# Patient Record
Sex: FEMALE | Race: WHITE | NOT HISPANIC OR LATINO | Employment: UNEMPLOYED | URBAN - METROPOLITAN AREA
[De-identification: names, ages, dates, MRNs, and addresses within clinical notes are randomized per-mention and may not be internally consistent; named-entity substitution may affect disease eponyms.]

---

## 2023-09-07 ENCOUNTER — HOSPITAL ENCOUNTER (INPATIENT)
Dept: DATA CONVERSION | Facility: HOSPITAL | Age: 68
Discharge: NURSING, REHAB OR LONG-TERM CARE | End: 2023-09-13
Payer: COMMERCIAL

## 2023-09-07 DIAGNOSIS — I16.0 HYPERTENSIVE URGENCY: ICD-10-CM

## 2023-09-07 DIAGNOSIS — Z86.16 PERSONAL HISTORY OF COVID-19: ICD-10-CM

## 2023-09-07 DIAGNOSIS — R29.810 FACIAL WEAKNESS: ICD-10-CM

## 2023-09-07 DIAGNOSIS — R33.9 RETENTION OF URINE, UNSPECIFIED: ICD-10-CM

## 2023-09-07 DIAGNOSIS — E66.9 OBESITY, UNSPECIFIED: ICD-10-CM

## 2023-09-07 DIAGNOSIS — I11.0 HYPERTENSIVE HEART DISEASE WITH HEART FAILURE (MULTI): ICD-10-CM

## 2023-09-07 DIAGNOSIS — E87.6 HYPOKALEMIA: ICD-10-CM

## 2023-09-07 DIAGNOSIS — G45.9 TRANSIENT CEREBRAL ISCHEMIC ATTACK, UNSPECIFIED: ICD-10-CM

## 2023-09-07 DIAGNOSIS — Z79.899 OTHER LONG TERM (CURRENT) DRUG THERAPY: ICD-10-CM

## 2023-09-07 DIAGNOSIS — S40.812A ABRASION OF LEFT UPPER ARM, INITIAL ENCOUNTER: ICD-10-CM

## 2023-09-07 DIAGNOSIS — I61.9 NONTRAUMATIC INTRACEREBRAL HEMORRHAGE, UNSPECIFIED (MULTI): ICD-10-CM

## 2023-09-07 DIAGNOSIS — I43 CARDIOMYOPATHY IN DISEASES CLASSIFIED ELSEWHERE (MULTI): ICD-10-CM

## 2023-09-07 DIAGNOSIS — R47.81 SLURRED SPEECH: ICD-10-CM

## 2023-09-07 DIAGNOSIS — Z87.891 PERSONAL HISTORY OF NICOTINE DEPENDENCE: ICD-10-CM

## 2023-09-07 DIAGNOSIS — I50.21 ACUTE SYSTOLIC (CONGESTIVE) HEART FAILURE (MULTI): ICD-10-CM

## 2023-09-07 DIAGNOSIS — S40.811A ABRASION OF RIGHT UPPER ARM, INITIAL ENCOUNTER: ICD-10-CM

## 2023-09-07 DIAGNOSIS — I49.3 VENTRICULAR PREMATURE DEPOLARIZATION: ICD-10-CM

## 2023-09-07 DIAGNOSIS — R47.1 DYSARTHRIA AND ANARTHRIA: ICD-10-CM

## 2023-09-07 DIAGNOSIS — N17.9 ACUTE KIDNEY FAILURE, UNSPECIFIED (CMS-HCC): ICD-10-CM

## 2023-09-07 DIAGNOSIS — I70.1 ATHEROSCLEROSIS OF RENAL ARTERY (CMS-HCC): ICD-10-CM

## 2023-09-07 DIAGNOSIS — Z91.148 PATIENT'S OTHER NONCOMPLIANCE WITH MEDICATION REGIMEN FOR OTHER REASON: ICD-10-CM

## 2023-09-07 LAB
ALBUMIN SERPL-MCNC: 4.3 GM/DL (ref 3.5–5)
ALBUMIN/GLOB SERPL: 1.2 RATIO (ref 1.5–3)
ALP BLD-CCNC: 138 U/L (ref 35–125)
ALT SERPL-CCNC: 11 U/L (ref 5–40)
ANION GAP SERPL CALCULATED.3IONS-SCNC: 15 MMOL/L (ref 0–19)
AST SERPL-CCNC: 19 U/L (ref 5–40)
BACTERIA UR QL AUTO: NEGATIVE
BASOPHILS # BLD AUTO: 0.08 K/UL (ref 0–0.22)
BASOPHILS NFR BLD AUTO: 1 % (ref 0–1)
BILIRUB SERPL-MCNC: 1.1 MG/DL (ref 0.1–1.2)
BILIRUB UR QL STRIP.AUTO: NEGATIVE
BUN SERPL-MCNC: 13 MG/DL (ref 8–25)
BUN/CREAT SERPL: 11.8 RATIO (ref 8–21)
CALCIUM SERPL-MCNC: 9.8 MG/DL (ref 8.5–10.4)
CHLORIDE SERPL-SCNC: 105 MMOL/L (ref 97–107)
CLARITY UR: CLEAR
CO2 SERPL-SCNC: 23 MMOL/L (ref 24–31)
COLOR UR: ABNORMAL
CREAT SERPL-MCNC: 1.1 MG/DL (ref 0.4–1.6)
DEPRECATED RDW RBC AUTO: 39.4 FL (ref 37–54)
DIFFERENTIAL METHOD BLD: ABNORMAL
EOSINOPHIL # BLD AUTO: 0.13 K/UL (ref 0–0.45)
EOSINOPHIL NFR BLD: 1.5 % (ref 0–3)
ERYTHROCYTE [DISTWIDTH] IN BLOOD BY AUTOMATED COUNT: 13.1 % (ref 11.7–15)
GFR SERPL CREATININE-BSD FRML MDRD: 55 ML/MIN/1.73 M2
GLOBULIN SER-MCNC: 3.6 G/DL (ref 1.9–3.7)
GLUCOSE SERPL-MCNC: 104 MG/DL (ref 65–99)
GLUCOSE UR STRIP.AUTO-MCNC: NEGATIVE MG/DL
HCT VFR BLD AUTO: 42.7 % (ref 36–44)
HGB BLD-MCNC: 15.4 GM/DL (ref 12–15)
HGB UR QL STRIP.AUTO: 1 /HPF (ref 0–3)
HGB UR QL: ABNORMAL
HS TROPONIN T DELTA: 1 (ref 0–4)
HS TROPONIN T DELTA: ABNORMAL (ref 0–4)
HYALINE CASTS UR QL AUTO: ABNORMAL /LPF
IMM GRANULOCYTES # BLD AUTO: 0.02 K/UL (ref 0–0.1)
KETONES UR QL STRIP.AUTO: NEGATIVE
LEUKOCYTE ESTERASE UR QL STRIP.AUTO: NEGATIVE
LIPASE SERPL-CCNC: 17 U/L (ref 16–63)
LYMPHOCYTES # BLD AUTO: 1.29 K/UL (ref 1.2–3.2)
LYMPHOCYTES NFR BLD MANUAL: 15.3 % (ref 20–40)
MCH RBC QN AUTO: 30.1 PG (ref 26–34)
MCHC RBC AUTO-ENTMCNC: 36.1 % (ref 31–37)
MCV RBC AUTO: 83.6 FL (ref 80–100)
MICROSCOPIC (UA): ABNORMAL
MONOCYTES # BLD AUTO: 0.6 K/UL (ref 0–0.8)
MONOCYTES NFR BLD MANUAL: 7.1 % (ref 0–8)
NEUTROPHILS # BLD AUTO: 6.29 K/UL
NEUTROPHILS # BLD AUTO: 6.29 K/UL (ref 1.8–7.7)
NEUTROPHILS.IMMATURE NFR BLD: 0.2 % (ref 0–1)
NEUTS SEG NFR BLD: 74.9 % (ref 50–70)
NITRITE UR QL STRIP.AUTO: NEGATIVE
NRBC BLD-RTO: 0 /100 WBC
NT-PROBNP SERPL-MCNC: 7278 PG/ML (ref 0–353)
PH UR STRIP.AUTO: 6 [PH] (ref 4.6–8)
PLATELET # BLD AUTO: 266 K/UL (ref 150–450)
PMV BLD AUTO: 11.7 CU (ref 7–12.6)
POTASSIUM SERPL-SCNC: 3.3 MMOL/L (ref 3.4–5.1)
PROT SERPL-MCNC: 7.9 G/DL (ref 5.9–7.9)
PROT UR STRIP.AUTO-MCNC: 50 MG/DL
RBC # BLD AUTO: 5.11 M/UL (ref 4–4.9)
SODIUM SERPL-SCNC: 143 MMOL/L (ref 133–145)
SP GR UR STRIP.AUTO: 1 (ref 1–1.03)
SQUAMOUS UR QL AUTO: ABNORMAL /HPF
TROPONIN T SERPL-MCNC: 25 NG/L
TROPONIN T SERPL-MCNC: 26 NG/L
URINE CULTURE: ABNORMAL
UROBILINOGEN UR QL STRIP.AUTO: NORMAL MG/DL (ref 0–1)
WBC # BLD AUTO: 8.4 K/UL (ref 4.5–11)
WBC #/AREA URNS AUTO: 1 /HPF (ref 0–3)

## 2023-09-08 LAB
ANION GAP SERPL CALCULATED.3IONS-SCNC: 14 MMOL/L (ref 0–19)
BUN SERPL-MCNC: 17 MG/DL (ref 8–25)
BUN/CREAT SERPL: 11.3 RATIO (ref 8–21)
CALCIUM SERPL-MCNC: 9.2 MG/DL (ref 8.5–10.4)
CHLORIDE SERPL-SCNC: 104 MMOL/L (ref 97–107)
CO2 SERPL-SCNC: 27 MMOL/L (ref 24–31)
CREAT SERPL-MCNC: 1.5 MG/DL (ref 0.4–1.6)
DEPRECATED RDW RBC AUTO: 41.9 FL (ref 37–54)
ERYTHROCYTE [DISTWIDTH] IN BLOOD BY AUTOMATED COUNT: 13.5 % (ref 11.7–15)
GFR SERPL CREATININE-BSD FRML MDRD: 38 ML/MIN/1.73 M2
GLUCOSE SERPL-MCNC: 109 MG/DL (ref 65–99)
HCT VFR BLD AUTO: 39 % (ref 36–44)
HGB BLD-MCNC: 13.7 GM/DL (ref 12–15)
MCH RBC QN AUTO: 29.8 PG (ref 26–34)
MCHC RBC AUTO-ENTMCNC: 35.1 % (ref 31–37)
MCV RBC AUTO: 85 FL (ref 80–100)
NRBC BLD-RTO: 0 /100 WBC
PLATELET # BLD AUTO: 235 K/UL (ref 150–450)
PMV BLD AUTO: 11.6 CU (ref 7–12.6)
POTASSIUM SERPL-SCNC: 3.4 MMOL/L (ref 3.4–5.1)
RBC # BLD AUTO: 4.59 M/UL (ref 4–4.9)
SODIUM SERPL-SCNC: 145 MMOL/L (ref 133–145)
WBC # BLD AUTO: 9.3 K/UL (ref 4.5–11)

## 2023-09-09 LAB
ANION GAP SERPL CALCULATED.3IONS-SCNC: 15 MMOL/L (ref 0–19)
BUN SERPL-MCNC: 25 MG/DL (ref 8–25)
BUN/CREAT SERPL: 15.6 RATIO (ref 8–21)
CALCIUM SERPL-MCNC: 9.4 MG/DL (ref 8.5–10.4)
CHLORIDE SERPL-SCNC: 105 MMOL/L (ref 97–107)
CO2 SERPL-SCNC: 23 MMOL/L (ref 24–31)
CREAT SERPL-MCNC: 1.6 MG/DL (ref 0.4–1.6)
DEPRECATED RDW RBC AUTO: 42.3 FL (ref 37–54)
ERYTHROCYTE [DISTWIDTH] IN BLOOD BY AUTOMATED COUNT: 13.5 % (ref 11.7–15)
GFR SERPL CREATININE-BSD FRML MDRD: 35 ML/MIN/1.73 M2
GLUCOSE SERPL-MCNC: 98 MG/DL (ref 65–99)
HCT VFR BLD AUTO: 41.3 % (ref 36–44)
HGB BLD-MCNC: 14.3 GM/DL (ref 12–15)
MCH RBC QN AUTO: 29.7 PG (ref 26–34)
MCHC RBC AUTO-ENTMCNC: 34.6 % (ref 31–37)
MCV RBC AUTO: 85.9 FL (ref 80–100)
NRBC BLD-RTO: 0 /100 WBC
PLATELET # BLD AUTO: 232 K/UL (ref 150–450)
PMV BLD AUTO: 11.7 CU (ref 7–12.6)
POTASSIUM SERPL-SCNC: 3.9 MMOL/L (ref 3.4–5.1)
RBC # BLD AUTO: 4.81 M/UL (ref 4–4.9)
SODIUM SERPL-SCNC: 143 MMOL/L (ref 133–145)
WBC # BLD AUTO: 7.7 K/UL (ref 4.5–11)

## 2023-09-10 LAB
ALBUMIN SERPL-MCNC: 3.8 GM/DL (ref 3.5–5)
ANION GAP SERPL CALCULATED.3IONS-SCNC: 14 MMOL/L (ref 0–19)
ANTICOAGULANT: NORMAL
APTT PPP: 27.3 SEC (ref 22–32.5)
BASOPHILS # BLD AUTO: 0.06 K/UL (ref 0–0.22)
BASOPHILS NFR BLD AUTO: 0.6 % (ref 0–1)
BUN SERPL-MCNC: 20 MG/DL (ref 8–25)
BUN/CREAT SERPL: 18.2 RATIO (ref 8–21)
CALCIUM SERPL-MCNC: 9.5 MG/DL (ref 8.5–10.4)
CHLORIDE SERPL-SCNC: 106 MMOL/L (ref 97–107)
CO2 SERPL-SCNC: 23 MMOL/L (ref 24–31)
CREAT SERPL-MCNC: 1.1 MG/DL (ref 0.4–1.6)
DEPRECATED RDW RBC AUTO: 41.9 FL (ref 37–54)
DEPRECATED RDW RBC AUTO: 42.3 FL (ref 37–54)
DIFFERENTIAL METHOD BLD: ABNORMAL
EOSINOPHIL # BLD AUTO: 0.2 K/UL (ref 0–0.45)
EOSINOPHIL NFR BLD: 1.9 % (ref 0–3)
ERYTHROCYTE [DISTWIDTH] IN BLOOD BY AUTOMATED COUNT: 13.3 % (ref 11.7–15)
ERYTHROCYTE [DISTWIDTH] IN BLOOD BY AUTOMATED COUNT: 13.5 % (ref 11.7–15)
GFR SERPL CREATININE-BSD FRML MDRD: 55 ML/MIN/1.73 M2
GLUCOSE SERPL-MCNC: 108 MG/DL (ref 65–99)
HCT VFR BLD AUTO: 43.6 % (ref 36–44)
HCT VFR BLD AUTO: 44 % (ref 36–44)
HGB BLD-MCNC: 15.1 GM/DL (ref 12–15)
HGB BLD-MCNC: 15.3 GM/DL (ref 12–15)
IMM GRANULOCYTES # BLD AUTO: 0.02 K/UL (ref 0–0.1)
INR PPP: 1 (ref 0.86–1.16)
LYMPHOCYTES # BLD AUTO: 1.58 K/UL (ref 1.2–3.2)
LYMPHOCYTES NFR BLD MANUAL: 15.2 % (ref 20–40)
MAGNESIUM SERPL-MCNC: 1.8 MG/DL (ref 1.6–3.1)
MCH RBC QN AUTO: 30 PG (ref 26–34)
MCH RBC QN AUTO: 30 PG (ref 26–34)
MCHC RBC AUTO-ENTMCNC: 34.6 % (ref 31–37)
MCHC RBC AUTO-ENTMCNC: 34.8 % (ref 31–37)
MCV RBC AUTO: 86.3 FL (ref 80–100)
MCV RBC AUTO: 86.5 FL (ref 80–100)
MONOCYTES # BLD AUTO: 1.14 K/UL (ref 0–0.8)
MONOCYTES NFR BLD MANUAL: 11 % (ref 0–8)
NEUTROPHILS # BLD AUTO: 7.39 K/UL
NEUTROPHILS # BLD AUTO: 7.39 K/UL (ref 1.8–7.7)
NEUTROPHILS.IMMATURE NFR BLD: 0.2 % (ref 0–1)
NEUTS SEG NFR BLD: 71.1 % (ref 50–70)
NRBC BLD-RTO: 0 /100 WBC
NRBC BLD-RTO: 0 /100 WBC
PHOSPHATE SERPL-MCNC: 3.2 MG/DL (ref 2.5–4.5)
PLATELET # BLD AUTO: 263 K/UL (ref 150–450)
PLATELET # BLD AUTO: 273 K/UL (ref 150–450)
PMV BLD AUTO: 11.3 CU (ref 7–12.6)
PMV BLD AUTO: 11.6 CU (ref 7–12.6)
POTASSIUM SERPL-SCNC: 4 MMOL/L (ref 3.4–5.1)
PROTHROMBIN TIME: 10.9 SEC (ref 9.3–12.7)
RBC # BLD AUTO: 5.04 M/UL (ref 4–4.9)
RBC # BLD AUTO: 5.1 M/UL (ref 4–4.9)
SODIUM SERPL-SCNC: 143 MMOL/L (ref 133–145)
WBC # BLD AUTO: 10.4 K/UL (ref 4.5–11)
WBC # BLD AUTO: 9.7 K/UL (ref 4.5–11)

## 2023-09-11 LAB
ALBUMIN SERPL-MCNC: 3.7 GM/DL (ref 3.5–5)
ALBUMIN SERPL-MCNC: 3.7 GM/DL (ref 3.5–5)
ALBUMIN/GLOB SERPL: 1.2 RATIO (ref 1.5–3)
ALP BLD-CCNC: 111 U/L (ref 35–125)
ALT SERPL-CCNC: 10 U/L (ref 5–40)
ANION GAP SERPL CALCULATED.3IONS-SCNC: 13 MMOL/L (ref 0–19)
ANION GAP SERPL CALCULATED.3IONS-SCNC: 14 MMOL/L (ref 0–19)
ANTICOAGULANT: ABNORMAL
APPEARANCE PLAS: CLEAR
APTT PPP: 51.4 SEC (ref 22–32.5)
AST SERPL-CCNC: 19 U/L (ref 5–40)
BILIRUB SERPL-MCNC: 1.4 MG/DL (ref 0.1–1.2)
BUN SERPL-MCNC: 22 MG/DL (ref 8–25)
BUN SERPL-MCNC: 26 MG/DL (ref 8–25)
BUN/CREAT SERPL: 20 RATIO (ref 8–21)
BUN/CREAT SERPL: 20 RATIO (ref 8–21)
CALCIUM SERPL-MCNC: 9 MG/DL (ref 8.5–10.4)
CALCIUM SERPL-MCNC: 9.2 MG/DL (ref 8.5–10.4)
CHLORIDE SERPL-SCNC: 103 MMOL/L (ref 97–107)
CHLORIDE SERPL-SCNC: 106 MMOL/L (ref 97–107)
CHOLEST SERPL-MCNC: 194 MG/DL (ref 133–200)
CHOLEST/HDLC SERPL: 4.4 RATIO
CO2 SERPL-SCNC: 22 MMOL/L (ref 24–31)
CO2 SERPL-SCNC: 22 MMOL/L (ref 24–31)
COLOR SPUN FLD: YELLOW
CREAT SERPL-MCNC: 1.1 MG/DL (ref 0.4–1.6)
CREAT SERPL-MCNC: 1.3 MG/DL (ref 0.4–1.6)
FASTING STATUS PATIENT QL REPORTED: ABNORMAL
GFR SERPL CREATININE-BSD FRML MDRD: 45 ML/MIN/1.73 M2
GFR SERPL CREATININE-BSD FRML MDRD: 55 ML/MIN/1.73 M2
GLOBULIN SER-MCNC: 3.2 G/DL (ref 1.9–3.7)
GLUCOSE SERPL-MCNC: 106 MG/DL (ref 65–99)
GLUCOSE SERPL-MCNC: 107 MG/DL (ref 65–99)
HBA1C MFR BLD: 5.4 % (ref 4–6)
HDLC SERPL-MCNC: 44 MG/DL
LDLC SERPL CALC-MCNC: 127 MG/DL (ref 65–130)
MAGNESIUM SERPL-MCNC: 2.1 MG/DL (ref 1.6–3.1)
MAGNESIUM SERPL-MCNC: 2.2 MG/DL (ref 1.6–3.1)
PHOSPHATE SERPL-MCNC: 3.4 MG/DL (ref 2.5–4.5)
POTASSIUM SERPL-SCNC: 3.4 MMOL/L (ref 3.4–5.1)
POTASSIUM SERPL-SCNC: 3.6 MMOL/L (ref 3.4–5.1)
PROT SERPL-MCNC: 6.9 G/DL (ref 5.9–7.9)
SODIUM SERPL-SCNC: 139 MMOL/L (ref 133–145)
SODIUM SERPL-SCNC: 141 MMOL/L (ref 133–145)
TRIGL SERPL-MCNC: 113 MG/DL (ref 40–150)

## 2023-09-12 LAB
ALBUMIN SERPL-MCNC: 3.4 GM/DL (ref 3.5–5)
ANION GAP SERPL CALCULATED.3IONS-SCNC: 14 MMOL/L (ref 0–19)
BUN SERPL-MCNC: 29 MG/DL (ref 8–25)
BUN/CREAT SERPL: 22.3 RATIO (ref 8–21)
CALCIUM SERPL-MCNC: 9 MG/DL (ref 8.5–10.4)
CHLORIDE SERPL-SCNC: 107 MMOL/L (ref 97–107)
CO2 SERPL-SCNC: 22 MMOL/L (ref 24–31)
CREAT SERPL-MCNC: 1.3 MG/DL (ref 0.4–1.6)
GFR SERPL CREATININE-BSD FRML MDRD: 45 ML/MIN/1.73 M2
GLUCOSE SERPL-MCNC: 95 MG/DL (ref 65–99)
MAGNESIUM SERPL-MCNC: 2 MG/DL (ref 1.6–3.1)
PHOSPHATE SERPL-MCNC: 4 MG/DL (ref 2.5–4.5)
POTASSIUM SERPL-SCNC: 3.5 MMOL/L (ref 3.4–5.1)
SODIUM SERPL-SCNC: 142 MMOL/L (ref 133–145)
VIT B12 SERPL-MCNC: 361 PG/ML (ref 211–946)

## 2023-09-13 LAB
ALDOST SERPL-MCNC: NORMAL NG/DL
ANION GAP SERPL CALCULATED.3IONS-SCNC: 10 MMOL/L (ref 0–19)
BUN SERPL-MCNC: 33 MG/DL (ref 8–25)
BUN/CREAT SERPL: 25.4 RATIO (ref 8–21)
CALCIUM SERPL-MCNC: 9 MG/DL (ref 8.5–10.4)
CHLORIDE SERPL-SCNC: 107 MMOL/L (ref 97–107)
CO2 SERPL-SCNC: 24 MMOL/L (ref 24–31)
CREAT SERPL-MCNC: 1.3 MG/DL (ref 0.4–1.6)
DEPRECATED RDW RBC AUTO: 41.4 FL (ref 37–54)
ERYTHROCYTE [DISTWIDTH] IN BLOOD BY AUTOMATED COUNT: 13.2 % (ref 11.7–15)
GFR SERPL CREATININE-BSD FRML MDRD: 45 ML/MIN/1.73 M2
GLUCOSE SERPL-MCNC: 102 MG/DL (ref 65–99)
HCT VFR BLD AUTO: 38.9 % (ref 36–44)
HGB BLD-MCNC: 13.5 GM/DL (ref 12–15)
MCH RBC QN AUTO: 29.9 PG (ref 26–34)
MCHC RBC AUTO-ENTMCNC: 34.7 % (ref 31–37)
MCV RBC AUTO: 86.1 FL (ref 80–100)
NRBC BLD-RTO: 0 /100 WBC
PLATELET # BLD AUTO: 225 K/UL (ref 150–450)
PMV BLD AUTO: 11.5 CU (ref 7–12.6)
POTASSIUM SERPL-SCNC: 3.4 MMOL/L (ref 3.4–5.1)
RBC # BLD AUTO: 4.52 M/UL (ref 4–4.9)
SODIUM SERPL-SCNC: 141 MMOL/L (ref 133–145)
WBC # BLD AUTO: 8.4 K/UL (ref 4.5–11)

## 2023-09-14 LAB
FOLATE RBC-MCNC: NORMAL NG/ML
HCT VFR BLD AUTO: 42.9 % (ref 36–44)
RENIN PLAS-CCNC: NORMAL NG/ML/H

## 2023-09-15 LAB
BACTERIA SPEC CULT: NORMAL
BILIRUB UR QL STRIP.AUTO: NEGATIVE
CLARITY UR: ABNORMAL
COLOR UR: YELLOW
EPITH CASTS #/AREA UR COMP ASSIST: ABNORMAL /HPF
GLUCOSE UR STRIP.AUTO-MCNC: NEGATIVE MG/DL
HGB UR QL STRIP.AUTO: ABNORMAL /HPF (ref 0–3)
HGB UR QL: ABNORMAL
KETONES UR QL STRIP.AUTO: NEGATIVE
LEUKOCYTE ESTERASE UR QL STRIP.AUTO: ABNORMAL
NITRITE UR QL STRIP.AUTO: NEGATIVE
PH UR STRIP.AUTO: 5.5 [PH] (ref 4.6–8)
PROT UR STRIP.AUTO-MCNC: ABNORMAL MG/DL
REFLEX MICROSCOPIC (UA): ABNORMAL
REPORT STATUS -LH SQ DATA CONVERSION: NORMAL
SERVICE CMNT-IMP: NORMAL
SP GR UR STRIP.AUTO: 1.02 (ref 1–1.03)
SPECIMEN SOURCE: NORMAL
UNSPECIFIED CRY UR QL COMP ASSIST: ABNORMAL
UROBILINOGEN UR QL STRIP.AUTO: NORMAL MG/DL (ref 0–1)
WBC #/AREA URNS AUTO: ABNORMAL /HPF (ref 0–3)

## 2023-09-18 LAB — METHYLMALONATE SERPL-SCNC: NORMAL

## 2023-09-21 LAB
ANION GAP SERPL CALCULATED.3IONS-SCNC: 10 MMOL/L (ref 0–19)
BUN SERPL-MCNC: 18 MG/DL (ref 8–25)
BUN/CREAT SERPL: 15 RATIO (ref 8–21)
CALCIUM SERPL-MCNC: 9.2 MG/DL (ref 8.5–10.4)
CHLORIDE SERPL-SCNC: 108 MMOL/L (ref 97–107)
CO2 SERPL-SCNC: 26 MMOL/L (ref 24–31)
CREAT SERPL-MCNC: 1.2 MG/DL (ref 0.4–1.6)
DEPRECATED RDW RBC AUTO: 42.1 FL (ref 37–54)
ERYTHROCYTE [DISTWIDTH] IN BLOOD BY AUTOMATED COUNT: 12.9 % (ref 11.7–15)
GFR SERPL CREATININE-BSD FRML MDRD: 50 ML/MIN/1.73 M2
GLUCOSE SERPL-MCNC: 99 MG/DL (ref 65–99)
HCT VFR BLD AUTO: 37.7 % (ref 36–44)
HGB BLD-MCNC: 13.1 GM/DL (ref 12–15)
MCH RBC QN AUTO: 30.8 PG (ref 26–34)
MCHC RBC AUTO-ENTMCNC: 34.7 % (ref 31–37)
MCV RBC AUTO: 88.5 FL (ref 80–100)
NRBC BLD-RTO: 0 /100 WBC
PLATELET # BLD AUTO: 259 K/UL (ref 150–450)
PMV BLD AUTO: 12 CU (ref 7–12.6)
POTASSIUM SERPL-SCNC: 3.6 MMOL/L (ref 3.4–5.1)
RBC # BLD AUTO: 4.26 M/UL (ref 4–4.9)
SODIUM SERPL-SCNC: 143 MMOL/L (ref 133–145)
WBC # BLD AUTO: 5.8 K/UL (ref 4.5–11)

## 2023-09-28 ENCOUNTER — HOSPITAL ENCOUNTER (OUTPATIENT)
Dept: DATA CONVERSION | Facility: HOSPITAL | Age: 68
Discharge: HOME | End: 2023-09-28
Payer: COMMERCIAL

## 2023-09-28 DIAGNOSIS — Z00.00 ENCOUNTER FOR GENERAL ADULT MEDICAL EXAMINATION WITHOUT ABNORMAL FINDINGS: ICD-10-CM

## 2023-09-28 LAB
ANION GAP SERPL CALCULATED.3IONS-SCNC: 10 MMOL/L (ref 0–19)
BUN SERPL-MCNC: 18 MG/DL (ref 8–25)
BUN/CREAT SERPL: 15 RATIO (ref 8–21)
CALCIUM SERPL-MCNC: 9 MG/DL (ref 8.5–10.4)
CHLORIDE SERPL-SCNC: 104 MMOL/L (ref 97–107)
CO2 SERPL-SCNC: 27 MMOL/L (ref 24–31)
CREAT SERPL-MCNC: 1.2 MG/DL (ref 0.4–1.6)
DEPRECATED RDW RBC AUTO: 40.9 FL (ref 37–54)
ERYTHROCYTE [DISTWIDTH] IN BLOOD BY AUTOMATED COUNT: 12.8 % (ref 11.7–15)
GFR SERPL CREATININE-BSD FRML MDRD: 50 ML/MIN/1.73 M2
GLUCOSE SERPL-MCNC: 93 MG/DL (ref 65–99)
HCT VFR BLD AUTO: 38.9 % (ref 36–44)
HGB BLD-MCNC: 13.5 GM/DL (ref 12–15)
MCH RBC QN AUTO: 30.4 PG (ref 26–34)
MCHC RBC AUTO-ENTMCNC: 34.7 % (ref 31–37)
MCV RBC AUTO: 87.6 FL (ref 80–100)
NRBC BLD-RTO: 0 /100 WBC
PLATELET # BLD AUTO: 221 K/UL (ref 150–450)
PMV BLD AUTO: 11.8 CU (ref 7–12.6)
POTASSIUM SERPL-SCNC: 3.8 MMOL/L (ref 3.4–5.1)
RBC # BLD AUTO: 4.44 M/UL (ref 4–4.9)
SODIUM SERPL-SCNC: 141 MMOL/L (ref 133–145)
WBC # BLD AUTO: 6.9 K/UL (ref 4.5–11)

## 2023-10-05 ENCOUNTER — PATIENT OUTREACH (OUTPATIENT)
Dept: CASE MANAGEMENT | Facility: HOSPITAL | Age: 68
End: 2023-10-05
Payer: COMMERCIAL

## 2023-10-06 ENCOUNTER — DOCUMENTATION (OUTPATIENT)
Dept: CASE MANAGEMENT | Facility: HOSPITAL | Age: 68
End: 2023-10-06
Payer: COMMERCIAL

## 2023-10-13 ENCOUNTER — PATIENT OUTREACH (OUTPATIENT)
Dept: CASE MANAGEMENT | Facility: HOSPITAL | Age: 68
End: 2023-10-13
Payer: COMMERCIAL

## 2023-10-13 NOTE — PROGRESS NOTES
Called out to Tammy who was DC on 9/13/23. She was in rehab until a few days ago. She is set up with an appointment with Raisa JONES/[facility] on Monday. I provided her with Dr. Alonzo's number to make a follow up visit with him. She was started on Entresto and carvedilol during her hospital stay and states she is taking her meds as prescribed. She denies HF symptoms and states she is doing well.

## 2023-10-14 PROBLEM — G45.9 TRANSIENT ISCHEMIC ATTACK: Status: ACTIVE | Noted: 2023-10-14

## 2023-10-14 PROBLEM — I70.1 RENAL ARTERY STENOSIS (CMS-HCC): Status: ACTIVE | Noted: 2023-10-14

## 2023-10-14 PROBLEM — R47.81 SLURRED SPEECH: Status: ACTIVE | Noted: 2023-10-14

## 2023-10-14 PROBLEM — I10 ESSENTIAL HYPERTENSION: Status: ACTIVE | Noted: 2023-10-14

## 2023-10-14 PROBLEM — I50.9 ACUTE CONGESTIVE HEART FAILURE (MULTI): Status: ACTIVE | Noted: 2023-10-14

## 2023-10-14 PROBLEM — R33.9 URINARY RETENTION: Status: ACTIVE | Noted: 2023-10-14

## 2023-10-14 PROBLEM — I70.1 ATHEROSCLEROSIS OF RENAL ARTERY (CMS-HCC): Status: ACTIVE | Noted: 2023-10-14

## 2023-10-14 PROBLEM — I50.21 ACUTE SYSTOLIC HEART FAILURE (MULTI): Status: ACTIVE | Noted: 2023-10-14

## 2023-10-14 PROBLEM — R47.1 DYSARTHRIA: Status: ACTIVE | Noted: 2023-10-14

## 2023-10-14 PROBLEM — I16.0 HYPERTENSIVE URGENCY: Status: ACTIVE | Noted: 2023-10-14

## 2023-10-14 PROBLEM — N23 URINARY TRACT PAIN: Status: ACTIVE | Noted: 2023-10-14

## 2023-10-14 PROBLEM — R06.09 DYSPNEA ON EXERTION: Status: ACTIVE | Noted: 2023-10-14

## 2023-10-14 RX ORDER — SACUBITRIL AND VALSARTAN 49; 51 MG/1; MG/1
1 TABLET, FILM COATED ORAL 2 TIMES DAILY
COMMUNITY
End: 2023-10-16 | Stop reason: SDUPTHER

## 2023-10-14 RX ORDER — ATENOLOL 25 MG/1
25 TABLET ORAL DAILY
COMMUNITY
Start: 2023-10-03 | End: 2023-10-16 | Stop reason: DRUGHIGH

## 2023-10-14 RX ORDER — CLOPIDOGREL BISULFATE 75 MG/1
75 TABLET ORAL DAILY
COMMUNITY
End: 2023-10-16 | Stop reason: ALTCHOICE

## 2023-10-14 RX ORDER — AMLODIPINE BESYLATE 10 MG/1
10 TABLET ORAL DAILY
COMMUNITY
End: 2023-10-16 | Stop reason: WASHOUT

## 2023-10-14 RX ORDER — MIRTAZAPINE 15 MG/1
15 TABLET, FILM COATED ORAL NIGHTLY
COMMUNITY
Start: 2023-10-03 | End: 2023-10-16 | Stop reason: SDUPTHER

## 2023-10-14 RX ORDER — NYSTATIN 100000 [USP'U]/G
1 POWDER TOPICAL 2 TIMES DAILY
COMMUNITY
End: 2023-10-16 | Stop reason: ALTCHOICE

## 2023-10-14 RX ORDER — LABETALOL 200 MG/1
200 TABLET, FILM COATED ORAL 2 TIMES DAILY
COMMUNITY
End: 2023-10-16 | Stop reason: ALTCHOICE

## 2023-10-14 RX ORDER — NAPROXEN SODIUM 220 MG/1
81 TABLET, FILM COATED ORAL DAILY
COMMUNITY

## 2023-10-14 RX ORDER — ATORVASTATIN CALCIUM 40 MG/1
40 TABLET, FILM COATED ORAL NIGHTLY
COMMUNITY
End: 2023-10-16 | Stop reason: SDUPTHER

## 2023-10-14 RX ORDER — TAMSULOSIN HYDROCHLORIDE 0.4 MG/1
0.4 CAPSULE ORAL DAILY
COMMUNITY
End: 2023-10-16 | Stop reason: ALTCHOICE

## 2023-10-16 ENCOUNTER — OFFICE VISIT (OUTPATIENT)
Dept: PRIMARY CARE | Facility: CLINIC | Age: 68
End: 2023-10-16
Payer: COMMERCIAL

## 2023-10-16 VITALS
RESPIRATION RATE: 16 BRPM | HEART RATE: 88 BPM | HEIGHT: 66 IN | DIASTOLIC BLOOD PRESSURE: 112 MMHG | TEMPERATURE: 97 F | BODY MASS INDEX: 29.09 KG/M2 | OXYGEN SATURATION: 97 % | WEIGHT: 181 LBS | SYSTOLIC BLOOD PRESSURE: 200 MMHG

## 2023-10-16 DIAGNOSIS — E78.2 MODERATE MIXED HYPERLIPIDEMIA NOT REQUIRING STATIN THERAPY: Primary | ICD-10-CM

## 2023-10-16 DIAGNOSIS — I10 HTN, GOAL BELOW 140/80: ICD-10-CM

## 2023-10-16 DIAGNOSIS — I50.41 ACUTE COMBINED SYSTOLIC AND DIASTOLIC CONGESTIVE HEART FAILURE (MULTI): ICD-10-CM

## 2023-10-16 DIAGNOSIS — F51.01 PRIMARY INSOMNIA: ICD-10-CM

## 2023-10-16 PROBLEM — N13.9 OBSTRUCTIVE AND REFLUX UROPATHY, UNSPECIFIED: Status: ACTIVE | Noted: 2023-09-21

## 2023-10-16 PROBLEM — Z91.199 NONCOMPLIANCE: Status: ACTIVE | Noted: 2023-09-13

## 2023-10-16 PROBLEM — Z86.16 PERSONAL HISTORY OF COVID-19: Status: ACTIVE | Noted: 2023-09-13

## 2023-10-16 PROBLEM — E87.6 HYPOKALEMIA: Status: ACTIVE | Noted: 2023-09-13

## 2023-10-16 PROCEDURE — 1159F MED LIST DOCD IN RCRD: CPT | Performed by: NURSE PRACTITIONER

## 2023-10-16 PROCEDURE — 1036F TOBACCO NON-USER: CPT | Performed by: NURSE PRACTITIONER

## 2023-10-16 PROCEDURE — 99350 HOME/RES VST EST HIGH MDM 60: CPT | Performed by: NURSE PRACTITIONER

## 2023-10-16 PROCEDURE — 3080F DIAST BP >= 90 MM HG: CPT | Performed by: NURSE PRACTITIONER

## 2023-10-16 PROCEDURE — 1126F AMNT PAIN NOTED NONE PRSNT: CPT | Performed by: NURSE PRACTITIONER

## 2023-10-16 PROCEDURE — 1160F RVW MEDS BY RX/DR IN RCRD: CPT | Performed by: NURSE PRACTITIONER

## 2023-10-16 PROCEDURE — 3077F SYST BP >= 140 MM HG: CPT | Performed by: NURSE PRACTITIONER

## 2023-10-16 RX ORDER — MIRTAZAPINE 15 MG/1
15 TABLET, FILM COATED ORAL NIGHTLY
Qty: 30 TABLET | Refills: 2 | Status: SHIPPED | OUTPATIENT
Start: 2023-10-16 | End: 2024-01-23

## 2023-10-16 RX ORDER — ATENOLOL 50 MG/1
50 TABLET ORAL DAILY
Qty: 30 TABLET | Refills: 5 | Status: SHIPPED | OUTPATIENT
Start: 2023-10-16 | End: 2023-11-13 | Stop reason: SDUPTHER

## 2023-10-16 RX ORDER — SACUBITRIL AND VALSARTAN 49; 51 MG/1; MG/1
1 TABLET, FILM COATED ORAL 2 TIMES DAILY
Qty: 60 TABLET | Refills: 2 | Status: SHIPPED | OUTPATIENT
Start: 2023-10-16 | End: 2023-11-20 | Stop reason: DRUGHIGH

## 2023-10-16 RX ORDER — ATORVASTATIN CALCIUM 40 MG/1
40 TABLET, FILM COATED ORAL NIGHTLY
Qty: 30 TABLET | Refills: 2 | Status: SHIPPED | OUTPATIENT
Start: 2023-10-16 | End: 2024-01-23

## 2023-10-16 ASSESSMENT — ENCOUNTER SYMPTOMS
DEPRESSION: 0
OCCASIONAL FEELINGS OF UNSTEADINESS: 1
LOSS OF SENSATION IN FEET: 0

## 2023-10-16 ASSESSMENT — PAIN SCALES - GENERAL: PAINLEVEL: 0-NO PAIN

## 2023-10-16 NOTE — PROGRESS NOTES
Subjective   Patient ID: Tammy Walker is a 68 y.o. female who presents for Follow-up (Post Acute).    Patient is seen in her private home sitting up on the sofa in the living room.  She is awake and alert with appropriate awareness and is her own decision maker.  Currently is non-amabulatory, can bear weight only and propels herself in the wheelchair. PMH: HTN, Covid-19 appx 2021, Hyperlipidemia, TIA.  Medical Necessity - Homebound due to functional decline from Covid-19 Long Hauler.    Today is an initial visit to establish with [facility].  Referral  to [facility] placed by Jamee Lowry NP to assist/arrange f/u for heart failure.   Post Acute:  Admitted to [facility] 9/7/23. Presented to the ED for evaluation of increased urinary frequency and burning x 5 days. Patient also reported increased swelling of her legs and SOB with exertion. Significant HTN noted in the ED,  systolic, hydralazine given. 226 on repeat evaluation, labetalol given. Rush catheter placed with over 800 cc urine output. U/A showed no evidence of UTI at this time. Troponin 26, BNP 7278.  I-Team called 9/10/23. Per NP note: Patient suddenly with speech change, not making sense. Last known normal was about 20 minutes earlier. Hypertensive; noted to be having labile BP since admission and medications have been adjusted. Stat CT of head without contrast done - verbal report is no acute change. Definite improvement after return to the unit after CT done. Speech clear and well articulated and she knows that she is speaking correctly/remembers not speaking correctly. Vision now to baseline and upper extremities no longer show neglect. Lower extremities still show right sided neglect. Discussed with Dr. Sheridan/ neurology on call. Due to improving symptoms will not do TNK. Will start heparin infusion. Obtain MRI/MRA in am. Consult neurology NP progress note: MRI negative for acute stroke however showed  "severe small vessel ischemic disease with multiple small areas of microhemorrhages consistent with severe uncontrolled HTN. Patient discharged to [facility],    Since having Covid in 2021 increased weakness, HTN and new onset A-fib.  Active with Dr. Alonzo, nisreen has note seen since her last hospitalization in September.  Since that time she is not able to ambulate.  She did go to SNF after hospital discharge in September, however reports that she did not make much progress.  Reports that she thought that home therapies was arranged, however states that she never heard from anyone.  She does has [employer] and may be due to pre-authorization of additional therapies.  Today she reports a headache with occassional dizziness.  Reports that she has not had this in a while, has not taken anything over the counter.  Denies double vision, nausea or the worse headache she ever had.  Appetite is reported as good, however does report appx 26 lb weight loss over the past two months.          Current Outpatient Medications:     aspirin 81 mg chewable tablet, Chew 1 tablet (81 mg) once daily., Disp: , Rfl:     atenolol (Tenormin) 50 mg tablet, Take 1 tablet (50 mg) by mouth once daily., Disp: 30 tablet, Rfl: 5    atorvastatin (Lipitor) 40 mg tablet, Take 1 tablet (40 mg) by mouth once daily at bedtime., Disp: 30 tablet, Rfl: 2    Entresto 49-51 mg tablet, Take 1 tablet by mouth 2 times a day., Disp: 60 tablet, Rfl: 2    mirtazapine (Remeron) 15 mg tablet, Take 1 tablet (15 mg) by mouth once daily at bedtime., Disp: 30 tablet, Rfl: 2     Review of Systems    Objective   BP (!) 200/112 (BP Location: Right arm, Patient Position: Sitting) Comment: Repeat 191/100  Pulse 88   Temp 36.1 °C (97 °F) (Temporal)   Resp 16   Ht 1.676 m (5' 6\")   Wt 82.1 kg (181 lb)   SpO2 97%   BMI 29.21 kg/m²     Physical Exam    Assessment/Plan   Problem List Items Addressed This Visit             ICD-10-CM    Acute congestive heart " failure (CMS/[facility]) I50.9    Relevant Medications    atenolol (Tenormin) 50 mg tablet    Entresto 49-51 mg tablet    Primary insomnia F51.01    Relevant Medications    mirtazapine (Remeron) 15 mg tablet    Moderate mixed hyperlipidemia not requiring statin therapy - Primary E78.2    Relevant Medications    atorvastatin (Lipitor) 40 mg tablet     Other Visit Diagnoses         Codes    HTN, goal below 140/80     I10          Will continue with [facility] visits with follow up in 4 weeks.  Routine labs at that time.  HTN; schedule appt with Dr. Alonzo-cardiology  BP: goal less than 140/80 - Today additional atenolol 25mg given - Increased dosage to 50mg daily.  Great deal of education to monitor blood pressure daily - call provider if BP is consistently greater than goal.       Raisa Goff, APRN-CNP

## 2023-10-26 ENCOUNTER — TELEPHONE (OUTPATIENT)
Dept: PRIMARY CARE | Facility: CLINIC | Age: 68
End: 2023-10-26
Payer: COMMERCIAL

## 2023-10-26 DIAGNOSIS — R53.1 DECREASED STRENGTH, ENDURANCE, AND MOBILITY: Primary | ICD-10-CM

## 2023-10-26 DIAGNOSIS — R68.89 DECREASED STRENGTH, ENDURANCE, AND MOBILITY: Primary | ICD-10-CM

## 2023-10-26 DIAGNOSIS — Z74.09 DECREASED STRENGTH, ENDURANCE, AND MOBILITY: Primary | ICD-10-CM

## 2023-10-26 NOTE — TELEPHONE ENCOUNTER
Patient seen 10/2023 by [facility] provider CHARLI Goff NP.  If patient is still in need of [facility] services please send referral and notes including therapy needs information directly to [facility] at [phone] (fax).

## 2023-11-13 DIAGNOSIS — I50.41 ACUTE COMBINED SYSTOLIC AND DIASTOLIC CONGESTIVE HEART FAILURE (MULTI): ICD-10-CM

## 2023-11-13 RX ORDER — ATENOLOL 50 MG/1
50 TABLET ORAL DAILY
Qty: 90 TABLET | Refills: 3 | Status: SHIPPED | OUTPATIENT
Start: 2023-11-13 | End: 2023-11-20 | Stop reason: SDUPTHER

## 2023-11-17 ENCOUNTER — TELEPHONE (OUTPATIENT)
Dept: PRIMARY CARE | Facility: CLINIC | Age: 68
End: 2023-11-17
Payer: COMMERCIAL

## 2023-11-20 ENCOUNTER — APPOINTMENT (OUTPATIENT)
Dept: CARDIOLOGY | Facility: CLINIC | Age: 68
End: 2023-11-20
Payer: COMMERCIAL

## 2023-11-20 ENCOUNTER — OFFICE VISIT (OUTPATIENT)
Dept: PRIMARY CARE | Facility: CLINIC | Age: 68
End: 2023-11-20
Payer: COMMERCIAL

## 2023-11-20 VITALS
HEIGHT: 66 IN | OXYGEN SATURATION: 96 % | RESPIRATION RATE: 16 BRPM | BODY MASS INDEX: 30.53 KG/M2 | SYSTOLIC BLOOD PRESSURE: 204 MMHG | HEART RATE: 58 BPM | WEIGHT: 190 LBS | DIASTOLIC BLOOD PRESSURE: 114 MMHG | TEMPERATURE: 96.7 F

## 2023-11-20 DIAGNOSIS — M10.9 GOUT OF MULTIPLE SITES, UNSPECIFIED CAUSE, UNSPECIFIED CHRONICITY: ICD-10-CM

## 2023-11-20 DIAGNOSIS — I10 ESSENTIAL (PRIMARY) HYPERTENSION: Primary | Chronic | ICD-10-CM

## 2023-11-20 DIAGNOSIS — E78.2 MIXED HYPERLIPIDEMIA: ICD-10-CM

## 2023-11-20 DIAGNOSIS — Z86.16 PERSONAL HISTORY OF COVID-19: ICD-10-CM

## 2023-11-20 DIAGNOSIS — I50.41 ACUTE COMBINED SYSTOLIC AND DIASTOLIC CONGESTIVE HEART FAILURE (MULTI): ICD-10-CM

## 2023-11-20 DIAGNOSIS — E55.9 VITAMIN D DEFICIENCY: Chronic | ICD-10-CM

## 2023-11-20 PROCEDURE — 82306 VITAMIN D 25 HYDROXY: CPT

## 2023-11-20 PROCEDURE — 3080F DIAST BP >= 90 MM HG: CPT | Performed by: NURSE PRACTITIONER

## 2023-11-20 PROCEDURE — 1126F AMNT PAIN NOTED NONE PRSNT: CPT | Performed by: NURSE PRACTITIONER

## 2023-11-20 PROCEDURE — 80061 LIPID PANEL: CPT

## 2023-11-20 PROCEDURE — 80053 COMPREHEN METABOLIC PANEL: CPT

## 2023-11-20 PROCEDURE — 1036F TOBACCO NON-USER: CPT | Performed by: NURSE PRACTITIONER

## 2023-11-20 PROCEDURE — 3077F SYST BP >= 140 MM HG: CPT | Performed by: NURSE PRACTITIONER

## 2023-11-20 PROCEDURE — 1160F RVW MEDS BY RX/DR IN RCRD: CPT | Performed by: NURSE PRACTITIONER

## 2023-11-20 PROCEDURE — 84550 ASSAY OF BLOOD/URIC ACID: CPT

## 2023-11-20 PROCEDURE — 85025 COMPLETE CBC W/AUTO DIFF WBC: CPT

## 2023-11-20 PROCEDURE — 1159F MED LIST DOCD IN RCRD: CPT | Performed by: NURSE PRACTITIONER

## 2023-11-20 PROCEDURE — 99350 HOME/RES VST EST HIGH MDM 60: CPT | Performed by: NURSE PRACTITIONER

## 2023-11-20 RX ORDER — SACUBITRIL AND VALSARTAN 97; 103 MG/1; MG/1
1 TABLET, FILM COATED ORAL 2 TIMES DAILY
Qty: 180 TABLET | Refills: 12 | Status: SHIPPED | OUTPATIENT
Start: 2023-11-20 | End: 2024-05-15 | Stop reason: SINTOL

## 2023-11-20 RX ORDER — ACETAMINOPHEN 500 MG
5000 TABLET ORAL DAILY
Qty: 12 TABLET | Refills: 2 | Status: SHIPPED | OUTPATIENT
Start: 2023-11-20 | End: 2023-12-20

## 2023-11-20 RX ORDER — FUROSEMIDE 40 MG/1
40 TABLET ORAL DAILY
Qty: 30 TABLET | Refills: 11 | Status: SHIPPED | OUTPATIENT
Start: 2023-11-20 | End: 2024-11-19

## 2023-11-20 RX ORDER — ATENOLOL 50 MG/1
50 TABLET ORAL 2 TIMES DAILY
Qty: 90 TABLET | Refills: 3 | Status: SHIPPED | OUTPATIENT
Start: 2023-11-20 | End: 2023-12-18 | Stop reason: SDUPTHER

## 2023-11-20 ASSESSMENT — ENCOUNTER SYMPTOMS
DIZZINESS: 1
BLOOD IN STOOL: 0
FATIGUE: 0
SPEECH DIFFICULTY: 0
UNEXPECTED WEIGHT CHANGE: 0
CONSTIPATION: 0
FREQUENCY: 1
WHEEZING: 0
HEADACHES: 0
SHORTNESS OF BREATH: 0
FEVER: 0
VOMITING: 0
PSYCHIATRIC NEGATIVE: 1
TREMORS: 0
DIFFICULTY URINATING: 0
WEAKNESS: 1
ARTHRALGIAS: 1
APPETITE CHANGE: 0
CHEST TIGHTNESS: 0
ACTIVITY CHANGE: 0
ENDOCRINE NEGATIVE: 1
CARDIOVASCULAR NEGATIVE: 1
CHILLS: 0
DIARRHEA: 1

## 2023-11-20 ASSESSMENT — PAIN SCALES - GENERAL: PAINLEVEL: 0-NO PAIN

## 2023-11-20 NOTE — PROGRESS NOTES
"Subjective   Patient ID: Tammy Walker is a 68 y.o. female who presents for Follow-up (HTN, Lab draw, functional decline).    Patient is seen in her private home sitting up on the sofa in the living room.  She is awake and alert with appropriate awareness and is her own decision maker.  Currently is non-amabulatory, can bear weight only and propels herself in the wheelchair. PMH: HTN, Covid-19 appx 2021, Hyperlipidemia, TIA.  Medical Necessity - Homebound due to functional decline from Covid-19 Long Hauler.    Today is a follow up for HTN and functional decline.  Sister reports giving atenolol bid which appears to be helping.  Sister is montoring her blood pressure daily, and has been quite elevate with SBP of 190's and DBP > 100's.  Blood pressures checked multiple times during home visit with BP of 200/113.  This provider recommended ER due to elevated BP, patient and sister are refusing at this time.  2nd dosage of Entresto administered during visit.  Great deal of education regarding body's inability to withstand elevated blood pressures on a regular basis and is high risk for stroke and/or other complications.  Appointment scheduled with cardiology Dr. Alonzo Jan 3rd. Although blood pressure is elevated, she is asymptomatic of HTN.  Denies headache, dizziness, double vision or shortness of breath.  However she does present with 3+ pitting edema bilateral lower extremities.  Reports that her feet are sore, however reports \"doesn't seem to bother me\".  Again a great deal of education regarding risk of fluid overload.  Admits to monitoring her weights \"a couple times weekly\".  Actual weight today is 190 lbs with some weight gain from last home visit.   Appetite is reported as good, mentions \"I don't put salt on my food\", however meals at times are processed and frozen foods.  Reports that bowels are regular.  Reports urinating frequently, but denies pain, burning or discomfort.  Admits that she is inactive and " remains stationary on the sofa most of the day.  Does have the ability to stand and transfer on her own, however is only transferring to wheelchair and propelling herself around.  Has the ability to ambulate, however is choosing not to ambulate due to fear of falling.  Sister reports that they did order a stationary bike for exercising.  Recommendations are for PT/OT in the home setting, however she is refusing.  Denies chest pain, palpitations, tachycardia, and shortness of breath, wheezing, no PND/orthopnea, or respiratory complaints for the patient. There is no fever, or chills. No nausea, vomiting, diarrhea, headaches, or vision changes or recent falls. There is no hematuria, dysuria, flank pain, or increased urgency.  Home Visit: Medically necessary due to: dementia/cognitive impairment, unsteady gait/poor balance, shortness of breath with minimal exertion, Illness or condition that results in activity limitation or restriction that impacts the ability to leave home such as: equipment and /or human assistance needed to safely leave the home, patient has limited support systems to help the patient attend office visits, the office visit would require excessive physical effort or pain for the patient.            Review of Systems   Constitutional:  Negative for activity change, appetite change, chills, fatigue, fever and unexpected weight change.   HENT: Negative.     Respiratory:  Negative for chest tightness, shortness of breath and wheezing.    Cardiovascular: Negative.    Gastrointestinal:  Positive for diarrhea. Negative for blood in stool, constipation and vomiting.   Endocrine: Negative.    Genitourinary:  Positive for frequency and urgency. Negative for difficulty urinating.   Musculoskeletal:  Positive for arthralgias and gait problem.   Skin: Negative.    Neurological:  Positive for dizziness and weakness. Negative for tremors, speech difficulty and headaches.   Psychiatric/Behavioral: Negative.    "      Objective   BP (!) 204/114 Comment: Repeat 196/100  Pulse 58   Temp 35.9 °C (96.7 °F) (Temporal)   Resp 16   Ht 1.676 m (5' 6\")   Wt 86.2 kg (190 lb)   SpO2 96%   BMI 30.67 kg/m²     Physical Exam  Constitutional:       Appearance: Normal appearance.   HENT:      Head: Normocephalic and atraumatic.      Nose: Nose normal.      Mouth/Throat:      Mouth: Mucous membranes are moist.      Pharynx: Oropharynx is clear.   Eyes:      Extraocular Movements: Extraocular movements intact.      Pupils: Pupils are equal, round, and reactive to light.   Cardiovascular:      Rate and Rhythm: Normal rate and regular rhythm.      Pulses: Normal pulses.      Heart sounds: Normal heart sounds.   Pulmonary:      Effort: Pulmonary effort is normal.      Breath sounds: Normal breath sounds. No wheezing or rhonchi.   Chest:      Chest wall: No tenderness.   Abdominal:      General: Bowel sounds are normal.      Palpations: Abdomen is soft.   Musculoskeletal:         General: Swelling present.      Cervical back: Neck supple. No tenderness.      Comments: 3+ left lower ext pitting edema, 2+ rt pitting edema   Skin:     General: Skin is warm and dry.      Findings: No erythema.   Neurological:      Mental Status: She is alert and oriented to person, place, and time. Mental status is at baseline.   Psychiatric:         Mood and Affect: Mood normal.         Judgment: Judgment normal.         Assessment/Plan   Problem List Items Addressed This Visit             ICD-10-CM    Acute congestive heart failure (CMS/HCC) I50.9    Relevant Medications    atenolol (Tenormin) 50 mg tablet    furosemide (Lasix) 40 mg tablet    sacubitriL-valsartan (Entresto)  mg tablet    Other Relevant Orders    Venipunc need phys skill, dx or rx    Essential (primary) hypertension - Primary I10    Relevant Medications    sacubitriL-valsartan (Entresto)  mg tablet    Other Relevant Orders    Comprehensive metabolic panel (Completed)    CBC and " Auto Differential (Completed)    Venipunc need phys skill, dx or rx    Personal history of COVID-19 Z86.16    Mixed hyperlipidemia E78.2    Relevant Orders    Lipid panel (Completed)    Vitamin D deficiency (Chronic) E55.9    Relevant Medications    cholecalciferol (Vitamin D-3) 5,000 Units tablet    Other Relevant Orders    Vitamin D 25-Hydroxy,Total (for eval of Vitamin D levels) (Completed)    Gout of multiple sites M10.9    Relevant Orders    Uric acid (Completed)   Will continue with [facility] visits.  HTN: Increase Entresto 97/103 bid and atenolol 50mg bid - will recheck BP in one hour and notify provider.  CHF:  will add furosemide 40mg daily  Additional Entresto given during today's visit  Labs drawn today without complications  Vitamin D Deficiency - add cholecalciferol 5000 units weekly  F/U with cardiology Jan 3rd.  [phone] - Tammy cell phone  More than 50% of time spent in face-to-face discussion @ a total of 60 minutes with this patient on counseling, coordination of care, collaboration with family and staff and review of medical records and diagnostics.  Follow up call to patient regarding lab results and Blood pressure reading - reports BP at 188/98       Raisa Goff, ANGELI-CNP

## 2023-12-01 ENCOUNTER — TELEPHONE (OUTPATIENT)
Dept: PRIMARY CARE | Facility: CLINIC | Age: 68
End: 2023-12-01
Payer: COMMERCIAL

## 2023-12-01 NOTE — TELEPHONE ENCOUNTER
Phoned patient to confirm 12/4/23 [facility] visit with Raisa Goff NP. No answer on home phone, LMOM. No answer on cell phone and voice mailbox is not set up.   Phoned patient in follow up 12/4/23 at 10:52 am, visit confirmed.

## 2023-12-04 ENCOUNTER — OFFICE VISIT (OUTPATIENT)
Dept: PRIMARY CARE | Facility: CLINIC | Age: 68
End: 2023-12-04
Payer: COMMERCIAL

## 2023-12-04 VITALS
RESPIRATION RATE: 16 BRPM | SYSTOLIC BLOOD PRESSURE: 180 MMHG | HEIGHT: 66 IN | DIASTOLIC BLOOD PRESSURE: 102 MMHG | HEART RATE: 90 BPM | TEMPERATURE: 97 F | BODY MASS INDEX: 30.7 KG/M2 | OXYGEN SATURATION: 98 % | WEIGHT: 191 LBS

## 2023-12-04 DIAGNOSIS — I50.41 ACUTE COMBINED SYSTOLIC AND DIASTOLIC CONGESTIVE HEART FAILURE (MULTI): ICD-10-CM

## 2023-12-04 DIAGNOSIS — E55.9 VITAMIN D DEFICIENCY: Chronic | ICD-10-CM

## 2023-12-04 DIAGNOSIS — I10 ESSENTIAL (PRIMARY) HYPERTENSION: Primary | Chronic | ICD-10-CM

## 2023-12-04 PROCEDURE — 99349 HOME/RES VST EST MOD MDM 40: CPT | Performed by: NURSE PRACTITIONER

## 2023-12-04 PROCEDURE — 1159F MED LIST DOCD IN RCRD: CPT | Performed by: NURSE PRACTITIONER

## 2023-12-04 PROCEDURE — 3080F DIAST BP >= 90 MM HG: CPT | Performed by: NURSE PRACTITIONER

## 2023-12-04 PROCEDURE — 1160F RVW MEDS BY RX/DR IN RCRD: CPT | Performed by: NURSE PRACTITIONER

## 2023-12-04 PROCEDURE — 3077F SYST BP >= 140 MM HG: CPT | Performed by: NURSE PRACTITIONER

## 2023-12-04 PROCEDURE — 1126F AMNT PAIN NOTED NONE PRSNT: CPT | Performed by: NURSE PRACTITIONER

## 2023-12-04 PROCEDURE — 1036F TOBACCO NON-USER: CPT | Performed by: NURSE PRACTITIONER

## 2023-12-04 RX ORDER — HYDROCHLOROTHIAZIDE 25 MG/1
25 TABLET ORAL 2 TIMES DAILY
Qty: 60 TABLET | Refills: 3 | Status: SHIPPED | OUTPATIENT
Start: 2023-12-04 | End: 2024-01-05 | Stop reason: DRUGHIGH

## 2023-12-04 RX ORDER — HYDROCHLOROTHIAZIDE 25 MG/1
25 TABLET ORAL DAILY
Qty: 30 TABLET | Refills: 5 | Status: SHIPPED | OUTPATIENT
Start: 2023-12-04 | End: 2023-12-04 | Stop reason: SDUPTHER

## 2023-12-04 ASSESSMENT — PAIN SCALES - GENERAL: PAINLEVEL: 0-NO PAIN

## 2023-12-04 NOTE — PROGRESS NOTES
Subjective   Patient ID: Tammy Walker is a 68 y.o. female who presents for Hypertension and Follow-up (HTN).    Patient is seen in her private home sitting up on the sofa in the living room.  She is awake and alert with appropriate awareness and is her own decision maker.  Currently is non-ambulatory, can bear weight only and propels herself in the wheelchair. PMH: HTN, Covid-19 appx 2021, Hyperlipidemia, TIA.  Medical Necessity - Homebound due to functional decline from Covid-19 Long Hauler.     Today is a follow up for HTN and functional decline.  Sister reports giving all medications as prescribed.  Recently increased Entresto, and started furosemide 40mg daily.  Appears to be tolerating well, however sister reports blood pressures have improved slightly, however SBP is still >170's. And DBP is > 100 at times.   Although she is not symptomatic, denies headache, dizziness, chest pain,  we have discussed the risks of continued hypertension, and why daily monitoring of blood pressure and weights are vital. They have  recently purchased a scale with recent weight of 190 lbs which is unchanged from most recent reported weight and after taking diuretic.  Admits that appetite choices are not great and does not always follow a low sodium cardiac diet.  Appointment scheduled with cardiology Dr. Alonzo January 3rd.  Remains stationary most of the day.  Does not ambulate, and is only transferring from sofa to wheelchair, propelling self around.  Urinating without complications, admits to frequency and is continent of bowel and bladder.  Denies chest pain, palpitations, tachycardia, and shortness of breath, wheezing, no PND/orthopnea, or respiratory complaints for the patient. There is no fever, or chills. No nausea, vomiting, diarrhea, headaches, or vision changes or recent falls. There is no hematuria, dysuria, flank pain, or increased urgency.    Home Visit: Medically necessary due to: dementia/cognitive impairment,  unsteady gait/poor balance, shortness of breath with minimal exertion, Illness or condition that results in activity limitation or restriction that impacts the ability to leave home such as: equipment and /or human assistance needed to safely leave the home, patient has limited support systems to help the patient attend office visits, the office visit would require excessive physical effort or pain for the patient.              Current Outpatient Medications:     aspirin 81 mg chewable tablet, Chew 1 tablet (81 mg) once daily., Disp: , Rfl:     atenolol (Tenormin) 50 mg tablet, Take 1 tablet (50 mg) by mouth 2 times a day., Disp: 90 tablet, Rfl: 3    atorvastatin (Lipitor) 40 mg tablet, Take 1 tablet (40 mg) by mouth once daily at bedtime., Disp: 30 tablet, Rfl: 2    cholecalciferol (Vitamin D-3) 5,000 Units tablet, Take 1 tablet (5,000 Units) by mouth once daily., Disp: 12 tablet, Rfl: 2    furosemide (Lasix) 40 mg tablet, Take 1 tablet (40 mg) by mouth once daily., Disp: 30 tablet, Rfl: 11    mirtazapine (Remeron) 15 mg tablet, Take 1 tablet (15 mg) by mouth once daily at bedtime., Disp: 30 tablet, Rfl: 2    sacubitriL-valsartan (Entresto)  mg tablet, Take 1 tablet by mouth 2 times a day., Disp: 180 tablet, Rfl: 12     Review of Systems  Constitutional:  Negative for activity change, appetite change, chills, fatigue, fever and unexpected weight change.   HENT: Negative.     Respiratory:  Negative for chest tightness, shortness of breath and wheezing.    Cardiovascular: Negative.    Gastrointestinal:  Negative for blood in stool, constipation and vomiting.   Endocrine: Negative.    Genitourinary:  Positive for frequency and urgency. Negative for difficulty urinating.   Musculoskeletal:  Positive for arthralgias and gait problem.   Skin: Negative.    Neurological:  Positive for  weakness. Negative for tremors, speech difficulty and headaches.   Psychiatric/Behavioral: Negative.       Objective   BP (!) 180/102  "  Pulse 90   Temp 36.1 °C (97 °F) (Temporal)   Resp 16   Ht 1.676 m (5' 6\")   Wt 86.6 kg (191 lb)   SpO2 98%   BMI 30.83 kg/m²     Physical Exam  Constitutional:       Appearance: Normal appearance.   HENT:      Head: Normocephalic and atraumatic.      Nose: Nose normal.      Mouth/Throat:      Mouth: Mucous membranes are moist.      Pharynx: Oropharynx is clear.   Eyes:      Extraocular Movements: Extraocular movements intact.      Pupils: Pupils are equal, round, and reactive to light.   Cardiovascular:      Rate and Rhythm: Normal rate and regular rhythm.      Pulses: Normal pulses.      Heart sounds: Normal heart sounds.   Pulmonary:      Effort: Pulmonary effort is normal.      Breath sounds: Normal breath sounds. No wheezing or rhonchi.   Abdominal:      General: Bowel sounds are normal.      Palpations: Abdomen is soft.   Musculoskeletal:         General: Swelling present.      Cervical back: Neck supple. No tenderness.      Comments: 3+ left lower ext pitting edema, 2+ rt pitting edema   Skin:     General: Skin is warm and dry.      Findings: No erythema.   Neurological:      Mental Status: She is alert and oriented to person, place, and time. Mental status is at baseline.   Psychiatric:         Mood and Affect: Mood normal.         Judgment: Judgment normal.     Assessment/Plan   Problem List Items Addressed This Visit             ICD-10-CM    Acute congestive heart failure (CMS/HCC) - Primary I50.9    Essential (primary) hypertension I10    Vitamin D deficiency (Chronic) E55.9     Will continue with [facility] visits.  Follow up scheduled for 2 weeks due to uncontrolled HTN.    HTN - Start hydrochlorothiazide 25mg bid.  Goal SBP <140 and DBP <80  CHF - Continue with furosemide 40mg daily - education regarding monitoring weights.  If weight gain of 3 lbs in one day or 5 lbs in one week should notify provider FLOR ESPAÑA Kitchen, APRN-CNP   "

## 2023-12-14 ENCOUNTER — APPOINTMENT (OUTPATIENT)
Dept: VASCULAR SURGERY | Facility: CLINIC | Age: 68
End: 2023-12-14
Payer: COMMERCIAL

## 2023-12-15 ENCOUNTER — TELEPHONE (OUTPATIENT)
Dept: PRIMARY CARE | Facility: CLINIC | Age: 68
End: 2023-12-15
Payer: COMMERCIAL

## 2023-12-15 NOTE — TELEPHONE ENCOUNTER
Call placed to patient at home number as listed, [phone]. Appointment confirmed via phone call with patient.

## 2023-12-18 ENCOUNTER — OFFICE VISIT (OUTPATIENT)
Dept: PRIMARY CARE | Facility: CLINIC | Age: 68
End: 2023-12-18
Payer: COMMERCIAL

## 2023-12-18 DIAGNOSIS — I10 ESSENTIAL (PRIMARY) HYPERTENSION: Primary | Chronic | ICD-10-CM

## 2023-12-18 DIAGNOSIS — E55.9 VITAMIN D DEFICIENCY: Chronic | ICD-10-CM

## 2023-12-18 DIAGNOSIS — I50.41 ACUTE COMBINED SYSTOLIC AND DIASTOLIC CONGESTIVE HEART FAILURE (MULTI): ICD-10-CM

## 2023-12-18 DIAGNOSIS — Z86.16 PERSONAL HISTORY OF COVID-19: ICD-10-CM

## 2023-12-18 DIAGNOSIS — M1A.09X0 IDIOPATHIC CHRONIC GOUT OF MULTIPLE SITES WITHOUT TOPHUS: ICD-10-CM

## 2023-12-18 PROCEDURE — 3077F SYST BP >= 140 MM HG: CPT | Performed by: NURSE PRACTITIONER

## 2023-12-18 PROCEDURE — 99349 HOME/RES VST EST MOD MDM 40: CPT | Performed by: NURSE PRACTITIONER

## 2023-12-18 PROCEDURE — 1159F MED LIST DOCD IN RCRD: CPT | Performed by: NURSE PRACTITIONER

## 2023-12-18 PROCEDURE — 3080F DIAST BP >= 90 MM HG: CPT | Performed by: NURSE PRACTITIONER

## 2023-12-18 PROCEDURE — 1126F AMNT PAIN NOTED NONE PRSNT: CPT | Performed by: NURSE PRACTITIONER

## 2023-12-18 PROCEDURE — 1160F RVW MEDS BY RX/DR IN RCRD: CPT | Performed by: NURSE PRACTITIONER

## 2023-12-18 PROCEDURE — 1036F TOBACCO NON-USER: CPT | Performed by: NURSE PRACTITIONER

## 2023-12-18 RX ORDER — ATENOLOL 50 MG/1
50 TABLET ORAL 2 TIMES DAILY
Qty: 90 TABLET | Refills: 3 | Status: SHIPPED | OUTPATIENT
Start: 2023-12-18

## 2023-12-18 ASSESSMENT — ENCOUNTER SYMPTOMS
VOMITING: 0
FATIGUE: 0
SPEECH DIFFICULTY: 0
WOUND: 0
LIGHT-HEADEDNESS: 0
VOICE CHANGE: 0
ARTHRALGIAS: 1
DIFFICULTY URINATING: 0
NUMBNESS: 0
FEVER: 0
ACTIVITY CHANGE: 0
ABDOMINAL PAIN: 0
DIARRHEA: 0
SHORTNESS OF BREATH: 0
UNEXPECTED WEIGHT CHANGE: 0
CONFUSION: 0
CONSTIPATION: 0
CHEST TIGHTNESS: 0
FREQUENCY: 1
COUGH: 0
DIZZINESS: 1
BLOOD IN STOOL: 0
WEAKNESS: 1
TROUBLE SWALLOWING: 0
AGITATION: 0

## 2023-12-18 ASSESSMENT — PAIN SCALES - GENERAL: PAINLEVEL: 0-NO PAIN

## 2023-12-18 NOTE — PROGRESS NOTES
Subjective   Patient ID: Tammy Walker is a 68 y.o. female who presents for Follow-up (Uncontrolled HTN).    Patient is seen in her private home sitting up on the sofa in the living room.  She is awake and alert with appropriate awareness and is her own decision maker.  Currently is non-ambulatory, can bear weight only and propels herself in the wheelchair. PMH: HTN, Covid-19 appx 2021, Hyperlipidemia, TIA.   Medical Necessity - Homebound due to functional decline from Covid-19 Long Hauler.     Today is a follow up for HTN and functional decline and medication changes.  Sister reports giving all medications as prescribed.  Recently increased Entresto, and started furosemide 40mg daily, along with hydrochlorothiazide 25mg bid.  However sister reports giving hydrochlorothiazide only 1x daily.  Appears to be tolerating well, sister reports blood pressures have improved slightly, however SBP is still >180's. And DBP is > 100 at times.   Per blood pressure monitor, does not appear that pressures have improved.  Although she is not symptomatic, denies headache, dizziness, chest pain,  we have discussed the risks of continued hypertension, and why daily monitoring of blood pressure and weights are vital. They have  recently purchased a scale with recent weight of 190 lbs which is unchanged from most recent reported weight and after taking diuretic.  Reassured that medications are taken as prescribed.  Mentions that she is unable to tolerate prescribed vitamin d supplement prescribed, stopped taking.  Admits that appetite choices are not great and does not always follow a low sodium cardiac diet.  Appointment scheduled with cardiology Dr. Alonzo January 3rd.  Remains stationary most of the day.  Does not ambulate, and is only transferring from sofa to wheelchair, propelling self around.  Urinating without complications, admits to frequency and is continent of bowel and bladder.  Denies chest pain, palpitations,  tachycardia, and shortness of breath, wheezing, no PND/orthopnea, or respiratory complaints for the patient. There is no fever, or chills. No nausea, vomiting, diarrhea, headaches, or vision changes or recent falls. There is no hematuria, dysuria, flank pain, or increased urgency.     Home Visit: Medically necessary due to: dementia/cognitive impairment, unsteady gait/poor balance, shortness of breath with minimal exertion, Illness or condition that results in activity limitation or restriction that impacts the ability to leave home such as: equipment and /or human assistance needed to safely leave the home, patient has limited support systems to help the patient attend office visits, the office visit would require excessive physical effort or pain for the patient.              Current Outpatient Medications:     aspirin 81 mg chewable tablet, Chew 1 tablet (81 mg) once daily., Disp: , Rfl:     atenolol (Tenormin) 50 mg tablet, Take 1 tablet (50 mg) by mouth 2 times a day., Disp: 90 tablet, Rfl: 3    atorvastatin (Lipitor) 40 mg tablet, Take 1 tablet (40 mg) by mouth once daily at bedtime., Disp: 30 tablet, Rfl: 2    cholecalciferol (Vitamin D-3) 5,000 Units tablet, Take 1 tablet (5,000 Units) by mouth once daily., Disp: 12 tablet, Rfl: 2    furosemide (Lasix) 40 mg tablet, Take 1 tablet (40 mg) by mouth once daily., Disp: 30 tablet, Rfl: 11    hydroCHLOROthiazide (HYDRODiuril) 25 mg tablet, Take 1 tablet (25 mg) by mouth 2 times a day., Disp: 60 tablet, Rfl: 3    mirtazapine (Remeron) 15 mg tablet, Take 1 tablet (15 mg) by mouth once daily at bedtime., Disp: 30 tablet, Rfl: 2    sacubitriL-valsartan (Entresto)  mg tablet, Take 1 tablet by mouth 2 times a day., Disp: 180 tablet, Rfl: 12     Review of Systems   Constitutional:  Negative for activity change, fatigue, fever and unexpected weight change.   HENT:  Negative for congestion, trouble swallowing and voice change.    Respiratory:  Negative for cough,  "chest tightness and shortness of breath.    Cardiovascular:  Positive for leg swelling. Negative for chest pain.   Gastrointestinal:  Negative for abdominal pain, blood in stool, constipation, diarrhea and vomiting.   Genitourinary:  Positive for frequency. Negative for decreased urine volume, difficulty urinating and pelvic pain.   Musculoskeletal:  Positive for arthralgias and gait problem.   Skin:  Positive for pallor and rash. Negative for wound.   Neurological:  Positive for dizziness and weakness. Negative for speech difficulty, light-headedness and numbness.   Psychiatric/Behavioral:  Negative for agitation, behavioral problems and confusion.        Objective   BP (!) 180/101 Comment: Repeat 178/98  Pulse 88   Temp 36.4 °C (97.6 °F) (Temporal)   Resp 16   Ht 1.676 m (5' 6\")   Wt 86.2 kg (190 lb)   SpO2 98%   BMI 30.67 kg/m²     Physical Exam  Constitutional:       Appearance: Normal appearance.   HENT:      Head: Normocephalic and atraumatic.      Nose: Nose normal.      Mouth/Throat:      Mouth: Mucous membranes are moist.      Pharynx: Oropharynx is clear.   Eyes:      Extraocular Movements: Extraocular movements intact.      Pupils: Pupils are equal, round, and reactive to light.   Cardiovascular:      Rate and Rhythm: Normal rate and regular rhythm.      Pulses: Normal pulses.      Heart sounds: Normal heart sounds.   Pulmonary:      Effort: Pulmonary effort is normal.      Breath sounds: Normal breath sounds. No wheezing or rhonchi.   Abdominal:      General: Bowel sounds are normal.      Palpations: Abdomen is soft.   Musculoskeletal:         General: Swelling present.      Cervical back: Neck supple. No tenderness.      Comments: 2+ left lower ext pitting edema, 2+ rt pitting edema   Skin:     General: Skin is warm and dry.      Findings: No erythema.   Neurological:      Mental Status: She is alert and oriented to person, place, and time. Mental status is at baseline.   Psychiatric:         " Mood and Affect: Mood normal.         Judgment: Judgment normal.     Assessment/Plan   Problem List Items Addressed This Visit             ICD-10-CM    Acute congestive heart failure (CMS/HCC) I50.9    Relevant Medications    atenolol (Tenormin) 50 mg tablet    Essential (primary) hypertension - Primary I10    Personal history of COVID-19 Z86.16    Vitamin D deficiency (Chronic) E55.9    Relevant Medications    cholecalciferol (Vitamin D3) 10 MCG (400 UNIT) tablet    Gout of multiple sites M10.9     Will continue with [facility] visits   HTN is uncontrolled - increased hydrochlorothiazide - instructed to monitor bid and to notify provider if unchanged.  Great deal of education regarding compliance and risks associated with uncontrolled HTN.  Vitamin D Deficiency - start OTC vitamin D supplement 10mcg daily.       Raisa Goff, APRN-CNP

## 2023-12-20 VITALS
OXYGEN SATURATION: 98 % | HEART RATE: 88 BPM | DIASTOLIC BLOOD PRESSURE: 101 MMHG | BODY MASS INDEX: 30.53 KG/M2 | RESPIRATION RATE: 16 BRPM | SYSTOLIC BLOOD PRESSURE: 180 MMHG | HEIGHT: 66 IN | WEIGHT: 190 LBS | TEMPERATURE: 97.6 F

## 2023-12-20 RX ORDER — CYANOCOBALAMIN (VITAMIN B-12) 500 MCG
400 TABLET ORAL DAILY
Qty: 30 TABLET | Refills: 11 | Status: SHIPPED | OUTPATIENT
Start: 2023-12-20 | End: 2024-12-19

## 2024-01-03 ENCOUNTER — APPOINTMENT (OUTPATIENT)
Dept: CARDIOLOGY | Facility: CLINIC | Age: 69
End: 2024-01-03
Payer: COMMERCIAL

## 2024-01-04 ENCOUNTER — TELEPHONE (OUTPATIENT)
Dept: PRIMARY CARE | Facility: CLINIC | Age: 69
End: 2024-01-04
Payer: COMMERCIAL

## 2024-01-05 ENCOUNTER — OFFICE VISIT (OUTPATIENT)
Dept: PRIMARY CARE | Facility: CLINIC | Age: 69
End: 2024-01-05
Payer: COMMERCIAL

## 2024-01-05 VITALS
DIASTOLIC BLOOD PRESSURE: 110 MMHG | RESPIRATION RATE: 16 BRPM | OXYGEN SATURATION: 97 % | HEART RATE: 56 BPM | HEIGHT: 66 IN | TEMPERATURE: 97 F | BODY MASS INDEX: 30.67 KG/M2 | SYSTOLIC BLOOD PRESSURE: 185 MMHG

## 2024-01-05 DIAGNOSIS — Z86.16 PERSONAL HISTORY OF COVID-19: ICD-10-CM

## 2024-01-05 DIAGNOSIS — E55.9 VITAMIN D DEFICIENCY: Chronic | ICD-10-CM

## 2024-01-05 DIAGNOSIS — I10 ESSENTIAL (PRIMARY) HYPERTENSION: Primary | Chronic | ICD-10-CM

## 2024-01-05 DIAGNOSIS — M1A.09X0 IDIOPATHIC CHRONIC GOUT OF MULTIPLE SITES WITHOUT TOPHUS: ICD-10-CM

## 2024-01-05 DIAGNOSIS — I50.41 ACUTE COMBINED SYSTOLIC AND DIASTOLIC CONGESTIVE HEART FAILURE (MULTI): Chronic | ICD-10-CM

## 2024-01-05 PROCEDURE — 99349 HOME/RES VST EST MOD MDM 40: CPT | Performed by: NURSE PRACTITIONER

## 2024-01-05 PROCEDURE — 3080F DIAST BP >= 90 MM HG: CPT | Performed by: NURSE PRACTITIONER

## 2024-01-05 PROCEDURE — 3077F SYST BP >= 140 MM HG: CPT | Performed by: NURSE PRACTITIONER

## 2024-01-05 PROCEDURE — 1036F TOBACCO NON-USER: CPT | Performed by: NURSE PRACTITIONER

## 2024-01-05 PROCEDURE — 1126F AMNT PAIN NOTED NONE PRSNT: CPT | Performed by: NURSE PRACTITIONER

## 2024-01-05 RX ORDER — HYDROCHLOROTHIAZIDE 50 MG/1
50 TABLET ORAL 2 TIMES DAILY
Qty: 60 TABLET | Refills: 0 | Status: SHIPPED | OUTPATIENT
Start: 2024-01-05 | End: 2024-02-12 | Stop reason: SDUPTHER

## 2024-01-05 ASSESSMENT — PAIN SCALES - GENERAL: PAINLEVEL: 0-NO PAIN

## 2024-01-05 NOTE — PROGRESS NOTES
Subjective   Patient ID: Tammy Walker is a 68 y.o. female who presents for No chief complaint on file..    Patient is seen in her private home sitting up on the sofa in the living room.  She is awake and alert with appropriate awareness and is her own decision maker.  Currently is non-ambulatory, can bear weight only and propels herself in the wheelchair. PMH: HTN, Covid-19 appx 2021, Hyperlipidemia, TIA.   Medical Necessity - Homebound due to functional decline from Covid-19 [facility] Ha[employer].     Today is a follow up for HTN and functional decline and medication changes.  Sister reports giving all medications as prescribed.  Recently increased Entresto, and started furosemide 40mg daily, along with hydrochlorothiazide 25mg bid.  Appears to be tolerating well, sister reports blood pressures have improved slightly, however SBP is still >160. And DBsP is > 100 at times.   Per blood pressure monitor, does not appear that pressures have improved.  Altho consistently, althouugh she is not symptomatic, denies headache, dizziness, chest pain,  we have discussed the risks of continued hypertension, and why daily monitoring of blood pressure and weights are vital. They have  recently purchased a scale with recent weight of 190 lbs which is unchanged from most recent reported weight and after taking diuretic.  Reassured that medications are taken as prescribed.  Mentions that she is unable to tolerate prescribed vitamin d supplement prescribed, stopped taking.  Admits that appetite choices are not great and does not always follow a low sodium cardiac diet.  Appointment scheduled with cardiology Dr. Alonzo January 3rd, did not make appointment and has not rescheduled.  Remains stationary most of the day.  Does not ambulate, and is only transferring from sofa to wheelchair, propelling self around.  Urinating without complications, admits to frequency and is continent of bowel and bladder.  Denies chest pain, palpitations,  tachycardia, and shortness of breath, wheezing, no PND/orthopnea, or respiratory complaints for the patient. There is no fever, or chills. No nausea, vomiting, diarrhea, headaches, or vision changes or recent falls. There is no hematuria, dysuria, flank pain, or increased urgency.     Home Visit: Medically necessary due to: dementia/cognitive impairment, unsteady gait/poor balance, shortness of breath with minimal exertion, Illness or condition that results in activity limitation or restriction that impacts the ability to leave home such as: equipment and /or human assistance needed to safely leave the home, patient has limited support systems to help the patient attend office visits, the office visit would require excessive physical effort or pain for the patient.              Current Outpatient Medications:     aspirin 81 mg chewable tablet, Chew 1 tablet (81 mg) once daily., Disp: , Rfl:     atenolol (Tenormin) 50 mg tablet, Take 1 tablet (50 mg) by mouth 2 times a day., Disp: 90 tablet, Rfl: 3    atorvastatin (Lipitor) 40 mg tablet, Take 1 tablet (40 mg) by mouth once daily at bedtime., Disp: 30 tablet, Rfl: 2    cholecalciferol (Vitamin D3) 10 MCG (400 UNIT) tablet, Take 1 tablet (10 mcg) by mouth once daily., Disp: 30 tablet, Rfl: 11    furosemide (Lasix) 40 mg tablet, Take 1 tablet (40 mg) by mouth once daily., Disp: 30 tablet, Rfl: 11    mirtazapine (Remeron) 15 mg tablet, Take 1 tablet (15 mg) by mouth once daily at bedtime., Disp: 30 tablet, Rfl: 2    sacubitriL-valsartan (Entresto)  mg tablet, Take 1 tablet by mouth 2 times a day., Disp: 180 tablet, Rfl: 12     Constitutional:  Negative for activity change, fatigue, fever and unexpected weight change.   HENT:  Negative for congestion, trouble swallowing and voice change.    Respiratory:  Negative for cough, chest tightness and shortness of breath.    Cardiovascular:  Positive for leg swelling. Negative for chest pain.   Gastrointestinal:  Negative  "for abdominal pain, blood in stool, constipation, diarrhea and vomiting.   Genitourinary:  Positive for frequency. Negative for decreased urine volume, difficulty urinating and pelvic pain.   Musculoskeletal:  Positive for arthralgias and gait problem.   Skin:  Positive for pallor and rash. Negative for wound.   Neurological:  Positive for dizziness and weakness. Negative for speech difficulty, light-headedness and numbness.   Psychiatric/Behavioral:  Negative for agitation, behavioral problems and confusion.    Constitutional:  Negative for activity change, fatigue, fever and unexpected weight change.       Objective   BP (!) 185/110 (BP Location: Left arm, Patient Position: Sitting, BP Cuff Size: Large adult) Comment: 161/95 Second Reading  Pulse 56   Temp 36.1 °C (97 °F) (Temporal)   Resp 16   Ht 1.676 m (5' 6\")   SpO2 97%   BMI 30.67 kg/m²     Physical Exam  Constitutional:       Appearance: Normal appearance.   HENT:      Head: Normocephalic and atraumatic.      Nose: Nose normal.      Mouth/Throat:      Mouth: Mucous membranes are moist.      Pharynx: Oropharynx is clear.   Eyes:      Extraocular Movements: Extraocular movements intact.      Pupils: Pupils are equal, round, and reactive to light.   Cardiovascular:      Rate and Rhythm: Normal rate and regular rhythm.      Pulses: Normal pulses.      Heart sounds: Normal heart sounds.   Pulmonary:      Effort: Pulmonary effort is normal.      Breath sounds: Normal breath sounds. No wheezing or rhonchi.   Abdominal:      General: Bowel sounds are normal.      Palpations: Abdomen is soft.   Musculoskeletal:         General: Swelling present.      Cervical back: Neck supple. No tenderness.      Comments: 1+ left lower ext pitting edema, 2+ rt pitting edema   Skin:     General: Skin is warm and dry.      Findings: No erythema.   Neurological:      Mental Status: She is alert and oriented to person, place, and time. Mental status is at baseline.   Psychiatric: "         Mood and Affect: Mood normal.         Judgment: Judgment normal.     Assessment/Plan   Problem List Items Addressed This Visit             ICD-10-CM    Acute congestive heart failure (CMS/HCC) I50.9    Essential (primary) hypertension - Primary I10    Personal history of COVID-19 Z86.16    Vitamin D deficiency (Chronic) E55.9    Gout of multiple sites M10.9     Will continue with [facility] home visits. Active with cardiology, however has limited support and difficulty getting out for appointments.  Patient and sister to reschedule missed cardiology appointment.    HTN - Not managed - continued education regarding medication compliance and the importance to call provider with ongoing SBP > 150's and DBP > 100    More than 50% of time spent in face-to-face discussion @ a total of 40 minutes with this patient on counseling, coordination of care, collaboration with family and staff and review of medical records and diagnostics.       Raisa Goff, APRN-CNP

## 2024-01-16 ENCOUNTER — TELEPHONE (OUTPATIENT)
Dept: PRIMARY CARE | Facility: CLINIC | Age: 69
End: 2024-01-16
Payer: COMMERCIAL

## 2024-01-16 NOTE — TELEPHONE ENCOUNTER
"Per pharmacist they received order for HCTZ 50 mg BID. Pt was previously on 25 mg HCTZ bid and computer is flagging new dose as \"high dose\". Do you want pt to be on 50 mg HCTZ BID?  "

## 2024-01-21 DIAGNOSIS — F51.01 PRIMARY INSOMNIA: ICD-10-CM

## 2024-01-21 DIAGNOSIS — E78.2 MODERATE MIXED HYPERLIPIDEMIA NOT REQUIRING STATIN THERAPY: ICD-10-CM

## 2024-01-23 RX ORDER — MIRTAZAPINE 15 MG/1
15 TABLET, FILM COATED ORAL NIGHTLY
Qty: 90 TABLET | Refills: 0 | Status: SHIPPED | OUTPATIENT
Start: 2024-01-23 | End: 2024-04-22

## 2024-01-23 RX ORDER — ATORVASTATIN CALCIUM 40 MG/1
40 TABLET, FILM COATED ORAL NIGHTLY
Qty: 90 TABLET | Refills: 3 | Status: SHIPPED | OUTPATIENT
Start: 2024-01-23 | End: 2025-01-22

## 2024-01-29 ENCOUNTER — TELEPHONE (OUTPATIENT)
Dept: PRIMARY CARE | Facility: CLINIC | Age: 69
End: 2024-01-29
Payer: COMMERCIAL

## 2024-01-29 DIAGNOSIS — M10.9 GOUT OF MULTIPLE SITES, UNSPECIFIED CAUSE, UNSPECIFIED CHRONICITY: Primary | ICD-10-CM

## 2024-01-29 RX ORDER — ALLOPURINOL 100 MG/1
100 TABLET ORAL 2 TIMES DAILY
Qty: 60 TABLET | Refills: 11 | Status: SHIPPED | OUTPATIENT
Start: 2024-01-29 | End: 2025-01-28

## 2024-01-29 NOTE — TELEPHONE ENCOUNTER
Patient calls to report although she has a history of gout has not been on medication.  Reports that gout symptoms are flaring up and is wanting permission to restart allopurinol.  Medications sent to [facility] pharmacy.

## 2024-02-01 ENCOUNTER — TELEPHONE (OUTPATIENT)
Dept: PRIMARY CARE | Facility: CLINIC | Age: 69
End: 2024-02-01
Payer: COMMERCIAL

## 2024-02-02 ENCOUNTER — APPOINTMENT (OUTPATIENT)
Dept: PRIMARY CARE | Facility: CLINIC | Age: 69
End: 2024-02-02
Payer: COMMERCIAL

## 2024-02-05 ENCOUNTER — OFFICE VISIT (OUTPATIENT)
Dept: PRIMARY CARE | Facility: CLINIC | Age: 69
End: 2024-02-05
Payer: COMMERCIAL

## 2024-02-05 VITALS
TEMPERATURE: 97 F | RESPIRATION RATE: 16 BRPM | DIASTOLIC BLOOD PRESSURE: 84 MMHG | SYSTOLIC BLOOD PRESSURE: 137 MMHG | WEIGHT: 191 LBS | BODY MASS INDEX: 30.7 KG/M2 | OXYGEN SATURATION: 98 % | HEIGHT: 66 IN | HEART RATE: 62 BPM

## 2024-02-05 DIAGNOSIS — I10 ESSENTIAL (PRIMARY) HYPERTENSION: Primary | Chronic | ICD-10-CM

## 2024-02-05 DIAGNOSIS — M1A.09X0 IDIOPATHIC CHRONIC GOUT OF MULTIPLE SITES WITHOUT TOPHUS: Chronic | ICD-10-CM

## 2024-02-05 DIAGNOSIS — I50.41 ACUTE COMBINED SYSTOLIC AND DIASTOLIC CONGESTIVE HEART FAILURE (MULTI): Chronic | ICD-10-CM

## 2024-02-05 PROCEDURE — 1126F AMNT PAIN NOTED NONE PRSNT: CPT | Performed by: NURSE PRACTITIONER

## 2024-02-05 PROCEDURE — 99349 HOME/RES VST EST MOD MDM 40: CPT | Performed by: NURSE PRACTITIONER

## 2024-02-05 PROCEDURE — 3075F SYST BP GE 130 - 139MM HG: CPT | Performed by: NURSE PRACTITIONER

## 2024-02-05 PROCEDURE — 3079F DIAST BP 80-89 MM HG: CPT | Performed by: NURSE PRACTITIONER

## 2024-02-05 PROCEDURE — 1036F TOBACCO NON-USER: CPT | Performed by: NURSE PRACTITIONER

## 2024-02-05 ASSESSMENT — PAIN SCALES - GENERAL: PAINLEVEL: 0-NO PAIN

## 2024-02-05 NOTE — PROGRESS NOTES
Subjective   Patient ID: Tammy Walker is a 68 y.o. female who presents for Follow-up (HTN, Functional decline, Congestion).    Patient is seen in her private home sitting up on the sofa in the living room.  She is awake and alert with appropriate awareness and is her own decision maker.  Currently is non-ambulatory, can bear weight only and propels herself in the wheelchair. PMH: HTN, Covid-19 appx 2021, Hyperlipidemia, TIA.   Medical Necessity - Homebound due to functional decline from Covid-19 [facility] Ha[employer].     Today is a follow up for HTN and functional decline and medication changes.  Sister reports giving all medications as prescribed.  HTN - continues with Entresto,  furosemide 40mg daily, along with hydrochlorothiazide 50mg bid.  Appears to be tolerating well, sister reports blood pressures have improved - blood pressure monitor reviewed SBP appears to be averaging 130' -140's,   She is not symptomatic, denies headache, dizziness, chest pain,  we have discussed the risks of continued hypertension, and why daily monitoring of blood pressure and weights are vital. They have  recently purchased a scale with recent weight of 190 lbs which is unchanged from most recent reported weight and after taking diuretic.  Reassured that medications are taken as prescribed.  Recently started allopurinol for recurring gout - she was having complaints of gout flare up - admits that medications has been effective - symptoms improved.   Reports that appetite choices are not great and does not always follow a low sodium cardiac diet, however are improving.   Remains stationary most of the day.  Does not ambulate, and is only transferring from sofa to wheelchair, propelling self around.  Urinating without complications, admits to frequency and is continent of bowel and bladder.  Denies chest pain, palpitations, tachycardia, and shortness of breath, wheezing, no PND/orthopnea, or respiratory complaints for the patient. There is  no fever, or chills. No nausea, vomiting, diarrhea, headaches, or vision changes or recent falls. There is no hematuria, dysuria, flank pain, or increased urgency.    Today blood pressures appear to have been better managed with current regimen.  Recently started on allopurinol due to recurring gout - which appears to also be managed.  Recently fighting a cold, which is more of sinus and head congestion - denies cough, or wheezing.  Denies using OTC and symptoms reported as dissipating.     Home Visit: Medically necessary due to: dementia/cognitive impairment, unsteady gait/poor balance, shortness of breath with minimal exertion, Illness or condition that results in activity limitation or restriction that impacts the ability to leave home such as: equipment and /or human assistance needed to safely leave the home, patient has limited support systems to help the patient attend office visits, the office visit would require excessive physical effort or pain for the patient.            Current Outpatient Medications:     allopurinol (Zyloprim) 100 mg tablet, Take 1 tablet (100 mg) by mouth 2 times a day., Disp: 60 tablet, Rfl: 11    aspirin 81 mg chewable tablet, Chew 1 tablet (81 mg) once daily., Disp: , Rfl:     atenolol (Tenormin) 50 mg tablet, Take 1 tablet (50 mg) by mouth 2 times a day., Disp: 90 tablet, Rfl: 3    atorvastatin (Lipitor) 40 mg tablet, Take 1 tablet (40 mg) by mouth once daily at bedtime., Disp: 90 tablet, Rfl: 3    cholecalciferol (Vitamin D3) 10 MCG (400 UNIT) tablet, Take 1 tablet (10 mcg) by mouth once daily., Disp: 30 tablet, Rfl: 11    furosemide (Lasix) 40 mg tablet, Take 1 tablet (40 mg) by mouth once daily., Disp: 30 tablet, Rfl: 11    hydroCHLOROthiazide (HYDRODiuril) 50 mg tablet, Take 1 tablet (50 mg) by mouth 2 times a day., Disp: 60 tablet, Rfl: 0    mirtazapine (Remeron) 15 mg tablet, TAKE 1 TABLET (15 MG) BY MOUTH ONCE DAILY AT BEDTIME., Disp: 90 tablet, Rfl: 0     "sacubitriL-valsartan (Entresto)  mg tablet, Take 1 tablet by mouth 2 times a day., Disp: 180 tablet, Rfl: 12     Review of Systems  Constitutional:  Negative for activity change, fatigue, fever and unexpected weight change.   HENT:  Negative for congestion, trouble swallowing and voice change.    Respiratory:  Negative for cough, chest tightness and shortness of breath.    Cardiovascular:  Positive for leg swelling. Negative for chest pain.   Gastrointestinal:  Negative for abdominal pain, blood in stool, constipation, diarrhea and vomiting.   Genitourinary:  Positive for frequency. Negative for decreased urine volume, difficulty urinating and pelvic pain.   Musculoskeletal:  Positive for arthralgias and gait problem.   Skin:  Positive for pallor and rash. Negative for wound.   Neurological:  Positive for dizziness and weakness. Negative for speech difficulty, light-headedness and numbness.   Psychiatric/Behavioral:  Negative for agitation, behavioral problems and confusion.    Constitutional:  Negative for activity change, fatigue, fever and unexpected weight change.     Objective   /84 (BP Location: Left arm, Patient Position: Sitting)   Pulse 62   Temp 36.1 °C (97 °F) (Temporal)   Resp 16   Ht 1.676 m (5' 6\")   Wt 86.6 kg (191 lb)   SpO2 98%   BMI 30.83 kg/m²     Physical Exam  Constitutional:       Appearance: Normal appearance.   HENT:      Head: Normocephalic and atraumatic.      Nose: Nose normal.      Mouth/Throat:      Mouth: Mucous membranes are moist.      Pharynx: Oropharynx is clear.   Eyes:      Extraocular Movements: Extraocular movements intact.      Pupils: Pupils are equal, round, and reactive to light.   Cardiovascular:      Rate and Rhythm: Normal rate and regular rhythm.      Pulses: Normal pulses.      Heart sounds: Normal heart sounds.   Pulmonary:      Effort: Pulmonary effort is normal.      Breath sounds: Normal breath sounds. No wheezing or rhonchi.   Abdominal:      " General: Bowel sounds are normal.      Palpations: Abdomen is soft.   Musculoskeletal:         General: Swelling present.      Cervical back: Neck supple. No tenderness.      Comments: 1+ left lower ext pitting edema, 2+ rt pitting edema   Skin:     General: Skin is warm and dry.      Findings: No erythema.   Neurological:      Mental Status: She is alert and oriented to person, place, and time. Mental status is at baseline.   Psychiatric:         Mood and Affect: Mood normal.         Judgment: Judgment normal.     Assessment/Plan   Diagnoses and all orders for this visit:  Essential (primary) hypertension  Comments:  Improving - Continue hydrochlorothiazide 50mg bid, Entresto  bid, atenolol 50mg bid.  Follow up with Dr. Nichole to be scheduled  Acute combined systolic and diastolic congestive heart failure (CMS/HCC)  Comments:  Improving - Continue furosemide 40mg daily  Idiopathic chronic gout of multiple sites without tophus  Comments:  Improving - continue with allopurinol 100mg bid      Will continue with [facility] home visits. Active with multiple specialist, however is however has limited support and difficulty getting out for appointments.  Follow up in 3 months and PRN    HTN/CHF - Will schedule follow up with Dr. Alonzo - symptoms have improved - Instructions to continue monitoring weights, elevate legs.  Notify provider should weight increase by 3lbs in one day or 5 lbs in one week.  Goal for for blood pressure SBP <140 and DBP <80    More than 50% of time spent in face-to-face discussion @ a total of 40 minutes with this patient on counseling, coordination of care, collaboration with family and staff and review of medical records and diagnostics.         Raisa Goff, APRN-CNP

## 2024-02-12 DIAGNOSIS — I50.41 ACUTE COMBINED SYSTOLIC AND DIASTOLIC CONGESTIVE HEART FAILURE (MULTI): Chronic | ICD-10-CM

## 2024-02-12 RX ORDER — HYDROCHLOROTHIAZIDE 50 MG/1
50 TABLET ORAL 2 TIMES DAILY
Qty: 180 TABLET | Refills: 3 | Status: SHIPPED | OUTPATIENT
Start: 2024-02-12 | End: 2024-05-06 | Stop reason: SINTOL

## 2024-03-11 ENCOUNTER — APPOINTMENT (OUTPATIENT)
Dept: RADIOLOGY | Facility: HOSPITAL | Age: 69
End: 2024-03-11
Payer: COMMERCIAL

## 2024-03-11 ENCOUNTER — HOSPITAL ENCOUNTER (EMERGENCY)
Facility: HOSPITAL | Age: 69
Discharge: HOME | End: 2024-03-11
Attending: EMERGENCY MEDICINE
Payer: COMMERCIAL

## 2024-03-11 VITALS
OXYGEN SATURATION: 98 % | TEMPERATURE: 98.4 F | SYSTOLIC BLOOD PRESSURE: 170 MMHG | DIASTOLIC BLOOD PRESSURE: 90 MMHG | HEIGHT: 66 IN | RESPIRATION RATE: 20 BRPM | HEART RATE: 76 BPM | BODY MASS INDEX: 30.61 KG/M2 | WEIGHT: 190.48 LBS

## 2024-03-11 DIAGNOSIS — M25.552 PAIN OF LEFT HIP: Primary | ICD-10-CM

## 2024-03-11 DIAGNOSIS — I10 HYPERTENSION, UNSPECIFIED TYPE: ICD-10-CM

## 2024-03-11 PROCEDURE — 2500000005 HC RX 250 GENERAL PHARMACY W/O HCPCS

## 2024-03-11 PROCEDURE — 99284 EMERGENCY DEPT VISIT MOD MDM: CPT

## 2024-03-11 PROCEDURE — 72100 X-RAY EXAM L-S SPINE 2/3 VWS: CPT | Performed by: RADIOLOGY

## 2024-03-11 PROCEDURE — 2500000001 HC RX 250 WO HCPCS SELF ADMINISTERED DRUGS (ALT 637 FOR MEDICARE OP)

## 2024-03-11 PROCEDURE — 72100 X-RAY EXAM L-S SPINE 2/3 VWS: CPT

## 2024-03-11 PROCEDURE — 73502 X-RAY EXAM HIP UNI 2-3 VIEWS: CPT | Mod: LT

## 2024-03-11 RX ORDER — IBUPROFEN 600 MG/1
600 TABLET ORAL ONCE
Status: COMPLETED | OUTPATIENT
Start: 2024-03-11 | End: 2024-03-11

## 2024-03-11 RX ORDER — IBUPROFEN 600 MG/1
600 TABLET ORAL EVERY 6 HOURS PRN
Qty: 20 TABLET | Refills: 0 | Status: SHIPPED | OUTPATIENT
Start: 2024-03-11 | End: 2024-03-16

## 2024-03-11 RX ORDER — ACETAMINOPHEN 325 MG/1
650 TABLET ORAL ONCE
Status: COMPLETED | OUTPATIENT
Start: 2024-03-11 | End: 2024-03-11

## 2024-03-11 RX ORDER — LIDOCAINE 560 MG/1
1 PATCH PERCUTANEOUS; TOPICAL; TRANSDERMAL ONCE
Status: DISCONTINUED | OUTPATIENT
Start: 2024-03-11 | End: 2024-03-11 | Stop reason: HOSPADM

## 2024-03-11 RX ADMIN — LIDOCAINE 1 PATCH: 4 PATCH TOPICAL at 12:26

## 2024-03-11 RX ADMIN — IBUPROFEN 600 MG: 600 TABLET, FILM COATED ORAL at 12:27

## 2024-03-11 RX ADMIN — ACETAMINOPHEN 650 MG: 325 TABLET ORAL at 12:27

## 2024-03-11 ASSESSMENT — PAIN DESCRIPTION - ORIENTATION: ORIENTATION: LEFT

## 2024-03-11 ASSESSMENT — PAIN SCALES - GENERAL
PAINLEVEL_OUTOF10: 8
PAINLEVEL_OUTOF10: 7

## 2024-03-11 ASSESSMENT — PAIN DESCRIPTION - DESCRIPTORS: DESCRIPTORS: RADIATING;SHARP;SHOOTING

## 2024-03-11 ASSESSMENT — PAIN DESCRIPTION - FREQUENCY: FREQUENCY: CONSTANT/CONTINUOUS

## 2024-03-11 ASSESSMENT — PAIN DESCRIPTION - LOCATION
LOCATION: HIP
LOCATION: LEG

## 2024-03-11 ASSESSMENT — COLUMBIA-SUICIDE SEVERITY RATING SCALE - C-SSRS
2. HAVE YOU ACTUALLY HAD ANY THOUGHTS OF KILLING YOURSELF?: NO
6. HAVE YOU EVER DONE ANYTHING, STARTED TO DO ANYTHING, OR PREPARED TO DO ANYTHING TO END YOUR LIFE?: NO
1. IN THE PAST MONTH, HAVE YOU WISHED YOU WERE DEAD OR WISHED YOU COULD GO TO SLEEP AND NOT WAKE UP?: NO

## 2024-03-11 ASSESSMENT — PAIN DESCRIPTION - PAIN TYPE: TYPE: CHRONIC PAIN

## 2024-03-11 ASSESSMENT — PAIN - FUNCTIONAL ASSESSMENT
PAIN_FUNCTIONAL_ASSESSMENT: 0-10
PAIN_FUNCTIONAL_ASSESSMENT: 0-10

## 2024-03-11 ASSESSMENT — PAIN DESCRIPTION - PROGRESSION: CLINICAL_PROGRESSION: NOT CHANGED

## 2024-03-11 ASSESSMENT — PAIN DESCRIPTION - ONSET: ONSET: PROGRESSIVE

## 2024-03-11 NOTE — PROGRESS NOTES
Attestation/Supervisory note for RAFFY Fox      The patient is a 68-year-old female presenting to the emergency department by EMS from home for evaluation of chronic left hip pain.  The patient states that she has had pain for a long time.  She states it has been worse over the past month.  It is painful when she is moving her left hip.  It is painful when she is walking and standing.  She denies any recent injury or trauma.  She states that she has not sought any care for it in the past.  No headache or visual changes.  No chest pain or shortness of breath.  No abdominal pain.  No nausea or vomiting.  No diarrhea or constipation.  No urinary complaints.  All pertinent positives and negatives are recorded above.  All other systems reviewed and otherwise negative.  Vital signs with hypertension but otherwise within normal limits.  Physical exam with a well-nourished well-developed female in no acute distress.  HEENT exam within normal limits.  She has no evidence of airway compromise or respiratory distress.  Abdominal exam is benign.  She has no focal midline neck or back pain with palpation.  No step-offs.  She has no gross motor, neurologic or vascular deficits on exam.  She does report pain with palpation and range of motion of the left hip.  No visible or palpable bony deformities.      Oral acetaminophen, oral ibuprofen and Lidoderm patch ordered.      XR lumbar spine 2-3 views   Final Result   No evidence for acute osseous abnormality or spondylolisthesis.   Degenerative changes are present        Signed by: Wilbert Lowery 3/11/2024 1:14 PM   Dictation workstation:   [id number]      XR hip left with pelvis when performed 2 or 3 views   Final Result   No evidence for acute osseous abnormality.        Signed by: Wilbert Lowery 3/11/2024 1:13 PM   Dictation workstation:   [id number]             The patient does not have any neurologic deficits on exam.  She does not have any visible or palpable bony  deformity on exam.  She does not have any evidence of fracture or dislocation on x-rays.      The patient was released in good condition.  She was instructed to follow-up with her primary care physician within 1 to 2 days for further management of her current symptoms and repeat check of her blood pressure.  She was given a prescription for ibuprofen.  She was also given a referral to orthopedics.  She will return to the emergency department sooner with worsening of symptoms or onset of new symptoms        Impression/diagnosis  Left hip pain, acute on chronic  Hypertension, unspecified      I personally saw the patient and made/approve the management plan and take responsibility for the patient management.      I personally discussed the patient's management with the patient      I reviewed the results of the diagnostic imaging.  Formal radiology read was completed by the radiologist.      Re Elaine MD

## 2024-03-11 NOTE — ED PROVIDER NOTES
HPI   Chief Complaint   Patient presents with    Sciatica    Hip Pain     Left hip, lower back, radiates down leg       Patient is a 68-year-old female presents emergency department for evaluation of left hip and lower back pain.  Patient states that she has been having some chronic pain in the left hip and low back has been ongoing over the last 1 to 2 months.  She denies any injury or trauma.  She states it is worse with movement of the left hip.  She states that she uses a wheelchair to get around and is relatively nonambulatory at baseline.  She describes the pain as a dull aching pain that occasionally radiates down her left leg.  She denies any numbness or tingling.  She denies any saddle anesthesia, bowel or bladder incontinence, fevers, chills, nausea, vomiting, abdominal pain.      History provided by:  Patient   used: No                        Sue Coma Scale Score: 15                     Patient History   Past Medical History:   Diagnosis Date    HTN (hypertension)     Long COVID      Past Surgical History:   Procedure Laterality Date    MR HEAD ANGIO WO IV CONTRAST  2023    MR HEAD ANGIO WO IV CONTRAST [facility]     Family History   Problem Relation Name Age of Onset    Hypertension Mother      Diabetes Mother      Hypertension Father      Diabetes Father       Social History     Tobacco Use    Smoking status: Former     Types: Cigarettes     Quit date:      Years since quittin.1    Smokeless tobacco: Never   Substance Use Topics    Alcohol use: Not Currently    Drug use: Never       Physical Exam   ED Triage Vitals [24 1113]   Temperature Heart Rate Respirations BP   36.9 °C (98.4 °F) 76 20 (!) 185/82      Pulse Ox Temp Source Heart Rate Source Patient Position   98 % Oral Brachial Sitting      BP Location FiO2 (%)     Left arm --       Physical Exam  Constitutional:       Appearance: Normal appearance.   Cardiovascular:      Rate and Rhythm: Normal  rate and regular rhythm.   Pulmonary:      Effort: Pulmonary effort is normal.      Breath sounds: Normal breath sounds.   Abdominal:      General: Abdomen is flat.      Palpations: Abdomen is soft.      Tenderness: There is no abdominal tenderness.   Musculoskeletal:         General: Normal range of motion.      Comments: Minimal tenderness to palpation over left hip with good range of motion.  Bilateral lower extremities with good strength and reflexes.  No midline spinal tenderness or bony step-offs.   Skin:     General: Skin is warm and dry.   Neurological:      General: No focal deficit present.      Mental Status: She is alert and oriented to person, place, and time.         ED Course & MDM   Diagnoses as of 03/11/24 1633   Pain of left hip   Hypertension, unspecified type       Medical Decision Making  Patient is a 68-year-old female who presents to emergency department for evaluation of left hip pain that radiates on the leg.    Lab work not warranted at today's visit.    Scans done today were interpreted/confirmed by radiologist and also interpreted by me which included x-ray lumbar spine and x-ray left hip with pelvis.  X-ray lumbar spine shows no evidence for acute osseous abnormality or spondylolisthesis with degenerative changes and x-ray left hip shows no evidence for acute osseous abnormality.    Medications given at today's visit include PO Tylenol and ibuprofen, topical Lidoderm patch    I saw this patient in conjunction with Dr. Elaine.  Patient is good range of motion of bilateral lower extremities.  Patient is nonambulatory at baseline and feeling mildly improved after medications given emergency department.  X-ray showed no acute bony abnormality and showed degenerative changes.  Patient has signs symptoms consistent with sciatica likely related to her degenerative changes and chronic wheelchair-bound status.  Patient was educated on continued symptom management and close follow-up outpatient  with primary care provider.  She was given referral to orthopedic provider and was recommended that she discuss options for physical therapy as this would benefit her greatly.  Patient agreeable to plan and discharge moving forward.  Emergent pathologies were considered for this patient, although I have low suspicion for anything acutely emergent given patient's clinical presentation, history, physical exam, stable vital signs, and relatively unremarkable workup.  Discharging patient home is reasonable plan of care for outpatient management.  I completed a structured, evidence-based clinical evaluation to screen for acute non-traumatic spinal emergencies. The patient has a normal detailed neurologic exam and a low red flag score. Patient has no red flag symptoms including bowel or bladder incontinence, saddle anesthesia, decreased strength, decreased reflexes, or new onset numbness/tingling. The evidence indicates that the patient is very low risk for an acute spinal emergency and this is consistent with my clinical intuition. The risk of further workup is higher than the likelihood of the patient having a spinal epidural abscess or other dangerous emergency spinal condition. It is, therefore, in the best interest of patient not to do additional emergent testing at this time    All labs, imaging, and diagnostic studies were reviewed by me and patient was counseled on clinical impression, expectations, and plan.  Patient was educated to follow-up with PCP in the following 1-2 days.  All questions from patient were answered. They elicited understanding and were agreeable to course of treatment.  Patient was discharged in stable condition and given strict return precautions.    Prescription was given on discharge: PO ibuprofen  ** Disclaimer:  Parts of this document were written utilizing a voice to text dictation software.  Note may contain minor transcription or typographical errors that were inadvertently  transcribed by the computer software.        Procedure  Procedures     Ami Fox PA-C  03/11/24 8928

## 2024-03-11 NOTE — DISCHARGE INSTRUCTIONS
Follow-up with your primary care physician within 1 to 2 days for further management of your current symptoms and repeat check of your blood pressure.      Follow-up with orthopedics as scheduled for further management of your hip pain      Return to the emergency department sooner with worsening of symptoms or onset of new symptoms

## 2024-03-11 NOTE — Clinical Note
Tammy Walker was seen and treated in our emergency department on 3/11/2024.  She may return to work on 03/12/2024.       If you have any questions or concerns, please don't hesitate to call.      Re Elaine MD

## 2024-03-12 ENCOUNTER — TELEPHONE (OUTPATIENT)
Dept: PRIMARY CARE | Facility: CLINIC | Age: 69
End: 2024-03-12
Payer: COMMERCIAL

## 2024-03-12 NOTE — TELEPHONE ENCOUNTER
"Patient's sister Idalmis phoned to report patient was treated and released from [facility] ED yesterday. Idalmis reported patient with c/o leg pain x 1 month. She was told this was related to a deep bruise and arthritis. She stated, \"I think it's a pinched nerve\". Per Idalmis was advised to take Ibuprofen for pain. Idalmis was advised with schedule a visit with you but she is questioning if this is needed. Patient's next [facility] visit is 5/2024.   Per ED note: X-ray showed no acute bony abnormality and showed degenerative changes. Patient has signs symptoms consistent with sciatica likely related to her degenerative changes and chronic wheelchair-bound status. Patient was educated on continued symptom management and close follow-up outpatient with primary care provider. She was given referral to orthopedic provider and was recommended that she discuss options for physical therapy as this would benefit her greatly.   Please clarify when you would like to see her in follow up?   "

## 2024-03-14 ENCOUNTER — TELEPHONE (OUTPATIENT)
Dept: PRIMARY CARE | Facility: CLINIC | Age: 69
End: 2024-03-14
Payer: COMMERCIAL

## 2024-03-15 ENCOUNTER — OFFICE VISIT (OUTPATIENT)
Dept: PRIMARY CARE | Facility: CLINIC | Age: 69
End: 2024-03-15
Payer: COMMERCIAL

## 2024-03-15 VITALS
WEIGHT: 200 LBS | SYSTOLIC BLOOD PRESSURE: 178 MMHG | HEIGHT: 66 IN | HEART RATE: 74 BPM | OXYGEN SATURATION: 96 % | TEMPERATURE: 97.6 F | DIASTOLIC BLOOD PRESSURE: 80 MMHG | BODY MASS INDEX: 32.14 KG/M2 | RESPIRATION RATE: 16 BRPM

## 2024-03-15 DIAGNOSIS — I50.41 ACUTE COMBINED SYSTOLIC AND DIASTOLIC CONGESTIVE HEART FAILURE (MULTI): Primary | Chronic | ICD-10-CM

## 2024-03-15 DIAGNOSIS — I10 ESSENTIAL (PRIMARY) HYPERTENSION: Chronic | ICD-10-CM

## 2024-03-15 DIAGNOSIS — Z86.16 PERSONAL HISTORY OF COVID-19: ICD-10-CM

## 2024-03-15 DIAGNOSIS — M25.552 PAIN OF LEFT HIP: ICD-10-CM

## 2024-03-15 DIAGNOSIS — M1A.09X0 IDIOPATHIC CHRONIC GOUT OF MULTIPLE SITES WITHOUT TOPHUS: Chronic | ICD-10-CM

## 2024-03-15 PROCEDURE — 1125F AMNT PAIN NOTED PAIN PRSNT: CPT | Performed by: NURSE PRACTITIONER

## 2024-03-15 PROCEDURE — 1036F TOBACCO NON-USER: CPT | Performed by: NURSE PRACTITIONER

## 2024-03-15 PROCEDURE — 3077F SYST BP >= 140 MM HG: CPT | Performed by: NURSE PRACTITIONER

## 2024-03-15 PROCEDURE — 99349 HOME/RES VST EST MOD MDM 40: CPT | Performed by: NURSE PRACTITIONER

## 2024-03-15 PROCEDURE — 3079F DIAST BP 80-89 MM HG: CPT | Performed by: NURSE PRACTITIONER

## 2024-03-15 ASSESSMENT — PAIN SCALES - GENERAL: PAINLEVEL: 7

## 2024-03-15 NOTE — PROGRESS NOTES
"Subjective   Patient ID: Tammy Walker is a 68 y.o. female who presents for Follow-up (Post Acute).    Patient is seen in her private home sitting up on the sofa in the living room.  She is awake and alert with appropriate awareness and is her own decision maker.  Currently is non-ambulatory, can bear weight only and propels herself in the wheelchair. PMH: HTN, Covid-19 appx 2021, Hyperlipidemia, TIA.   Medical Necessity - Homebound due to functional decline from Covid-19 Long Hauler.      Today is a post acute follow up  Presented to ED due to severe left hip pain. X-ray showed no acute bony abnormality and showed degenerative changes. Patient has signs symptoms consistent with sciatica likely related to her degenerative changes and chronic wheelchair-bound status. Patient was educated on continued symptom management and close follow-up outpatient with primary care provider. She was given referral to orthopedic provider and was recommended that she discuss options for physical therapy as this would benefit her greatly.      Reported that prior to the hip pain she did have a fall 2 days before.  Stated she did not think she was hurt at the time, however the hip pain became increasingly worse.  Today she reports no pain at rest, does indicate pain with movement.  Hip pain is 0/10 at rest and 8/10 with movement.  Currently is taking tylenol 500mg 1x daily without relief.  She continues to remain on her sofa most of the day and where she sleeps at night.  Indicating they have recently purchased a new bed and mattress for her, waiting  for delivery.  It was recommeneded for PT/OT, however she is declining at this time, stating \"I will think about it\".  Continues to monitor blood pressure 2x daily.  Sister reports that pressure \"for her\" has been better.  Noting that recorded SBP averaging 140's - 160's.  Admits that she is taking all medications as prescribed.  Still presents with bilateral lower pedal edema.  Also " admits that she is not elevating legs, and has not been good at monitoring a low sodium diet.  She is asymptomatic of HTN, has not made follow up appointment with cardiology.  Appetite is reported as good.  Urinating frequently, and bowels are regular.  Denies chest pain, palpitations, tachycardia, and shortness of breath, wheezing, no PND/orthopnea, or respiratory complaints for the patient. There is no fever, or chills. No nausea, vomiting, diarrhea, headaches, or vision changes or recent falls. There is no hematuria, dysuria, flank pain, or increased urgency.  Home Visit: Medically necessary due to: dementia/cognitive impairment, unsteady gait/poor balance, shortness of breath with minimal exertion, Illness or condition that results in activity limitation or restriction that impacts the ability to leave home such as: equipment and /or human assistance needed to safely leave the home, patient has limited support systems to help the patient attend office visits, the office visit would require excessive physical effort or pain for the patient.            Current Outpatient Medications:     allopurinol (Zyloprim) 100 mg tablet, Take 1 tablet (100 mg) by mouth 2 times a day., Disp: 60 tablet, Rfl: 11    aspirin 81 mg chewable tablet, Chew 1 tablet (81 mg) once daily., Disp: , Rfl:     atenolol (Tenormin) 50 mg tablet, Take 1 tablet (50 mg) by mouth 2 times a day., Disp: 90 tablet, Rfl: 3    atorvastatin (Lipitor) 40 mg tablet, Take 1 tablet (40 mg) by mouth once daily at bedtime., Disp: 90 tablet, Rfl: 3    cholecalciferol (Vitamin D3) 10 MCG (400 UNIT) tablet, Take 1 tablet (10 mcg) by mouth once daily., Disp: 30 tablet, Rfl: 11    furosemide (Lasix) 40 mg tablet, Take 1 tablet (40 mg) by mouth once daily., Disp: 30 tablet, Rfl: 11    hydroCHLOROthiazide (HYDRODiuril) 50 mg tablet, Take 1 tablet (50 mg) by mouth 2 times a day., Disp: 180 tablet, Rfl: 3    mirtazapine (Remeron) 15 mg tablet, TAKE 1 TABLET (15 MG) BY  "MOUTH ONCE DAILY AT BEDTIME., Disp: 90 tablet, Rfl: 0    sacubitriL-valsartan (Entresto)  mg tablet, Take 1 tablet by mouth 2 times a day., Disp: 180 tablet, Rfl: 12     Review of Systems  Patient is seen in her private home sitting up on the sofa in the living room. She is awake and alert with appropriate awareness and is her own decision maker. Currently is non-ambulatory, can bear weight only and propels herself in the wheelchair. PMH: HTN, Covid-19 appx 2021, Hyperlipidemia, TIA. Medical Necessity - Homebound due to functional decline from Covid-19 Long Hauler.     Objective   /80   Pulse 74   Temp 36.4 °C (97.6 °F) (Temporal)   Resp 16   Ht 1.676 m (5' 6\")   Wt 90.7 kg (200 lb)   SpO2 96%   BMI 32.28 kg/m²     Physical Exam  Constitutional:       Appearance: Normal appearance.   HENT:      Head: Normocephalic and atraumatic.      Nose: Nose normal.      Mouth/Throat:      Mouth: Mucous membranes are moist.      Pharynx: Oropharynx is clear.   Eyes:      Extraocular Movements: Extraocular movements intact.      Pupils: Pupils are equal, round, and reactive to light.   Cardiovascular:      Rate and Rhythm: Normal rate and regular rhythm.      Pulses: Normal pulses.      Heart sounds: Normal heart sounds.   Pulmonary:      Effort: Pulmonary effort is normal.      Breath sounds: Normal breath sounds. No wheezing or rhonchi.   Abdominal:      General: Bowel sounds are normal.      Palpations: Abdomen is soft.   Musculoskeletal:         General: Swelling present.      Cervical back: Neck supple. No tenderness.      Comments: 1+ left lower ext pitting edema, 2+ rt pitting edema   Skin:     General: Skin is warm and dry.      Findings: No erythema.   Neurological:      Mental Status: She is alert and oriented to person, place, and time. Mental status is at baseline.   Psychiatric:         Mood and Affect: Mood normal.         Judgment: Judgment normal.     Assessment/Plan   Diagnoses and all orders " for this visit:  Acute combined systolic and diastolic congestive heart failure (CMS/HCC)  Comments:  Improving - continue furosemide 40mg daily, hydrochlorothiazide 50mg bid  Essential (primary) hypertension  Comments:  Improving - continue valsartan 97-103mg bid  Personal history of COVID-19  Idiopathic chronic gout of multiple sites without tophus  Comments:  Improving - continue allopurinol 100mg 2 x daily  Pain of left hip  Comments:  Acute - start tylenol 1000mg bid, start aspercreme lidocain patch daily    More than 50% of time spent in face-to-face discussion @ a total of 40 minutes with this patient on counseling, coordination of care, collaboration with family and staff and review of medical records and diagnostics.               Raisa Goff, APRN-CNP

## 2024-03-20 PROBLEM — M25.552 PAIN OF LEFT HIP: Status: ACTIVE | Noted: 2024-03-20

## 2024-04-02 ENCOUNTER — TELEPHONE (OUTPATIENT)
Dept: PRIMARY CARE | Facility: CLINIC | Age: 69
End: 2024-04-02
Payer: COMMERCIAL

## 2024-04-02 ENCOUNTER — HOSPITAL ENCOUNTER (EMERGENCY)
Facility: HOSPITAL | Age: 69
Discharge: HOME | End: 2024-04-02
Attending: EMERGENCY MEDICINE
Payer: COMMERCIAL

## 2024-04-02 VITALS
OXYGEN SATURATION: 97 % | DIASTOLIC BLOOD PRESSURE: 98 MMHG | TEMPERATURE: 98.8 F | BODY MASS INDEX: 33.32 KG/M2 | HEART RATE: 63 BPM | SYSTOLIC BLOOD PRESSURE: 178 MMHG | WEIGHT: 200 LBS | HEIGHT: 65 IN | RESPIRATION RATE: 16 BRPM

## 2024-04-02 DIAGNOSIS — R26.2 AMBULATORY DYSFUNCTION: ICD-10-CM

## 2024-04-02 DIAGNOSIS — Z74.09 LIMITED MOBILITY: ICD-10-CM

## 2024-04-02 DIAGNOSIS — I10 HYPERTENSION, UNSPECIFIED TYPE: ICD-10-CM

## 2024-04-02 DIAGNOSIS — M25.551 BILATERAL HIP PAIN: Primary | ICD-10-CM

## 2024-04-02 DIAGNOSIS — R53.1 GENERALIZED WEAKNESS: Primary | ICD-10-CM

## 2024-04-02 DIAGNOSIS — M25.552 BILATERAL HIP PAIN: Primary | ICD-10-CM

## 2024-04-02 PROCEDURE — 99281 EMR DPT VST MAYX REQ PHY/QHP: CPT

## 2024-04-02 ASSESSMENT — LIFESTYLE VARIABLES
HAVE YOU EVER FELT YOU SHOULD CUT DOWN ON YOUR DRINKING: NO
TOTAL SCORE: 0
EVER FELT BAD OR GUILTY ABOUT YOUR DRINKING: NO
EVER HAD A DRINK FIRST THING IN THE MORNING TO STEADY YOUR NERVES TO GET RID OF A HANGOVER: NO
HAVE PEOPLE ANNOYED YOU BY CRITICIZING YOUR DRINKING: NO

## 2024-04-02 ASSESSMENT — PAIN SCALES - GENERAL: PAINLEVEL_OUTOF10: 0 - NO PAIN

## 2024-04-02 ASSESSMENT — COLUMBIA-SUICIDE SEVERITY RATING SCALE - C-SSRS
1. IN THE PAST MONTH, HAVE YOU WISHED YOU WERE DEAD OR WISHED YOU COULD GO TO SLEEP AND NOT WAKE UP?: NO
2. HAVE YOU ACTUALLY HAD ANY THOUGHTS OF KILLING YOURSELF?: NO
6. HAVE YOU EVER DONE ANYTHING, STARTED TO DO ANYTHING, OR PREPARED TO DO ANYTHING TO END YOUR LIFE?: NO

## 2024-04-02 ASSESSMENT — PAIN - FUNCTIONAL ASSESSMENT: PAIN_FUNCTIONAL_ASSESSMENT: 0-10

## 2024-04-02 NOTE — DISCHARGE INSTRUCTIONS
Follow with your primary care physician within 1 to 2 days for further management of your current symptoms and repeat check of your blood pressure.  You will also need to discuss physical therapy with your primary care physician.      Return to the emergency department sooner with worsening of symptoms or onset of new symptoms

## 2024-04-02 NOTE — ED PROVIDER NOTES
HPI   No chief complaint on file.      HPI                    No data recorded                   Patient History   Past Medical History:   Diagnosis Date    HTN (hypertension)     Long COVID      Past Surgical History:   Procedure Laterality Date    MR HEAD ANGIO WO IV CONTRAST  2023    MR HEAD ANGIO WO IV CONTRAST [facility]     Family History   Problem Relation Name Age of Onset    Hypertension Mother      Diabetes Mother      Hypertension Father      Diabetes Father       Social History     Tobacco Use    Smoking status: Former     Types: Cigarettes     Quit date:      Years since quittin.2    Smokeless tobacco: Never   Substance Use Topics    Alcohol use: Not Currently    Drug use: Never       Physical Exam   ED Triage Vitals   Temp Pulse Resp BP   -- -- -- --      SpO2 Temp src Heart Rate Source Patient Position   -- -- -- --      BP Location FiO2 (%)     -- --       Physical Exam    ED Course & MDM   Diagnoses as of 24 1218   Generalized weakness   Hypertension, unspecified type   Ambulatory dysfunction       Medical Decision Making    The patient is a 68-year-old female presenting to the emergency department by EMS from home for ambulatory dysfunction.  This is reportedly a chronic symptom that has been going on for years.  She is wheelchair dependent.  She states that she does live at home and her sister takes care of her but her sister cannot take care of her any longer.  She states that is just becoming too much for her.  She states that she does not want to go to a long-term care facility though.  She has not had any recent injury or trauma.  No headache or visual changes.  No chest pain or shortness of breath.  No abdominal pain.  No nausea vomiting.  No diarrhea or constipation.  No urinary complaints.  All pertinent positives and negatives are recorded above.  All other systems reviewed and otherwise negative.  Vital signs with hypertension but otherwise within normal  limits.  Physical exam with a well-nourished well-developed female in no acute distress.  HEENT exam with dry mucous membranes but otherwise unremarkable.  She has no evidence of airway compromise or respiratory distress.  Abdominal exam is benign.  She has no focal midline neck or back pain with palpation.  She has no step-offs.  Strength is 5 of 5 in all 4 extremities but she does have some mild generalized weakness in all 4 extremities.      Options discussed with the patient.  She is adamant that she does not want to go to long-term care.  She states that she would like to go home.  The patient's sister did arrive to the emergency room.  She supports her sister's decision to go home.  She states that they have been asking the nurse practitioner who is the provider for the patient to arrange in-home physical therapy for the patient and it was supposed to be arranged by them.  The patient does not want to go to a facility and her sister agrees that she should be able to come home and have her primary care provider arrange for physical therapy out of the home.      The patient declined any diagnostic studies in the emergency room      She was released in good condition.  She will follow-up with her primary care physician within 1 to 2 days for further management of her current symptoms and to discuss possible options for physical therapy at home.  She will return to the emergency department sooner with worsening of symptoms or onset of new symptoms      Impression/diagnosis  Ambulatory dysfunction, chronic  Generalized weakness, chronic  Hypertension, unspecified      There is no indication for emergent imaging and/or diagnostic testing at this time    Procedure  Procedures     Re Elaine MD  04/02/24 9596

## 2024-04-02 NOTE — TELEPHONE ENCOUNTER
Pt's sister called and states pt needs to go to rehab to gain more strength. I advised pt's sister that she would need to be evaluated in the hospital and have a stay for 3 days to qualify for SNF. Pt has not been in the hospital for more than 30 days so we are unable to try to get her admitted to facility. Pt's sister called back at 1140 and advised that the hospital told her she will need to go home if she doesn't want to go to nursing home. I explained to pt and her sister that nursing home is the same as rehab and it seems like a miscommunication. Pt and her sister voiced understanding and advised they would speak to hospital staff about short term placement.

## 2024-04-02 NOTE — ED NOTES
Patient BIB EMS for rehab placement, she hasn't ambulated since having COVID 2 years ago and being in rehab.  She lives with her sister and is able to get up to her wheelchair where she remains all day.  Her home care nurse recommended she come to the ER to get into rehab.    PMHX:  Past Medical History:   Diagnosis Date    Arthritis     HTN (hypertension)     Long COVID         No Known Allergies      LABS:    PLAN: Pending                   Nica Cao RN  04/02/24 0868

## 2024-04-02 NOTE — ED NOTES
[facility] to pick patient up and transport home.     Nica Cao, ZOË  04/02/24 [id number]

## 2024-04-03 ENCOUNTER — DOCUMENTATION (OUTPATIENT)
Dept: HOME HEALTH SERVICES | Facility: HOME HEALTH | Age: 69
End: 2024-04-03
Payer: COMMERCIAL

## 2024-04-03 ENCOUNTER — HOME HEALTH ADMISSION (OUTPATIENT)
Dept: HOME HEALTH SERVICES | Facility: HOME HEALTH | Age: 69
End: 2024-04-03
Payer: COMMERCIAL

## 2024-04-03 NOTE — HH CARE COORDINATION
Home Care received a Referral for Physical Therapy and Occupational Therapy. We have processed the referral for a Start of Care on 04/04.     If you have any questions or concerns, please feel free to contact us at [phone]. Follow the prompts, enter your five digit zip code, and you will be directed to your care team on [facility] 1.

## 2024-04-03 NOTE — TELEPHONE ENCOUNTER
[facility] called today stating pt was sent home and they were advised that PCP needs to write an order for rehab. Idalmis states ER informed her she was given the wrong information form our office and that we would need to have pt placed. I am attaching Carolann Sánchez RN CM for some help. Thank you

## 2024-04-05 ENCOUNTER — HOME CARE VISIT (OUTPATIENT)
Dept: HOME HEALTH SERVICES | Facility: HOME HEALTH | Age: 69
End: 2024-04-05

## 2024-04-22 DIAGNOSIS — F51.01 PRIMARY INSOMNIA: ICD-10-CM

## 2024-04-22 RX ORDER — MIRTAZAPINE 15 MG/1
15 TABLET, FILM COATED ORAL NIGHTLY
Qty: 90 TABLET | Refills: 0 | Status: SHIPPED | OUTPATIENT
Start: 2024-04-22 | End: 2024-05-15 | Stop reason: SINTOL

## 2024-05-03 ENCOUNTER — TELEPHONE (OUTPATIENT)
Dept: PRIMARY CARE | Facility: CLINIC | Age: 69
End: 2024-05-03

## 2024-05-06 ENCOUNTER — OFFICE VISIT (OUTPATIENT)
Dept: PRIMARY CARE | Facility: CLINIC | Age: 69
End: 2024-05-06
Payer: COMMERCIAL

## 2024-05-06 VITALS
TEMPERATURE: 96.8 F | DIASTOLIC BLOOD PRESSURE: 89 MMHG | HEIGHT: 65 IN | OXYGEN SATURATION: 98 % | BODY MASS INDEX: 36.49 KG/M2 | RESPIRATION RATE: 16 BRPM | SYSTOLIC BLOOD PRESSURE: 173 MMHG | HEART RATE: 78 BPM | WEIGHT: 219 LBS

## 2024-05-06 DIAGNOSIS — I50.41 ACUTE COMBINED SYSTOLIC AND DIASTOLIC CONGESTIVE HEART FAILURE (MULTI): Chronic | ICD-10-CM

## 2024-05-06 DIAGNOSIS — M1A.09X0 IDIOPATHIC CHRONIC GOUT OF MULTIPLE SITES WITHOUT TOPHUS: Chronic | ICD-10-CM

## 2024-05-06 DIAGNOSIS — Z86.16 PERSONAL HISTORY OF COVID-19: ICD-10-CM

## 2024-05-06 DIAGNOSIS — I10 ESSENTIAL (PRIMARY) HYPERTENSION: Primary | Chronic | ICD-10-CM

## 2024-05-06 PROCEDURE — 99349 HOME/RES VST EST MOD MDM 40: CPT | Performed by: NURSE PRACTITIONER

## 2024-05-06 PROCEDURE — 3079F DIAST BP 80-89 MM HG: CPT | Performed by: NURSE PRACTITIONER

## 2024-05-06 PROCEDURE — 1159F MED LIST DOCD IN RCRD: CPT | Performed by: NURSE PRACTITIONER

## 2024-05-06 PROCEDURE — 1126F AMNT PAIN NOTED NONE PRSNT: CPT | Performed by: NURSE PRACTITIONER

## 2024-05-06 PROCEDURE — 1160F RVW MEDS BY RX/DR IN RCRD: CPT | Performed by: NURSE PRACTITIONER

## 2024-05-06 PROCEDURE — 3077F SYST BP >= 140 MM HG: CPT | Performed by: NURSE PRACTITIONER

## 2024-05-06 ASSESSMENT — ENCOUNTER SYMPTOMS
PALPITATIONS: 0
ENDOCRINE NEGATIVE: 1
VOMITING: 0
NAUSEA: 0
COUGH: 0
SHORTNESS OF BREATH: 0
WHEEZING: 0
ARTHRALGIAS: 1
ACTIVITY CHANGE: 0
DIARRHEA: 0
PSYCHIATRIC NEGATIVE: 1
FREQUENCY: 1
TROUBLE SWALLOWING: 0
WEAKNESS: 0

## 2024-05-06 ASSESSMENT — PAIN SCALES - GENERAL: PAINLEVEL: 0-NO PAIN

## 2024-05-06 NOTE — PROGRESS NOTES
"Subjective   Patient ID: Tammy Walker is a 68 y.o. female who presents for Follow-up (HTN, functional decline, heart failure).    Patient is seen in her private home sitting up on the sofa in the living room. Resides at home where she lives with her sister, and she is her primary caregiver.  She is awake and alert with appropriate awareness and is her own decision maker.  Currently is non-ambulatory, can bear weight only and propels herself in the wheelchair. PMH: HTN, Covid-19 appx 2021, Hyperlipidemia, TIA.   Medical Necessity - Homebound due to functional decline from Covid-19 Long Hauler.      Today is a follow up for overall functional decline, HTN, and hip pain. Today she reports getting around a bit better, and is no longer having hip pain like she was. We have discussed PT/OT and both patient and sister are declining in home services.  Reports that she will not be able to answer the door to let anyone in. Both were hoping for an inpatient physical therapy program.  We've discussed benefits of therapy for balance and strengthening and giving her increased independence.  She is mainly homebound due to limited functional ability.  She does have ability to stand and transfer on her own.  Propels herself in the wheelchair to the bathroom, however does not have the ability to ambulate and is a risk for falls.  Sister manages all of her medications.  She is home alone most of the day, as sister works and is usually home by 3pm.  She remains on the sofa 24/7 which is where she also sleeps.  Sister does admit that she is not giving patient hydrochlorothiazide as prescribed. States \"that's what made her fall\". Sister reports that she has decreased her mirtazapine.  Appears to be giving her 1/2 tab 7.5mg.  Sister reports \"I think it makes her confused, and was thinking of stopping it altogether\". Reports that she is doing better without it. We have discussed the importance of collaborating with provider regarding " "medications and possible side effects.   Bilateral lower legs presenting with 3+ pitting edema.  Sister reports that she is giving her the \"water pill\".  Patient states, \"I must be taking it because I pee all the time\".   Reports they are monitoring blood pressure, which is quite elevated during today's home visit.  Sister chushirley stating \"white coat syndrome\".  Blood pressure log reviewed via machine, with SBP between 140's - 190's.  Again, discussed importance of taking all medications as prescribed and again risks associated of with high blood pressures including stroke and heart attack.  Sister responds by saying \"ok we will try the medication again\".  Patient is not symptomatic of hypertension, as she is denying dizziness, chest pain, headaches or double vision.  States \"I never have any of those symptoms\".  They have not followed up with cardiology Dr. Alonzo, as previous appointment was cancelled.   Denies chest pain, palpitations, tachycardia, and shortness of breath, wheezing, no PND/orthopnea, or respiratory complaints for the patient. There is no fever, or chills. No nausea, vomiting, diarrhea, headaches, or vision changes or recent falls. There is no hematuria, dysuria, flank pain, or increased urgency.    Home Visit: Medically necessary due to: dementia/cognitive impairment, unsteady gait/poor balance, shortness of breath with minimal exertion, Illness or condition that results in activity limitation or restriction that impacts the ability to leave home such as: equipment and /or human assistance needed to safely leave the home, patient has limited support systems to help the patient attend office visits, the office visit would require excessive physical effort or pain for the patient.              Current Outpatient Medications:     allopurinol (Zyloprim) 100 mg tablet, Take 1 tablet (100 mg) by mouth 2 times a day., Disp: 60 tablet, Rfl: 11    aspirin 81 mg chewable tablet, Chew 1 tablet (81 mg) " "once daily., Disp: , Rfl:     atenolol (Tenormin) 50 mg tablet, Take 1 tablet (50 mg) by mouth 2 times a day., Disp: 90 tablet, Rfl: 3    atorvastatin (Lipitor) 40 mg tablet, Take 1 tablet (40 mg) by mouth once daily at bedtime., Disp: 90 tablet, Rfl: 3    cholecalciferol (Vitamin D3) 10 MCG (400 UNIT) tablet, Take 1 tablet (10 mcg) by mouth once daily., Disp: 30 tablet, Rfl: 11    furosemide (Lasix) 40 mg tablet, Take 1 tablet (40 mg) by mouth once daily., Disp: 30 tablet, Rfl: 11    hydroCHLOROthiazide (HYDRODiuril) 50 mg tablet, Take 1 tablet (50 mg) by mouth once daily., Disp: 30 tablet, Rfl: 5    mirtazapine (Remeron) 15 mg tablet, TAKE 1 TABLET (15 MG) BY MOUTH ONCE DAILY AT BEDTIME., Disp: 90 tablet, Rfl: 0    sacubitriL-valsartan (Entresto)  mg tablet, Take 1 tablet by mouth 2 times a day., Disp: 180 tablet, Rfl: 12     Review of Systems   Constitutional:  Negative for activity change.   HENT:  Negative for trouble swallowing.    Respiratory:  Negative for cough, shortness of breath and wheezing.    Cardiovascular:  Positive for leg swelling. Negative for palpitations.   Gastrointestinal:  Negative for diarrhea, nausea and vomiting.   Endocrine: Negative.    Genitourinary:  Positive for frequency.   Musculoskeletal:  Positive for arthralgias and gait problem.   Skin:  Positive for pallor.   Neurological:  Negative for weakness.   Psychiatric/Behavioral: Negative.         Objective   /89   Pulse 78   Temp 36 °C (96.8 °F) (Temporal)   Resp 16   Ht 1.651 m (5' 5\")   Wt 99.3 kg (219 lb) Comment: actual  SpO2 98%   BMI 36.44 kg/m²     Physical Exam  Constitutional:       Appearance: Normal appearance.   HENT:      Head: Normocephalic and atraumatic.      Nose: Nose normal.      Mouth/Throat:      Mouth: Mucous membranes are moist.      Pharynx: Oropharynx is clear.   Eyes:      Extraocular Movements: Extraocular movements intact.      Pupils: Pupils are equal, round, and reactive to light. "   Cardiovascular:      Rate and Rhythm: Normal rate and regular rhythm.      Pulses: Normal pulses.      Heart sounds: Normal heart sounds.   Pulmonary:      Effort: Pulmonary effort is normal.      Breath sounds: Normal breath sounds. No wheezing or rhonchi.   Abdominal:      General: Bowel sounds are normal.      Palpations: Abdomen is soft.   Musculoskeletal:         General: Swelling present.      Cervical back: Neck supple. No tenderness.      Comments: 1+ left lower ext pitting edema, 2+ rt pitting edema   Skin:     General: Skin is warm and dry.      Findings: No erythema.   Neurological:      Mental Status: She is alert and oriented to person, place, and time. Mental status is at baseline.   Psychiatric:         Mood and Affect: Mood normal.         Judgment: Judgment normal.     Assessment/Plan   Diagnoses and all orders for this visit:  Essential (primary) hypertension  Comments:  Not controlled - not compliant - Take Entresto 97-103mg bid, atenolol 50mg bid, start hydrochlorothiazide 50mg daily  Orders:  -     hydroCHLOROthiazide (HYDRODiuril) 50 mg tablet; Take 1 tablet (50 mg) by mouth once daily.  Acute combined systolic and diastolic congestive heart failure (Multi)  Comments:  Not managed - not compliant with regimen - Continue - furosemide 40mg daily / start hydrochlorothiazide 50mg daily  Orders:  -     hydroCHLOROthiazide (HYDRODiuril) 50 mg tablet; Take 1 tablet (50 mg) by mouth once daily.  Personal history of COVID-19  Idiopathic chronic gout of multiple sites without tophus  Comments:  Managed - continue allopurinol 100mg daily    More than 50% of time spent in face-to-face discussion @ a total of 40 minutes with this patient on counseling, coordination of care, collaboration with family and staff and review of medical records and diagnostics.    Will continue with [facility] home visits. Patient has limited support, limited mobility and unable to navigate to traditional office  appointments.       Will follow up in 4 weeks - labs with next home visit.  Great deal of education regarding medication compliance and follow up with Dr. Alonzo - cardiology.    Reduce mirtazapine to 7.5mg daily x 1 week, then every other day x 1 week then stop.  Start - hydrochlorothiazide 50mg daily    Raisa Goff, APRN-CNP

## 2024-05-07 RX ORDER — HYDROCHLOROTHIAZIDE 50 MG/1
50 TABLET ORAL DAILY
Qty: 30 TABLET | Refills: 5 | Status: SHIPPED | OUTPATIENT
Start: 2024-05-07 | End: 2024-11-03

## 2024-05-10 ENCOUNTER — TELEPHONE (OUTPATIENT)
Dept: PRIMARY CARE | Facility: CLINIC | Age: 69
End: 2024-05-10
Payer: COMMERCIAL

## 2024-05-10 ENCOUNTER — HOSPITAL ENCOUNTER (EMERGENCY)
Facility: HOSPITAL | Age: 69
Discharge: HOME | End: 2024-05-10
Attending: EMERGENCY MEDICINE
Payer: COMMERCIAL

## 2024-05-10 ENCOUNTER — APPOINTMENT (OUTPATIENT)
Dept: RADIOLOGY | Facility: HOSPITAL | Age: 69
End: 2024-05-10
Payer: COMMERCIAL

## 2024-05-10 VITALS
RESPIRATION RATE: 18 BRPM | OXYGEN SATURATION: 96 % | SYSTOLIC BLOOD PRESSURE: 156 MMHG | HEIGHT: 65 IN | BODY MASS INDEX: 36.32 KG/M2 | HEART RATE: 64 BPM | DIASTOLIC BLOOD PRESSURE: 58 MMHG | TEMPERATURE: 99 F | WEIGHT: 218 LBS

## 2024-05-10 DIAGNOSIS — N17.9 AKI (ACUTE KIDNEY INJURY) (CMS-HCC): ICD-10-CM

## 2024-05-10 DIAGNOSIS — S90.812A ABRASION OF LEFT FOOT, INITIAL ENCOUNTER: Primary | ICD-10-CM

## 2024-05-10 LAB
ANION GAP SERPL CALC-SCNC: 16 MMOL/L
BASOPHILS # BLD AUTO: 0.09 X10*3/UL (ref 0–0.1)
BASOPHILS NFR BLD AUTO: 0.9 %
BUN SERPL-MCNC: 43 MG/DL (ref 8–25)
CALCIUM SERPL-MCNC: 8.9 MG/DL (ref 8.5–10.4)
CHLORIDE SERPL-SCNC: 99 MMOL/L (ref 97–107)
CO2 SERPL-SCNC: 25 MMOL/L (ref 24–31)
CREAT SERPL-MCNC: 1.9 MG/DL (ref 0.4–1.6)
EGFRCR SERPLBLD CKD-EPI 2021: 29 ML/MIN/1.73M*2
EOSINOPHIL # BLD AUTO: 0.49 X10*3/UL (ref 0–0.7)
EOSINOPHIL NFR BLD AUTO: 4.9 %
ERYTHROCYTE [DISTWIDTH] IN BLOOD BY AUTOMATED COUNT: 12.9 % (ref 11.5–14.5)
GLUCOSE SERPL-MCNC: 115 MG/DL (ref 65–99)
HCT VFR BLD AUTO: 36.6 % (ref 36–46)
HGB BLD-MCNC: 12.6 G/DL (ref 12–16)
IMM GRANULOCYTES # BLD AUTO: 0.02 X10*3/UL (ref 0–0.7)
IMM GRANULOCYTES NFR BLD AUTO: 0.2 % (ref 0–0.9)
LYMPHOCYTES # BLD AUTO: 1.69 X10*3/UL (ref 1.2–4.8)
LYMPHOCYTES NFR BLD AUTO: 16.8 %
MCH RBC QN AUTO: 30.6 PG (ref 26–34)
MCHC RBC AUTO-ENTMCNC: 34.4 G/DL (ref 32–36)
MCV RBC AUTO: 89 FL (ref 80–100)
MONOCYTES # BLD AUTO: 0.92 X10*3/UL (ref 0.1–1)
MONOCYTES NFR BLD AUTO: 9.2 %
NEUTROPHILS # BLD AUTO: 6.84 X10*3/UL (ref 1.2–7.7)
NEUTROPHILS NFR BLD AUTO: 68 %
NRBC BLD-RTO: 0 /100 WBCS (ref 0–0)
PLATELET # BLD AUTO: 314 X10*3/UL (ref 150–450)
POTASSIUM SERPL-SCNC: 3.3 MMOL/L (ref 3.4–5.1)
RBC # BLD AUTO: 4.12 X10*6/UL (ref 4–5.2)
SODIUM SERPL-SCNC: 140 MMOL/L (ref 133–145)
WBC # BLD AUTO: 10.1 X10*3/UL (ref 4.4–11.3)

## 2024-05-10 PROCEDURE — 90715 TDAP VACCINE 7 YRS/> IM: CPT | Performed by: EMERGENCY MEDICINE

## 2024-05-10 PROCEDURE — 2500000006 HC RX 250 W HCPCS SELF ADMINISTERED DRUGS (ALT 637 FOR ALL PAYERS): Performed by: EMERGENCY MEDICINE

## 2024-05-10 PROCEDURE — 99283 EMERGENCY DEPT VISIT LOW MDM: CPT | Mod: 25

## 2024-05-10 PROCEDURE — 73630 X-RAY EXAM OF FOOT: CPT | Mod: LEFT SIDE | Performed by: RADIOLOGY

## 2024-05-10 PROCEDURE — 36415 COLL VENOUS BLD VENIPUNCTURE: CPT | Performed by: EMERGENCY MEDICINE

## 2024-05-10 PROCEDURE — 2500000004 HC RX 250 GENERAL PHARMACY W/ HCPCS (ALT 636 FOR OP/ED): Performed by: EMERGENCY MEDICINE

## 2024-05-10 PROCEDURE — 73630 X-RAY EXAM OF FOOT: CPT | Mod: LT

## 2024-05-10 PROCEDURE — 80048 BASIC METABOLIC PNL TOTAL CA: CPT | Performed by: EMERGENCY MEDICINE

## 2024-05-10 PROCEDURE — 85025 COMPLETE CBC W/AUTO DIFF WBC: CPT | Performed by: EMERGENCY MEDICINE

## 2024-05-10 PROCEDURE — 90471 IMMUNIZATION ADMIN: CPT | Performed by: EMERGENCY MEDICINE

## 2024-05-10 RX ORDER — DOXYCYCLINE 100 MG/1
100 CAPSULE ORAL 2 TIMES DAILY
Qty: 14 CAPSULE | Refills: 0 | Status: SHIPPED | OUTPATIENT
Start: 2024-05-10 | End: 2024-05-15 | Stop reason: ALTCHOICE

## 2024-05-10 RX ORDER — POTASSIUM CHLORIDE 20 MEQ/1
40 TABLET, EXTENDED RELEASE ORAL ONCE
Status: COMPLETED | OUTPATIENT
Start: 2024-05-10 | End: 2024-05-10

## 2024-05-10 RX ADMIN — POTASSIUM CHLORIDE 40 MEQ: 1500 TABLET, EXTENDED RELEASE ORAL at 12:54

## 2024-05-10 RX ADMIN — TETANUS TOXOID, REDUCED DIPHTHERIA TOXOID AND ACELLULAR PERTUSSIS VACCINE, ADSORBED 0.5 ML: 5; 2.5; 8; 8; 2.5 SUSPENSION INTRAMUSCULAR at 10:59

## 2024-05-10 ASSESSMENT — COLUMBIA-SUICIDE SEVERITY RATING SCALE - C-SSRS
6. HAVE YOU EVER DONE ANYTHING, STARTED TO DO ANYTHING, OR PREPARED TO DO ANYTHING TO END YOUR LIFE?: NO
1. IN THE PAST MONTH, HAVE YOU WISHED YOU WERE DEAD OR WISHED YOU COULD GO TO SLEEP AND NOT WAKE UP?: NO
2. HAVE YOU ACTUALLY HAD ANY THOUGHTS OF KILLING YOURSELF?: NO

## 2024-05-10 ASSESSMENT — PAIN SCALES - GENERAL: PAINLEVEL_OUTOF10: 0 - NO PAIN

## 2024-05-10 ASSESSMENT — PAIN - FUNCTIONAL ASSESSMENT: PAIN_FUNCTIONAL_ASSESSMENT: 0-10

## 2024-05-10 NOTE — ED TRIAGE NOTES
Pt to ED via EMS for possible infection of laceration of the left foot. Pt cut it on her wheelchair a few days ago. Pt is A&Ox2 at baseline. Lives at home with sister. Small laceration on the top of the left foot that has a bandage that was placed by EMS.

## 2024-05-10 NOTE — ED PROVIDER NOTES
HPI   Chief Complaint   Patient presents with    Laceration     Pt to ED via EMS for possible infection of laceration of the left foot. Pt cut it on her wheelchair a few days ago. Pt is A&Ox2 at baseline. Lives at home with sister. Small laceration on the top of the left foot that has a bandage that was placed by EMS.       HPI  Patient is a 68-year-old wheelchair-bound female who presents to ED for possible laceration of left foot.  Patient states she cut her foot on her wheelchair.  Patient has small abrasion on dorsal aspect of left foot.  Patient is ANO x 2 at baseline.  Lives with sister.  No other acute complaints.                  Sue Coma Scale Score: 15                     Patient History   Past Medical History:   Diagnosis Date    Arthritis     HTN (hypertension)     Long COVID      Past Surgical History:   Procedure Laterality Date    MR HEAD ANGIO WO IV CONTRAST  2023    MR HEAD ANGIO WO IV CONTRAST [facility]     Family History   Problem Relation Name Age of Onset    Hypertension Mother      Diabetes Mother      Hypertension Father      Diabetes Father       Social History     Tobacco Use    Smoking status: Former     Current packs/day: 0.00     Types: Cigarettes     Quit date:      Years since quittin.3    Smokeless tobacco: Never   Substance Use Topics    Alcohol use: Not Currently    Drug use: Never       Physical Exam   ED Triage Vitals [05/10/24 1034]   Temperature Heart Rate Respirations BP   37.2 °C (99 °F) 64 18 156/58      Pulse Ox Temp Source Heart Rate Source Patient Position   96 % Temporal -- Sitting      BP Location FiO2 (%)     Left arm --       Physical Exam  Vitals reviewed.   Constitutional:       Appearance: Normal appearance.   HENT:      Head: Atraumatic.   Eyes:      Extraocular Movements: Extraocular movements intact.   Cardiovascular:      Rate and Rhythm: Normal rate.   Pulmonary:      Effort: Pulmonary effort is normal.   Abdominal:      General:  Abdomen is flat.   Musculoskeletal:         General: Normal range of motion.      Cervical back: Neck supple.   Skin:     General: Skin is dry.   Neurological:      Mental Status: She is alert. Mental status is at baseline.   Psychiatric:         Mood and Affect: Mood normal.         Behavior: Behavior normal.         ED Course & MDM   Diagnoses as of 05/10/24 1514   Abrasion of left foot, initial encounter   LORETA (acute kidney injury) (CMS-[facility])       Medical Decision Making  Parts of this chart have been completed using voice recognition software. Please excuse any errors of transcription.  My thought process and reason for plan has been formulated from the time that I saw the patient until the time of disposition and is not specific to one specific moment during their visit and furthermore my MDM encompasses this entire chart and not only this text box.    HPI:   Detailed above.    Exam:   A medically appropriate exam performed, outlined above, given the known history and presentation.    History obtained from:   Patient    EKG/Cardiac monitor:       Social Determinants of Health considered during this visit:       Medications given during visit:  Medications   diphth,pertus(acell),tetanus (BoostRIX) 2.5-8-5 Lf-mcg-Lf/0.5mL vaccine 0.5 mL (0.5 mL intramuscular Given 5/10/24 1059)   potassium chloride CR (Klor-Con M20) ER tablet 40 mEq (40 mEq oral Given 5/10/24 1254)        Diagnostic/tests:  Labs Reviewed   BASIC METABOLIC PANEL - Abnormal       Result Value    Glucose 115 (*)     Sodium 140      Potassium 3.3 (*)     Chloride 99      Bicarbonate 25      Urea Nitrogen 43 (*)     Creatinine 1.90 (*)     eGFR 29 (*)     Calcium 8.9      Anion Gap 16     CBC WITH AUTO DIFFERENTIAL    WBC 10.1      nRBC 0.0      RBC 4.12      Hemoglobin 12.6      Hematocrit 36.6      MCV 89      MCH 30.6      MCHC 34.4      RDW 12.9      Platelets 314      Neutrophils % 68.0      Immature Granulocytes %, Automated 0.2      Lymphocytes  % 16.8      Monocytes % 9.2      Eosinophils % 4.9      Basophils % 0.9      Neutrophils Absolute 6.84      Immature Granulocytes Absolute, Automated 0.02      Lymphocytes Absolute 1.69      Monocytes Absolute 0.92      Eosinophils Absolute 0.49      Basophils Absolute 0.09        XR foot left 3+ views   Final Result   The soft tissues of the left foot are swollen specially along the   dorsum with no air or radiopaque foreign body in the soft tissues is   noted.             MACRO:   None        Signed by: Buddy Lomas 5/10/2024 12:02 PM   Dictation workstation:   [id number]           Differential Diagnosis:         Consultations:      Procedures:      Critical Care:      Referrals:      Discharge Prescriptions:      MDM Summary:  Patient found to have acute kidney injury.  This was discussed with patient by my attending physician.  Plans were made to follow-up with primary care provider on Monday.  Primary care provider was contacted and informed.  Patient has no other acute complaints.    We have discussed the diagnosis and risks, and we agree with discharging home to follow-up with appropriate physician as directed. We also discussed returning to the Emergency Department immediately if new or worsening symptoms occur. We have discussed the symptoms which are most concerning that necessitate immediate return. Pt symptoms have been well controlled here and the patient is safe for discharge with appropriate outpatient follow up. The patient has verbalized understanding to return to ER without delay for new or worsening pains or for any other symptoms or concerns. I utilized the discharge clinical management tool provided [employer] to help estimate risk of negative outcome for this patient.      Disposition:  The patient was discharged      Procedure  Procedures     Hay Ambrose PA-C  05/10/24 2456

## 2024-05-10 NOTE — PROGRESS NOTES
Attestation/Supervisory note for RAFFY Ambrose      The patient is a 68-year-old female presenting to the emergency department by EMS from home.  The patient reportedly scraped her left foot on her wheelchair 3 days ago.  She states that she felt like it was a little irritated.  She does have a nurse practitioner who provides her home health care and she states that they did look at her foot the other day but did not think she needed anything.  She states that she felt like it was a little more irritated today.  She states that her sister was very concerned and wanted her to be seen in the emergency room because of this.  The patient states that she does have a history of CHF and does have chronic lower extremity swelling and weeping.  She states that she does not have any chest pain or shortness of breath.  She denies any palpitations.  No diaphoresis.  No fever or chills.  No cough or congestion.  No abdominal pain.  No nausea vomiting.  No diarrhea or constipation.  No headache or visual changes.  Unknown date of last tetanus.  All pertinent positives and negatives are recorded above.  All other systems reviewed and otherwise negative.  Vital signs with borderline hypertension but otherwise within normal limits.  Physical exam with a well-nourished well-developed female in no acute distress.  HEENT exam with dry mucous membranes but otherwise unremarkable.  She has no evidence of airway compromise or respiratory distress.  Abdominal exam is benign.  She has no gross motor, neurologic or vascular deficits on exam.  She does have bilateral lower extremity pitting edema.  She does have a superficial wound to the dorsal fifth of the left foot.  There is no discharge from the wound.  There is no active bleeding.  She does not have any visible or palpable bony deformities.      Wound care provided by nursing staff.  Tetanus was updated.      Diagnostic labs with mild electrolyte imbalance with mild renal insufficiency but  otherwise unremarkable.      Oral KCl ordered.      XR foot left 3+ views   Final Result   The soft tissues of the left foot are swollen specially along the   dorsum with no air or radiopaque foreign body in the soft tissues is   noted.             MACRO:   None        Signed by: Rodha Abebe 5/10/2024 12:02 PM   Dictation workstation:   [id number]           The patient does not have any evidence of airway compromise or respiratory distress on exam.  She does not have any evidence of hemodynamic instability.  No evidence of sepsis.  The patient does have a superficial abrasion to the dorsum of the left foot.  Her diagnostic labs do show acute renal insufficiency.  I did discuss her results and the case with her primary care provider, NANCY Goff.  She did recommend that the patient be admitted to be transition to rehab as she feels that this would benefit the patient but she will follow the patient next week for further management of her wound and to recheck her creatinine if the patient cannot or will not be admitted.  The patient declined to be admitted.  She states that she has been to rehab before and does not want to go back to rehab.  She states that she does not want to be admitted to the hospital.  She states that she would like to go home.  The patient's sister does try to persuade her to be admitted and to go to rehab but the patient persistently states that she does not want to be admitted and does not want to go to rehab.  She states that she would like to go home.  She prefers to go home with a prescription for antibiotic to treat the wound on her left foot and will follow-up with her primary care provider next week for repeat check of her creatinine.  She will return to the emergency department sooner with worsening of symptoms or onset of new symptoms.  The patient was released in the care of her sister with a prescription for doxycycline.        Impression/diagnosis  Left foot  abrasion  Bilateral lower extremity pitting edema  Hypertension, unspecified  Electrolyte imbalance with mild hypokalemia  Renal insufficiency, acute      I personally saw the patient and made/approve the management plan and take responsibility for the patient management.      I independently interpreted the following study (S): diagnostic labs      I personally discussed the patient's management with the pt and her sister      I reviewed the results of the diagnostic labs and diagnostic imaging.  Formal radiology read was completed by the radiologist.      Re Elaine MD

## 2024-05-10 NOTE — TELEPHONE ENCOUNTER
"Patient sibling, Allison calls office leaving message on machine stating patient is enroute to [facility] as her foot is leaking fluid.  Call returned to Idalmis who states patient called her at work to notify her and she had patient call 911.  Idalmis states she's had experience with both her parents having \"leaking feet and legs\" and it doesn't end well so she sent her seister to the hospital for eval and treatment.    "

## 2024-05-14 ENCOUNTER — TELEPHONE (OUTPATIENT)
Dept: PRIMARY CARE | Facility: CLINIC | Age: 69
End: 2024-05-14
Payer: COMMERCIAL

## 2024-05-14 NOTE — TELEPHONE ENCOUNTER
Phoned Idalmis Denise/ sister and scheduled a [facility] with Raisa Goff NP 5/15/24 at 11:00 am.

## 2024-05-15 ENCOUNTER — LAB (OUTPATIENT)
Dept: LAB | Facility: LAB | Age: 69
End: 2024-05-15
Payer: COMMERCIAL

## 2024-05-15 ENCOUNTER — TELEPHONE (OUTPATIENT)
Dept: PRIMARY CARE | Facility: CLINIC | Age: 69
End: 2024-05-15

## 2024-05-15 ENCOUNTER — OFFICE VISIT (OUTPATIENT)
Dept: PRIMARY CARE | Facility: CLINIC | Age: 69
End: 2024-05-15
Payer: COMMERCIAL

## 2024-05-15 VITALS
TEMPERATURE: 96.7 F | SYSTOLIC BLOOD PRESSURE: 158 MMHG | RESPIRATION RATE: 16 BRPM | BODY MASS INDEX: 36.32 KG/M2 | DIASTOLIC BLOOD PRESSURE: 90 MMHG | HEIGHT: 65 IN | OXYGEN SATURATION: 98 % | HEART RATE: 78 BPM | WEIGHT: 218 LBS

## 2024-05-15 DIAGNOSIS — E87.6 HYPOKALEMIA DUE TO LOSS OF POTASSIUM: ICD-10-CM

## 2024-05-15 DIAGNOSIS — Z86.16 PERSONAL HISTORY OF COVID-19: ICD-10-CM

## 2024-05-15 DIAGNOSIS — I50.41 ACUTE COMBINED SYSTOLIC AND DIASTOLIC CONGESTIVE HEART FAILURE (MULTI): Chronic | ICD-10-CM

## 2024-05-15 DIAGNOSIS — I10 PRIMARY HYPERTENSION: Primary | ICD-10-CM

## 2024-05-15 DIAGNOSIS — E55.9 VITAMIN D DEFICIENCY: ICD-10-CM

## 2024-05-15 DIAGNOSIS — E55.9 VITAMIN D DEFICIENCY: Chronic | ICD-10-CM

## 2024-05-15 DIAGNOSIS — R47.1 DYSARTHRIA: Chronic | ICD-10-CM

## 2024-05-15 DIAGNOSIS — I10 PRIMARY HYPERTENSION: ICD-10-CM

## 2024-05-15 LAB
25(OH)D3 SERPL-MCNC: 7 NG/ML (ref 31–100)
ALBUMIN SERPL-MCNC: 4.1 G/DL (ref 3.5–5)
ALP BLD-CCNC: 191 U/L (ref 35–125)
ALT SERPL-CCNC: 9 U/L (ref 5–40)
ANION GAP SERPL CALC-SCNC: 17 MMOL/L
AST SERPL-CCNC: 17 U/L (ref 5–40)
BASOPHILS # BLD AUTO: 0.08 X10*3/UL (ref 0–0.1)
BASOPHILS NFR BLD AUTO: 0.8 %
BILIRUB SERPL-MCNC: 0.6 MG/DL (ref 0.1–1.2)
BUN SERPL-MCNC: 46 MG/DL (ref 8–25)
CALCIUM SERPL-MCNC: 9.2 MG/DL (ref 8.5–10.4)
CHLORIDE SERPL-SCNC: 98 MMOL/L (ref 97–107)
CO2 SERPL-SCNC: 28 MMOL/L (ref 24–31)
CREAT SERPL-MCNC: 2 MG/DL (ref 0.4–1.6)
EGFRCR SERPLBLD CKD-EPI 2021: 27 ML/MIN/1.73M*2
EOSINOPHIL # BLD AUTO: 0.56 X10*3/UL (ref 0–0.7)
EOSINOPHIL NFR BLD AUTO: 5.4 %
ERYTHROCYTE [DISTWIDTH] IN BLOOD BY AUTOMATED COUNT: 12.6 % (ref 11.5–14.5)
GLUCOSE SERPL-MCNC: 107 MG/DL (ref 65–99)
HCT VFR BLD AUTO: 36 % (ref 36–46)
HGB BLD-MCNC: 12.4 G/DL (ref 12–16)
IMM GRANULOCYTES # BLD AUTO: 0.02 X10*3/UL (ref 0–0.7)
IMM GRANULOCYTES NFR BLD AUTO: 0.2 % (ref 0–0.9)
LYMPHOCYTES # BLD AUTO: 1.87 X10*3/UL (ref 1.2–4.8)
LYMPHOCYTES NFR BLD AUTO: 18.1 %
MCH RBC QN AUTO: 30.8 PG (ref 26–34)
MCHC RBC AUTO-ENTMCNC: 34.4 G/DL (ref 32–36)
MCV RBC AUTO: 89 FL (ref 80–100)
MONOCYTES # BLD AUTO: 0.88 X10*3/UL (ref 0.1–1)
MONOCYTES NFR BLD AUTO: 8.5 %
NEUTROPHILS # BLD AUTO: 6.94 X10*3/UL (ref 1.2–7.7)
NEUTROPHILS NFR BLD AUTO: 67 %
NRBC BLD-RTO: 0 /100 WBCS (ref 0–0)
PLATELET # BLD AUTO: 326 X10*3/UL (ref 150–450)
POTASSIUM SERPL-SCNC: 3.3 MMOL/L (ref 3.4–5.1)
PROT SERPL-MCNC: 7.6 G/DL (ref 5.9–7.9)
RBC # BLD AUTO: 4.03 X10*6/UL (ref 4–5.2)
SODIUM SERPL-SCNC: 143 MMOL/L (ref 133–145)
WBC # BLD AUTO: 10.4 X10*3/UL (ref 4.4–11.3)

## 2024-05-15 PROCEDURE — 3077F SYST BP >= 140 MM HG: CPT | Performed by: NURSE PRACTITIONER

## 2024-05-15 PROCEDURE — 3080F DIAST BP >= 90 MM HG: CPT | Performed by: NURSE PRACTITIONER

## 2024-05-15 PROCEDURE — 1159F MED LIST DOCD IN RCRD: CPT | Performed by: NURSE PRACTITIONER

## 2024-05-15 PROCEDURE — 82306 VITAMIN D 25 HYDROXY: CPT

## 2024-05-15 PROCEDURE — 80053 COMPREHEN METABOLIC PANEL: CPT

## 2024-05-15 PROCEDURE — 85025 COMPLETE CBC W/AUTO DIFF WBC: CPT

## 2024-05-15 PROCEDURE — 99349 HOME/RES VST EST MOD MDM 40: CPT | Performed by: NURSE PRACTITIONER

## 2024-05-15 PROCEDURE — 36415 COLL VENOUS BLD VENIPUNCTURE: CPT

## 2024-05-15 PROCEDURE — 1160F RVW MEDS BY RX/DR IN RCRD: CPT | Performed by: NURSE PRACTITIONER

## 2024-05-15 RX ORDER — POTASSIUM CHLORIDE 750 MG/1
10 TABLET, FILM COATED, EXTENDED RELEASE ORAL 2 TIMES DAILY
Qty: 60 TABLET | Refills: 11 | Status: SHIPPED | OUTPATIENT
Start: 2024-05-15 | End: 2025-05-15

## 2024-05-15 RX ORDER — VALSARTAN 80 MG/1
80 TABLET ORAL 2 TIMES DAILY
Qty: 100 TABLET | Refills: 3 | Status: SHIPPED | OUTPATIENT
Start: 2024-05-15

## 2024-05-15 NOTE — TELEPHONE ENCOUNTER
Spoke with Su in Admissions Department at [facility] regarding need for skilled rehab admission. Requested information faxed including demographics, ED visit notes from 4/2 and 5/10, provider visit note from 5/6 with H&P and medications. Admissions will submit to [facility] for consideration. Sister updated via phone.

## 2024-05-15 NOTE — PROGRESS NOTES
"Subjective   Patient ID: Tammy Walker is a 68 y.o. female who presents for Follow-up (Post Acute - functional decline).    Patient is seen in her private home sitting up on the sofa in the living room. Resides at home where she lives with her sister, and she is her primary caregiver.  She is awake and alert with appropriate awareness and is her own decision maker.  Currently is non-ambulatory, can bear weight only and propels herself in the wheelchair. PMH: HTN, Covid-19 appx 2021, Hyperlipidemia, TIA.   Medical Necessity - Homebound due to functional decline from Covid-19 Long Hauler.      Today is a post acute visit 5/11/24 due to a new wound of her left upper foot.  Reported that she had hit the top of her foot on her wheelchair, gashed it and therefor called 911.  At that time both sister and patient were hopeful that she could be admitted, and then possibly transferred to SNF for rehab.  We have been discussing PT/OT over the past several months.  Multiple barriers exist for PT/OT in the home setting, due to multiple cats in the home, limited support or the abiity to answerer the door to let staff into the home.  Since she had covid-19 approximately 1 year ago, she has not been able to ambulate.  Reported that she was ill with covid in 2023, hospitalized and sent to SNF for rehab.  Reports that while at rehab she states \"I barely had any rehab and remained in my bed the majority of the day, and never gained my strength back.  She was discharged to home, sister is her primary caregiver for most of her needs.  She remains at home during the day by herself.  She is able to stand and pivot to her wheelchair, and that is the extent of any mobilization.  She is able to manage getting herself to the commode, by propelling herself in the wheelchair and transferring on her own.  She has had several falls and has been sent to ER 2x over the past three weeks.  Appears to have strength, denies pain with standing, " "however reports that her legs will give out, \"as she just doesn't have the strength in her legs to stand\".  She would benefit for extensive rehab in an inpatient setting for balance, strengthening and to gain independence.  At home she remains on her sofa with legs dependent the majority of the day.  Reports that appetite is good, bowels have been regular, and she is urinating frequently due to medications.  Bilateral lower leg edema does appear to have improved slightly.  Currently working with [facility] to help navigate possible transfer to [facility] for extensive Rehab.  HTN - not managed, however slight improvement from previous home visit.  BP log reviewed with significant elevated blood pressures over the past week SBP <160's consistently.  Reports that she is taking all medications as prescribed, and recently restarted taking hydrochlorothiazide - as she had not been previously taking.  She has not made follow up appointments as recommended with cardiology due to limited support and difficulty with mobility.  Denies chest pain, palpitations, tachycardia, and shortness of breath, wheezing, no PND/orthopnea, or respiratory complaints for the patient. There is no fever, or chills. No nausea, vomiting, diarrhea, headaches, or vision changes or recent falls. There is no hematuria, dysuria, flank pain, or increased urgency.    Home Visit: Medically necessary due to: dementia/cognitive impairment, unsteady gait/poor balance, shortness of breath with minimal exertion, Illness or condition that results in activity limitation or restriction that impacts the ability to leave home such as: equipment and /or human assistance needed to safely leave the home, patient has limited support systems to help the patient attend office visits, the office visit would require excessive physical effort or pain for the patient.              Current Outpatient Medications:     allopurinol (Zyloprim) 100 mg tablet, Take 1 tablet " "(100 mg) by mouth 2 times a day., Disp: 60 tablet, Rfl: 11    aspirin 81 mg chewable tablet, Chew 1 tablet (81 mg) once daily., Disp: , Rfl:     atenolol (Tenormin) 50 mg tablet, Take 1 tablet (50 mg) by mouth 2 times a day., Disp: 90 tablet, Rfl: 3    atorvastatin (Lipitor) 40 mg tablet, Take 1 tablet (40 mg) by mouth once daily at bedtime., Disp: 90 tablet, Rfl: 3    cholecalciferol (Vitamin D3) 10 MCG (400 UNIT) tablet, Take 1 tablet (10 mcg) by mouth once daily., Disp: 30 tablet, Rfl: 11    furosemide (Lasix) 40 mg tablet, Take 1 tablet (40 mg) by mouth once daily., Disp: 30 tablet, Rfl: 11    hydroCHLOROthiazide (HYDRODiuril) 50 mg tablet, Take 1 tablet (50 mg) by mouth once daily., Disp: 30 tablet, Rfl: 5    potassium chloride CR (Klor-Con) 10 mEq ER tablet, Take 1 tablet (10 mEq) by mouth 2 times a day. Do not crush, chew, or split., Disp: 60 tablet, Rfl: 11    valsartan (Diovan) 80 mg tablet, Take 1 tablet (80 mg) by mouth 2 times a day., Disp: 100 tablet, Rfl: 3     Review of Systems  Constitutional:  Negative for activity change.   HENT:  Negative for trouble swallowing.    Respiratory:  Negative for cough, shortness of breath and wheezing.    Cardiovascular:  Positive for leg swelling. Negative for palpitations.   Gastrointestinal:  Negative for diarrhea, nausea and vomiting.   Endocrine: Negative.    Genitourinary:  Positive for frequency.   Musculoskeletal:  Positive for arthralgias and gait problem.   Skin:  Positive for pallor.   Neurological:  Negative for weakness.   Psychiatric/Behavioral: Negative.     Objective   /90   Pulse 78   Temp 35.9 °C (96.7 °F) (Temporal)   Resp 16   Ht 1.651 m (5' 5\")   Wt 98.9 kg (218 lb)   SpO2 98%   BMI 36.28 kg/m²     Physical Exam  Constitutional:       Appearance: Normal appearance.   HENT:      Head: Normocephalic and atraumatic.      Nose: Nose normal.      Mouth/Throat:      Mouth: Mucous membranes are moist.      Pharynx: Oropharynx is clear. " "  Eyes:      Extraocular Movements: Extraocular movements intact.      Pupils: Pupils are equal, round, and reactive to light.   Cardiovascular:      Rate and Rhythm: Normal rate and regular rhythm.      Pulses: Normal pulses.      Heart sounds: Normal heart sounds.   Pulmonary:      Effort: Pulmonary effort is normal.      Breath sounds: Normal breath sounds. No wheezing or rhonchi.   Abdominal:      General: Bowel sounds are normal.      Palpations: Abdomen is soft.   Musculoskeletal:         General: Swelling present.      Cervical back: Neck supple. No tenderness.      Comments: 1+ left lower ext pitting edema, 2+ rt pitting edema   Skin:     General: Skin is warm and dry.      Findings: No erythema.   Neurological:      Mental Status: She is alert and oriented to person, place, and time. Mental status is at baseline.   Psychiatric:         Mood and Affect: Mood normal.         Judgment: Judgment normal.   Lipid-lowering therapy was not prescribed due to .     Lab Results   Component Value Date    WBC 10.4 05/15/2024    HGB 12.4 05/15/2024    HCT 36.0 05/15/2024    MCV 89 05/15/2024     05/15/2024      Lab Results   Component Value Date    GLUCOSE 107 (H) 05/15/2024    CALCIUM 9.2 05/15/2024     05/15/2024    K 3.3 (L) 05/15/2024    CO2 28 05/15/2024    CL 98 05/15/2024    BUN 46 (H) 05/15/2024    CREATININE 2.00 (H) 05/15/2024      Lab Results   Component Value Date    CHOL 141 11/20/2023    CHOL 194 09/11/2023     Lab Results   Component Value Date    HDL 52.0 11/20/2023    HDL 44 (L) 09/11/2023     Lab Results   Component Value Date    LDLCALC 69 11/20/2023    LDLCALC 127 09/11/2023     Lab Results   Component Value Date    TRIG 100 11/20/2023    TRIG 113 09/11/2023     No components found for: \"CHOLHDL\"     Assessment/Plan   Diagnoses and all orders for this visit:  Primary hypertension  Comments:  Not Managed - Continue atenolol 50mg daily, hydrochlorothiazide 50mg daily- Due to kidney function " will change to valsartan 160g bid  Orders:  -     CBC and Auto Differential; Future  -     Comprehensive metabolic panel; Future  -     valsartan (Diovan) 80 mg tablet; Take 1 tablet (80 mg) by mouth 2 times a day.  Acute combined systolic and diastolic congestive heart failure (Multi)  Comments:  Not managed - continue furosemide 40mg daily, hydorchlorothiazide 50mg daily  Orders:  -     potassium chloride CR (Klor-Con) 10 mEq ER tablet; Take 1 tablet (10 mEq) by mouth 2 times a day. Do not crush, chew, or split.  -     Venipunc need phys skill, dx or rx; Future  Vitamin D deficiency  Comments:  Education regarding risks and benefits of taking supplement as prescribed  Orders:  -     Vitamin D 25-Hydroxy,Total (for eval of Vitamin D levels); Future  Dysarthria  Comments:  PT/OT recommendation for extensive inpatient therapies  Personal history of COVID-19  Hypokalemia due to loss of potassium  Comments:  Potassium supplement ordered 10meq bid  More than 50% of time spent in face-to-face discussion @ a total of 40 minutes with this patient on counseling, coordination of care, collaboration with family and staff and review of medical records and diagnostics.     Will continue with [facility] home visits. Patient has limited support, limited mobility and unable to navigate to traditional office appointments.      Labs drawn without complication     Great deal of education regarding medication compliance and follow up with Dr. Alonzo - cardiology.     Stop mirtazapine tStart - Continue hydrochlorothiazide 50mg daily      Looking at direct admit to [facility] for rehab - insurance is [employer]         Raisa Goff, APRN-CNP

## 2024-05-17 ENCOUNTER — TELEPHONE (OUTPATIENT)
Dept: PRIMARY CARE | Facility: CLINIC | Age: 69
End: 2024-05-17
Payer: COMMERCIAL

## 2024-05-17 NOTE — TELEPHONE ENCOUNTER
Per Admissions at [facility], patient would need a 3 day hospital stay or recent therapy notes showing need for skilled rehab to be admitted to SNF.

## 2024-05-20 NOTE — TELEPHONE ENCOUNTER
Spoke with sister, Idalmis. Informed that insurance requires a 3 day hospital stay or recent home therapy notes for SNF admission. Expressed understanding.

## 2024-05-28 ENCOUNTER — TELEPHONE (OUTPATIENT)
Dept: PRIMARY CARE | Facility: CLINIC | Age: 69
End: 2024-05-28
Payer: COMMERCIAL

## 2024-05-28 NOTE — TELEPHONE ENCOUNTER
5/29/24 [facility] visit with Raisa Goff NP confirmed via phone with Idalmis Wakler / patient's sister.

## 2024-05-29 ENCOUNTER — OFFICE VISIT (OUTPATIENT)
Dept: PRIMARY CARE | Facility: CLINIC | Age: 69
End: 2024-05-29
Payer: COMMERCIAL

## 2024-05-29 ENCOUNTER — LAB (OUTPATIENT)
Dept: LAB | Facility: LAB | Age: 69
End: 2024-05-29
Payer: COMMERCIAL

## 2024-05-29 VITALS
HEART RATE: 78 BPM | BODY MASS INDEX: 32.99 KG/M2 | RESPIRATION RATE: 16 BRPM | TEMPERATURE: 96.6 F | HEIGHT: 65 IN | OXYGEN SATURATION: 98 % | WEIGHT: 198 LBS | DIASTOLIC BLOOD PRESSURE: 84 MMHG | SYSTOLIC BLOOD PRESSURE: 168 MMHG

## 2024-05-29 DIAGNOSIS — Z91.199 NONCOMPLIANCE: ICD-10-CM

## 2024-05-29 DIAGNOSIS — E55.9 VITAMIN D DEFICIENCY: ICD-10-CM

## 2024-05-29 DIAGNOSIS — I50.43 ACUTE ON CHRONIC COMBINED SYSTOLIC AND DIASTOLIC CONGESTIVE HEART FAILURE (MULTI): ICD-10-CM

## 2024-05-29 DIAGNOSIS — Z86.16 PERSONAL HISTORY OF COVID-19: ICD-10-CM

## 2024-05-29 DIAGNOSIS — I10 PRIMARY HYPERTENSION: Primary | Chronic | ICD-10-CM

## 2024-05-29 DIAGNOSIS — E55.9 VITAMIN D DEFICIENCY: Chronic | ICD-10-CM

## 2024-05-29 LAB
25(OH)D3 SERPL-MCNC: 9 NG/ML (ref 31–100)
ANION GAP SERPL CALC-SCNC: 17 MMOL/L
BASOPHILS # BLD AUTO: 0.1 X10*3/UL (ref 0–0.1)
BASOPHILS NFR BLD AUTO: 1.1 %
BUN SERPL-MCNC: 52 MG/DL (ref 8–25)
CALCIUM SERPL-MCNC: 9.1 MG/DL (ref 8.5–10.4)
CHLORIDE SERPL-SCNC: 98 MMOL/L (ref 97–107)
CO2 SERPL-SCNC: 25 MMOL/L (ref 24–31)
CREAT SERPL-MCNC: 2.4 MG/DL (ref 0.4–1.6)
EGFRCR SERPLBLD CKD-EPI 2021: 22 ML/MIN/1.73M*2
EOSINOPHIL # BLD AUTO: 0.81 X10*3/UL (ref 0–0.7)
EOSINOPHIL NFR BLD AUTO: 8.5 %
ERYTHROCYTE [DISTWIDTH] IN BLOOD BY AUTOMATED COUNT: 12.7 % (ref 11.5–14.5)
GLUCOSE SERPL-MCNC: 116 MG/DL (ref 65–99)
HCT VFR BLD AUTO: 37.8 % (ref 36–46)
HGB BLD-MCNC: 12.9 G/DL (ref 12–16)
IMM GRANULOCYTES # BLD AUTO: 0.01 X10*3/UL (ref 0–0.7)
IMM GRANULOCYTES NFR BLD AUTO: 0.1 % (ref 0–0.9)
LYMPHOCYTES # BLD AUTO: 1.68 X10*3/UL (ref 1.2–4.8)
LYMPHOCYTES NFR BLD AUTO: 17.7 %
MCH RBC QN AUTO: 30.4 PG (ref 26–34)
MCHC RBC AUTO-ENTMCNC: 34.1 G/DL (ref 32–36)
MCV RBC AUTO: 89 FL (ref 80–100)
MONOCYTES # BLD AUTO: 0.96 X10*3/UL (ref 0.1–1)
MONOCYTES NFR BLD AUTO: 10.1 %
NEUTROPHILS # BLD AUTO: 5.95 X10*3/UL (ref 1.2–7.7)
NEUTROPHILS NFR BLD AUTO: 62.5 %
NRBC BLD-RTO: 0 /100 WBCS (ref 0–0)
PLATELET # BLD AUTO: 251 X10*3/UL (ref 150–450)
POTASSIUM SERPL-SCNC: 3.3 MMOL/L (ref 3.4–5.1)
RBC # BLD AUTO: 4.25 X10*6/UL (ref 4–5.2)
SODIUM SERPL-SCNC: 140 MMOL/L (ref 133–145)
WBC # BLD AUTO: 9.5 X10*3/UL (ref 4.4–11.3)

## 2024-05-29 PROCEDURE — 80048 BASIC METABOLIC PNL TOTAL CA: CPT

## 2024-05-29 PROCEDURE — 99349 HOME/RES VST EST MOD MDM 40: CPT | Performed by: NURSE PRACTITIONER

## 2024-05-29 PROCEDURE — 1160F RVW MEDS BY RX/DR IN RCRD: CPT | Performed by: NURSE PRACTITIONER

## 2024-05-29 PROCEDURE — 36415 COLL VENOUS BLD VENIPUNCTURE: CPT

## 2024-05-29 PROCEDURE — 85025 COMPLETE CBC W/AUTO DIFF WBC: CPT

## 2024-05-29 PROCEDURE — 1159F MED LIST DOCD IN RCRD: CPT | Performed by: NURSE PRACTITIONER

## 2024-05-29 PROCEDURE — 3077F SYST BP >= 140 MM HG: CPT | Performed by: NURSE PRACTITIONER

## 2024-05-29 PROCEDURE — 3079F DIAST BP 80-89 MM HG: CPT | Performed by: NURSE PRACTITIONER

## 2024-05-29 PROCEDURE — 82306 VITAMIN D 25 HYDROXY: CPT

## 2024-05-29 NOTE — PROGRESS NOTES
"Subjective   Patient ID: Tammy Walker is a 68 y.o. female who presents for No chief complaint on file..    Patient is seen in her private home sitting up on the sofa in the living room. Resides at home where she lives with her sister, and she is her primary caregiver.  She is awake and alert with appropriate awareness and is her own decision maker.  Sister is present for home visit.  Currently is non-ambulatory, can bear weight only and propels herself in the wheelchair. PMH: HTN, Covid-19 appx 2021, Hyperlipidemia, TIA.   Medical Necessity - Homebound due to functional decline from Covid-19 [facility].      Today is a follow up for overall functional decline, HTN, foot wound and lab draw.  Today patient reports that she has resumed taking all medications as prescribed, including vitamind D supplement.  Reports blood pressures have been \"pretty good\".  Log reviewed via machine with 's - 170's and DBP 80's-90's.  We've discussed again serious risks of uncontrolled HTN and managing blood pressure through lifestyle changes and medications is crucial to mitigate these risks.  Uncertain the actual extent this information is being validated by patient.  She is denying symptoms associated with chronic HTN, however we did review lab results and worsening kidney function.  Has not made appointment with cardiology Dr. Alonzo as recommended.  She remains stationary on the sofa throughout the day, and also where she sleeps.  Does transfer herself and propel via wheelchair to commode.  Unable to arrange for inpatient PT/OT, reluctant for in home PT/OT due to inability to transfer to door to let them in, as she is home alone during the day.  Reports that appetite is good, staying hydrated and urinating adequate amounts.  Denies chest pain, palpitations, tachycardia, and shortness of breath, wheezing, no PND/orthopnea, or respiratory complaints for the patient. There is no fever, or chills. No nausea, vomiting, " diarrhea, headaches, or vision changes or recent falls. There is no hematuria, dysuria, flank pain, or increased urgency.    Home Visit: Medically necessary due to: dementia/cognitive impairment, unsteady gait/poor balance, shortness of breath with minimal exertion, Illness or condition that results in activity limitation or restriction that impacts the ability to leave home such as: equipment and /or human assistance needed to safely leave the home, patient has limited support systems to help the patient attend office visits, the office visit would require excessive physical effort or pain for the patient.              Current Outpatient Medications:     allopurinol (Zyloprim) 100 mg tablet, Take 1 tablet (100 mg) by mouth 2 times a day., Disp: 60 tablet, Rfl: 11    aspirin 81 mg chewable tablet, Chew 1 tablet (81 mg) once daily., Disp: , Rfl:     atenolol (Tenormin) 50 mg tablet, Take 1 tablet (50 mg) by mouth 2 times a day., Disp: 90 tablet, Rfl: 3    atorvastatin (Lipitor) 40 mg tablet, Take 1 tablet (40 mg) by mouth once daily at bedtime., Disp: 90 tablet, Rfl: 3    cholecalciferol (Vitamin D3) 10 MCG (400 UNIT) tablet, Take 1 tablet (10 mcg) by mouth once daily., Disp: 30 tablet, Rfl: 11    furosemide (Lasix) 40 mg tablet, Take 1 tablet (40 mg) by mouth once daily., Disp: 30 tablet, Rfl: 11    hydroCHLOROthiazide (HYDRODiuril) 50 mg tablet, Take 1 tablet (50 mg) by mouth once daily., Disp: 30 tablet, Rfl: 5    potassium chloride CR (Klor-Con) 10 mEq ER tablet, Take 1 tablet (10 mEq) by mouth 2 times a day. Do not crush, chew, or split., Disp: 60 tablet, Rfl: 11    valsartan (Diovan) 80 mg tablet, Take 1 tablet (80 mg) by mouth 2 times a day., Disp: 100 tablet, Rfl: 3     Review of Systems  Constitutional:  Negative for activity change.   HENT:  Negative for trouble swallowing.    Respiratory:  Negative for cough, shortness of breath and wheezing.    Cardiovascular:  Positive for leg swelling. Negative for  "palpitations.   Gastrointestinal:  Negative for diarrhea, nausea and vomiting.   Endocrine: Negative.    Genitourinary:  Positive for frequency.   Musculoskeletal:  Positive for arthralgias and gait problem.   Skin:  Positive for pallor.   Neurological:  Negative for weakness.   Psychiatric/Behavioral: Negative.     Objective   /84 (BP Location: Left arm, Patient Position: Sitting)   Pulse 78   Temp 35.9 °C (96.6 °F) (Temporal)   Resp 16   Ht 1.651 m (5' 5\")   Wt 89.8 kg (198 lb)   SpO2 98%   BMI 32.95 kg/m²     Physical Exam  Constitutional:       Appearance: Normal appearance.   HENT:      Head: Normocephalic and atraumatic.      Nose: Nose normal.      Mouth/Throat:      Mouth: Mucous membranes are moist.      Pharynx: Oropharynx is clear.   Eyes:      Extraocular Movements: Extraocular movements intact.      Pupils: Pupils are equal, round, and reactive to light.   Cardiovascular:      Rate and Rhythm: Normal rate and regular rhythm.      Pulses: Normal pulses.      Heart sounds: Normal heart sounds.   Pulmonary:      Effort: Pulmonary effort is normal.      Breath sounds: Normal breath sounds. No wheezing or rhonchi.   Abdominal:      General: Bowel sounds are normal.      Palpations: Abdomen is soft.   Musculoskeletal:         General: Swelling present.      Cervical back: Neck supple. No tenderness.      Comments: 1+ left lower ext pitting edema, 2+ rt pitting edema   Skin:     General: Skin is warm and dry.      Findings: No erythema.   Neurological:      Mental Status: She is alert and oriented to person, place, and time. Mental status is at baseline.   Psychiatric:         Mood and Affect: Mood normal.         Judgment: Judgment normal.   Lipid-lowering therapy was not prescribed due to .     Lab Results   Component Value Date    WBC 9.5 05/29/2024    HGB 12.9 05/29/2024    HCT 37.8 05/29/2024    MCV 89 05/29/2024     05/29/2024      Lab Results   Component Value Date    GLUCOSE 116 (H) " 05/29/2024    CALCIUM 9.1 05/29/2024     05/29/2024    K 3.3 (L) 05/29/2024    CO2 25 05/29/2024    CL 98 05/29/2024    BUN 52 (H) 05/29/2024    CREATININE 2.40 (H) 05/29/2024        Assessment/Plan   Diagnoses and all orders for this visit:  Primary hypertension  Comments:  Not managed - quiana atenolol 50mg bid, valsartan 80mg bid, hydrochlorothiazide 50mg daily  Acute on chronic combined systolic and diastolic congestive heart failure (Multi)  Comments:  Continue with furosemide 40mg daily  Orders:  -     CBC and Auto Differential; Future  -     Basic metabolic panel; Future  Vitamin D deficiency  Comments:  Not managed - not compliant - continue cholecalciferol 10mcg daily  Orders:  -     Vitamin D 25-Hydroxy,Total (for eval of Vitamin D levels); Future  Noncompliance  Comments:  Great deal of education regarding importance of medication compiance  Personal history of COVID-19    More than 50% of time spent in face-to-face discussion @ a total of 40 minutes with this patient on counseling, coordination of care, collaboration with family and staff and review of medical records and diagnostics.     Will continue with [facility] home visits. Patient has limited support, limited mobility and unable to navigate to traditional office appointments.       Labs drawn without complication     Great deal of education regarding medication compliance and follow up with Dr. Alonzo - cardiology.            Raisa Goff, APRN-CNP

## 2024-05-31 PROBLEM — I50.43 ACUTE ON CHRONIC COMBINED SYSTOLIC AND DIASTOLIC CONGESTIVE HEART FAILURE (MULTI): Status: ACTIVE | Noted: 2024-05-31

## 2024-06-04 ENCOUNTER — TELEPHONE (OUTPATIENT)
Dept: PRIMARY CARE | Facility: CLINIC | Age: 69
End: 2024-06-04
Payer: COMMERCIAL

## 2024-06-04 NOTE — TELEPHONE ENCOUNTER
Pt's sister states pt has had a shift in her mentation. She is asking to speak to provider. She feels pt is over medicated which is causing this.

## 2024-07-09 ENCOUNTER — TELEPHONE (OUTPATIENT)
Dept: PRIMARY CARE | Facility: CLINIC | Age: 69
End: 2024-07-09
Payer: COMMERCIAL

## 2024-08-13 ENCOUNTER — TELEPHONE (OUTPATIENT)
Dept: PRIMARY CARE | Facility: CLINIC | Age: 69
End: 2024-08-13
Payer: COMMERCIAL

## 2024-08-14 ENCOUNTER — OFFICE VISIT (OUTPATIENT)
Dept: PRIMARY CARE | Facility: CLINIC | Age: 69
End: 2024-08-14
Payer: COMMERCIAL

## 2024-08-14 VITALS
DIASTOLIC BLOOD PRESSURE: 78 MMHG | OXYGEN SATURATION: 97 % | TEMPERATURE: 96.7 F | WEIGHT: 200 LBS | HEART RATE: 60 BPM | RESPIRATION RATE: 16 BRPM | BODY MASS INDEX: 33.32 KG/M2 | HEIGHT: 65 IN | SYSTOLIC BLOOD PRESSURE: 128 MMHG

## 2024-08-14 DIAGNOSIS — E55.9 VITAMIN D DEFICIENCY: Chronic | ICD-10-CM

## 2024-08-14 DIAGNOSIS — I10 PRIMARY HYPERTENSION: Chronic | ICD-10-CM

## 2024-08-14 DIAGNOSIS — I50.41 ACUTE COMBINED SYSTOLIC AND DIASTOLIC CONGESTIVE HEART FAILURE (MULTI): Primary | ICD-10-CM

## 2024-08-14 DIAGNOSIS — Z86.16 PERSONAL HISTORY OF COVID-19: ICD-10-CM

## 2024-08-14 LAB
25(OH)D3 SERPL-MCNC: 10 NG/ML (ref 31–100)
ANION GAP SERPL CALC-SCNC: 16 MMOL/L
BASOPHILS # BLD AUTO: 0.09 X10*3/UL (ref 0–0.1)
BASOPHILS NFR BLD AUTO: 0.7 %
BUN SERPL-MCNC: 46 MG/DL (ref 8–25)
CALCIUM SERPL-MCNC: 8.8 MG/DL (ref 8.5–10.4)
CHLORIDE SERPL-SCNC: 96 MMOL/L (ref 97–107)
CO2 SERPL-SCNC: 27 MMOL/L (ref 24–31)
CREAT SERPL-MCNC: 2.8 MG/DL (ref 0.4–1.6)
EGFRCR SERPLBLD CKD-EPI 2021: 18 ML/MIN/1.73M*2
EOSINOPHIL # BLD AUTO: 0.59 X10*3/UL (ref 0–0.7)
EOSINOPHIL NFR BLD AUTO: 4.7 %
ERYTHROCYTE [DISTWIDTH] IN BLOOD BY AUTOMATED COUNT: 15.5 % (ref 11.5–14.5)
GLUCOSE SERPL-MCNC: 99 MG/DL (ref 65–99)
HCT VFR BLD AUTO: 31.5 % (ref 36–46)
HGB BLD-MCNC: 10.6 G/DL (ref 12–16)
IMM GRANULOCYTES # BLD AUTO: 0.05 X10*3/UL (ref 0–0.7)
IMM GRANULOCYTES NFR BLD AUTO: 0.4 % (ref 0–0.9)
LYMPHOCYTES # BLD AUTO: 1.48 X10*3/UL (ref 1.2–4.8)
LYMPHOCYTES NFR BLD AUTO: 11.9 %
MCH RBC QN AUTO: 30.9 PG (ref 26–34)
MCHC RBC AUTO-ENTMCNC: 33.7 G/DL (ref 32–36)
MCV RBC AUTO: 92 FL (ref 80–100)
MONOCYTES # BLD AUTO: 1.08 X10*3/UL (ref 0.1–1)
MONOCYTES NFR BLD AUTO: 8.7 %
NEUTROPHILS # BLD AUTO: 9.15 X10*3/UL (ref 1.2–7.7)
NEUTROPHILS NFR BLD AUTO: 73.6 %
NRBC BLD-RTO: 0 /100 WBCS (ref 0–0)
PLATELET # BLD AUTO: 284 X10*3/UL (ref 150–450)
POTASSIUM SERPL-SCNC: 3.6 MMOL/L (ref 3.4–5.1)
RBC # BLD AUTO: 3.43 X10*6/UL (ref 4–5.2)
SODIUM SERPL-SCNC: 139 MMOL/L (ref 133–145)
WBC # BLD AUTO: 12.4 X10*3/UL (ref 4.4–11.3)

## 2024-08-14 PROCEDURE — 80048 BASIC METABOLIC PNL TOTAL CA: CPT

## 2024-08-14 PROCEDURE — 36415 COLL VENOUS BLD VENIPUNCTURE: CPT

## 2024-08-14 PROCEDURE — 85025 COMPLETE CBC W/AUTO DIFF WBC: CPT

## 2024-08-14 PROCEDURE — 99349 HOME/RES VST EST MOD MDM 40: CPT | Performed by: NURSE PRACTITIONER

## 2024-08-14 PROCEDURE — 82306 VITAMIN D 25 HYDROXY: CPT

## 2024-08-14 PROCEDURE — 3074F SYST BP LT 130 MM HG: CPT | Performed by: NURSE PRACTITIONER

## 2024-08-14 PROCEDURE — 3078F DIAST BP <80 MM HG: CPT | Performed by: NURSE PRACTITIONER

## 2024-08-14 PROCEDURE — 1126F AMNT PAIN NOTED NONE PRSNT: CPT | Performed by: NURSE PRACTITIONER

## 2024-08-14 ASSESSMENT — PAIN SCALES - GENERAL: PAINLEVEL: 0-NO PAIN

## 2024-08-14 NOTE — PROGRESS NOTES
"Subjective   Patient ID: Tammy Walker is a 69 y.o. female who presents for Follow-up (HTN, functional decline, lab draw).    Patient is seen in her private home sitting up on the sofa in the living room. Resides at home where she lives with her sister, and she is her primary caregiver.  She is awake and alert with appropriate awareness and is her own decision maker.  Sister is present for home visit.  Currently is non-ambulatory, can bear weight only and propels herself in the wheelchair. PMH: HTN, Covid-19 appx 2021, Hyperlipidemia, TIA.   Medical Necessity - Homebound due to functional decline from Covid-19 [place].      Today is a follow up for overall functional decline, HTN and lab draw.    Today patient reports that she has resumed taking all medications as prescribed, including vitamind D supplement.  Reports blood pressures have been \"pretty good\".  Log reviewed via machine with 's - 190's and DBP 80's-90's.  We've discussed again serious risks of uncontrolled HTN and managing blood pressure through lifestyle changes and medications is crucial to mitigate these risks.  She is denying symptoms associated with chronic HTN, however again we did review lab results and worsening kidney function.  Has not made appointment with cardiology Dr. Alonzo or with nephrology as recommended.  She remains stationary on the sofa throughout the day, and also where she sleeps.  Does transfer herself and propel via wheelchair to commode.    Reports that appetite is good, staying hydrated and urinating adequate amounts.  Denies chest pain, palpitations, tachycardia, and shortness of breath, wheezing, no PND/orthopnea, or respiratory complaints for the patient. There is no fever, or chills. No nausea, vomiting, diarrhea, headaches, or vision changes or recent falls. There is no hematuria, dysuria, flank pain, or increased urgency.     Home Visit: Medically necessary due to: dementia/cognitive impairment, unsteady " gait/poor balance, shortness of breath with minimal exertion, Illness or condition that results in activity limitation or restriction that impacts the ability to leave home such as: equipment and /or human assistance needed to safely leave the home, patient has limited support systems to help the patient attend office visits, the office visit would require excessive physical effort or pain for the patient.           Current Outpatient Medications:     allopurinol (Zyloprim) 100 mg tablet, Take 1 tablet (100 mg) by mouth 2 times a day., Disp: 60 tablet, Rfl: 11    aspirin 81 mg chewable tablet, Chew 1 tablet (81 mg) once daily., Disp: , Rfl:     atenolol (Tenormin) 50 mg tablet, Take 1 tablet (50 mg) by mouth 2 times a day., Disp: 90 tablet, Rfl: 3    atorvastatin (Lipitor) 40 mg tablet, Take 1 tablet (40 mg) by mouth once daily at bedtime., Disp: 90 tablet, Rfl: 3    cholecalciferol (Vitamin D3) 10 MCG (400 UNIT) tablet, Take 1 tablet (10 mcg) by mouth once daily., Disp: 30 tablet, Rfl: 11    furosemide (Lasix) 40 mg tablet, Take 1 tablet (40 mg) by mouth once daily., Disp: 30 tablet, Rfl: 11    hydroCHLOROthiazide (HYDRODiuril) 50 mg tablet, Take 1 tablet (50 mg) by mouth once daily., Disp: 30 tablet, Rfl: 5    potassium chloride CR (Klor-Con) 10 mEq ER tablet, Take 1 tablet (10 mEq) by mouth 2 times a day. Do not crush, chew, or split., Disp: 60 tablet, Rfl: 11    valsartan (Diovan) 80 mg tablet, Take 1 tablet (80 mg) by mouth 2 times a day., Disp: 100 tablet, Rfl: 3     Review of Systems   Constitutional:  Negative for unexpected weight change.   HENT: Negative.     Respiratory: Negative.     Cardiovascular:  Positive for leg swelling.   Gastrointestinal: Negative.    Genitourinary:  Positive for frequency.   Musculoskeletal:  Positive for arthralgias.   Skin:  Positive for pallor.   Neurological: Negative.    Psychiatric/Behavioral: Negative.     Objective   /78 (BP Location: Left arm)   Pulse 60   Temp  "35.9 °C (96.7 °F) (Temporal)   Resp 16   Ht 1.651 m (5' 5\")   Wt 90.7 kg (200 lb)   SpO2 97%   BMI 33.28 kg/m²     Physical Exam  Constitutional:       Appearance: Normal appearance.   HENT:      Head: Normocephalic and atraumatic.      Nose: Nose normal.      Mouth/Throat:      Mouth: Mucous membranes are moist.      Pharynx: Oropharynx is clear.   Eyes:      Extraocular Movements: Extraocular movements intact.      Pupils: Pupils are equal, round, and reactive to light.   Cardiovascular:      Rate and Rhythm: Normal rate and regular rhythm.      Pulses: Normal pulses.      Heart sounds: Normal heart sounds.   Pulmonary:      Effort: Pulmonary effort is normal.      Breath sounds: Normal breath sounds. No wheezing or rhonchi.   Abdominal:      General: Bowel sounds are normal.      Palpations: Abdomen is soft.   Musculoskeletal:         General: Swelling present.      Cervical back: Neck supple. No tenderness.      Comments: 1+ left lower ext pitting edema, 2+ rt pitting edema   Skin:     General: Skin is warm and dry.      Findings: No erythema.   Neurological:      Mental Status: She is alert and oriented to person, place, and time. Mental status is at baseline.   Psychiatric:         Mood and Affect: Mood normal.         Judgment: Judgment normal.     Assessment/Plan   Diagnoses and all orders for this visit:  Acute combined systolic and diastolic congestive heart failure (Multi)  Comments:  Continue hydrochlorothiazide 50mg daily, furosemide 40mg daily  Orders:  -     CBC and Auto Differential; Future  -     Basic metabolic panel; Future  -     Venipunc need phys skill, dx or rx; Future  Primary hypertension  Comments:  Improving - Continue - valsartan 80mg daily  Vitamin D deficiency  Comments:  Not managed - education regarding Vit d levels and need to take medications consistently  Orders:  -     Vitamin D 25-Hydroxy,Total (for eval of Vitamin D levels); Future  Personal history of COVID-19        More " than 50% of time spent in face-to-face discussion @ a total of 40 minutes with this patient on counseling, coordination of care, collaboration with family and staff and review of medical records and diagnostics.     Will continue with [facility] visits. Patient has limited support, limited mobility and unable to navigate to traditional office appointments.       Labs drawn today without complications     Great deal of education regarding medication compliance and follow up with Dr. Alonzo - cardiology. Referral to nephrology       Raisa Goff, APRN-CNP

## 2024-09-02 DIAGNOSIS — I50.41 ACUTE COMBINED SYSTOLIC AND DIASTOLIC CONGESTIVE HEART FAILURE (MULTI): ICD-10-CM

## 2024-09-03 RX ORDER — FUROSEMIDE 40 MG/1
40 TABLET ORAL DAILY
Qty: 90 TABLET | Refills: 3 | Status: SHIPPED | OUTPATIENT
Start: 2024-09-03

## 2024-09-24 ENCOUNTER — TELEPHONE (OUTPATIENT)
Dept: PRIMARY CARE | Facility: CLINIC | Age: 69
End: 2024-09-24
Payer: COMMERCIAL

## 2024-09-25 ENCOUNTER — OFFICE VISIT (OUTPATIENT)
Dept: PRIMARY CARE | Facility: CLINIC | Age: 69
End: 2024-09-25
Payer: COMMERCIAL

## 2024-09-25 VITALS
OXYGEN SATURATION: 98 % | RESPIRATION RATE: 16 BRPM | TEMPERATURE: 96.7 F | SYSTOLIC BLOOD PRESSURE: 123 MMHG | HEART RATE: 58 BPM | WEIGHT: 200 LBS | DIASTOLIC BLOOD PRESSURE: 80 MMHG | BODY MASS INDEX: 33.28 KG/M2

## 2024-09-25 DIAGNOSIS — E55.9 VITAMIN D DEFICIENCY: Chronic | ICD-10-CM

## 2024-09-25 DIAGNOSIS — I50.41 ACUTE COMBINED SYSTOLIC AND DIASTOLIC CONGESTIVE HEART FAILURE: Primary | Chronic | ICD-10-CM

## 2024-09-25 DIAGNOSIS — I10 PRIMARY HYPERTENSION: ICD-10-CM

## 2024-09-25 DIAGNOSIS — Z86.16 PERSONAL HISTORY OF COVID-19: Chronic | ICD-10-CM

## 2024-09-25 PROCEDURE — 99349 HOME/RES VST EST MOD MDM 40: CPT | Performed by: NURSE PRACTITIONER

## 2024-09-25 PROCEDURE — 3074F SYST BP LT 130 MM HG: CPT | Performed by: NURSE PRACTITIONER

## 2024-09-25 PROCEDURE — 1159F MED LIST DOCD IN RCRD: CPT | Performed by: NURSE PRACTITIONER

## 2024-09-25 PROCEDURE — 3079F DIAST BP 80-89 MM HG: CPT | Performed by: NURSE PRACTITIONER

## 2024-09-25 PROCEDURE — 1126F AMNT PAIN NOTED NONE PRSNT: CPT | Performed by: NURSE PRACTITIONER

## 2024-09-25 PROCEDURE — 1160F RVW MEDS BY RX/DR IN RCRD: CPT | Performed by: NURSE PRACTITIONER

## 2024-09-25 ASSESSMENT — PAIN SCALES - GENERAL: PAINLEVEL: 0-NO PAIN

## 2024-09-25 NOTE — PROGRESS NOTES
"Subjective   Patient ID: Tammy Walker is a 69 y.o. female who presents for Follow-up (HTN, Functional decline).    Patient is seen in her private home sitting up on the sofa in the living room. Resides at home where she lives with her sister, and she is her primary caregiver.  She is awake and alert with appropriate awareness and is her own decision maker.  Sister is present for home visit.  Currently is non-ambulatory, can bear weight only and propels herself in the wheelchair. PMH: HTN, Covid-19 appx 2021, Hyperlipidemia, TIA.   Medical Necessity - Homebound due to functional decline from Covid-19 [place].    Today is a follow up for overall functional decline, and HTN.   Patient reports that she is taking all medications as prescribed, including vitamin D supplement, sister is managing her medications.   Reports blood pressures have been \"pretty good\".  Log reviewed via machine with 's - 150's  and DBP 80's-90's.  Actually is the first time in several months that blood pressures have been within normal ranges.  We've discussed again serious risks of uncontrolled HTN and managing blood pressure through lifestyle changes and medications is crucial to mitigate these risks.   Has not made appointment with cardiology Dr. Alonzo or with nephrology as recommended.  She remains stationary on the sofa throughout the day, and also where she sleeps.  They both report that day bed has been ordered for more comfort, and will be getting rid of sofa.  Does transfer herself and propel via wheelchair to commode by herself during the day.    Reports that appetite is good, staying hydrated and urinating adequate amounts.  Denies chest pain, palpitations, tachycardia, and shortness of breath, wheezing, no PND/orthopnea, or respiratory complaints for the patient. There is no fever, or chills. No nausea, vomiting, diarrhea, headaches, or vision changes or recent falls. There is no hematuria, dysuria, flank pain, or " increased urgency.     Home Visit: Medically necessary due to: dementia/cognitive impairment, unsteady gait/poor balance, shortness of breath with minimal exertion, Illness or condition that results in activity limitation or restriction that impacts the ability to leave home such as: equipment and /or human assistance needed to safely leave the home, patient has limited support systems to help the patient attend office visits, the office visit would require excessive physical effort or pain for the patient.           Current Outpatient Medications:     allopurinol (Zyloprim) 100 mg tablet, Take 1 tablet (100 mg) by mouth 2 times a day., Disp: 60 tablet, Rfl: 11    aspirin 81 mg chewable tablet, Chew 1 tablet (81 mg) once daily., Disp: , Rfl:     atenolol (Tenormin) 50 mg tablet, Take 1 tablet (50 mg) by mouth 2 times a day., Disp: 90 tablet, Rfl: 3    atorvastatin (Lipitor) 40 mg tablet, Take 1 tablet (40 mg) by mouth once daily at bedtime., Disp: 90 tablet, Rfl: 3    cholecalciferol (Vitamin D3) 10 MCG (400 UNIT) tablet, Take 1 tablet (10 mcg) by mouth once daily., Disp: 30 tablet, Rfl: 11    furosemide (Lasix) 40 mg tablet, TAKE 1 TABLET BY MOUTH EVERY DAY, Disp: 90 tablet, Rfl: 3    hydroCHLOROthiazide (HYDRODiuril) 50 mg tablet, Take 1 tablet (50 mg) by mouth once daily., Disp: 30 tablet, Rfl: 5    potassium chloride CR (Klor-Con) 10 mEq ER tablet, Take 1 tablet (10 mEq) by mouth 2 times a day. Do not crush, chew, or split., Disp: 60 tablet, Rfl: 11    valsartan (Diovan) 80 mg tablet, Take 1 tablet (80 mg) by mouth 2 times a day., Disp: 100 tablet, Rfl: 3     Review of Systems  Constitutional:  Negative for unexpected weight change.   HENT: Negative.     Respiratory: Negative.     Cardiovascular:  Positive for leg swelling.   Gastrointestinal: Negative.    Genitourinary:  Positive for frequency.   Musculoskeletal:  Positive for arthralgias.   Skin:  Positive for pallor.   Neurological: Negative.     Psychiatric/Behavioral: Negative.    Objective   /80 (BP Location: Left arm, Patient Position: Sitting)   Pulse 58   Temp 35.9 °C (96.7 °F) (Temporal)   Resp 16   Wt 90.7 kg (200 lb)   SpO2 98%   BMI 33.28 kg/m²     Physical Exam  Constitutional:       Appearance: Normal appearance.   HENT:      Head: Normocephalic and atraumatic.      Nose: Nose normal.      Mouth/Throat:      Mouth: Mucous membranes are moist.      Pharynx: Oropharynx is clear.   Eyes:      Extraocular Movements: Extraocular movements intact.      Pupils: Pupils are equal, round, and reactive to light.   Cardiovascular:      Rate and Rhythm: Normal rate and regular rhythm.      Pulses: Normal pulses.      Heart sounds: Normal heart sounds.   Pulmonary:      Effort: Pulmonary effort is normal.      Breath sounds: Normal breath sounds. No wheezing or rhonchi.   Abdominal:      General: Bowel sounds are normal.      Palpations: Abdomen is soft.   Musculoskeletal:         General: Swelling present.      Cervical back: Neck supple. No tenderness.      Comments: 1+ left lower ext pitting edema, 2+ rt pitting edema   Skin:     General: Skin is warm and dry.      Findings: No erythema.   Neurological:      Mental Status: She is alert and oriented to person, place, and time. Mental status is at baseline.   Psychiatric:         Mood and Affect: Mood normal.         Judgment: Judgment normal.      Assessment/Plan   Diagnoses and all orders for this visit:  Acute combined systolic and diastolic congestive heart failure (Multi)  Comments:  Continue furosemide 40mg daily, hydrochlorothiazide 50mg daily  Primary hypertension  Comments:  Better managed - continue atenolol 50mg bid, diovan 80 mg bid  Vitamin D deficiency  Comments:  Not managed - increase daily supplement to 5000mg QD  Personal history of COVID-19  Comments:  Continue with supportive care - recommending PT/OT for strengthening - family refusing at this time    More than 50% of time  spent in face-to-face discussion @ a total of 40 minutes with this patient on counseling, coordination of care, and review of medical records and diagnostics.    Preventative Medicine Counseling: Medication Education: Education: Current medication list reviewed and discussed, understanding, patient expressed understanding, adherence: Unable to leave the home due to limited mobility and functional decline - unable to complete recommended care gaps.  Refusing flu vaccine    Will continue with [facility] visits. Patient has limited support, limited mobility and unable to navigate to traditional office appointments.           Raisa Goff, APRN-CNP

## 2024-10-29 ENCOUNTER — TELEPHONE (OUTPATIENT)
Dept: PRIMARY CARE | Facility: CLINIC | Age: 69
End: 2024-10-29
Payer: COMMERCIAL

## 2024-10-30 ENCOUNTER — LAB (OUTPATIENT)
Dept: LAB | Facility: LAB | Age: 69
End: 2024-10-30
Payer: COMMERCIAL

## 2024-10-30 ENCOUNTER — OFFICE VISIT (OUTPATIENT)
Dept: PRIMARY CARE | Facility: CLINIC | Age: 69
End: 2024-10-30
Payer: COMMERCIAL

## 2024-10-30 DIAGNOSIS — I50.41 ACUTE COMBINED SYSTOLIC AND DIASTOLIC CONGESTIVE HEART FAILURE: ICD-10-CM

## 2024-10-30 DIAGNOSIS — E78.2 MIXED HYPERLIPIDEMIA: ICD-10-CM

## 2024-10-30 DIAGNOSIS — I50.41 ACUTE COMBINED SYSTOLIC AND DIASTOLIC CONGESTIVE HEART FAILURE: Primary | ICD-10-CM

## 2024-10-30 DIAGNOSIS — E78.2 MIXED HYPERLIPIDEMIA: Chronic | ICD-10-CM

## 2024-10-30 DIAGNOSIS — Z86.16 PERSONAL HISTORY OF COVID-19: ICD-10-CM

## 2024-10-30 DIAGNOSIS — E55.9 VITAMIN D DEFICIENCY: Chronic | ICD-10-CM

## 2024-10-30 DIAGNOSIS — M1A.09X0 IDIOPATHIC CHRONIC GOUT OF MULTIPLE SITES WITHOUT TOPHUS: Chronic | ICD-10-CM

## 2024-10-30 DIAGNOSIS — I10 PRIMARY HYPERTENSION: Chronic | ICD-10-CM

## 2024-10-30 LAB
25(OH)D3 SERPL-MCNC: 14 NG/ML (ref 30–100)
ANION GAP SERPL CALCULATED.3IONS-SCNC: 16 MMOL/L (ref 10–20)
BASOPHILS # BLD AUTO: 0.11 X10*3/UL (ref 0–0.1)
BASOPHILS NFR BLD AUTO: 0.9 %
BUN SERPL-MCNC: 40 MG/DL (ref 6–23)
CALCIUM SERPL-MCNC: 8.1 MG/DL (ref 8.6–10.3)
CHLORIDE SERPL-SCNC: 104 MMOL/L (ref 98–107)
CHOLEST SERPL-MCNC: 126 MG/DL (ref 0–199)
CHOLEST/HDLC SERPL: 3.7 {RATIO}
CO2 SERPL-SCNC: 26 MMOL/L (ref 21–32)
CREAT SERPL-MCNC: 2.26 MG/DL (ref 0.5–1.05)
EGFRCR SERPLBLD CKD-EPI 2021: 23 ML/MIN/1.73M*2
EOSINOPHIL # BLD AUTO: 0.58 X10*3/UL (ref 0–0.7)
EOSINOPHIL NFR BLD AUTO: 4.6 %
ERYTHROCYTE [DISTWIDTH] IN BLOOD BY AUTOMATED COUNT: 13.7 % (ref 11.5–14.5)
GLUCOSE SERPL-MCNC: 77 MG/DL (ref 74–99)
HCT VFR BLD AUTO: 33.4 % (ref 36–46)
HDLC SERPL-MCNC: 34 MG/DL
HGB BLD-MCNC: 11.7 G/DL (ref 12–16)
IMM GRANULOCYTES # BLD AUTO: 0.06 X10*3/UL (ref 0–0.7)
IMM GRANULOCYTES NFR BLD AUTO: 0.5 % (ref 0–0.9)
LDLC SERPL CALC-MCNC: 63 MG/DL
LYMPHOCYTES # BLD AUTO: 1.94 X10*3/UL (ref 1.2–4.8)
LYMPHOCYTES NFR BLD AUTO: 15.4 %
MCH RBC QN AUTO: 32 PG (ref 26–34)
MCHC RBC AUTO-ENTMCNC: 35 G/DL (ref 32–36)
MCV RBC AUTO: 91 FL (ref 80–100)
MONOCYTES # BLD AUTO: 1.01 X10*3/UL (ref 0.1–1)
MONOCYTES NFR BLD AUTO: 8 %
NEUTROPHILS # BLD AUTO: 8.93 X10*3/UL (ref 1.2–7.7)
NEUTROPHILS NFR BLD AUTO: 70.6 %
NON HDL CHOLESTEROL: 92 MG/DL (ref 0–149)
NRBC BLD-RTO: 0 /100 WBCS (ref 0–0)
PLATELET # BLD AUTO: 288 X10*3/UL (ref 150–450)
POTASSIUM SERPL-SCNC: 3.5 MMOL/L (ref 3.5–5.3)
RBC # BLD AUTO: 3.66 X10*6/UL (ref 4–5.2)
SODIUM SERPL-SCNC: 142 MMOL/L (ref 136–145)
TRIGL SERPL-MCNC: 147 MG/DL (ref 0–149)
VLDL: 29 MG/DL (ref 0–40)
WBC # BLD AUTO: 12.6 X10*3/UL (ref 4.4–11.3)

## 2024-10-30 PROCEDURE — 80048 BASIC METABOLIC PNL TOTAL CA: CPT

## 2024-10-30 PROCEDURE — 3075F SYST BP GE 130 - 139MM HG: CPT | Performed by: NURSE PRACTITIONER

## 2024-10-30 PROCEDURE — 1159F MED LIST DOCD IN RCRD: CPT | Performed by: NURSE PRACTITIONER

## 2024-10-30 PROCEDURE — 82306 VITAMIN D 25 HYDROXY: CPT

## 2024-10-30 PROCEDURE — 80061 LIPID PANEL: CPT

## 2024-10-30 PROCEDURE — 3079F DIAST BP 80-89 MM HG: CPT | Performed by: NURSE PRACTITIONER

## 2024-10-30 PROCEDURE — 36415 COLL VENOUS BLD VENIPUNCTURE: CPT

## 2024-10-30 PROCEDURE — 85025 COMPLETE CBC W/AUTO DIFF WBC: CPT

## 2024-10-30 PROCEDURE — 99349 HOME/RES VST EST MOD MDM 40: CPT | Performed by: NURSE PRACTITIONER

## 2024-10-30 PROCEDURE — 1160F RVW MEDS BY RX/DR IN RCRD: CPT | Performed by: NURSE PRACTITIONER

## 2024-10-30 NOTE — PROGRESS NOTES
"Subjective   Patient ID: Tammy Walker is a 69 y.o. female who presents for Follow-up (HTN, covid [place]).    Patient is seen in her private home sitting up on the sofa in the living room. Resides at home where she lives with her sister, and she is her primary caregiver.  She is awake and alert with appropriate awareness and is her own decision maker.  Sister is present for home visit.  Currently is non-ambulatory, can bear weight only and propels herself in the wheelchair. PMH: HTN, Covid-19 appx 2021, Hyperlipidemia, TIA.   Medical Necessity - Homebound due to functional decline from Covid-19 [place].      Today is a follow up for overall functional decline, HTN and lab draw.    Today patient reports that she has resumed taking all medications as prescribed, including vitamind D supplement.  Reports blood pressures have been \"pretty good\".  Log reviewed via machine with 's - 190's and DBP 80's-90's.  We've discussed again serious risks of uncontrolled HTN and managing blood pressure through lifestyle changes and medications is crucial to mitigate these risks.  She is denying symptoms associated with chronic HTN, however again we did review lab results and worsening kidney function.  Has not made appointment with cardiology Dr. Alonzo or with nephrology as recommended.  She remains stationary on the sofa throughout the day, and also where she sleeps.  Does transfer herself and propel via wheelchair to commode.    Reports that appetite is good, staying hydrated and urinating adequate amounts.  Denies chest pain, palpitations, tachycardia, and shortness of breath, wheezing, no PND/orthopnea, or respiratory complaints for the patient. There is no fever, or chills. No nausea, vomiting, diarrhea, headaches, or vision changes or recent falls. There is no hematuria, dysuria, flank pain, or increased urgency.     Home Visit: Medically necessary due to: dementia/cognitive impairment, unsteady gait/poor " balance, shortness of breath with minimal exertion, Illness or condition that results in activity limitation or restriction that impacts the ability to leave home such as: equipment and /or human assistance needed to safely leave the home, patient has limited support systems to help the patient attend office visits, the office visit would require excessive physical effort or pain for the patient.             Current Outpatient Medications:     allopurinol (Zyloprim) 100 mg tablet, Take 1 tablet (100 mg) by mouth 2 times a day., Disp: 60 tablet, Rfl: 11    aspirin 81 mg chewable tablet, Chew 1 tablet (81 mg) once daily., Disp: , Rfl:     atenolol (Tenormin) 50 mg tablet, Take 1 tablet (50 mg) by mouth 2 times a day., Disp: 90 tablet, Rfl: 3    atorvastatin (Lipitor) 40 mg tablet, Take 1 tablet (40 mg) by mouth once daily at bedtime., Disp: 90 tablet, Rfl: 3    cholecalciferol (Vitamin D3) 10 MCG (400 UNIT) tablet, Take 1 tablet (10 mcg) by mouth once daily., Disp: 30 tablet, Rfl: 11    furosemide (Lasix) 40 mg tablet, TAKE 1 TABLET BY MOUTH EVERY DAY, Disp: 90 tablet, Rfl: 3    hydroCHLOROthiazide (HYDRODiuril) 50 mg tablet, Take 1 tablet (50 mg) by mouth once daily., Disp: 30 tablet, Rfl: 5    potassium chloride CR (Klor-Con) 10 mEq ER tablet, Take 1 tablet (10 mEq) by mouth 2 times a day. Do not crush, chew, or split., Disp: 60 tablet, Rfl: 11    valsartan (Diovan) 80 mg tablet, Take 1 tablet (80 mg) by mouth 2 times a day., Disp: 100 tablet, Rfl: 3     Review of Systems  Constitutional:  Negative for unexpected weight change.   HENT: Negative.     Respiratory: Negative.     Cardiovascular:  Positive for leg swelling.   Gastrointestinal: Negative.    Genitourinary:  Positive for frequency.   Musculoskeletal:  Positive for arthralgias.   Skin:  Positive for pallor.   Neurological: Negative.    Psychiatric/Behavioral: Negative.    Objective   /80 (BP Location: Left arm, Patient Position: Sitting)   Pulse 90    "Temp 36.2 °C (97.1 °F) (Temporal)   Resp 16   Ht 1.651 m (5' 5\")   Wt 90.7 kg (200 lb)   SpO2 97%   BMI 33.28 kg/m²     Physical Exam  Constitutional:       Appearance: Normal appearance.   HENT:      Head: Normocephalic and atraumatic.      Nose: Nose normal.      Mouth/Throat:      Mouth: Mucous membranes are moist.      Pharynx: Oropharynx is clear.   Eyes:      Extraocular Movements: Extraocular movements intact.      Pupils: Pupils are equal, round, and reactive to light.   Cardiovascular:      Rate and Rhythm: Normal rate and regular rhythm.      Pulses: Normal pulses.      Heart sounds: Normal heart sounds.   Pulmonary:      Effort: Pulmonary effort is normal.      Breath sounds: Normal breath sounds. No wheezing or rhonchi.   Abdominal:      General: Bowel sounds are normal.      Palpations: Abdomen is soft.   Musculoskeletal:         General: Swelling present.      Cervical back: Neck supple. No tenderness.      Comments: 1+ left lower ext pitting edema, 2+ rt pitting edema   Skin:     General: Skin is warm and dry.      Findings: No erythema.   Neurological:      Mental Status: She is alert and oriented to person, place, and time. Mental status is at baseline.   Psychiatric:         Mood and Affect: Mood normal.         Judgment: Judgment normal.     Lab Results   Component Value Date    WBC 12.6 (H) 10/30/2024    HGB 11.7 (L) 10/30/2024    HCT 33.4 (L) 10/30/2024    MCV 91 10/30/2024     10/30/2024      Lab Results   Component Value Date    GLUCOSE 77 10/30/2024    CALCIUM 8.1 (L) 10/30/2024     10/30/2024    K 3.5 10/30/2024    CO2 26 10/30/2024     10/30/2024    BUN 40 (H) 10/30/2024    CREATININE 2.26 (H) 10/30/2024        Assessment/Plan   Diagnoses and all orders for this visit:  Acute combined systolic and diastolic congestive heart failure  Comments:  Better managed - continue furosemide 40mg daily  Orders:  -     CBC and Auto Differential; Future  -     Basic metabolic " panel; Future  -     Venipunc need phys skill, dx or rx; Future  Primary hypertension  Comments:  Improved - continue hydrochlorothiazide 50mg daily, atenolol 50mg daily, valsartan 80mg bid  Vitamin D deficiency  Comments:  Improved - cont Vit D3 400units daily  Orders:  -     Vitamin D 25-Hydroxy,Total (for eval of Vitamin D levels); Future  Idiopathic chronic gout of multiple sites without tophus  Comments:  Managed continue allopurinol 100mg bid  Personal history of COVID-19  Comments:  Continue with supportive care  Mixed hyperlipidemia  Comments:  Managed - continue atorvastatin 40mg daily  Orders:  -     Lipid panel; Future    More than 50% of time spent in face-to-face discussion @ a total of 40 minutes with this patient on counseling, coordination of care, and review of medical records and diagnostics.    Will continue with [facility] visits. Patient has limited support, limited mobility and unable to navigate to traditional office appointments.      Preventative Medicine Counseling: Medication Education: Education: Current medication list reviewed and discussed, understanding, patient expressed understanding, adherence: No barriers to adherence identified.     Labs drawn today monitoring medications and compliance.  Tolerated well.           Raisa Goff, APRN-CNP

## 2024-11-05 VITALS
HEART RATE: 90 BPM | TEMPERATURE: 97.1 F | DIASTOLIC BLOOD PRESSURE: 80 MMHG | BODY MASS INDEX: 33.32 KG/M2 | HEIGHT: 65 IN | WEIGHT: 200 LBS | RESPIRATION RATE: 16 BRPM | OXYGEN SATURATION: 97 % | SYSTOLIC BLOOD PRESSURE: 138 MMHG

## 2024-11-12 DIAGNOSIS — M10.9 GOUT OF MULTIPLE SITES, UNSPECIFIED CAUSE, UNSPECIFIED CHRONICITY: ICD-10-CM

## 2024-11-12 RX ORDER — ALLOPURINOL 100 MG/1
100 TABLET ORAL 2 TIMES DAILY
Qty: 180 TABLET | Refills: 3 | Status: SHIPPED | OUTPATIENT
Start: 2024-11-12

## 2024-11-26 DIAGNOSIS — I10 PRIMARY HYPERTENSION: ICD-10-CM

## 2024-11-26 RX ORDER — VALSARTAN 80 MG/1
80 TABLET ORAL 2 TIMES DAILY
Qty: 100 TABLET | Refills: 3 | Status: SHIPPED | OUTPATIENT
Start: 2024-11-26

## 2024-11-28 DIAGNOSIS — I50.41 ACUTE COMBINED SYSTOLIC AND DIASTOLIC CONGESTIVE HEART FAILURE: ICD-10-CM

## 2024-11-29 RX ORDER — ATENOLOL 50 MG/1
50 TABLET ORAL 2 TIMES DAILY
Qty: 180 TABLET | Refills: 3 | Status: SHIPPED | OUTPATIENT
Start: 2024-11-29

## 2024-12-21 DIAGNOSIS — N30.00 ACUTE CYSTITIS WITHOUT HEMATURIA: Primary | ICD-10-CM

## 2024-12-21 RX ORDER — NITROFURANTOIN 25; 75 MG/1; MG/1
100 CAPSULE ORAL 2 TIMES DAILY
Qty: 14 CAPSULE | Refills: 0 | Status: SHIPPED | OUTPATIENT
Start: 2024-12-21 | End: 2024-12-28

## 2024-12-21 NOTE — PROGRESS NOTES
Sister calls to report burning, pressure and hesitation with urination.  Reports that she starts to urinate, and feels like she constantly has to go.  In agreement to treat empirically and start macrobid.

## 2025-01-05 DIAGNOSIS — E78.2 MODERATE MIXED HYPERLIPIDEMIA NOT REQUIRING STATIN THERAPY: ICD-10-CM

## 2025-01-06 DIAGNOSIS — E78.2 MODERATE MIXED HYPERLIPIDEMIA NOT REQUIRING STATIN THERAPY: ICD-10-CM

## 2025-01-06 RX ORDER — ATORVASTATIN CALCIUM 40 MG/1
40 TABLET, FILM COATED ORAL NIGHTLY
Qty: 90 TABLET | Refills: 3 | Status: SHIPPED | OUTPATIENT
Start: 2025-01-06 | End: 2025-01-06 | Stop reason: SDUPTHER

## 2025-01-06 RX ORDER — ATORVASTATIN CALCIUM 40 MG/1
40 TABLET, FILM COATED ORAL NIGHTLY
Qty: 90 TABLET | Refills: 3 | Status: SHIPPED | OUTPATIENT
Start: 2025-01-06 | End: 2026-01-06

## 2025-01-09 ENCOUNTER — APPOINTMENT (OUTPATIENT)
Dept: PRIMARY CARE | Facility: CLINIC | Age: 70
End: 2025-01-09
Payer: COMMERCIAL

## 2025-01-16 ENCOUNTER — TELEPHONE (OUTPATIENT)
Dept: PRIMARY CARE | Facility: CLINIC | Age: 70
End: 2025-01-16
Payer: MEDICARE

## 2025-01-16 DIAGNOSIS — I50.41 ACUTE COMBINED SYSTOLIC AND DIASTOLIC CONGESTIVE HEART FAILURE: Chronic | ICD-10-CM

## 2025-01-16 DIAGNOSIS — I10 ESSENTIAL (PRIMARY) HYPERTENSION: Chronic | ICD-10-CM

## 2025-01-16 RX ORDER — HYDROCHLOROTHIAZIDE 50 MG/1
50 TABLET ORAL DAILY
Qty: 30 TABLET | Refills: 5 | Status: SHIPPED | OUTPATIENT
Start: 2025-01-16 | End: 2025-07-15

## 2025-01-16 NOTE — TELEPHONE ENCOUNTER
"JALEEL Kline - Phoned Idal[employer]/ patient's sister to confirm 1/17/25 [facility] visit, she reported she cancelled the visit because they have both been ill. She reported her sister has a cold. She has been sneezing, has a NPC. No temperature. She stated, \"It is starting to break up\". Tested negative for Covid on Monday. Patient is eating and drinking fluids. Visit rescheduled on 2/7/25 at 2:00 pm per Idalmis's request.   "

## 2025-01-17 ENCOUNTER — APPOINTMENT (OUTPATIENT)
Dept: PRIMARY CARE | Facility: CLINIC | Age: 70
End: 2025-01-17
Payer: MEDICARE

## 2025-02-06 ENCOUNTER — TELEPHONE (OUTPATIENT)
Dept: PRIMARY CARE | Facility: CLINIC | Age: 70
End: 2025-02-06
Payer: MEDICARE

## 2025-02-06 NOTE — TELEPHONE ENCOUNTER
Idalmis/ patient's sister returned call and requested to reschedule 2/7/25 [facility] visit, visit rescheduled to 2/9/25 at 2:00 pm with Raisa Goff NP.

## 2025-02-19 ENCOUNTER — OFFICE VISIT (OUTPATIENT)
Dept: PRIMARY CARE | Facility: CLINIC | Age: 70
End: 2025-02-19
Payer: MEDICARE

## 2025-03-03 ENCOUNTER — APPOINTMENT (OUTPATIENT)
Dept: CARDIOLOGY | Facility: HOSPITAL | Age: 70
End: 2025-03-03
Payer: MEDICARE

## 2025-03-03 ENCOUNTER — APPOINTMENT (OUTPATIENT)
Dept: RADIOLOGY | Facility: HOSPITAL | Age: 70
End: 2025-03-03
Payer: MEDICARE

## 2025-03-03 ENCOUNTER — HOSPITAL ENCOUNTER (EMERGENCY)
Facility: HOSPITAL | Age: 70
Discharge: HOME | End: 2025-03-04
Payer: MEDICARE

## 2025-03-03 VITALS
TEMPERATURE: 98.2 F | OXYGEN SATURATION: 95 % | DIASTOLIC BLOOD PRESSURE: 90 MMHG | HEIGHT: 65 IN | SYSTOLIC BLOOD PRESSURE: 178 MMHG | RESPIRATION RATE: 18 BRPM | WEIGHT: 200 LBS | HEART RATE: 73 BPM | BODY MASS INDEX: 33.32 KG/M2

## 2025-03-03 DIAGNOSIS — E83.42 HYPOMAGNESEMIA: ICD-10-CM

## 2025-03-03 DIAGNOSIS — R53.1 GENERALIZED WEAKNESS: Primary | ICD-10-CM

## 2025-03-03 LAB
ALBUMIN SERPL BCP-MCNC: 3.9 G/DL (ref 3.4–5)
ALP SERPL-CCNC: 171 U/L (ref 33–136)
ALT SERPL W P-5'-P-CCNC: 6 U/L (ref 7–45)
ANION GAP BLDV CALCULATED.4IONS-SCNC: 12 MMOL/L (ref 10–25)
ANION GAP SERPL CALCULATED.3IONS-SCNC: 15 MMOL/L (ref 10–20)
APPEARANCE UR: CLEAR
AST SERPL W P-5'-P-CCNC: 21 U/L (ref 9–39)
BASE EXCESS BLDV CALC-SCNC: -2.2 MMOL/L (ref -2–3)
BASOPHILS # BLD AUTO: 0.06 X10*3/UL (ref 0–0.1)
BASOPHILS NFR BLD AUTO: 0.6 %
BILIRUB SERPL-MCNC: 1.4 MG/DL (ref 0–1.2)
BILIRUB UR STRIP.AUTO-MCNC: NEGATIVE MG/DL
BNP SERPL-MCNC: 247 PG/ML (ref 0–99)
BODY TEMPERATURE: 37 DEGREES CELSIUS
BUN SERPL-MCNC: 26 MG/DL (ref 6–23)
CA-I BLDV-SCNC: 1.1 MMOL/L (ref 1.1–1.33)
CALCIUM SERPL-MCNC: 8.5 MG/DL (ref 8.6–10.3)
CARDIAC TROPONIN I PNL SERPL HS: 14 NG/L (ref 0–13)
CARDIAC TROPONIN I PNL SERPL HS: 15 NG/L (ref 0–13)
CHLORIDE BLDV-SCNC: 106 MMOL/L (ref 98–107)
CHLORIDE SERPL-SCNC: 105 MMOL/L (ref 98–107)
CO2 SERPL-SCNC: 18 MMOL/L (ref 21–32)
COLOR UR: NORMAL
CREAT SERPL-MCNC: 1.57 MG/DL (ref 0.5–1.05)
EGFRCR SERPLBLD CKD-EPI 2021: 36 ML/MIN/1.73M*2
EOSINOPHIL # BLD AUTO: 0.14 X10*3/UL (ref 0–0.7)
EOSINOPHIL NFR BLD AUTO: 1.3 %
ERYTHROCYTE [DISTWIDTH] IN BLOOD BY AUTOMATED COUNT: 14 % (ref 11.5–14.5)
FLUAV RNA RESP QL NAA+PROBE: NOT DETECTED
FLUBV RNA RESP QL NAA+PROBE: NOT DETECTED
GLUCOSE BLDV-MCNC: 105 MG/DL (ref 74–99)
GLUCOSE SERPL-MCNC: 115 MG/DL (ref 74–99)
GLUCOSE UR STRIP.AUTO-MCNC: NORMAL MG/DL
HCO3 BLDV-SCNC: 20.9 MMOL/L (ref 22–26)
HCT VFR BLD AUTO: 38.8 % (ref 36–46)
HCT VFR BLD EST: 37 % (ref 36–46)
HGB BLD-MCNC: 12.7 G/DL (ref 12–16)
HGB BLDV-MCNC: 12.3 G/DL (ref 12–16)
IMM GRANULOCYTES # BLD AUTO: 0.03 X10*3/UL (ref 0–0.7)
IMM GRANULOCYTES NFR BLD AUTO: 0.3 % (ref 0–0.9)
INHALED O2 CONCENTRATION: 21 %
KETONES UR STRIP.AUTO-MCNC: NEGATIVE MG/DL
LACTATE BLDV-SCNC: 0.9 MMOL/L (ref 0.4–2)
LEUKOCYTE ESTERASE UR QL STRIP.AUTO: NEGATIVE
LYMPHOCYTES # BLD AUTO: 0.9 X10*3/UL (ref 1.2–4.8)
LYMPHOCYTES NFR BLD AUTO: 8.6 %
MAGNESIUM SERPL-MCNC: 1.45 MG/DL (ref 1.6–2.4)
MCH RBC QN AUTO: 30.8 PG (ref 26–34)
MCHC RBC AUTO-ENTMCNC: 32.7 G/DL (ref 32–36)
MCV RBC AUTO: 94 FL (ref 80–100)
MONOCYTES # BLD AUTO: 0.94 X10*3/UL (ref 0.1–1)
MONOCYTES NFR BLD AUTO: 8.9 %
NEUTROPHILS # BLD AUTO: 8.45 X10*3/UL (ref 1.2–7.7)
NEUTROPHILS NFR BLD AUTO: 80.3 %
NITRITE UR QL STRIP.AUTO: NEGATIVE
NRBC BLD-RTO: 0 /100 WBCS (ref 0–0)
OXYHGB MFR BLDV: 84.8 % (ref 45–75)
PCO2 BLDV: 30 MM HG (ref 41–51)
PH BLDV: 7.45 PH (ref 7.33–7.43)
PH UR STRIP.AUTO: 5 [PH]
PLATELET # BLD AUTO: 230 X10*3/UL (ref 150–450)
PO2 BLDV: 50 MM HG (ref 35–45)
POTASSIUM BLDV-SCNC: 4.8 MMOL/L (ref 3.5–5.3)
POTASSIUM SERPL-SCNC: 4.3 MMOL/L (ref 3.5–5.3)
PROT SERPL-MCNC: 7 G/DL (ref 6.4–8.2)
PROT UR STRIP.AUTO-MCNC: NEGATIVE MG/DL
RBC # BLD AUTO: 4.12 X10*6/UL (ref 4–5.2)
RBC # UR STRIP.AUTO: NEGATIVE MG/DL
SAO2 % BLDV: 87 % (ref 45–75)
SARS-COV-2 RNA RESP QL NAA+PROBE: NOT DETECTED
SODIUM BLDV-SCNC: 134 MMOL/L (ref 136–145)
SODIUM SERPL-SCNC: 134 MMOL/L (ref 136–145)
SP GR UR STRIP.AUTO: 1.01
UROBILINOGEN UR STRIP.AUTO-MCNC: NORMAL MG/DL
WBC # BLD AUTO: 10.5 X10*3/UL (ref 4.4–11.3)

## 2025-03-03 PROCEDURE — 84484 ASSAY OF TROPONIN QUANT: CPT

## 2025-03-03 PROCEDURE — 71045 X-RAY EXAM CHEST 1 VIEW: CPT | Performed by: RADIOLOGY

## 2025-03-03 PROCEDURE — 85025 COMPLETE CBC W/AUTO DIFF WBC: CPT

## 2025-03-03 PROCEDURE — 83880 ASSAY OF NATRIURETIC PEPTIDE: CPT

## 2025-03-03 PROCEDURE — 70450 CT HEAD/BRAIN W/O DYE: CPT | Performed by: RADIOLOGY

## 2025-03-03 PROCEDURE — 84132 ASSAY OF SERUM POTASSIUM: CPT

## 2025-03-03 PROCEDURE — 99285 EMERGENCY DEPT VISIT HI MDM: CPT | Mod: 25

## 2025-03-03 PROCEDURE — 83735 ASSAY OF MAGNESIUM: CPT

## 2025-03-03 PROCEDURE — 84075 ASSAY ALKALINE PHOSPHATASE: CPT

## 2025-03-03 PROCEDURE — 2500000001 HC RX 250 WO HCPCS SELF ADMINISTERED DRUGS (ALT 637 FOR MEDICARE OP)

## 2025-03-03 PROCEDURE — 93005 ELECTROCARDIOGRAM TRACING: CPT

## 2025-03-03 PROCEDURE — 87636 SARSCOV2 & INF A&B AMP PRB: CPT

## 2025-03-03 PROCEDURE — 36415 COLL VENOUS BLD VENIPUNCTURE: CPT

## 2025-03-03 PROCEDURE — 81003 URINALYSIS AUTO W/O SCOPE: CPT

## 2025-03-03 PROCEDURE — 2500000004 HC RX 250 GENERAL PHARMACY W/ HCPCS (ALT 636 FOR OP/ED)

## 2025-03-03 PROCEDURE — 70450 CT HEAD/BRAIN W/O DYE: CPT

## 2025-03-03 PROCEDURE — 51798 US URINE CAPACITY MEASURE: CPT

## 2025-03-03 PROCEDURE — 71045 X-RAY EXAM CHEST 1 VIEW: CPT

## 2025-03-03 RX ORDER — ACETAMINOPHEN 325 MG/1
975 TABLET ORAL ONCE
Status: COMPLETED | OUTPATIENT
Start: 2025-03-03 | End: 2025-03-03

## 2025-03-03 RX ORDER — LANOLIN ALCOHOL/MO/W.PET/CERES
400 CREAM (GRAM) TOPICAL ONCE
Status: COMPLETED | OUTPATIENT
Start: 2025-03-03 | End: 2025-03-03

## 2025-03-03 RX ADMIN — ACETAMINOPHEN 975 MG: 325 TABLET ORAL at 16:04

## 2025-03-03 RX ADMIN — Medication 400 MG: at 17:13

## 2025-03-03 ASSESSMENT — LIFESTYLE VARIABLES
TOTAL SCORE: 0
HAVE PEOPLE ANNOYED YOU BY CRITICIZING YOUR DRINKING: NO
HAVE YOU EVER FELT YOU SHOULD CUT DOWN ON YOUR DRINKING: NO
EVER FELT BAD OR GUILTY ABOUT YOUR DRINKING: NO
EVER HAD A DRINK FIRST THING IN THE MORNING TO STEADY YOUR NERVES TO GET RID OF A HANGOVER: NO

## 2025-03-03 ASSESSMENT — PAIN - FUNCTIONAL ASSESSMENT: PAIN_FUNCTIONAL_ASSESSMENT: 0-10

## 2025-03-03 ASSESSMENT — PAIN SCALES - GENERAL: PAINLEVEL_OUTOF10: 0 - NO PAIN

## 2025-03-03 NOTE — PROGRESS NOTES
Tammy Walker is a 69 y.o. female admitted for No Principal Problem currently listed. Pharmacy reviewed the patient's [id number] medications and allergies for accuracy.    Medications ADDED:  None  Medications CHANGED:  furosemide (Lasix) 40 mg tablet  Medications REMOVED:   cholecalciferol (Vitamin D3) 10 MCG (400 UNIT) tablet    The list below reflects the updated PTA list. Comments regarding how patient may be taking medications differently can be found in the Admit Orders Activity  Prior to Admission Medications   Prescriptions Last Dose Informant   allopurinol (Zyloprim) 100 mg tablet 3/3/2025 Morning Sister   Sig: TAKE 1 TABLET BY MOUTH TWICE A DAY   aspirin 81 mg chewable tablet 3/3/2025 Morning Sister   Sig: Chew 1 tablet (81 mg) once daily.   atenolol (Tenormin) 50 mg tablet 3/3/2025 Morning Sister   Sig: TAKE 1 TABLET BY MOUTH TWICE A DAY   atorvastatin (Lipitor) 40 mg tablet 3/2/2025 Bedtime Sister   Sig: Take 1 tablet (40 mg) by mouth once daily at bedtime.      furosemide (Lasix) 40 mg tablet Unknown Sister   Sig: TAKE 1 TABLET BY MOUTH IF NEEDED   hydroCHLOROthiazide (HYDRODiuril) 50 mg tablet 3/3/2025 Morning Sister   Sig: Take 1 tablet (50 mg) by mouth once daily.   potassium chloride CR (Klor-Con) 10 mEq ER tablet 3/3/2025 Morning Sister   Sig: Take 1 tablet (10 mEq) by mouth 2 times a day. Do not crush, chew, or split.   valsartan (Diovan) 80 mg tablet 3/3/2025 Morning Sister   Sig: TAKE 1 TABLET (80 MG) BY MOUTH 2 TIMES A DAY.      Facility-Administered Medications: None        The list below reflects the updated allergy list. Please review each documented allergy for additional clarification and justification.  Allergies  Reviewed by Florin Alvarado on 3/3/2025   No Known Allergies         Pharmacy has been updated to [facility].    Sources used to complete the med history include:   Patient interview with sister at bedside to confirm medication and doses, good historian, dispense  report, care everywhere, chart review history     Below are additional concerns with the patient's PTA list.  None    Florin Alvarado CPhT  Please reach out via [facility] Secure Chat for questions

## 2025-03-03 NOTE — ED PROVIDER NOTES
HPI   Chief Complaint   Patient presents with    Weakness, Gen       Patient is a 69-year-old female presenting with a chief complaint of generalized weakness.  Patient reports that she had increased weakness, slight confusion her last days, patient has a history of UTIs, TIA, she has had no known fever, no cough or congestion, states she feels rundown.  Patient has no chest pain or shortness of breath, patient has minor dizziness, no lightheadedness, no other acute complaints at this time.      History provided by:  Patient and relative          Patient History   Past Medical History:   Diagnosis Date    Arthritis     HTN (hypertension)     Long COVID      Past Surgical History:   Procedure Laterality Date    MR HEAD ANGIO WO IV CONTRAST  9/11/2023    MR HEAD ANGIO WO IV CONTRAST [facility]     Family History   Problem Relation Name Age of Onset    Hypertension Mother      Diabetes Mother      Hypertension Father      Diabetes Father       Social History     Tobacco Use    Smoking status: Former     Current packs/day: 0.00     Types: Cigarettes     Quit date: 2022     Years since quitting: 3.1    Smokeless tobacco: Never   Substance Use Topics    Alcohol use: Not Currently    Drug use: Never       Physical Exam   ED Triage Vitals   Temp Pulse Resp BP   -- -- -- --      SpO2 Temp src Heart Rate Source Patient Position   -- -- -- --      BP Location FiO2 (%)     -- --       Physical Exam  Vitals and nursing note reviewed. Exam conducted with a chaperone present.   Constitutional:       General: She is not in acute distress.     Appearance: Normal appearance. She is normal weight. She is not ill-appearing or toxic-appearing.   HENT:      Head: Normocephalic and atraumatic.      Nose: Nose normal.      Mouth/Throat:      Mouth: Mucous membranes are dry.      Pharynx: Oropharynx is clear.   Eyes:      Extraocular Movements: Extraocular movements intact.      Conjunctiva/sclera: Conjunctivae normal.       Pupils: Pupils are equal, round, and reactive to light.   Cardiovascular:      Rate and Rhythm: Regular rhythm.      Pulses: Normal pulses.      Heart sounds: Normal heart sounds.   Pulmonary:      Effort: Pulmonary effort is normal.      Breath sounds: Normal breath sounds.   Abdominal:      General: Abdomen is flat. Bowel sounds are normal.      Palpations: Abdomen is soft.   Musculoskeletal:         General: Normal range of motion.   Skin:     General: Skin is warm and dry.      Capillary Refill: Capillary refill takes less than 2 seconds.   Neurological:      General: No focal deficit present.      Mental Status: She is alert. Mental status is at baseline.   Psychiatric:         Mood and Affect: Mood normal.         Behavior: Behavior normal.         Thought Content: Thought content normal.         Judgment: Judgment normal.           ED Course & MDM   ED Course as of 03/03/25 2247   Mon Mar 03, 2025   1516 EKG interpreted by myself independently, EKG shows a normal sinus rhythm, rate of 72 bpm, SD interval 188, , , QTc 473, patient has no ST elevation or depression, negative for acute MI. [RICHIE]      ED Course User Index  [RICHIE] Quang Howell DO         Diagnoses as of 03/03/25 2247   Generalized weakness   Hypomagnesemia                 No data recorded     Sue Coma Scale Score: 15 (03/03/25 1446 : Shari Ziegler LPN)                           Medical Decision Making  Patient seen and evaluated at bedside, patient is in no acute distress.  I will order a CBC, CMP, VBG, troponin, BNP, EKG, x-ray chest, CT head, COVID, flu. Differential diagnosis includes but is not limited to UTI, ACS, TIA, influenza, COVID  NIH 0, no gait ataxia  HINTS Testing  Horizontal head impulse test  Normal  2.   Observation of nystagmus  Direction-fixed horizontal nystagmus  3.   Test of skew  Absent skew deviation     HINTS test results are negative for concerning findings.    No truncal ataxia  Patient's lab work shows  a sodium 134, magnesium 1.45, this was replenished, creatinine 1.57, patient's magnesium 1.45, , troponin 15 and 14, urinalysis does not show signs of infection, COVID and flu negative, patient's imaging results listed below, patient will be discharged home, advised follow-up with her primary care provider, patient and her sister are agreeable this discharge plan.    Diagnosis: Generalized weakness, hypomagnesemia  CT head wo IV contrast   Final Result    No acute intracranial pathologic findings are identified.    There is no interval change when compared to the previous examination.          MACRO:    none          Signed by: Lito Rutledge 3/3/2025 4:13 PM    Dictation workstation:   [id number]     XR chest 1 view   Final Result    Allowing for the aforementioned limitation, no acute cardiopulmonary    disease.          Signed by: Sanjay Keith 3/3/2025 3:34 PM    Dictation workstation:   [id number]     Results for orders placed or performed during the hospital encounter of 03/03/25  -CBC and Auto Differential:   Collection Time: 03/03/25  3:06 PM       Result                      Value             Ref Range           WBC                         10.5              4.4 - 11.3 x*       nRBC                        0.0               0.0 - 0.0 /1*       RBC                         4.12              4.00 - 5.20 *       Hemoglobin                  12.7              12.0 - 16.0 *       Hematocrit                  38.8              36.0 - 46.0 %       MCV                         94                80 - 100 fL         MCH                         30.8              26.0 - 34.0 *       MCHC                        32.7              32.0 - 36.0 *       RDW                         14.0              11.5 - 14.5 %       Platelets                   230               150 - 450 x1*       Neutrophils %               80.3              40.0 - 80.0 %       Immature Granulocytes *     0.3               0.0 - 0.9 %          Lymphocytes %               8.6               13.0 - 44.0 %       Monocytes %                 8.9               2.0 - 10.0 %        Eosinophils %               1.3               0.0 - 6.0 %         Basophils %                 0.6               0.0 - 2.0 %         Neutrophils Absolute        8.45 (H)          1.20 - 7.70 *       Immature Granulocytes *     0.03              0.00 - 0.70 *       Lymphocytes Absolute        0.90 (L)          1.20 - 4.80 *       Monocytes Absolute          0.94              0.10 - 1.00 *       Eosinophils Absolute        0.14              0.00 - 0.70 *       Basophils Absolute          0.06              0.00 - 0.10 *  -Comprehensive metabolic panel:   Collection Time: 03/03/25  3:06 PM       Result                      Value             Ref Range           Glucose                     115 (H)           74 - 99 mg/dL       Sodium                      134 (L)           136 - 145 mm*       Potassium                   4.3               3.5 - 5.3 mm*       Chloride                    105               98 - 107 mmo*       Bicarbonate                 18 (L)            21 - 32 mmol*       Anion Gap                   15                10 - 20 mmol*       Urea Nitrogen               26 (H)            6 - 23 mg/dL        Creatinine                  1.57 (H)          0.50 - 1.05 *       eGFR                        36 (L)            >60 mL/min/1*       Calcium                     8.5 (L)           8.6 - 10.3 m*       Albumin                     3.9               3.4 - 5.0 g/*       Alkaline Phosphatase        171 (H)           33 - 136 U/L        Total Protein               7.0               6.4 - 8.2 g/*       AST                         21                9 - 39 U/L          Bilirubin, Total            1.4 (H)           0.0 - 1.2 mg*       ALT                         6 (L)             7 - 45 U/L     -Magnesium:   Collection Time: 03/03/25  3:06 PM       Result                      Value              Ref Range           Magnesium                   1.45 (L)          1.60 - 2.40 *  -B-Type Natriuretic Peptide:   Collection Time: 03/03/25  3:06 PM       Result                      Value             Ref Range           BNP                         247 (H)           0 - 99 pg/mL   -Sars-CoV-2 and Influenza A/B PCR:   Collection Time: 03/03/25  3:06 PM       Result                      Value             Ref Range           Flu A Result                Not Detected      Not Detected        Flu B Result                Not Detected      Not Detected        Coronavirus 2019, PCR       Not Detected      Not Detected   -Blood Gas Venous Full Panel:   Collection Time: 03/03/25  3:06 PM       Result                      Value             Ref Range           POCT pH, Venous             7.45 (H)          7.33 - 7.43 *       POCT pCO2, Venous           30 (L)            41 - 51 mm Hg       POCT pO2, Venous            50 (H)            35 - 45 mm Hg       POCT SO2, Venous            87 (H)            45 - 75 %           POCT Oxy Hemoglobin, V*     84.8 (H)          45.0 - 75.0 %       POCT Hematocrit Calcul*     37.0              36.0 - 46.0 %       POCT Sodium, Venous         134 (L)           136 - 145 mm*       POCT Potassium, Venous      4.8               3.5 - 5.3 mm*       POCT Chloride, Venous       106               98 - 107 mmo*       POCT Ionized Calicum, *     1.10              1.10 - 1.33 *       POCT Glucose, Venous        105 (H)           74 - 99 mg/dL       POCT Lactate, Venous        0.9               0.4 - 2.0 mm*       POCT Base Excess, Veno*     -2.2 (L)          -2.0 - 3.0 m*       POCT HCO3 Calculated, *     20.9 (L)          22.0 - 26.0 *       POCT Hemoglobin, Venous     12.3              12.0 - 16.0 *       POCT Anion Gap, Venous      12.0              10.0 - 25.0 *       Patient Temperature         37.0              degrees Cels*       FiO2                        21                %              -Troponin  I, High Sensitivity, Initial:   Collection Time: 03/03/25  3:06 PM       Result                      Value             Ref Range           Troponin I, High Sensi*     15 (H)            0 - 13 ng/L    -Troponin, High Sensitivity, 1 Hour:   Collection Time: 03/03/25  4:10 PM       Result                      Value             Ref Range           Troponin I, High Sensi*     14 (H)            0 - 13 ng/L    -Urinalysis with Reflex Culture and Microscopic:   Collection Time: 03/03/25 10:27 PM       Result                      Value             Ref Range           Color, Urine                Light-Yellow      Light-Yellow*       Appearance, Urine           Clear             Clear               Specific Gravity, Urine     1.013             1.005 - 1.035       pH, Urine                   5.0               5.0, 5.5, 6.*       Protein, Urine              NEGATIVE          NEGATIVE, 10*       Glucose, Urine              Normal            Normal mg/dL        Blood, Urine                NEGATIVE          NEGATIVE mg/*       Ketones, Urine              NEGATIVE          NEGATIVE mg/*       Bilirubin, Urine            NEGATIVE          NEGATIVE mg/*       Urobilinogen, Urine         Normal            Normal mg/dL        Nitrite, Urine              NEGATIVE          NEGATIVE            Leukocyte Esterase, Ur*     NEGATIVE          NEGATIVE               Procedure  Procedures  Sections of this report were created using voice-to-text technology and may contain errors in translation    Quang Howell DO  Emergency Medicine         Quang Howell DO  03/03/25 0225

## 2025-03-04 ENCOUNTER — TELEPHONE (OUTPATIENT)
Dept: PRIMARY CARE | Facility: CLINIC | Age: 70
End: 2025-03-04
Payer: MEDICARE

## 2025-03-04 LAB
ATRIAL RATE: 72 BPM
HOLD SPECIMEN: NORMAL
P OFFSET: 164 MS
P ONSET: 117 MS
PR INTERVAL: 188 MS
Q ONSET: 211 MS
QRS COUNT: 12 BEATS
QRS DURATION: 114 MS
QT INTERVAL: 432 MS
QTC CALCULATION(BAZETT): 473 MS
QTC FREDERICIA: 459 MS
R AXIS: -71 DEGREES
T AXIS: 101 DEGREES
T OFFSET: 427 MS
VENTRICULAR RATE: 72 BPM

## 2025-03-04 NOTE — TELEPHONE ENCOUNTER
"3/5/25 [facility] visit with Raisa Goff NP confirmed via phone with Idalmis/ patient's sister.  JALEEL Kline - Idalmis reported patient was T&R from [facility] ED 3/3/25. Per Idalmis: Presented to ED \"because she could not pee\", she was catherized and \"a lot came out\". No UTI noted, magnesium level was low.    "
Hypertension, unspecified type

## 2025-03-04 NOTE — DISCHARGE INSTRUCTIONS
You were seen today for low magnesium levels and generalized weakness, please follow-up with your primary care provider, please return ER for any worsening symptoms

## 2025-03-05 ENCOUNTER — OFFICE VISIT (OUTPATIENT)
Dept: PRIMARY CARE | Facility: CLINIC | Age: 70
End: 2025-03-05
Payer: MEDICARE

## 2025-03-05 ENCOUNTER — APPOINTMENT (OUTPATIENT)
Dept: PRIMARY CARE | Facility: CLINIC | Age: 70
End: 2025-03-05
Payer: MEDICARE

## 2025-03-05 VITALS
DIASTOLIC BLOOD PRESSURE: 78 MMHG | WEIGHT: 200 LBS | HEART RATE: 68 BPM | TEMPERATURE: 97.1 F | HEIGHT: 65 IN | BODY MASS INDEX: 33.32 KG/M2 | SYSTOLIC BLOOD PRESSURE: 140 MMHG | OXYGEN SATURATION: 97 % | RESPIRATION RATE: 16 BRPM

## 2025-03-05 DIAGNOSIS — E55.9 VITAMIN D DEFICIENCY: Chronic | ICD-10-CM

## 2025-03-05 DIAGNOSIS — I10 PRIMARY HYPERTENSION: Primary | ICD-10-CM

## 2025-03-05 DIAGNOSIS — I50.41 ACUTE COMBINED SYSTOLIC AND DIASTOLIC CONGESTIVE HEART FAILURE: ICD-10-CM

## 2025-03-05 DIAGNOSIS — M1A.09X0 IDIOPATHIC CHRONIC GOUT OF MULTIPLE SITES WITHOUT TOPHUS: Chronic | ICD-10-CM

## 2025-03-05 DIAGNOSIS — R33.9 URINARY RETENTION: ICD-10-CM

## 2025-03-05 PROCEDURE — 99349 HOME/RES VST EST MOD MDM 40: CPT | Performed by: NURSE PRACTITIONER

## 2025-03-05 PROCEDURE — 3078F DIAST BP <80 MM HG: CPT | Performed by: NURSE PRACTITIONER

## 2025-03-05 PROCEDURE — 1160F RVW MEDS BY RX/DR IN RCRD: CPT | Performed by: NURSE PRACTITIONER

## 2025-03-05 PROCEDURE — 3077F SYST BP >= 140 MM HG: CPT | Performed by: NURSE PRACTITIONER

## 2025-03-05 PROCEDURE — 1159F MED LIST DOCD IN RCRD: CPT | Performed by: NURSE PRACTITIONER

## 2025-03-05 NOTE — PROGRESS NOTES
Subjective   Patient ID: Tammy Walker is a 69 y.o. female who presents for Follow-up (Follow up for [facility] patient for multiple chronic issues including functional decline, HTN, post acute UTI.).    Patient is seen in her private home sitting up on the sofa in the living room. Resides at home where she lives with her sister, and she is her primary caregiver.  She is awake and alert with appropriate awareness and is her own decision maker.  Sister is present for home visit.  Currently is non-ambulatory, can bear weight only and propels herself in the wheelchair. PMH: HTN, Covid-19 appx 2021, Hyperlipidemia, TIA.   Medical Necessity - Homebound due to functional decline from Covid-19 Long Hauler.      Post Acute 3/3/25 Per EMR: Patient is a 69-year-old female presenting with a chief complaint of generalized weakness.  Patient reports that she had increased weakness, slight confusion her last days, patient has a history of UTIs, TIA, she has had no known fever, no cough or congestion, states she feels rundown.  Patient has no chest pain or shortness of breath, patient has minor dizziness, no lightheadedness, no other acute complaints at this time.             Current Outpatient Medications:   •  allopurinol (Zyloprim) 100 mg tablet, TAKE 1 TABLET BY MOUTH TWICE A DAY, Disp: 180 tablet, Rfl: 3  •  aspirin 81 mg chewable tablet, Chew 1 tablet (81 mg) once daily., Disp: , Rfl:   •  atenolol (Tenormin) 50 mg tablet, TAKE 1 TABLET BY MOUTH TWICE A DAY, Disp: 180 tablet, Rfl: 3  •  atorvastatin (Lipitor) 40 mg tablet, Take 1 tablet (40 mg) by mouth once daily at bedtime., Disp: 90 tablet, Rfl: 3  •  cholecalciferol (Vitamin D3) 10 MCG (400 UNIT) tablet, Take 1 tablet (10 mcg) by mouth once daily., Disp: 30 tablet, Rfl: 11  •  furosemide (Lasix) 40 mg tablet, TAKE 1 TABLET BY MOUTH EVERY DAY, Disp: 90 tablet, Rfl: 3  •  hydroCHLOROthiazide (HYDRODiuril) 50 mg tablet, Take 1 tablet (50 mg) by mouth once daily., Disp:  "30 tablet, Rfl: 5  •  potassium chloride CR (Klor-Con) 10 mEq ER tablet, Take 1 tablet (10 mEq) by mouth 2 times a day. Do not crush, chew, or split., Disp: 60 tablet, Rfl: 11  •  valsartan (Diovan) 80 mg tablet, TAKE 1 TABLET (80 MG) BY MOUTH 2 TIMES A DAY., Disp: 100 tablet, Rfl: 3     Review of Systems  Constitutional:  Negative for unexpected weight change.   HENT: Negative.     Respiratory: Negative.     Cardiovascular:  Positive for leg swelling.   Gastrointestinal: Negative.    Genitourinary:  Positive for frequency.   Musculoskeletal:  Positive for arthralgias.   Skin:  Positive for pallor.   Neurological: Negative.    Psychiatric/Behavioral: Negative.    Objective   /78 (BP Location: Left arm, Patient Position: Sitting)   Pulse 68   Temp 36.2 °C (97.1 °F) (Temporal)   Resp 16   Ht 1.651 m (5' 5\")   Wt 90.7 kg (200 lb)   SpO2 97%   BMI 33.28 kg/m²     Physical Exam  Constitutional:       Appearance: Normal appearance.   HENT:      Head: Normocephalic and atraumatic.      Nose: Nose normal.      Mouth/Throat:      Mouth: Mucous membranes are moist.      Pharynx: Oropharynx is clear.   Eyes:      Extraocular Movements: Extraocular movements intact.      Pupils: Pupils are equal, round, and reactive to light.   Cardiovascular:      Rate and Rhythm: Normal rate and regular rhythm.      Pulses: Normal pulses.      Heart sounds: Normal heart sounds.   Pulmonary:      Effort: Pulmonary effort is normal.      Breath sounds: Normal breath sounds. No wheezing or rhonchi.   Abdominal:      General: Bowel sounds are normal.      Palpations: Abdomen is soft.   Musculoskeletal:         General: Swelling present.      Cervical back: Neck supple. No tenderness.      Comments: 1+ left lower ext pitting edema, 2+ rt pitting edema   Skin:     General: Skin is warm and dry.      Findings: No erythema.   Neurological:      Mental Status: She is alert and oriented to person, place, and time. Mental status is at " baseline.   Psychiatric:         Mood and Affect: Mood normal.         Judgment: Judgment normal.     Lab Results   Component Value Date    WBC 10.5 03/03/2025    HGB 12.7 03/03/2025    HCT 38.8 03/03/2025    MCV 94 03/03/2025     03/03/2025      Lab Results   Component Value Date    GLUCOSE 115 (H) 03/03/2025    CALCIUM 8.5 (L) 03/03/2025     (L) 03/03/2025    K 4.3 03/03/2025    CO2 18 (L) 03/03/2025     03/03/2025    BUN 26 (H) 03/03/2025    CREATININE 1.57 (H) 03/03/2025        Assessment/Plan   Diagnoses and all orders for this visit:  Primary hypertension  Comments:  Managed - cont valsartan 80mg bid, atenolol 50 mg daily  Acute combined systolic and diastolic congestive heart failure  Comments:  Managed - cont furosemide 40mg, hydrochlorothiazide 50 mg daily  Vitamin D deficiency  Comments:  Deficient - increase supplement tp at least 1000units daily  Urinary retention  Idiopathic chronic gout of multiple sites without tophus  Comments:  Managed - cont allopurinol 100mg bid    More than 50% of time spent in face-to-face discussion @ a total of 60 minutes with this patient on counseling, coordination of care, and review of medical records and diagnostics. Advised pt to contact house calls office with any acute concerns or medication needs.              Raisa Goff, APRN-CNP    present.      Cervical back: Neck supple. No tenderness.      Comments: 1+ left lower ext pitting edema, 2+ rt pitting edema   Skin:     General: Skin is warm and dry.      Findings: No erythema.   Neurological:      Mental Status: She is alert and oriented to person, place, and time. Mental status is at baseline.   Psychiatric:         Mood and Affect: Mood normal.         Judgment: Judgment normal.     Lab Results   Component Value Date    WBC 10.5 03/03/2025    HGB 12.7 03/03/2025    HCT 38.8 03/03/2025    MCV 94 03/03/2025     03/03/2025      Lab Results   Component Value Date    GLUCOSE 115 (H) 03/03/2025    CALCIUM 8.5 (L) 03/03/2025     (L) 03/03/2025    K 4.3 03/03/2025    CO2 18 (L) 03/03/2025     03/03/2025    BUN 26 (H) 03/03/2025    CREATININE 1.57 (H) 03/03/2025        Assessment/Plan   Diagnoses and all orders for this visit:  Primary hypertension  Comments:  Managed - cont valsartan 80mg bid, atenolol 50 mg daily  Acute combined systolic and diastolic congestive heart failure  Comments:  Managed - cont furosemide 40mg, hydrochlorothiazide 50 mg daily  Vitamin D deficiency  Comments:  Deficient - increase supplement tp at least 1000units daily  Urinary retention  Comments:  Will start oxybutynin 5mg bid  Orders:  -     oxybutynin (Ditropan) 5 mg tablet; Take 1 tablet (5 mg) by mouth 2 times a day.  Idiopathic chronic gout of multiple sites without tophus  Comments:  Managed - cont allopurinol 100mg bid    More than 50% of time spent in face-to-face discussion @ a total of 60 minutes with this patient on counseling, coordination of care, and review of medical records and diagnostics. Advised pt to contact [facility] calls office with any acute concerns or medication needs.     Will continue with [facility] home visits. Patient has limited support, limited mobility and unable to navigate to traditional office appointments.  follow up In 2-3 months and JUSTIN ESPAÑA  [place], APRLUDWIN-CNP

## 2025-03-07 RX ORDER — OXYBUTYNIN CHLORIDE 5 MG/1
5 TABLET ORAL 2 TIMES DAILY
Qty: 60 TABLET | Refills: 5 | Status: SHIPPED | OUTPATIENT
Start: 2025-03-07 | End: 2025-09-03

## 2025-03-28 ENCOUNTER — HOSPITAL ENCOUNTER (EMERGENCY)
Facility: HOSPITAL | Age: 70
Discharge: HOME | End: 2025-03-28
Attending: STUDENT IN AN ORGANIZED HEALTH CARE EDUCATION/TRAINING PROGRAM
Payer: MEDICARE

## 2025-03-28 ENCOUNTER — APPOINTMENT (OUTPATIENT)
Dept: RADIOLOGY | Facility: HOSPITAL | Age: 70
End: 2025-03-28
Payer: MEDICARE

## 2025-03-28 ENCOUNTER — APPOINTMENT (OUTPATIENT)
Dept: CARDIOLOGY | Facility: HOSPITAL | Age: 70
End: 2025-03-28
Payer: MEDICARE

## 2025-03-28 VITALS
HEART RATE: 74 BPM | SYSTOLIC BLOOD PRESSURE: 157 MMHG | DIASTOLIC BLOOD PRESSURE: 92 MMHG | OXYGEN SATURATION: 98 % | BODY MASS INDEX: 32.14 KG/M2 | WEIGHT: 200 LBS | RESPIRATION RATE: 20 BRPM | TEMPERATURE: 97.7 F | HEIGHT: 66 IN

## 2025-03-28 DIAGNOSIS — R33.8 ACUTE URINARY RETENTION: ICD-10-CM

## 2025-03-28 DIAGNOSIS — I50.9 CONGESTIVE HEART FAILURE, UNSPECIFIED HF CHRONICITY, UNSPECIFIED HEART FAILURE TYPE: ICD-10-CM

## 2025-03-28 DIAGNOSIS — I10 HYPERTENSION, UNSPECIFIED TYPE: ICD-10-CM

## 2025-03-28 DIAGNOSIS — R06.02 SHORTNESS OF BREATH: Primary | ICD-10-CM

## 2025-03-28 LAB
ALBUMIN SERPL BCP-MCNC: 3.9 G/DL (ref 3.4–5)
ALP SERPL-CCNC: 172 U/L (ref 33–136)
ALT SERPL W P-5'-P-CCNC: 10 U/L (ref 7–45)
ANION GAP SERPL CALCULATED.3IONS-SCNC: 15 MMOL/L (ref 10–20)
AST SERPL W P-5'-P-CCNC: 19 U/L (ref 9–39)
ATRIAL RATE: 57 BPM
BASOPHILS # BLD AUTO: 0.07 X10*3/UL (ref 0–0.1)
BASOPHILS NFR BLD AUTO: 0.7 %
BILIRUB SERPL-MCNC: 1.3 MG/DL (ref 0–1.2)
BNP SERPL-MCNC: 104 PG/ML (ref 0–99)
BUN SERPL-MCNC: 23 MG/DL (ref 6–23)
CALCIUM SERPL-MCNC: 8.9 MG/DL (ref 8.6–10.3)
CARDIAC TROPONIN I PNL SERPL HS: 19 NG/L (ref 0–13)
CARDIAC TROPONIN I PNL SERPL HS: 20 NG/L (ref 0–13)
CHLORIDE SERPL-SCNC: 104 MMOL/L (ref 98–107)
CO2 SERPL-SCNC: 24 MMOL/L (ref 21–32)
CREAT SERPL-MCNC: 1.68 MG/DL (ref 0.5–1.05)
EGFRCR SERPLBLD CKD-EPI 2021: 33 ML/MIN/1.73M*2
EOSINOPHIL # BLD AUTO: 0.43 X10*3/UL (ref 0–0.7)
EOSINOPHIL NFR BLD AUTO: 4 %
ERYTHROCYTE [DISTWIDTH] IN BLOOD BY AUTOMATED COUNT: 13.9 % (ref 11.5–14.5)
FLUAV RNA RESP QL NAA+PROBE: NOT DETECTED
FLUBV RNA RESP QL NAA+PROBE: NOT DETECTED
GLUCOSE SERPL-MCNC: 106 MG/DL (ref 74–99)
HCT VFR BLD AUTO: 36.8 % (ref 36–46)
HGB BLD-MCNC: 12.6 G/DL (ref 12–16)
IMM GRANULOCYTES # BLD AUTO: 0.04 X10*3/UL (ref 0–0.7)
IMM GRANULOCYTES NFR BLD AUTO: 0.4 % (ref 0–0.9)
LYMPHOCYTES # BLD AUTO: 1.6 X10*3/UL (ref 1.2–4.8)
LYMPHOCYTES NFR BLD AUTO: 14.9 %
MCH RBC QN AUTO: 30.3 PG (ref 26–34)
MCHC RBC AUTO-ENTMCNC: 34.2 G/DL (ref 32–36)
MCV RBC AUTO: 89 FL (ref 80–100)
MONOCYTES # BLD AUTO: 0.8 X10*3/UL (ref 0.1–1)
MONOCYTES NFR BLD AUTO: 7.4 %
NEUTROPHILS # BLD AUTO: 7.82 X10*3/UL (ref 1.2–7.7)
NEUTROPHILS NFR BLD AUTO: 72.6 %
NRBC BLD-RTO: 0 /100 WBCS (ref 0–0)
P AXIS: 40 DEGREES
P OFFSET: 141 MS
P ONSET: 119 MS
PLATELET # BLD AUTO: 285 X10*3/UL (ref 150–450)
POTASSIUM SERPL-SCNC: 3.6 MMOL/L (ref 3.5–5.3)
PR INTERVAL: 184 MS
PROT SERPL-MCNC: 7.2 G/DL (ref 6.4–8.2)
Q ONSET: 211 MS
QRS COUNT: 9 BEATS
QRS DURATION: 116 MS
QT INTERVAL: 474 MS
QTC CALCULATION(BAZETT): 461 MS
QTC FREDERICIA: 466 MS
R AXIS: -71 DEGREES
RBC # BLD AUTO: 4.16 X10*6/UL (ref 4–5.2)
SARS-COV-2 RNA RESP QL NAA+PROBE: NOT DETECTED
SODIUM SERPL-SCNC: 139 MMOL/L (ref 136–145)
T AXIS: 110 DEGREES
T OFFSET: 448 MS
VENTRICULAR RATE: 57 BPM
WBC # BLD AUTO: 10.8 X10*3/UL (ref 4.4–11.3)

## 2025-03-28 PROCEDURE — 84075 ASSAY ALKALINE PHOSPHATASE: CPT | Performed by: STUDENT IN AN ORGANIZED HEALTH CARE EDUCATION/TRAINING PROGRAM

## 2025-03-28 PROCEDURE — 87636 SARSCOV2 & INF A&B AMP PRB: CPT | Performed by: STUDENT IN AN ORGANIZED HEALTH CARE EDUCATION/TRAINING PROGRAM

## 2025-03-28 PROCEDURE — 96376 TX/PRO/DX INJ SAME DRUG ADON: CPT | Mod: 59

## 2025-03-28 PROCEDURE — 84484 ASSAY OF TROPONIN QUANT: CPT | Performed by: STUDENT IN AN ORGANIZED HEALTH CARE EDUCATION/TRAINING PROGRAM

## 2025-03-28 PROCEDURE — 93005 ELECTROCARDIOGRAM TRACING: CPT | Mod: 59

## 2025-03-28 PROCEDURE — 51798 US URINE CAPACITY MEASURE: CPT

## 2025-03-28 PROCEDURE — 71045 X-RAY EXAM CHEST 1 VIEW: CPT | Performed by: INTERNAL MEDICINE

## 2025-03-28 PROCEDURE — 71045 X-RAY EXAM CHEST 1 VIEW: CPT

## 2025-03-28 PROCEDURE — 85025 COMPLETE CBC W/AUTO DIFF WBC: CPT | Performed by: STUDENT IN AN ORGANIZED HEALTH CARE EDUCATION/TRAINING PROGRAM

## 2025-03-28 PROCEDURE — 96374 THER/PROPH/DIAG INJ IV PUSH: CPT | Mod: 59

## 2025-03-28 PROCEDURE — 83880 ASSAY OF NATRIURETIC PEPTIDE: CPT | Performed by: STUDENT IN AN ORGANIZED HEALTH CARE EDUCATION/TRAINING PROGRAM

## 2025-03-28 PROCEDURE — 36415 COLL VENOUS BLD VENIPUNCTURE: CPT | Performed by: STUDENT IN AN ORGANIZED HEALTH CARE EDUCATION/TRAINING PROGRAM

## 2025-03-28 PROCEDURE — 51702 INSERT TEMP BLADDER CATH: CPT

## 2025-03-28 PROCEDURE — 99285 EMERGENCY DEPT VISIT HI MDM: CPT | Mod: 25 | Performed by: STUDENT IN AN ORGANIZED HEALTH CARE EDUCATION/TRAINING PROGRAM

## 2025-03-28 PROCEDURE — 2500000004 HC RX 250 GENERAL PHARMACY W/ HCPCS (ALT 636 FOR OP/ED): Performed by: STUDENT IN AN ORGANIZED HEALTH CARE EDUCATION/TRAINING PROGRAM

## 2025-03-28 RX ORDER — FUROSEMIDE 10 MG/ML
40 INJECTION INTRAMUSCULAR; INTRAVENOUS ONCE
Status: COMPLETED | OUTPATIENT
Start: 2025-03-28 | End: 2025-03-28

## 2025-03-28 RX ADMIN — FUROSEMIDE 40 MG: 10 INJECTION, SOLUTION INTRAMUSCULAR; INTRAVENOUS at 10:19

## 2025-03-28 RX ADMIN — FUROSEMIDE 40 MG: 10 INJECTION, SOLUTION INTRAMUSCULAR; INTRAVENOUS at 13:26

## 2025-03-28 ASSESSMENT — LIFESTYLE VARIABLES
EVER FELT BAD OR GUILTY ABOUT YOUR DRINKING: NO
HAVE YOU EVER FELT YOU SHOULD CUT DOWN ON YOUR DRINKING: NO
EVER HAD A DRINK FIRST THING IN THE MORNING TO STEADY YOUR NERVES TO GET RID OF A HANGOVER: NO
HAVE PEOPLE ANNOYED YOU BY CRITICIZING YOUR DRINKING: NO
TOTAL SCORE: 0

## 2025-03-28 ASSESSMENT — PAIN SCALES - GENERAL
PAINLEVEL_OUTOF10: 6
PAINLEVEL_OUTOF10: 0 - NO PAIN

## 2025-03-28 ASSESSMENT — PAIN DESCRIPTION - ORIENTATION: ORIENTATION: MID

## 2025-03-28 ASSESSMENT — PAIN DESCRIPTION - FREQUENCY: FREQUENCY: CONSTANT/CONTINUOUS

## 2025-03-28 ASSESSMENT — COLUMBIA-SUICIDE SEVERITY RATING SCALE - C-SSRS
6. HAVE YOU EVER DONE ANYTHING, STARTED TO DO ANYTHING, OR PREPARED TO DO ANYTHING TO END YOUR LIFE?: NO
2. HAVE YOU ACTUALLY HAD ANY THOUGHTS OF KILLING YOURSELF?: NO
1. IN THE PAST MONTH, HAVE YOU WISHED YOU WERE DEAD OR WISHED YOU COULD GO TO SLEEP AND NOT WAKE UP?: NO

## 2025-03-28 ASSESSMENT — PAIN DESCRIPTION - PAIN TYPE: TYPE: ACUTE PAIN

## 2025-03-28 ASSESSMENT — PAIN - FUNCTIONAL ASSESSMENT: PAIN_FUNCTIONAL_ASSESSMENT: 0-10

## 2025-03-28 ASSESSMENT — PAIN DESCRIPTION - LOCATION: LOCATION: CHEST

## 2025-03-28 NOTE — DISCHARGE INSTRUCTIONS
Be sure to follow up as directed in 1-2 days.  All of the details of your follow up instructions are detailed in the follow up section of this packet.     Your blood pressure was elevated during your ER visit.  Strongly recommend that you follow-up with family doctor and/or cardiology to have this reassessed.  If you develop new different or worsening pains or symptoms at all make sure you return immediately to the emergency department.    It is important to remember that your care does not end here and you must continue to monitor your condition closely. Please return to the emergency department for any worsening or concerning signs or symptoms as directed by our conversations and the discharge instructions. Otherwise please follow up with your doctor in 2 days if no better or worse. If you do not have a doctor please contact the referral number on your discharge instructions. Please contact any physician specialists provided in your discharge notes as it is very important to follow up with them regarding your condition. If you are unable to reach the physicians provided, please come back to the Emergency Department at any time.        Return to emergency room without delay for ANY new or worsening pains or for any other symptoms or concerns.

## 2025-03-28 NOTE — PROGRESS NOTES
Pharmacy Medication History Review    Tammy Walker is a 69 y.o. female . Pharmacy reviewed the patient's [id number] medications and allergies for accuracy.    Medications ADDED:  None   Medications CHANGED:  Vitamin D3 400unit - not taking -   Oxybutynin 5mg - not taking   Furosemide 40mg - daily as needed  Medications REMOVED:   None      The list below reflects the updated PTA list. Comments regarding how patient may be taking medications differently can be found in the Admit Orders Activity  Prior to Admission Medications   Prescriptions Last Dose Informant   allopurinol (Zyloprim) 100 mg tablet 3/28/2025 Sister   Sig: TAKE 1 TABLET BY MOUTH TWICE A DAY   aspirin 81 mg chewable tablet 3/28/2025 Sister   Sig: Chew 1 tablet (81 mg) once daily.   atenolol (Tenormin) 50 mg tablet 3/28/2025 Sister   Sig: TAKE 1 TABLET BY MOUTH TWICE A DAY   atorvastatin (Lipitor) 40 mg tablet 3/27/2025 Bedtime Sister   Sig: Take 1 tablet (40 mg) by mouth once daily at bedtime.   furosemide (Lasix) 40 mg tablet Unknown Sister   Sig: TAKE 1 TABLET BY MOUTH EVERY DAY   Patient taking differently: Take 1 tablet (40 mg) by mouth once daily as needed.   hydroCHLOROthiazide (HYDRODiuril) 50 mg tablet 3/28/2025 Sister   Sig: Take 1 tablet (50 mg) by mouth once daily.   potassium chloride CR (Klor-Con) 10 mEq ER tablet 3/28/2025 Sister   Sig: Take 1 tablet (10 mEq) by mouth 2 times a day. Do not crush, chew, or split.   valsartan (Diovan) 80 mg tablet 3/28/2025 Sister   Sig: TAKE 1 TABLET (80 MG) BY MOUTH 2 TIMES A DAY.      Facility-Administered Medications: None        The list below reflects the updated allergy list. Please review each documented allergy for additional clarification and justification.  Allergies  Reviewed by Genevieve Wylie CPhT on 3/28/2025   No Known Allergies         Pharmacy has been updated to  [facility].    Sources used to complete the med history include dispense history, PTA  medication list, patient interview. Patient/family is a good historian.    Below are additional concerns with the patient's PTA list.  None     Genevieve Wylie, CPhT-Adv  Please reach out via [employer] Secure Chat for questions

## 2025-03-28 NOTE — ED PROVIDER NOTES
HPI   Chief Complaint   Patient presents with    Shortness of Breath     Pt states she woke up this morning with sob. Pt states she had cough and congestion x 1 week.        Patient reports that shortly after awakening this morning, she developed shortness of breath as well as pressure in the chest.  She reports history of heart failure but is never had heart attack.  She reports that many years ago, she did smoke.  She reports that her shortness of breath has improved somewhat.  She reports that she never lays down when asked if her symptoms worsen when she lays flat.              Patient History   Past Medical History:   Diagnosis Date    Arthritis     HTN (hypertension)     Long COVID      Past Surgical History:   Procedure Laterality Date    MR HEAD ANGIO WO IV CONTRAST  9/11/2023    MR HEAD ANGIO WO IV CONTRAST [facility]     Family History   Problem Relation Name Age of Onset    Hypertension Mother      Diabetes Mother      Hypertension Father      Diabetes Father       Social History     Tobacco Use    Smoking status: Former     Current packs/day: 0.00     Types: Cigarettes     Quit date: 2022     Years since quitting: 3.2    Smokeless tobacco: Never   Substance Use Topics    Alcohol use: Not Currently    Drug use: Never       Physical Exam   ED Triage Vitals [03/28/25 0916]   Temperature Heart Rate Respirations BP   36.5 °C (97.7 °F) 58 20 (!) 218/87      Pulse Ox Temp Source Heart Rate Source Patient Position   96 % Temporal -- Sitting      BP Location FiO2 (%)     Left arm --       Physical Exam  HENT:      Head: Normocephalic.   Cardiovascular:      Rate and Rhythm: Regular rhythm.   Pulmonary:      Effort: Pulmonary effort is normal.      Breath sounds: Normal breath sounds.   Musculoskeletal:      Comments: No significant pretibial edema   Neurological:      Mental Status: She is alert.           ED Course & MDM   ED Course as of 03/28/25 1752   Fri Mar 28, 2025   0932 EKG interpreted by me:  Sinus bradycardia, rate 57.  Leftward axis.  LVH with repolarization abnormality.  No other significant ST or T wave abnormalities. [ML]   1229 Creatinine(!): 1.68  Approximate baseline [AP]   1307 Patient want to go home.  [place]robbey once in the hospital more.  Sisters take care of her.  They also feel comfortable with this plan, blood pressure still high, due for medication.  Will be ordered.  Patient adamant on going home [AP]   1328 Bladder scan reflects greater than 1 L of urine.  She has had this issue before, Rush catheter will be placed. [AP]      ED Course User Index  [AP] Jamie Barker PA-C  [ML] Adam Lima MD         Diagnoses as of 03/28/25 1752   Shortness of breath   Congestive heart failure, unspecified HF chronicity, unspecified heart failure type   Hypertension, unspecified type   Acute urinary retention                 No data recorded     [place] Coma Scale Score: 15 (03/28/25 0914 : Radha Sandoval RN)                           Medical Decision Making  Supervisory note:  Patient seen in conjunction with RAFFY Torres.    Vital signs reveal hypertension but are otherwise unremarkable.  Patient does not normally ambulate but gets around in wheelchair.  Her laboratory studies reveal troponin x 2 which is stable and only mild elevation of BNP.  Chest x-ray without acute findings.  Patient did not have any additional episodes of shortness of breath in the emergency department and states that she now feels better.  Patient was given 2 doses of Lasix given her significantly elevated blood pressure and I suspect that her symptoms were caused by mild heart failure exacerbation.  PE, aortic dissection, esophageal perforation, pneumothorax, acute coronary syndrome felt to be very unlikely.  Patient was advised to follow-up with her primary care physician/cardiologist and given return precautions for any worsening symptoms.    I personally saw the patient and made/approved the management  plan and take responsiblity for the patient management.  Parts of this chart were completed with dictation software, please excuse any errors in transcription.        Procedure  Procedures     Adam Lima MD  03/28/25 4723

## 2025-03-28 NOTE — ED PROVIDER NOTES
HPI   Chief Complaint   Patient presents with    Shortness of Breath     Pt states she woke up this morning with sob. Pt states she had cough and congestion x 1 week.        HPI  Patient 69-year-old male coming into the emergency department for shortness of breath with little bit of chest heaviness this morning, lasted about an hour and then resolved, apparently had a little bit of some congestion for last week.  Has history of CHF, is accompanied by 2 sisters.  Says she feels well now      Patient History   Past Medical History:   Diagnosis Date    Arthritis     HTN (hypertension)     Long COVID      Past Surgical History:   Procedure Laterality Date    MR HEAD ANGIO WO IV CONTRAST  9/11/2023    MR HEAD ANGIO WO IV CONTRAST [facility]     Family History   Problem Relation Name Age of Onset    Hypertension Mother      Diabetes Mother      Hypertension Father      Diabetes Father       Social History     Tobacco Use    Smoking status: Former     Current packs/day: 0.00     Types: Cigarettes     Quit date: 2022     Years since quitting: 3.2    Smokeless tobacco: Never   Substance Use Topics    Alcohol use: Not Currently    Drug use: Never       Physical Exam   ED Triage Vitals   Temperature Heart Rate Respirations BP   03/28/25 0916 03/28/25 0916 03/28/25 0916 03/28/25 0916   36.5 °C (97.7 °F) 58 20 (!) 218/87      Pulse Ox Temp Source Heart Rate Source Patient Position   03/28/25 0916 03/28/25 0916 03/28/25 1130 03/28/25 0916   96 % Temporal Monitor Sitting      BP Location FiO2 (%)     03/28/25 0916 --     Left arm        Physical Exam  PHYSICAL EXAMINATION    GENERAL APPEARANCE: Awake and alert.     VITAL SIGNS: As per the nurses' triage record.     HEENT: Normocephalic, atraumatic. Extraocular muscles are intact. Pupils equal round and reactive to light. Conjunctiva are pink. Negative scleral icterus. Mucous membranes are moist. Tongue in the midline. Pharynx was without erythema or exudates, uvula  midline    NECK: Soft Nontender and supple, full gross ROM, no meningeal signs.    CHEST: Nontender to palpation. Clear to auscultation bilaterally. No rales, rhonchi, or wheezing.     HEART: S1, S2. Regular rate and rhythm. No murmurs, gallops or rubs.  Strong and equal pulses in the extremities.     ABDOMEN: Soft, nontender, nondistended, positive bowel sounds, no palpable masses.    MUSCULOSKELETAL: 1-2+ pitting edema bilateral lower extremities equal and symmetric the calves are nontender to palpation. Full gross active range of motion. Ambulating on own with no acute difficulties     NEUROLOGICAL: Awake, alert and oriented x 3. Power intact in the upper and lower extremities. Sensation is intact to light touch in the upper and lower extremities.     IMMUNOLOGICAL: No lymphatic streaking noted     DERM: No petechiae, rashes, or ecchymoses.    ED Course & MDM   ED Course as of 03/28/25 1331   Fri Mar 28, 2025   0932 EKG interpreted by me: Sinus bradycardia, rate 57.  Leftward axis.  LVH with repolarization abnormality.  No other significant ST or T wave abnormalities. [ML]   1229 Creatinine(!): 1.68  Approximate baseline [AP]   1307 Patient want to go home.  [place]ilah once in the hospital more.  Sisters take care of her.  They also feel comfortable with this plan, blood pressure still high, due for medication.  Will be ordered.  Patient adamant on going home [AP]   1328 Bladder scan reflects greater than 1 L of urine.  She has had this issue before, Rush catheter will be placed. [AP]      ED Course User Index  [AP] Jamie Barker PA-C  [ML] Adam Lima MD         Diagnoses as of 03/28/25 1331   Shortness of breath   Congestive heart failure, unspecified HF chronicity, unspecified heart failure type   Hypertension, unspecified type   Acute urinary retention                 No data recorded     [place] Coma Scale Score: 15 (03/28/25 0914 : Radha Sandoval RN)                           Medical Decision  Making  Parts of this chart have been completed using voice recognition software. Please excuse any errors of transcription.  My thought process and reason for plan has been formulated from the time that I saw the patient until the time of disposition and is not specific to one specific moment during their visit and furthermore my MDM encompasses this entire chart and not only this text box.      HPI: Detailed above.    Exam: A medically appropriate exam performed, outlined above, given the known history and presentation.    History Limited by: Nothing    History obtained from: The patient    External/internal records reviewed: No external record reviewed    Social Determinants of Health considered during this visit: Lives at    Chronic conditions impacting care: CHF    Medications given during visit:  Medications   furosemide (Lasix) injection 40 mg (has no administration in time range)   furosemide (Lasix) injection 40 mg (40 mg intravenous Given 3/28/25 1019)        Diagnostic/tests  Labs Reviewed   CBC WITH AUTO DIFFERENTIAL - Abnormal       Result Value    WBC 10.8      nRBC 0.0      RBC 4.16      Hemoglobin 12.6      Hematocrit 36.8      MCV 89      MCH 30.3      MCHC 34.2      RDW 13.9      Platelets 285      Neutrophils % 72.6      Immature Granulocytes %, Automated 0.4      Lymphocytes % 14.9      Monocytes % 7.4      Eosinophils % 4.0      Basophils % 0.7      Neutrophils Absolute 7.82 (*)     Immature Granulocytes Absolute, Automated 0.04      Lymphocytes Absolute 1.60      Monocytes Absolute 0.80      Eosinophils Absolute 0.43      Basophils Absolute 0.07     COMPREHENSIVE METABOLIC PANEL - Abnormal    Glucose 106 (*)     Sodium 139      Potassium 3.6      Chloride 104      Bicarbonate 24      Anion Gap 15      Urea Nitrogen 23      Creatinine 1.68 (*)     eGFR 33 (*)     Calcium 8.9      Albumin 3.9      Alkaline Phosphatase 172 (*)     Total Protein 7.2      AST 19      Bilirubin, Total 1.3 (*)     ALT  10     B-TYPE NATRIURETIC PEPTIDE - Abnormal     (*)     Narrative:        <100 pg/mL - Heart failure unlikely  100-299 pg/mL - Intermediate probability of acute heart                  failure exacerbation. Correlate with clinical                  context and patient history.    >=300 pg/mL - Heart Failure likely. Correlate with clinical                  context and patient history.    BNP testing is performed using different testing methodology at [facility] than at other [facility]. Direct result comparisons should only be made within the same method.      SERIAL TROPONIN-INITIAL - Abnormal    Troponin I, High Sensitivity 20 (*)     Narrative:     Less than 99th percentile of normal range cutoff-  Female and children under 18 years old <14 ng/L; Male <21 ng/L: Negative  Repeat testing should be performed if clinically indicated.     Female and children under 18 years old 14-50 ng/L; Male 21-50 ng/L:  Consistent with possible cardiac damage and possible increased clinical   risk. Serial measurements may help to assess extent of myocardial damage.     >50 ng/L: Consistent with cardiac damage, increased clinical risk and  myocardial infarction. Serial measurements may help assess extent of   myocardial damage.      NOTE: Children less than 1 year old may have higher baseline troponin   levels and results should be interpreted in conjunction with the overall   clinical context.     NOTE: Troponin I testing is performed using a different   testing methodology at [facility] than at other   [facility]. Direct result comparisons should only   be made within the same method.   SERIAL TROPONIN, 1 HOUR - Abnormal    Troponin I, High Sensitivity 19 (*)     Narrative:     Less than 99th percentile of normal range cutoff-  Female and children under 18 years old <14 ng/L; Male <21 ng/L: Negative  Repeat testing should be performed if clinically indicated.     Female and children  under 18 years old 14-50 ng/L; Male 21-50 ng/L:  Consistent with possible cardiac damage and possible increased clinical   risk. Serial measurements may help to assess extent of myocardial damage.     >50 ng/L: Consistent with cardiac damage, increased clinical risk and  myocardial infarction. Serial measurements may help assess extent of   myocardial damage.      NOTE: Children less than 1 year old may have higher baseline troponin   levels and results should be interpreted in conjunction with the overall   clinical context.     NOTE: Troponin I testing is performed using a different   testing methodology at [facility] than at [facility]. Direct result comparisons should only   be made within the same method.   SARS-COV-2 AND INFLUENZA A/B PCR - Normal    Flu A Result Not Detected      Flu B Result Not Detected      Coronavirus 2019, PCR Not Detected      Narrative:     This assay is an FDA-cleared, in vitro diagnostic nucleic acid amplification test for the qualitative detection and differentiation of SARS CoV-2/ Influenza A/B from nasopharyngeal specimens collected from individuals with signs and symptoms of respiratory tract infections, and has been validated for use at [facility]. Negative results do not preclude COVID-19/ Influenza A/B infections and should not be used as the sole basis for diagnosis, treatment, or other management decisions. Testing for SARS CoV-2 is recommended only for patients who meet current clinical and/or epidemiological criteria defined by federal, state, or local public health directives.   TROPONIN SERIES- (INITIAL, 1 HR)    Narrative:     The following orders were created for panel order Troponin I Series, High Sensitivity (0, 1 HR).  Procedure                               Abnormality         Status                     ---------                               -----------         ------                     Troponin I, High  Sensiti...[[id number]]  Abnormal            Final result               Troponin, High Sensitivi...[[id number]]  Abnormal            Final result                 Please view results for these tests on the individual orders.      XR chest 1 view   Final Result   No evidence of acute cardiopulmonary process.        Signed by: Jory Mahmood 3/28/2025 9:47 AM   Dictation workstation:   [id number]           EKG: Obtained read by attending physician reviewed myself and Aileennter patient was in her STEMI criteria      Case discussed with: ED attending      Considerations/further MDM:  I estimate there is low risk for acute coronary syndrome including MI, intracranial hemorrhage, cardiac dysrhythmia, hypertension, tamponade, pneumothorax, hemothorax, pulmonary embolism, sepsis, intracranial hemorrhage, dissection, pneumonia, respiratory distress or compromise, pericardial effusion,  thus I consider the discharge disposition reasonable. We have discussed the diagnosis and risks, and we agree with discharging home to follow-up with their primary doctor or specialist as discussed and as provided.  We also discussed returning to the Emergency Department immediately if new or worsening symptoms occur. We have discussed the symptoms which are most concerning (e.g., bloody sputum, fever, worsening pain or shortness of breath, vomiting, weakness) that necessitate immediate return.    Patient adamant about going home, not having any chest pain, says she feels completely fine now.  I believe her symptoms are likely more of a component of congestive heart failure exacerbation.  Some of the additional Lasix given.  She says she is feeling much better.  Her blood pressure was elevated however she is also on several medications for this and is due for them.  The patient has been insistent upon going home, the sister who takes care of her is agreeable to this plan as well, discussed need for outpatient follow-up to recheck blood pressure  and to reassess symptoms.  She has verbalized a clear and standing of the plan.    Found to be in acute urinary retention.  Likely also contributing to the elevated blood pressure.  She has had issues with this before and has required Rush catheter placement before.  The patient has had a Rush with leg bag placed with outpatient follow-up instructions.    Patient has been seen in conjunction with attending physician Dr. Lima      Procedure  Procedures     Jamie Barker PA-C  03/28/25 1312       Jamie Barker PA-C  03/28/25 1331

## 2025-03-31 ENCOUNTER — HOSPITAL ENCOUNTER (OUTPATIENT)
Facility: HOSPITAL | Age: 70
Setting detail: OBSERVATION
Discharge: NURSING, REHAB OR LONG-TERM CARE | End: 2025-04-02
Payer: MEDICARE

## 2025-03-31 ENCOUNTER — APPOINTMENT (OUTPATIENT)
Dept: RADIOLOGY | Facility: HOSPITAL | Age: 70
End: 2025-03-31
Payer: MEDICARE

## 2025-03-31 ENCOUNTER — APPOINTMENT (OUTPATIENT)
Dept: CARDIOLOGY | Facility: HOSPITAL | Age: 70
End: 2025-03-31
Payer: MEDICARE

## 2025-03-31 ENCOUNTER — TELEPHONE (OUTPATIENT)
Dept: PRIMARY CARE | Facility: CLINIC | Age: 70
End: 2025-03-31
Payer: MEDICARE

## 2025-03-31 DIAGNOSIS — N30.00 ACUTE CYSTITIS WITHOUT HEMATURIA: ICD-10-CM

## 2025-03-31 DIAGNOSIS — M25.552 PAIN OF LEFT HIP: ICD-10-CM

## 2025-03-31 DIAGNOSIS — R53.1 GENERALIZED WEAKNESS: Primary | ICD-10-CM

## 2025-03-31 DIAGNOSIS — L30.4 INTERTRIGO: ICD-10-CM

## 2025-03-31 DIAGNOSIS — N17.9 AKI (ACUTE KIDNEY INJURY): ICD-10-CM

## 2025-03-31 LAB
ALBUMIN SERPL BCP-MCNC: 3.9 G/DL (ref 3.4–5)
ALP SERPL-CCNC: 167 U/L (ref 33–136)
ALT SERPL W P-5'-P-CCNC: 9 U/L (ref 7–45)
ANION GAP SERPL CALCULATED.3IONS-SCNC: 15 MMOL/L (ref 10–20)
APPEARANCE UR: CLEAR
AST SERPL W P-5'-P-CCNC: 17 U/L (ref 9–39)
ATRIAL RATE: 57 BPM
BACTERIA #/AREA URNS AUTO: ABNORMAL /HPF
BASOPHILS # BLD AUTO: 0.09 X10*3/UL (ref 0–0.1)
BASOPHILS NFR BLD AUTO: 0.8 %
BILIRUB SERPL-MCNC: 1.5 MG/DL (ref 0–1.2)
BILIRUB UR STRIP.AUTO-MCNC: NEGATIVE MG/DL
BNP SERPL-MCNC: 66 PG/ML (ref 0–99)
BUN SERPL-MCNC: 29 MG/DL (ref 6–23)
CALCIUM SERPL-MCNC: 8.6 MG/DL (ref 8.6–10.3)
CARDIAC TROPONIN I PNL SERPL HS: 21 NG/L (ref 0–13)
CARDIAC TROPONIN I PNL SERPL HS: 21 NG/L (ref 0–13)
CHLORIDE SERPL-SCNC: 102 MMOL/L (ref 98–107)
CO2 SERPL-SCNC: 25 MMOL/L (ref 21–32)
COLOR UR: COLORLESS
CREAT SERPL-MCNC: 2.13 MG/DL (ref 0.5–1.05)
EGFRCR SERPLBLD CKD-EPI 2021: 25 ML/MIN/1.73M*2
EOSINOPHIL # BLD AUTO: 0.27 X10*3/UL (ref 0–0.7)
EOSINOPHIL NFR BLD AUTO: 2.5 %
ERYTHROCYTE [DISTWIDTH] IN BLOOD BY AUTOMATED COUNT: 13.8 % (ref 11.5–14.5)
FLUAV RNA RESP QL NAA+PROBE: NOT DETECTED
FLUBV RNA RESP QL NAA+PROBE: NOT DETECTED
GLUCOSE SERPL-MCNC: 123 MG/DL (ref 74–99)
GLUCOSE UR STRIP.AUTO-MCNC: NORMAL MG/DL
HCT VFR BLD AUTO: 36.9 % (ref 36–46)
HGB BLD-MCNC: 12.9 G/DL (ref 12–16)
IMM GRANULOCYTES # BLD AUTO: 0.02 X10*3/UL (ref 0–0.7)
IMM GRANULOCYTES NFR BLD AUTO: 0.2 % (ref 0–0.9)
KETONES UR STRIP.AUTO-MCNC: NEGATIVE MG/DL
LEUKOCYTE ESTERASE UR QL STRIP.AUTO: ABNORMAL
LYMPHOCYTES # BLD AUTO: 1.16 X10*3/UL (ref 1.2–4.8)
LYMPHOCYTES NFR BLD AUTO: 11 %
MAGNESIUM SERPL-MCNC: 1.19 MG/DL (ref 1.6–2.4)
MCH RBC QN AUTO: 30.5 PG (ref 26–34)
MCHC RBC AUTO-ENTMCNC: 35 G/DL (ref 32–36)
MCV RBC AUTO: 87 FL (ref 80–100)
MONOCYTES # BLD AUTO: 0.68 X10*3/UL (ref 0.1–1)
MONOCYTES NFR BLD AUTO: 6.4 %
MUCOUS THREADS #/AREA URNS AUTO: ABNORMAL /LPF
NEUTROPHILS # BLD AUTO: 8.37 X10*3/UL (ref 1.2–7.7)
NEUTROPHILS NFR BLD AUTO: 79.1 %
NITRITE UR QL STRIP.AUTO: NEGATIVE
NRBC BLD-RTO: 0 /100 WBCS (ref 0–0)
P AXIS: 40 DEGREES
P OFFSET: 141 MS
P ONSET: 119 MS
PH UR STRIP.AUTO: 5 [PH]
PLATELET # BLD AUTO: 286 X10*3/UL (ref 150–450)
POTASSIUM SERPL-SCNC: 3.2 MMOL/L (ref 3.5–5.3)
PR INTERVAL: 184 MS
PROT SERPL-MCNC: 7.7 G/DL (ref 6.4–8.2)
PROT UR STRIP.AUTO-MCNC: NEGATIVE MG/DL
Q ONSET: 211 MS
QRS COUNT: 9 BEATS
QRS DURATION: 116 MS
QT INTERVAL: 474 MS
QTC CALCULATION(BAZETT): 461 MS
QTC FREDERICIA: 466 MS
R AXIS: -71 DEGREES
RBC # BLD AUTO: 4.23 X10*6/UL (ref 4–5.2)
RBC # UR STRIP.AUTO: NEGATIVE MG/DL
RBC #/AREA URNS AUTO: ABNORMAL /HPF
RSV RNA RESP QL NAA+PROBE: NOT DETECTED
SARS-COV-2 RNA RESP QL NAA+PROBE: NOT DETECTED
SODIUM SERPL-SCNC: 139 MMOL/L (ref 136–145)
SP GR UR STRIP.AUTO: 1.01
SQUAMOUS #/AREA URNS AUTO: ABNORMAL /HPF
T AXIS: 110 DEGREES
T OFFSET: 448 MS
UROBILINOGEN UR STRIP.AUTO-MCNC: NORMAL MG/DL
VENTRICULAR RATE: 57 BPM
WBC # BLD AUTO: 10.6 X10*3/UL (ref 4.4–11.3)
WBC #/AREA URNS AUTO: ABNORMAL /HPF

## 2025-03-31 PROCEDURE — 80053 COMPREHEN METABOLIC PANEL: CPT

## 2025-03-31 PROCEDURE — 83735 ASSAY OF MAGNESIUM: CPT

## 2025-03-31 PROCEDURE — 84484 ASSAY OF TROPONIN QUANT: CPT

## 2025-03-31 PROCEDURE — 83880 ASSAY OF NATRIURETIC PEPTIDE: CPT

## 2025-03-31 PROCEDURE — 97162 PT EVAL MOD COMPLEX 30 MIN: CPT | Mod: GP

## 2025-03-31 PROCEDURE — 87637 SARSCOV2&INF A&B&RSV AMP PRB: CPT

## 2025-03-31 PROCEDURE — 93005 ELECTROCARDIOGRAM TRACING: CPT

## 2025-03-31 PROCEDURE — 2500000004 HC RX 250 GENERAL PHARMACY W/ HCPCS (ALT 636 FOR OP/ED)

## 2025-03-31 PROCEDURE — 96361 HYDRATE IV INFUSION ADD-ON: CPT | Mod: 59

## 2025-03-31 PROCEDURE — 87086 URINE CULTURE/COLONY COUNT: CPT | Mod: WESLAB

## 2025-03-31 PROCEDURE — 96372 THER/PROPH/DIAG INJ SC/IM: CPT

## 2025-03-31 PROCEDURE — 99285 EMERGENCY DEPT VISIT HI MDM: CPT | Mod: 25

## 2025-03-31 PROCEDURE — G0378 HOSPITAL OBSERVATION PER HR: HCPCS

## 2025-03-31 PROCEDURE — 2500000001 HC RX 250 WO HCPCS SELF ADMINISTERED DRUGS (ALT 637 FOR MEDICARE OP)

## 2025-03-31 PROCEDURE — 97166 OT EVAL MOD COMPLEX 45 MIN: CPT | Mod: GO

## 2025-03-31 PROCEDURE — 71045 X-RAY EXAM CHEST 1 VIEW: CPT | Performed by: RADIOLOGY

## 2025-03-31 PROCEDURE — 71045 X-RAY EXAM CHEST 1 VIEW: CPT

## 2025-03-31 PROCEDURE — 99223 1ST HOSP IP/OBS HIGH 75: CPT

## 2025-03-31 PROCEDURE — 36415 COLL VENOUS BLD VENIPUNCTURE: CPT

## 2025-03-31 PROCEDURE — 81001 URINALYSIS AUTO W/SCOPE: CPT

## 2025-03-31 PROCEDURE — P9612 CATHETERIZE FOR URINE SPEC: HCPCS

## 2025-03-31 PROCEDURE — 85025 COMPLETE CBC W/AUTO DIFF WBC: CPT

## 2025-03-31 RX ORDER — POTASSIUM CHLORIDE 20 MEQ/1
40 TABLET, EXTENDED RELEASE ORAL ONCE
Status: DISCONTINUED | OUTPATIENT
Start: 2025-03-31 | End: 2025-04-01

## 2025-03-31 RX ORDER — MAGNESIUM SULFATE HEPTAHYDRATE 40 MG/ML
2 INJECTION, SOLUTION INTRAVENOUS ONCE
Status: DISCONTINUED | OUTPATIENT
Start: 2025-03-31 | End: 2025-04-01

## 2025-03-31 RX ORDER — CEFTRIAXONE 1 G/50ML
1 INJECTION, SOLUTION INTRAVENOUS ONCE
Status: DISCONTINUED | OUTPATIENT
Start: 2025-03-31 | End: 2025-04-02 | Stop reason: HOSPADM

## 2025-03-31 RX ORDER — CYANOCOBALAMIN (VITAMIN B-12) 500 MCG
400 TABLET ORAL DAILY
Status: DISCONTINUED | OUTPATIENT
Start: 2025-04-01 | End: 2025-04-02 | Stop reason: HOSPADM

## 2025-03-31 RX ORDER — MAGNESIUM CHLORIDE 64 MG
140 TABLET, DELAYED RELEASE (ENTERIC COATED) ORAL ONCE
Status: DISCONTINUED | OUTPATIENT
Start: 2025-03-31 | End: 2025-03-31

## 2025-03-31 RX ORDER — HEPARIN SODIUM 5000 [USP'U]/ML
5000 INJECTION, SOLUTION INTRAVENOUS; SUBCUTANEOUS EVERY 8 HOURS
Status: DISCONTINUED | OUTPATIENT
Start: 2025-03-31 | End: 2025-04-02 | Stop reason: HOSPADM

## 2025-03-31 RX ORDER — NAPROXEN SODIUM 220 MG/1
81 TABLET, FILM COATED ORAL DAILY
Status: DISCONTINUED | OUTPATIENT
Start: 2025-04-01 | End: 2025-04-02 | Stop reason: HOSPADM

## 2025-03-31 RX ORDER — ATENOLOL 50 MG/1
50 TABLET ORAL 2 TIMES DAILY
Status: DISCONTINUED | OUTPATIENT
Start: 2025-03-31 | End: 2025-04-02 | Stop reason: HOSPADM

## 2025-03-31 RX ORDER — POLYETHYLENE GLYCOL 3350 17 G/17G
17 POWDER, FOR SOLUTION ORAL DAILY
Status: DISCONTINUED | OUTPATIENT
Start: 2025-04-01 | End: 2025-04-02 | Stop reason: HOSPADM

## 2025-03-31 RX ORDER — ATORVASTATIN CALCIUM 40 MG/1
40 TABLET, FILM COATED ORAL NIGHTLY
Status: DISCONTINUED | OUTPATIENT
Start: 2025-04-01 | End: 2025-04-02 | Stop reason: HOSPADM

## 2025-03-31 RX ADMIN — ATENOLOL 50 MG: 50 TABLET ORAL at 20:23

## 2025-03-31 RX ADMIN — HEPARIN SODIUM 5000 UNITS: 5000 INJECTION INTRAVENOUS; SUBCUTANEOUS at 21:00

## 2025-03-31 RX ADMIN — SODIUM CHLORIDE 500 ML: 9 INJECTION, SOLUTION INTRAVENOUS at 12:01

## 2025-03-31 SDOH — ECONOMIC STABILITY: FOOD INSECURITY: WITHIN THE PAST 12 MONTHS, YOU WORRIED THAT YOUR FOOD WOULD RUN OUT BEFORE YOU GOT THE MONEY TO BUY MORE.: NEVER TRUE

## 2025-03-31 SDOH — ECONOMIC STABILITY: FOOD INSECURITY: WITHIN THE PAST 12 MONTHS, THE FOOD YOU BOUGHT JUST DIDN'T LAST AND YOU DIDN'T HAVE MONEY TO GET MORE.: NEVER TRUE

## 2025-03-31 SDOH — HEALTH STABILITY: MENTAL HEALTH
DO YOU FEEL STRESS - TENSE, RESTLESS, NERVOUS, OR ANXIOUS, OR UNABLE TO SLEEP AT NIGHT BECAUSE YOUR MIND IS TROUBLED ALL THE TIME - THESE DAYS?: RATHER MUCH

## 2025-03-31 SDOH — ECONOMIC STABILITY: HOUSING INSECURITY: AT ANY TIME IN THE PAST 12 MONTHS, WERE YOU HOMELESS OR LIVING IN A SHELTER (INCLUDING NOW)?: NO

## 2025-03-31 SDOH — SOCIAL STABILITY: SOCIAL INSECURITY: HAS ANYONE EVER THREATENED TO HURT YOUR FAMILY OR YOUR PETS?: NO

## 2025-03-31 SDOH — HEALTH STABILITY: MENTAL HEALTH: HOW OFTEN DO YOU HAVE A DRINK CONTAINING ALCOHOL?: NEVER

## 2025-03-31 SDOH — SOCIAL STABILITY: SOCIAL INSECURITY: DOES ANYONE TRY TO KEEP YOU FROM HAVING/CONTACTING OTHER FRIENDS OR DOING THINGS OUTSIDE YOUR HOME?: NO

## 2025-03-31 SDOH — SOCIAL STABILITY: SOCIAL NETWORK
DO YOU BELONG TO ANY CLUBS OR ORGANIZATIONS SUCH AS CHURCH GROUPS, UNIONS, FRATERNAL OR ATHLETIC GROUPS, OR SCHOOL GROUPS?: NO

## 2025-03-31 SDOH — SOCIAL STABILITY: SOCIAL INSECURITY
WITHIN THE LAST YEAR, HAVE YOU BEEN RAPED OR FORCED TO HAVE ANY KIND OF SEXUAL ACTIVITY BY YOUR PARTNER OR EX-PARTNER?: NO

## 2025-03-31 SDOH — SOCIAL STABILITY: SOCIAL INSECURITY: WITHIN THE LAST YEAR, HAVE YOU BEEN HUMILIATED OR EMOTIONALLY ABUSED IN OTHER WAYS BY YOUR PARTNER OR EX-PARTNER?: NO

## 2025-03-31 SDOH — SOCIAL STABILITY: SOCIAL INSECURITY: WITHIN THE LAST YEAR, HAVE YOU BEEN AFRAID OF YOUR PARTNER OR EX-PARTNER?: NO

## 2025-03-31 SDOH — SOCIAL STABILITY: SOCIAL NETWORK: HOW OFTEN DO YOU ATTEND MEETINGS OF THE CLUBS OR ORGANIZATIONS YOU BELONG TO?: NEVER

## 2025-03-31 SDOH — ECONOMIC STABILITY: INCOME INSECURITY: IN THE PAST 12 MONTHS HAS THE ELECTRIC, GAS, OIL, OR WATER COMPANY THREATENED TO SHUT OFF SERVICES IN YOUR HOME?: NO

## 2025-03-31 SDOH — SOCIAL STABILITY: SOCIAL INSECURITY
WITHIN THE LAST YEAR, HAVE YOU BEEN KICKED, HIT, SLAPPED, OR OTHERWISE PHYSICALLY HURT BY YOUR PARTNER OR EX-PARTNER?: NO

## 2025-03-31 SDOH — HEALTH STABILITY: PHYSICAL HEALTH
HOW OFTEN DO YOU NEED TO HAVE SOMEONE HELP YOU WHEN YOU READ INSTRUCTIONS, PAMPHLETS, OR OTHER WRITTEN MATERIAL FROM YOUR DOCTOR OR PHARMACY?: SOMETIMES

## 2025-03-31 SDOH — ECONOMIC STABILITY: TRANSPORTATION INSECURITY: IN THE PAST 12 MONTHS, HAS LACK OF TRANSPORTATION KEPT YOU FROM MEDICAL APPOINTMENTS OR FROM GETTING MEDICATIONS?: YES

## 2025-03-31 SDOH — HEALTH STABILITY: PHYSICAL HEALTH: ON AVERAGE, HOW MANY MINUTES DO YOU ENGAGE IN EXERCISE AT THIS LEVEL?: 0 MIN

## 2025-03-31 SDOH — SOCIAL STABILITY: SOCIAL NETWORK: HOW OFTEN DO YOU GET TOGETHER WITH FRIENDS OR RELATIVES?: MORE THAN THREE TIMES A WEEK

## 2025-03-31 SDOH — ECONOMIC STABILITY: FOOD INSECURITY: HOW HARD IS IT FOR YOU TO PAY FOR THE VERY BASICS LIKE FOOD, HOUSING, MEDICAL CARE, AND HEATING?: NOT HARD AT ALL

## 2025-03-31 SDOH — SOCIAL STABILITY: SOCIAL NETWORK: HOW OFTEN DO YOU ATTEND CHURCH OR RELIGIOUS SERVICES?: NEVER

## 2025-03-31 SDOH — SOCIAL STABILITY: SOCIAL INSECURITY: DO YOU FEEL UNSAFE GOING BACK TO THE PLACE WHERE YOU ARE LIVING?: NO

## 2025-03-31 SDOH — HEALTH STABILITY: MENTAL HEALTH: HOW MANY DRINKS CONTAINING ALCOHOL DO YOU HAVE ON A TYPICAL DAY WHEN YOU ARE DRINKING?: PATIENT DOES NOT DRINK

## 2025-03-31 SDOH — SOCIAL STABILITY: SOCIAL INSECURITY: ARE YOU OR HAVE YOU BEEN THREATENED OR ABUSED PHYSICALLY, EMOTIONALLY, OR SEXUALLY BY ANYONE?: NO

## 2025-03-31 SDOH — SOCIAL STABILITY: SOCIAL NETWORK
IN A TYPICAL WEEK, HOW MANY TIMES DO YOU TALK ON THE PHONE WITH FAMILY, FRIENDS, OR NEIGHBORS?: MORE THAN THREE TIMES A WEEK

## 2025-03-31 SDOH — HEALTH STABILITY: MENTAL HEALTH: HOW OFTEN DO YOU HAVE SIX OR MORE DRINKS ON ONE OCCASION?: NEVER

## 2025-03-31 SDOH — ECONOMIC STABILITY: HOUSING INSECURITY: IN THE LAST 12 MONTHS, WAS THERE A TIME WHEN YOU WERE NOT ABLE TO PAY THE MORTGAGE OR RENT ON TIME?: NO

## 2025-03-31 SDOH — SOCIAL STABILITY: SOCIAL INSECURITY: HAVE YOU HAD ANY THOUGHTS OF HARMING ANYONE ELSE?: NO

## 2025-03-31 SDOH — HEALTH STABILITY: PHYSICAL HEALTH: ON AVERAGE, HOW MANY DAYS PER WEEK DO YOU ENGAGE IN MODERATE TO STRENUOUS EXERCISE (LIKE A BRISK WALK)?: 0 DAYS

## 2025-03-31 SDOH — SOCIAL STABILITY: SOCIAL INSECURITY: ABUSE: ADULT

## 2025-03-31 SDOH — SOCIAL STABILITY: SOCIAL INSECURITY: HAVE YOU HAD THOUGHTS OF HARMING ANYONE ELSE?: NO

## 2025-03-31 SDOH — SOCIAL STABILITY: SOCIAL INSECURITY: DO YOU FEEL ANYONE HAS EXPLOITED OR TAKEN ADVANTAGE OF YOU FINANCIALLY OR OF YOUR PERSONAL PROPERTY?: NO

## 2025-03-31 SDOH — SOCIAL STABILITY: SOCIAL INSECURITY: ARE THERE ANY APPARENT SIGNS OF INJURIES/BEHAVIORS THAT COULD BE RELATED TO ABUSE/NEGLECT?: NO

## 2025-03-31 ASSESSMENT — ACTIVITIES OF DAILY LIVING (ADL)
LACK_OF_TRANSPORTATION: NO
DRESSING YOURSELF: NEEDS ASSISTANCE
FEEDING YOURSELF: INDEPENDENT
HEARING - LEFT EAR: FUNCTIONAL
BATHING_ASSISTANCE: MAXIMAL
WALKS IN HOME: NEEDS ASSISTANCE
ADEQUATE_TO_COMPLETE_ADL: YES
PATIENT'S MEMORY ADEQUATE TO SAFELY COMPLETE DAILY ACTIVITIES?: YES
ASSISTIVE_DEVICE: WHEELCHAIR
JUDGMENT_ADEQUATE_SAFELY_COMPLETE_DAILY_ACTIVITIES: YES
ADL_ASSISTANCE: NEEDS ASSISTANCE
GROOMING: NEEDS ASSISTANCE
LACK_OF_TRANSPORTATION: NO
TOILETING: NEEDS ASSISTANCE
LACK_OF_TRANSPORTATION: YES
BATHING: NEEDS ASSISTANCE
HEARING - RIGHT EAR: FUNCTIONAL

## 2025-03-31 ASSESSMENT — PATIENT HEALTH QUESTIONNAIRE - PHQ9
1. LITTLE INTEREST OR PLEASURE IN DOING THINGS: NOT AT ALL
2. FEELING DOWN, DEPRESSED OR HOPELESS: NOT AT ALL
SUM OF ALL RESPONSES TO PHQ9 QUESTIONS 1 & 2: 0

## 2025-03-31 ASSESSMENT — COGNITIVE AND FUNCTIONAL STATUS - GENERAL
MOVING FROM LYING ON BACK TO SITTING ON SIDE OF FLAT BED WITH BEDRAILS: A LOT
STANDING UP FROM CHAIR USING ARMS: A LOT
TURNING FROM BACK TO SIDE WHILE IN FLAT BAD: A LOT
WALKING IN HOSPITAL ROOM: A LOT
PERSONAL GROOMING: A LITTLE
MOVING TO AND FROM BED TO CHAIR: TOTAL
DRESSING REGULAR LOWER BODY CLOTHING: TOTAL
DRESSING REGULAR UPPER BODY CLOTHING: A LOT
MOVING TO AND FROM BED TO CHAIR: A LOT
DRESSING REGULAR LOWER BODY CLOTHING: A LITTLE
TOILETING: A LITTLE
WALKING IN HOSPITAL ROOM: TOTAL
STANDING UP FROM CHAIR USING ARMS: A LOT
DAILY ACTIVITIY SCORE: 20
MOBILITY SCORE: 9
HELP NEEDED FOR BATHING: A LOT
TURNING FROM BACK TO SIDE WHILE IN FLAT BAD: A LITTLE
CLIMB 3 TO 5 STEPS WITH RAILING: TOTAL
CLIMB 3 TO 5 STEPS WITH RAILING: A LOT
PATIENT BASELINE BEDBOUND: YES
MOBILITY SCORE: 15
TOILETING: A LOT
EATING MEALS: A LITTLE
HELP NEEDED FOR BATHING: A LITTLE
DAILY ACTIVITIY SCORE: 13
DRESSING REGULAR UPPER BODY CLOTHING: A LITTLE

## 2025-03-31 ASSESSMENT — PAIN SCALES - GENERAL
PAINLEVEL_OUTOF10: 3
PAINLEVEL_OUTOF10: 0 - NO PAIN
PAINLEVEL_OUTOF10: 3

## 2025-03-31 ASSESSMENT — LIFESTYLE VARIABLES
TOTAL SCORE: 0
HAVE YOU EVER FELT YOU SHOULD CUT DOWN ON YOUR DRINKING: NO
HOW OFTEN DO YOU HAVE A DRINK CONTAINING ALCOHOL: NEVER
HOW MANY STANDARD DRINKS CONTAINING ALCOHOL DO YOU HAVE ON A TYPICAL DAY: PATIENT DOES NOT DRINK
EVER HAD A DRINK FIRST THING IN THE MORNING TO STEADY YOUR NERVES TO GET RID OF A HANGOVER: NO
SKIP TO QUESTIONS 9-10: 1
EVER FELT BAD OR GUILTY ABOUT YOUR DRINKING: NO
AUDIT-C TOTAL SCORE: 0
SKIP TO QUESTIONS 9-10: 1
AUDIT-C TOTAL SCORE: 0
AUDIT-C TOTAL SCORE: 0
HOW OFTEN DO YOU HAVE 6 OR MORE DRINKS ON ONE OCCASION: NEVER
HAVE PEOPLE ANNOYED YOU BY CRITICIZING YOUR DRINKING: NO

## 2025-03-31 ASSESSMENT — PAIN - FUNCTIONAL ASSESSMENT
PAIN_FUNCTIONAL_ASSESSMENT: 0-10
PAIN_FUNCTIONAL_ASSESSMENT: 0-10

## 2025-03-31 NOTE — ED PROVIDER NOTES
HPI   Chief Complaint   Patient presents with    Weakness, Gen       Patient is a 61-year-old female presenting with a chief complaint of generalized weakness,  family numbers called the squad, states the patient is unable to care for herself.  Usually she is able to ambulate on her own, however today she denies any ambulating with assistance, patient denies any acute complaints at this time, states that she just feels weak, family and patient believe IV necessary for her to go to rehab facility versus skilled nursing facility, patient has no chest pain or shortness of breath, no dizziness or blurred vision, no other acute complaints at this time.      History provided by:  Patient and relative          Patient History   Past Medical History:   Diagnosis Date    Arthritis     HTN (hypertension)     Long COVID      Past Surgical History:   Procedure Laterality Date    MR HEAD ANGIO WO IV CONTRAST  9/11/2023    MR HEAD ANGIO WO IV CONTRAST [facility]     Family History   Problem Relation Name Age of Onset    Hypertension Mother      Diabetes Mother      Hypertension Father      Diabetes Father       Social History     Tobacco Use    Smoking status: Former     Current packs/day: 0.00     Types: Cigarettes     Quit date: 2022     Years since quitting: 3.2    Smokeless tobacco: Never   Substance Use Topics    Alcohol use: Not Currently    Drug use: Never       Physical Exam   ED Triage Vitals [03/31/25 1008]   Temperature Heart Rate Respirations BP   36.8 °C (98.2 °F) 63 18 143/73      Pulse Ox Temp Source Heart Rate Source Patient Position   (!) 93 % Oral Monitor --      BP Location FiO2 (%)     -- --       Physical Exam  Vitals and nursing note reviewed. Exam conducted with a chaperone present.   Constitutional:       General: She is not in acute distress.     Appearance: Normal appearance. She is normal weight. She is not ill-appearing, toxic-appearing or diaphoretic.   HENT:      Head: Normocephalic and  atraumatic.      Nose: Nose normal.      Mouth/Throat:      Mouth: Mucous membranes are moist.      Pharynx: Oropharynx is clear.   Eyes:      Extraocular Movements: Extraocular movements intact.      Conjunctiva/sclera: Conjunctivae normal.      Pupils: Pupils are equal, round, and reactive to light.   Cardiovascular:      Rate and Rhythm: Normal rate and regular rhythm.      Pulses: Normal pulses.      Heart sounds: Normal heart sounds.   Pulmonary:      Effort: Pulmonary effort is normal.      Breath sounds: Normal breath sounds.   Abdominal:      General: Abdomen is flat. Bowel sounds are normal.      Palpations: Abdomen is soft.   Musculoskeletal:         General: Normal range of motion.      Cervical back: Normal range of motion and neck supple.   Skin:     General: Skin is warm and dry.      Capillary Refill: Capillary refill takes less than 2 seconds.   Neurological:      General: No focal deficit present.      Mental Status: She is alert and oriented to person, place, and time. Mental status is at baseline.   Psychiatric:         Mood and Affect: Mood normal.         Behavior: Behavior normal.         Thought Content: Thought content normal.         Judgment: Judgment normal.           ED Course & MDM   ED Course as of 04/02/25 2158   Mon Mar 31, 2025   1100 EKG interpreted by myself independently, EKG shows a sinus bradycardia, rate of 57 bpm, PA interval 198, , , QTc 490, patient has no ST elevation or depression, negative for acute MI. [RICHIE]      ED Course User Index  [RICHIE] Quang Howell DO         Diagnoses as of 04/02/25 2158   Generalized weakness   LORETA (acute kidney injury)   Acute cystitis without hematuria                 No data recorded                                 Medical Decision Making  Patient seen and evaluated at bedside, patient is in no acute distress.  I will order a CBC, magnesium, CMP, BNP, troponin, COVID flu and RSV, urinalysis, x-ray of the chest, give the patient 500  cc of normal saline. Differential diagnosis includes but is not limited to  Weakness, UTI, electrolyte abnormality, COVID, flu, RSV  Patient CBC showed a hemoglobin 12.9 hematocrit 36.9, magnesium 1.19, this was replenished, CMP shows a potassium 3.2, BUN 29, creatinine 2.13, GFR 25, troponin 3 rounds  , COVID flu and RSV negative, urinalysis does show signs of infection, patient given IV Rocephin, and the patient generalized weakness, UTI and LORETA, patient be moved with the general medicine team for further evaluation and treatment.    Diagnosis: Acute cystitis, LORETA, generalized weakness, hypomagnesemia  XR chest 1 view   Final Result    No active disease in the chest identified.          MACRO:    None          Signed by: Cristino Tavera 3/31/2025 11:31 AM    Dictation workstation:   [id number]     Results for orders placed or performed during the hospital encounter of 03/31/25  -CBC and Auto Differential:   Collection Time: 03/31/25 10:44 AM       Result                      Value             Ref Range           WBC                         10.6              4.4 - 11.3 x*       nRBC                        0.0               0.0 - 0.0 /1*       RBC                         4.23              4.00 - 5.20 *       Hemoglobin                  12.9              12.0 - 16.0 *       Hematocrit                  36.9              36.0 - 46.0 %       MCV                         87                80 - 100 fL         MCH                         30.5              26.0 - 34.0 *       MCHC                        35.0              32.0 - 36.0 *       RDW                         13.8              11.5 - 14.5 %       Platelets                   286               150 - 450 x1*       Neutrophils %               79.1              40.0 - 80.0 %       Immature Granulocytes *     0.2               0.0 - 0.9 %         Lymphocytes %               11.0              13.0 - 44.0 %       Monocytes %                 6.4               2.0 - 10.0 %         Eosinophils %               2.5               0.0 - 6.0 %         Basophils %                 0.8               0.0 - 2.0 %         Neutrophils Absolute        8.37 (H)          1.20 - 7.70 *       Immature Granulocytes *     0.02              0.00 - 0.70 *       Lymphocytes Absolute        1.16 (L)          1.20 - 4.80 *       Monocytes Absolute          0.68              0.10 - 1.00 *       Eosinophils Absolute        0.27              0.00 - 0.70 *       Basophils Absolute          0.09              0.00 - 0.10 *  -Magnesium:   Collection Time: 03/31/25 10:44 AM       Result                      Value             Ref Range           Magnesium                   1.19 (L)          1.60 - 2.40 *  -Comprehensive metabolic panel:   Collection Time: 03/31/25 10:44 AM       Result                      Value             Ref Range           Glucose                     123 (H)           74 - 99 mg/dL       Sodium                      139               136 - 145 mm*       Potassium                   3.2 (L)           3.5 - 5.3 mm*       Chloride                    102               98 - 107 mmo*       Bicarbonate                 25                21 - 32 mmol*       Anion Gap                   15                10 - 20 mmol*       Urea Nitrogen               29 (H)            6 - 23 mg/dL        Creatinine                  2.13 (H)          0.50 - 1.05 *       eGFR                        25 (L)            >60 mL/min/1*       Calcium                     8.6               8.6 - 10.3 m*       Albumin                     3.9               3.4 - 5.0 g/*       Alkaline Phosphatase        167 (H)           33 - 136 U/L        Total Protein               7.7               6.4 - 8.2 g/*       AST                         17                9 - 39 U/L          Bilirubin, Total            1.5 (H)           0.0 - 1.2 mg*       ALT                         9                 7 - 45 U/L     -B-Type Natriuretic Peptide:   Collection Time:  03/31/25 10:44 AM       Result                      Value             Ref Range           BNP                         66                0 - 99 pg/mL   -Troponin I, High Sensitivity, Initial:   Collection Time: 03/31/25 10:44 AM       Result                      Value             Ref Range           Troponin I, High Sensi*     21 (H)            0 - 13 ng/L    -ECG 12 lead:   Collection Time: 03/31/25 10:45 AM       Result                      Value             Ref Range           Ventricular Rate            57                BPM                 Atrial Rate                 57                BPM                 KY Interval                 198               ms                  QRS Duration                124               ms                  QT Interval                 504               ms                  QTC Calculation(Bazett)     490               ms                  P Axis                      58                degrees             R Axis                      -77               degrees             T Axis                      101               degrees             QRS Count                   10                beats               Q Onset                     206               ms                  P Onset                     107               ms                  P Offset                    151               ms                  T Offset                    458               ms                  QTC Fredericia              495               ms             -Sars-CoV-2, Influenza A/B and RSV PCR:   Collection Time: 03/31/25 10:49 AM       Result                      Value             Ref Range           Coronavirus 2019, PCR       Not Detected      Not Detected        Flu A Result                Not Detected      Not Detected        Flu B Result                Not Detected      Not Detected        RSV PCR                     Not Detected      Not Detected   -Troponin, High Sensitivity, 1 Hour:   Collection Time: 03/31/25 12:02  PM       Result                      Value             Ref Range           Troponin I, High Sensi*     21 (H)            0 - 13 ng/L    -Urine Culture:   Collection Time: 03/31/25  2:12 PM  Specimen: Straight Catheter; Urine       Result                      Value             Ref Range           Urine Culture               No growth                        -Urinalysis with Reflex Culture and Microscopic:   Collection Time: 03/31/25  2:12 PM       Result                      Value             Ref Range           Color, Urine                Colorless (N)     Light-Yellow*       Appearance, Urine           Clear             Clear               Specific Gravity, Urine     1.009             1.005 - 1.035       pH, Urine                   5.0               5.0, 5.5, 6.*       Protein, Urine              NEGATIVE          NEGATIVE, 10*       Glucose, Urine              Normal            Normal mg/dL        Blood, Urine                NEGATIVE          NEGATIVE mg/*       Ketones, Urine              NEGATIVE          NEGATIVE mg/*       Bilirubin, Urine            NEGATIVE          NEGATIVE mg/*       Urobilinogen, Urine         Normal            Normal mg/dL        Nitrite, Urine              NEGATIVE          NEGATIVE            Leukocyte Esterase, Ur*     75 Jeison/uL (A)     NEGATIVE       -Extra Urine Gray Tube:   Collection Time: 03/31/25  2:12 PM       Result                      Value             Ref Range           Extra Tube                                                    Hold for add-ons.  -Microscopic Only, Urine:   Collection Time: 03/31/25  2:12 PM       Result                      Value             Ref Range           WBC, Urine                  6-10 (A)          1-5, NONE /H*       RBC, Urine                  NONE              NONE, 1-2, 3*       Squamous Epithelial Ce*     1-9 (SPARSE)      Reference ra*       Bacteria, Urine             1+ (A)            NONE SEEN /H*       Mucus, Urine                FEW                Reference ra*  -CBC:   Collection Time: 04/01/25  5:00 AM       Result                      Value             Ref Range           WBC                         9.2               4.4 - 11.3 x*       nRBC                        0.0               0.0 - 0.0 /1*       RBC                         3.83 (L)          4.00 - 5.20 *       Hemoglobin                  11.6 (L)          12.0 - 16.0 *       Hematocrit                  34.0 (L)          36.0 - 46.0 %       MCV                         89                80 - 100 fL         MCH                         30.3              26.0 - 34.0 *       MCHC                        34.1              32.0 - 36.0 *       RDW                         13.9              11.5 - 14.5 %       Platelets                   223               150 - 450 x1*  -Renal Function Panel:   Collection Time: 04/01/25  5:00 AM       Result                      Value             Ref Range           Glucose                     91                74 - 99 mg/dL       Sodium                      141               136 - 145 mm*       Potassium                   3.0 (L)           3.5 - 5.3 mm*       Chloride                    104               98 - 107 mmo*       Bicarbonate                 28                21 - 32 mmol*       Anion Gap                   12                10 - 20 mmol*       Urea Nitrogen               31 (H)            6 - 23 mg/dL        Creatinine                  2.20 (H)          0.50 - 1.05 *       eGFR                        24 (L)            >60 mL/min/1*       Calcium                     7.9 (L)           8.6 - 10.3 m*       Phosphorus                  4.0               2.5 - 4.9 mg*       Albumin                     3.3 (L)           3.4 - 5.0 g/*  -Magnesium:   Collection Time: 04/01/25  5:00 AM       Result                      Value             Ref Range           Magnesium                   1.22 (L)          1.60 - 2.40 *  -Basic metabolic panel:   Collection Time: 04/01/25   4:32 PM       Result                      Value             Ref Range           Glucose                     107 (H)           74 - 99 mg/dL       Sodium                      137               136 - 145 mm*       Potassium                   3.3 (L)           3.5 - 5.3 mm*       Chloride                    102               98 - 107 mmo*       Bicarbonate                 26                21 - 32 mmol*       Anion Gap                   12                10 - 20 mmol*       Urea Nitrogen               29 (H)            6 - 23 mg/dL        Creatinine                  1.95 (H)          0.50 - 1.05 *       eGFR                        28 (L)            >60 mL/min/1*       Calcium                     8.1 (L)           8.6 - 10.3 m*  -Magnesium:   Collection Time: 04/01/25  4:32 PM       Result                      Value             Ref Range           Magnesium                   2.36              1.60 - 2.40 *  -CBC:   Collection Time: 04/02/25  6:05 AM       Result                      Value             Ref Range           WBC                         11.0              4.4 - 11.3 x*       nRBC                        0.0               0.0 - 0.0 /1*       RBC                         4.07              4.00 - 5.20 *       Hemoglobin                  12.4              12.0 - 16.0 *       Hematocrit                  36.0              36.0 - 46.0 %       MCV                         89                80 - 100 fL         MCH                         30.5              26.0 - 34.0 *       MCHC                        34.4              32.0 - 36.0 *       RDW                         13.8              11.5 - 14.5 %       Platelets                   251               150 - 450 x1*  -Renal Function Panel:   Collection Time: 04/02/25  6:05 AM       Result                      Value             Ref Range           Glucose                     109 (H)           74 - 99 mg/dL       Sodium                      139               136 - 145 mm*        Potassium                   3.5               3.5 - 5.3 mm*       Chloride                    103               98 - 107 mmo*       Bicarbonate                 28                21 - 32 mmol*       Anion Gap                   12                10 - 20 mmol*       Urea Nitrogen               27 (H)            6 - 23 mg/dL        Creatinine                  1.83 (H)          0.50 - 1.05 *       eGFR                        30 (L)            >60 mL/min/1*       Calcium                     9.1               8.6 - 10.3 m*       Phosphorus                  3.0               2.5 - 4.9 mg*       Albumin                     3.7               3.4 - 5.0 g/*  -Magnesium:   Collection Time: 04/02/25  6:05 AM       Result                      Value             Ref Range           Magnesium                   2.15              1.60 - 2.40 *  .        Procedure  Procedures  Sections of this report were created using voice-to-text technology and may contain errors in translation    Quang Howell DO  Emergency Medicine         Quang Howell DO  04/02/25 3461

## 2025-03-31 NOTE — PROGRESS NOTES
[facility] spoke with pts sister Kassandra over the phone. Pt lives with her younger sister Idalmis. Pt has struggled with ambulation the past two months, has both rolling and standard walker as well as a wheelchair. Pt has not been able to get out of the house per sister. PT/OT = moderate intensity with ampac of 9. SNF list delivered to pt and sister, they were advised to provide 2-3 choices and provide those to care transitions who will send out referrals and initiate a precert with insurance when able.     Update: SNF list provided to pt and family, first choice is [facility] however they were advised to provide two alternate choices as first choice may not have availability. Family to further look over the list and provide this in a timely manner. Referral being sent to [facility], await response.      Accepting facility and precert are pending.     Kandi Claros [facility]

## 2025-03-31 NOTE — CARE PLAN
The patient's goals for the shift include      The clinical goals for the shift include vitals to remain stable and no falls overnight.      Problem: Pain - Adult  Goal: Verbalizes/displays adequate comfort level or baseline comfort level  Outcome: Progressing     Problem: Safety - Adult  Goal: Free from fall injury  Outcome: Progressing     Problem: Discharge Planning  Goal: Discharge to home or other facility with appropriate resources  Outcome: Progressing     Problem: Chronic Conditions and Co-morbidities  Goal: Patient's chronic conditions and co-morbidity symptoms are monitored and maintained or improved  Outcome: Progressing     Problem: Nutrition  Goal: Nutrient intake appropriate for maintaining nutritional needs  Outcome: Progressing

## 2025-03-31 NOTE — ED TRIAGE NOTES
Pt bib squad for general weakness. Ems was called to pt house twice today. Pt states she wants to go to rehab, no chest pain, no sob, no pain at this time

## 2025-03-31 NOTE — PROGRESS NOTES
[facility] received call from ED, pt/family requesting for possible dc to SNF. This worker spoke with ED Dr and requested PT/OT to be ordered as these evaluations will drive whether pt qualifies to go to SNF, a precert would also be needed to go to SNF. [facility] will follow up with pt and family once PT/OT evals are completed.     Kandi Claros, [facility]

## 2025-03-31 NOTE — H&P
History Of Present Illness  69 YOF PMH of essential HTN CKD III, HFrEF, presenting to ED C/O generalized weakness.      She lives at home alone and ambulates with a walker and has had a slow and worsening progression of generalized weakness where today she could not get up on her own is unable to take care of herself any further.    In the ED: AF HDS & SpO2 WNL on RA.  CMP notable for hypokalemia, LORETA with serum creatinine of 2.1 (baseline 1.6).  Hypomagnesemia of 1.1 troponin 21 with delta troponin 21.  Urinalysis grossly unremarkable.  CXR unremarkable.  Was given electrolyte repletion and admitted for worsening generalized weakness and unable to safely live at home any further.    Past Medical History  She has a past medical history of Arthritis, HTN (hypertension), and Long COVID.    Surgical History  She has a past surgical history that includes MR angio head wo IV contrast (9/11/2023).     Social History  She reports that she quit smoking about 3 years ago. Her smoking use included cigarettes. She has never used smokeless tobacco. She reports that she does not currently use alcohol. She reports that she does not use drugs.    Family History  Family History   Problem Relation Name Age of Onset    Hypertension Mother      Diabetes Mother      Hypertension Father      Diabetes Father          Allergies  Patient has no known allergies.    Review of Systems    12 pt ROS obtained: positives & pertinent negatives listed in HPI   Physical Exam   Constitutional: A&Ox4, NAD, resting comfortable   Head and Face: Atraumatic, normocephalic   Eyes: Normal external exam, EOMI  ENT: Normal external inspection of ears and nose. Oropharynx normal.  Cardiovascular: RRR, S1/S2, no murmurs, rubs, or gallops, radial pulses +2  Pulmonary: CTAB, no respiratory distress, no wheezing, rales or rhonchi, on RA  Abdomen: +BS, soft, non-tender, nondistended, no guarding rigidity or rebound tenderness, no masses noted  MSK: Negative for  edema, No joint swelling, normal movements of all extremities.   Neuro: No focal deficits, normal motor function, normal sensation, follows all commands  Skin- No lesions, contusions, or erythema.  Psychiatric: Judgment intact. Appropriate mood, affect and behavior   Last Recorded Vitals  /73   Pulse 60   Temp 36.8 °C (98.2 °F) (Oral)   Resp (!) 22   Wt 90.7 kg (200 lb)   SpO2 94%     Relevant Results  Results for orders placed or performed during the hospital encounter of 03/31/25 (from the past 24 hours)   CBC and Auto Differential   Result Value Ref Range    WBC 10.6 4.4 - 11.3 x10*3/uL    nRBC 0.0 0.0 - 0.0 /100 WBCs    RBC 4.23 4.00 - 5.20 x10*6/uL    Hemoglobin 12.9 12.0 - 16.0 g/dL    Hematocrit 36.9 36.0 - 46.0 %    MCV 87 80 - 100 fL    MCH 30.5 26.0 - 34.0 pg    MCHC 35.0 32.0 - 36.0 g/dL    RDW 13.8 11.5 - 14.5 %    Platelets 286 150 - 450 x10*3/uL    Neutrophils % 79.1 40.0 - 80.0 %    Immature Granulocytes %, Automated 0.2 0.0 - 0.9 %    Lymphocytes % 11.0 13.0 - 44.0 %    Monocytes % 6.4 2.0 - 10.0 %    Eosinophils % 2.5 0.0 - 6.0 %    Basophils % 0.8 0.0 - 2.0 %    Neutrophils Absolute 8.37 (H) 1.20 - 7.70 x10*3/uL    Immature Granulocytes Absolute, Automated 0.02 0.00 - 0.70 x10*3/uL    Lymphocytes Absolute 1.16 (L) 1.20 - 4.80 x10*3/uL    Monocytes Absolute 0.68 0.10 - 1.00 x10*3/uL    Eosinophils Absolute 0.27 0.00 - 0.70 x10*3/uL    Basophils Absolute 0.09 0.00 - 0.10 x10*3/uL   Magnesium   Result Value Ref Range    Magnesium 1.19 (L) 1.60 - 2.40 mg/dL   Comprehensive metabolic panel   Result Value Ref Range    Glucose 123 (H) 74 - 99 mg/dL    Sodium 139 136 - 145 mmol/L    Potassium 3.2 (L) 3.5 - 5.3 mmol/L    Chloride 102 98 - 107 mmol/L    Bicarbonate 25 21 - 32 mmol/L    Anion Gap 15 10 - 20 mmol/L    Urea Nitrogen 29 (H) 6 - 23 mg/dL    Creatinine 2.13 (H) 0.50 - 1.05 mg/dL    eGFR 25 (L) >60 mL/min/1.73m*2    Calcium 8.6 8.6 - 10.3 mg/dL    Albumin 3.9 3.4 - 5.0 g/dL    Alkaline  Phosphatase 167 (H) 33 - 136 U/L    Total Protein 7.7 6.4 - 8.2 g/dL    AST 17 9 - 39 U/L    Bilirubin, Total 1.5 (H) 0.0 - 1.2 mg/dL    ALT 9 7 - 45 U/L   B-Type Natriuretic Peptide   Result Value Ref Range    BNP 66 0 - 99 pg/mL   Troponin I, High Sensitivity, Initial   Result Value Ref Range    Troponin I, High Sensitivity 21 (H) 0 - 13 ng/L   ECG 12 lead   Result Value Ref Range    Ventricular Rate 57 BPM    Atrial Rate 57 BPM    OK Interval 198 ms    QRS Duration 124 ms    QT Interval 504 ms    QTC Calculation(Bazett) 490 ms    P Axis 58 degrees    R Axis -77 degrees    T Axis 101 degrees    QRS Count 10 beats    Q Onset 206 ms    P Onset 107 ms    P Offset 151 ms    T Offset 458 ms    QTC Fredericia 495 ms   Sars-CoV-2, Influenza A/B and RSV PCR   Result Value Ref Range    Coronavirus 2019, PCR Not Detected Not Detected    Flu A Result Not Detected Not Detected    Flu B Result Not Detected Not Detected    RSV PCR Not Detected Not Detected   Troponin, High Sensitivity, 1 Hour   Result Value Ref Range    Troponin I, High Sensitivity 21 (H) 0 - 13 ng/L   Urinalysis with Reflex Culture and Microscopic   Result Value Ref Range    Color, Urine Colorless (N) Light-Yellow, Yellow, Dark-Yellow    Appearance, Urine Clear Clear    Specific Gravity, Urine 1.009 1.005 - 1.035    pH, Urine 5.0 5.0, 5.5, 6.0, 6.5, 7.0, 7.5, 8.0    Protein, Urine NEGATIVE NEGATIVE, 10 (TRACE), 20 (TRACE) mg/dL    Glucose, Urine Normal Normal mg/dL    Blood, Urine NEGATIVE NEGATIVE mg/dL    Ketones, Urine NEGATIVE NEGATIVE mg/dL    Bilirubin, Urine NEGATIVE NEGATIVE mg/dL    Urobilinogen, Urine Normal Normal mg/dL    Nitrite, Urine NEGATIVE NEGATIVE    Leukocyte Esterase, Urine 75 Jeison/uL (A) NEGATIVE   Microscopic Only, Urine   Result Value Ref Range    WBC, Urine 6-10 (A) 1-5, NONE /HPF    RBC, Urine NONE NONE, 1-2, 3-5 /HPF    Squamous Epithelial Cells, Urine 1-9 (SPARSE) Reference range not established. /HPF    Bacteria, Urine 1+ (A) NONE  SEEN /HPF    Mucus, Urine FEW Reference range not established. /LPF         Assessment/Plan   Assessment & Plan  LORETA (acute kidney injury)  Generalized weakness  Hypokalemia  Hypomagnesemia   Hx of CKDIII  Hx of HFrEF not in exacerbation    69 YOF PMH of essential HTN CKD III, presenting to ED C/O generalized weakness.  She lives at home alone and ambulates with a walker and has had a slow and worsening progression of generalized weakness where today she could not get up on her own is unable to take care of herself any further. CMP notable for hypokalemia, LORETA with serum creatinine of 2.1 (baseline 1.6).  Hypomagnesemia of 1.1 troponin 21 with delta troponin 21.  Urinalysis grossly unremarkable.  CXR unremarkable.     - Given IVF challenge in ED, will hold off on further fluids due to EF 34-40%  - Denies any urinary symptoms, suspect asymptomatic bacteruria, will hold off on further ABX  - Hold Valsartan/HCTZ and home lasix, trend renal function prior to restarting  - PT/OT with plans for placement  - Resume home attenolol with hold parameters  - Telemetry     IVF: None  DVT Ppx: Heparin  Diet: Regular  Code Status:   FULL CODE    Complexity moderate, anticipate > 2 MN stays       Luis Miguel Lee DO

## 2025-03-31 NOTE — PROGRESS NOTES
Pharmacy Medication History Review    Tammy Walker is a 69 y.o. female admitted for LORETA (acute kidney injury). Pharmacy reviewed the patient's adwik-iv-moopsiazj medications and allergies for accuracy.    Medications ADDED:  None   Medications CHANGED:  Oxybutynin 5mg - not taking  Medications REMOVED:   None      The list below reflects the updated PTA list. Comments regarding how patient may be taking medications differently can be found in the Admit Orders Activity  Prior to Admission Medications   Prescriptions Last Dose Informant   allopurinol (Zyloprim) 100 mg tablet 3/31/2025 self   Sig: TAKE 1 TABLET BY MOUTH TWICE A DAY   aspirin 81 mg chewable tablet 3/31/2025 self   Sig: Chew 1 tablet (81 mg) once daily.   atenolol (Tenormin) 50 mg tablet 3/31/2025 self   Sig: TAKE 1 TABLET BY MOUTH TWICE A DAY   atorvastatin (Lipitor) 40 mg tablet 3/31/2025 Self   Sig: Take 1 tablet (40 mg) by mouth once daily at bedtime.   cholecalciferol (Vitamin D3) 10 MCG (400 UNIT) tablet 3/31/2025 self   Sig: Take 1 tablet (10 mcg) by mouth once daily.   furosemide (Lasix) 40 mg tablet 3/31/2025 Self   Sig: TAKE 1 TABLET BY MOUTH EVERY DAY   Patient taking differently: Take 1 tablet (40 mg) by mouth once daily as needed.   hydroCHLOROthiazide (HYDRODiuril) 50 mg tablet 3/31/2025 Self   Sig: Take 1 tablet (50 mg) by mouth once daily.   potassium chloride CR (Klor-Con) 10 mEq ER tablet 3/31/2025 Self   Sig: Take 1 tablet (10 mEq) by mouth 2 times a day. Do not crush, chew, or split.   valsartan (Diovan) 80 mg tablet 3/31/2025 Self   Sig: TAKE 1 TABLET (80 MG) BY MOUTH 2 TIMES A DAY.      Facility-Administered Medications: None        The list below reflects the updated allergy list. Please review each documented allergy for additional clarification and justification.  Allergies  Reviewed by Genevieve Wylie CPhT on 3/31/2025   No Known Allergies         Pharmacy has been updated to [facility]..    Sources used to  complete the med history include dispense history, PTA medication list, patient interview. Patient is a fair historian.    Below are additional concerns with the patient's PTA list.  None     Genevieve Wylie, Riley-Adv  Please reach out via [employer] Secure Chat for questions

## 2025-03-31 NOTE — TELEPHONE ENCOUNTER
Treated and released from [facility] ED 3/28/25. Presented with SOB, cough, and congestion. She was also noted to have urinary retention. A nixon catheter was placed with leg bag. Received Lasix while in ED, stated she was feeling better and was adamant about going home. Sisters were in agreement. Patient then returned to the ED 3/31/25 with generalized weakness.

## 2025-03-31 NOTE — PROGRESS NOTES
.     03/31/25 1607   Discharge Planning   Living Arrangements Family members   Support Systems Family members   Type of Residence Private residence   Do you have animals or pets at home? No   Who is requesting discharge planning? Provider   Home or Post Acute Services Post acute facilities (Rehab/SNF/etc)   Type of Post Acute Facility Services Rehab;Skilled nursing   Expected Discharge Disposition SNF   Does the patient need discharge transport arranged? Yes   RoundTrip coordination needed? Yes   Has discharge transport been arranged? No   Financial Resource Strain   How hard is it for you to pay for the very basics like food, housing, medical care, and heating? Not hard   Housing Stability   In the last 12 months, was there a time when you were not able to pay the mortgage or rent on time? N   At any time in the past 12 months, were you homeless or living in a shelter (including now)? N   Transportation Needs   In the past 12 months, has lack of transportation kept you from medical appointments or from getting medications? no   In the past 12 months, has lack of transportation kept you from meetings, work, or from getting things needed for daily living? No   Patient Choice   Provider Choice list and CMS website (https://medicare.gov/care-compare#search) for post-acute Quality and Resource Measure Data were provided and reviewed with: Family;Patient   Patient / Family choosing to utilize agency / facility established prior to hospitalization No   Stroke Family Assessment   Stroke Family Assessment Needed No             MIKA Bhakta

## 2025-03-31 NOTE — PROGRESS NOTES
Physical Therapy    Physical Therapy Evaluation    Patient Name: Tammy Walker  MRN: [id number]  Department: [facility] ED  Room: [place] [address]  Today's Date: 3/31/2025   Time Calculation  Start Time: 1435  Stop Time: 1500  Time Calculation (min): 25 min    Assessment/Plan   PT Assessment  PT Assessment Results: Decreased strength, Decreased range of motion, Decreased endurance, Impaired balance, Decreased mobility, Decreased coordination, Decreased cognition, Impaired judgement, Decreased safety awareness, Impaired vision, Obesity, Decreased skin integrity, Pain  Rehab Prognosis: Good  Barriers to Discharge Home: Physical needs  Physical Needs: Stair navigation into home limited by function/safety, 24hr mobility assistance needed, 24hr ADL assistance needed, High falls risk due to function or environment  Evaluation/Treatment Tolerance: Patient limited by fatigue  Medical Staff Made Aware: Yes  Strengths: Support of Caregivers  Barriers to Participation: Comorbidities  End of Session Communication: Bedside nurse  Assessment Comment: The patient is a 69 y.o. female admitted to the hospital for increased weakness and frequent falls. The patient currently requires maxAx1-2 for transfers and bed mobility with HHA. The patient would continue to benefit from skilled therapy services to address functional deficits.  End of Session Patient Position: Bed, 2 rail up, On cart, Alarm off, caregiver present  IP OR SWING BED PT PLAN  Inpatient or Swing Bed: Inpatient  PT Plan  Treatment/Interventions: Bed mobility, Transfer training, Gait training, Balance training, Neuromuscular re-education, Strengthening, Endurance training, Range of motion, Therapeutic exercise, Therapeutic activity, Wheelchair management  PT Plan: Ongoing PT  PT Frequency: 3 times per week  PT Discharge Recommendations: Moderate intensity level of continued care  Equipment Recommended upon Discharge: Wheeled walker, Wheelchair  PT Recommended Transfer Status: Assist  x2  PT - OK to Discharge: Yes    Subjective   General Visit Information:  General  Reason for Referral: mobility impairment due to weakness  Referred By: Quang Howell DO  Past Medical History Relevant to Rehab: HTN, arthritis, long COVID  Family/Caregiver Present: Yes  Caregiver Feedback: sister and brother present  Co-Treatment: OT  Co-Treatment Reason: to optimize pt outcomes  Prior to Session Communication: Bedside nurse  Patient Position Received: Bed, 2 rail up, Alarm off, caregiver present  Preferred Learning Style: verbal, visual  General Comment: The patient is a 69 y.o. female admitted to the hospital for multiple falls at home and continued weakness.  Home Living:  Home Living  Type of Home: House  Lives With: Siblings (sister)  Home Adaptive Equipment: Walker rolling or standard, Wheelchair-manual  Home Layout: Multi-level, Able to live on main level with bedroom/bathroom  Home Access: Stairs to enter with rails  Entrance Stairs-Rails:  (unilateral)  Entrance Stairs-Number of Steps: 5  Bathroom Shower/Tub: Tub/shower unit  Bathroom Toilet: Standard  Bathroom Equipment: Grab bars in shower  Bathroom Accessibility: located on 1st floor  Home Living Comments: multi-level home with 1st floor set up  Prior Level of Function:  Prior Function Per Pt/Caregiver Report  Level of [place]: Needs assistance with ADLs, Needs assistance with homemaking  Receives Help From: Family (sister)  ADL Assistance: Needs assistance (sponge bath; assist with bathing and dressing)  Homemaking Assistance: Needs assistance (completed by family)  Ambulatory Assistance: Independent (mainly utilizes WC; stand pivot transfers at mod indep level; able to self propel WC)  Vocational: Retired  Prior Function Comments: per pt and family, pt with increasing functional decline with multiple falls. Pt requires assist with bathing and dressing and is mainly WC level with ability to stand pivot transfer without  assist  Precautions:  Precautions  Hearing/Visual Limitations: glasses prn  Medical Precautions: Fall precautions  Precautions Comment: h/o multiple recent falls      Date/Time Vitals Session Patient Position Pulse Resp SpO2 BP MAP (mmHg)    03/31/25 13:45:20 --  --  --  --  94 %  --  --          Vital Signs Comment: pt on room air     Objective   Pain:  Pain Assessment  Pain Assessment: 0-10  0-10 (Numeric) Pain Score: 3  Pain Type: Acute pain  Pain Location: Buttocks  Pain Interventions: Repositioned  Response to Interventions: Content/relaxed  Cognition:  Cognition  Overall Cognitive Status: Impaired  Orientation Level: Oriented X4  Following Commands: Follows one step commands with increased time  Safety Judgment: Decreased awareness of need for assistance  Cognition Comments: highly anxious regarding mobility  Insight: Moderate  Impulsive: Mildly  Processing Speed: Delayed    General Assessments:  General Observation  General Observation: pleasant; anxious    Activity Tolerance  Endurance: Decreased tolerance for upright activites  Activity Tolerance Comments: limited due to weakness and fear of falling  Rate of Perceived Exertion (RPE): 5/10    Sensation  Sensation Comment: pt denies paresthesia BLE    Strength  Strength Comments: BLE grossly 3-3+/5  Coordination  Coordination Comment: increased time and effort for mobility    Postural Control  Posture Comment: forward head; rounded shoulders    Static Sitting Balance  Static Sitting-Balance Support: Feet supported, Bilateral upper extremity supported  Static Sitting-Level of Assistance: Minimum assistance  Static Sitting-Comment/Number of Minutes: EOB for approx. 3 min    Static Standing Balance  Static Standing-Balance Support: Bilateral upper extremity supported (HHA)  Static Standing-Level of Assistance: Maximum assistance (x2)  Static Standing-Comment/Number of Minutes: retrolean in standing  Functional Assessments:  Bed Mobility  Bed Mobility: Yes  Bed  Mobility 1  Bed Mobility 1: Supine to sitting  Level of Assistance 1: Maximum assistance  Bed Mobility Comments 1: assist for trunk elevation; able to move BLE to EOB  Bed Mobility 2  Bed Mobility  2: Sitting to supine  Level of Assistance 2: Maximum assistance, +2  Bed Mobility Comments 2: pt impulsively returning to supine due to fear of falling; maxAx2 for trunk down and BLE into supine.  Bed Mobility 3  Bed Mobility 3: Rolling right, Rolling left  Level of Assistance 3: Maximum assistance  Bed Mobility Comments 3: MaxA for rolling samia for donning of new linens; simple VCs for safe hand placement and sequencing    Transfers  Transfer: Yes  Transfer 1  Transfer From 1: Sit to, Stand to  Transfer to 1: Stand, Sit  Technique 1: Sit to stand, Stand to sit  Transfer Level of Assistance 1: Maximum assistance, +2  Trials/Comments 1: MaxAx2 with HHA; forward flexed posture with samia soft knees    Ambulation/Gait Training  Ambulation/Gait Training Performed: No (unable to take steps safely at this time; pt voicing fear of falls)  Extremity/Trunk Assessments:  RLE   RLE :  (grossly 3-3+/5)  LLE   LLE :  (grossly 3-3+/5)  Outcome Measures:  [facility] Basic Mobility  Turning from your back to your side while in a flat bed without using bedrails: A lot  Moving from lying on your back to sitting on the side of a flat bed without using bedrails: A lot  Moving to and from bed to chair (including a wheelchair): Total  Standing up from a chair using your arms (e.g. wheelchair or bedside chair): A lot  To walk in hospital room: Total  Climbing 3-5 steps with railing: Total  Basic Mobility - Total Score: 9    Encounter Problems       Encounter Problems (Active)       PT Problem       Strength (Progressing)       Start:  03/31/25    Expected End:  04/30/25       The patient will demonstrate an overall strength of 4/5 in BLE to assist with completion of functional mobility.           Functional Mobility (Progressing)       Start:  03/31/25     Expected End:  04/30/25       The patient will complete functional mobility (bed mobility, transfers, etc.) at a mod indep level with LRAD by DC.           Ambulation (Progressing)       Start:  03/31/25    Expected End:  04/30/25       The patient will be able to ambulate at a mod indep level for >10ftx1 with RW.          Balance (Progressing)       Start:  03/31/25    Expected End:  04/30/25       The patient will demonstrate good+ dynamic standing balance during activity with LRAD.          WC Management (Progressing)       Start:  03/31/25    Expected End:  04/30/25       The patient will be able to self-propel WC 30ftx1 on an even surface.                 Education Documentation  Mobility Training, taught by Lupe Castaneda PT at 3/31/2025  3:18 PM.  Learner: Patient  Readiness: Acceptance  Method: Explanation  Response: Verbalizes Understanding  Comment: educated pt on PT POC    Education Comments  No comments found.

## 2025-03-31 NOTE — PROGRESS NOTES
Occupational Therapy    Evaluation    Patient Name: Tammy Walker  MRN: [id number]  Department: [facility] ED  Room: [place] [address]  Today's Date: 3/31/2025  Time Calculation  Start Time: 1445  Stop Time: 1500  Time Calculation (min): 15 min    Assessment  IP OT Assessment  OT Assessment: OT order received, chart reviewed, evaluation complete. Pt demonstrated decreased ADL status and functional mobility due to generalized weakness. Pt appears fearful of falls and required max assist to perform bed mobilty and transfers. Pt would benefit from acute OT services to facilitate PLOF.  Prognosis: Fair  Barriers to Discharge Home: Physical needs, Caregiver assistance  Caregiver Assistance: Caregiver assistance needed per identified barriers - however, level of patient's required assistance exceeds assistance available at home  Physical Needs: 24hr mobility assistance needed, 24hr ADL assistance needed, High falls risk due to function or environment, Ambulating household distances limited by function/safety  Evaluation/Treatment Tolerance: Treatment limited secondary to medical complications (Comment)  Medical Staff Made Aware: Yes  End of Session Communication: Bedside nurse  End of Session Patient Position: Bed, 2 rail up, On cart, Alarm off, not on at start of session (caregiver present, pt agreeable to notifiy nursing for needs. call light in reach)  Plan:  Treatment Interventions: ADL retraining, Functional transfer training, UE strengthening/ROM, Endurance training, Patient/family training, Equipment evaluation/education, Compensatory technique education  OT Frequency: 4 times per week  OT Discharge Recommendations: Moderate intensity level of continued care  Equipment Recommended upon Discharge: Wheeled walker  OT Recommended Transfer Status: Maximum assist, Assist of 2  OT - OK to Discharge: Yes    Subjective   Current Problem:  No diagnosis found.  General:  General  Reason for Referral: Activities of daily living  Referred  By: Quang Howell,   Past Medical History Relevant to Rehab: CHF, HTN, TIA, HLD, Arthritis  Family/Caregiver Present: Yes  Caregiver Feedback: sister and brother present  Co-Treatment: PT  Co-Treatment Reason: maximization of pt safety and functional mobility  Prior to Session Communication: Bedside nurse  Patient Position Received: On cart, Alarm off, not on at start of session  General Comment: Pt is a 69 year old female admit with generalized weakess resulting in multiiple falls at home.  Precautions:  Hearing/Visual Limitations: glasses prn  Medical Precautions: Fall precautions  Precautions Comment: h/o multiple recent falls     Date/Time Vitals Session Patient Position Pulse Resp SpO2 BP MAP (mmHg)    03/31/25 13:45:20 --  --  --  --  94 %  --  --          Vital Signs Comment: pt on room air    Pain:  Pain Assessment  Pain Assessment: 0-10  0-10 (Numeric) Pain Score: 3  Pain Type: Acute pain  Pain Location: Buttocks  Pain Interventions: Repositioned  Response to Interventions: Content/relaxed    Objective   Cognition:  Overall Cognitive Status: Impaired  Orientation Level: Oriented X4  Following Commands: Follows one step commands with increased time  Safety Judgment: Decreased awareness of need for assistance  Cognition Comments: pt appears highly anxious and fearful of functional mobility  Insight: Moderate  Impulsive: Mildly  Processing Speed: Delayed           Home Living:  Type of Home: House  Lives With: Siblings (sister)  Home Adaptive Equipment: Walker rolling or standard, Wheelchair-manual  Home Layout: Multi-level, Able to live on main level with bedroom/bathroom  Home Access: Stairs to enter with rails  Entrance Stairs-Rails:  (unilateral)  Entrance Stairs-Number of Steps: 5  Bathroom Shower/Tub: Tub/shower unit  Bathroom Toilet: Standard  Bathroom Equipment: Grab bars in shower  Bathroom Accessibility: located on 1st floor  Home Living Comments: pt lives in a multi level but stays on main floor. pt  states she does not leave the house   Prior Function:  Level of [place]: Needs assistance with ADLs, Needs assistance with homemaking  Receives Help From: Family (sister)  ADL Assistance:  (needs assistance, currently sponge bathes and requires assistance with dressing)  Homemaking Assistance:  (needs assistance, completed by family)  Ambulatory Assistance: Independent (modified independent with WC, pt reports she performs stand pivot transfer to get in and out of wheelchair)  Vocational: Retired  Prior Function Comments: per pt and family, pt with increasing functional decline with multiple falls. Pt requires assist with bathing and dressing and is mainly WC level with ability to stand pivot transfer without assist     ADL:  Eating Assistance: Minimal  Eating Deficit: Setup (pt projected to require min assist with opening of lids and containers)  Grooming Assistance: Minimal  Grooming Deficit: Increased time to complete, Setup, Other (Comment) (pt projected to require min assist for set up and gathering of items)  Bathing Assistance: Maximal  Bathing Deficit: Supervision/safety, Increased time to complete , Chest, Right arm, Left arm, Abdomen, Right upper leg, Left upper leg, Right lower leg including foot, Left lower leg including foot (pt demonstrates limited stability and projected to require assistance with reaching extremities while maintaining balance)  UE Dressing Assistance: Maximal  UE Dressing Deficit: Supervision/safety, Increased time to complete, Thread RUE, Thread LUE, Pull over head, Pull around back, Pull down in back (Pt projected to require assistance with all aspects of UE dressing due to generalized weakness)  LE Dressing Assistance: Maximal  LE Dressing Deficit: Supervision/safety, Increased time to complete, Don/doff R sock, Don/doff L sock, Thread RLE into pants, Thread LLE into pants, Thread RLE into underwear, Thread LLE into underwear, Pull up over hips, Fasteners, Don/doff R shoe,  Don/doff L shoe (pt required max assist for donning socks and projected to require max assist with all LE dressing)  Toileting Assistance with Device: Maximal  Toileting Deficit: Supervison/safety, Increased time to complete, Clothing management up, Clothing management down, Incontinent  Activity Tolerance:  Endurance: Decreased tolerance for upright activites  Rate of Perceived Exertion (RPE): 5/10  Bed Mobility/Transfers: Bed Mobility  Bed Mobility: Yes  Bed Mobility 1  Bed Mobility 1: Supine to sitting, Sitting to supine  Level of Assistance 1: Maximum assistance, +2  Bed Mobility Comments 1: Pt required max assist for elevating LE over EOB and positioning trunk into upright position. Pt required frequent commands for technique and verbal cueing to utilize bed rails. Pt impulsively returned to supine due to fear of falling.  Bed Mobility 2  Bed Mobility  2: Rolling right, Rolling left  Level of Assistance 2: Maximum assistance, +2  Bed Mobility Comments 2: Pt required max Ax2 for rolling as pt fearful of getting close to EOB. Pt required increased time to process commands and tactile cues for hand placement    Transfers  Transfer: Yes  Transfer 1  Transfer From 1: Bed to, Stand to  Transfer to 1: Stand, Bed  Technique 1: Sit to stand, Stand to sit  Transfer Level of Assistance 1: Maximum assistance  Trials/Comments 1: Pt required max assistance for elevation from bed. Pt appeared fearful of falling during sit to stand and rapidly returned to sitting requiring max assist for controlled descent. Pt also required blocking of B knees to prevent knee flexion while in standing    Functional Mobility:  Functional Mobility  Functional Mobility Performed: No    Sensation:  Light Touch: No apparent deficits  Strength:  Strength Comments: JAMESONE 3/5    Coordination:  Movements are Fluid and Coordinated: No  Upper Body Coordination: pt demonstrates slow and guarded movements   Hand Function:  Hand Function  Gross Grasp:  Functional  Coordination: Impaired  Extremities: RUE   RUE : Within Functional Limits and LUE   LUE: Within Functional Limits    Outcome Measures: [facility] Daily Activity  Putting on and taking off regular lower body clothing: Total  Bathing (including washing, rinsing, drying): A lot  Putting on and taking off regular upper body clothing: A lot  Toileting, which includes using toilet, bedpan or urinal: A lot  Taking care of personal grooming such as brushing teeth: A little  Eating Meals: A little  Daily Activity - Total Score: 13      Education Documentation  ADL Training, taught by DONOVAN Wade at 3/31/2025  3:01 PM.  Learner: Patient  Readiness: Acceptance  Method: Explanation  Response: Verbalizes Understanding  Comment: Pt educated on call light use and plan for therapy.    Education Comments  No comments found.      Goals:   Encounter Problems       Encounter Problems (Active)       OT Goals       ADLs       Start:  03/31/25    Expected End:  04/11/25       Patient will complete ADLs with minimal assistance.          Functional Transfer       Start:  03/31/25    Expected End:  04/11/25       Patient will complete functional transfer with modified independence utilizing safe transfer techniques.          Bed Mobility        Start:  03/31/25    Expected End:  04/11/25       Patient will complete bed mobility with modified independence with use of bed rails.

## 2025-04-01 LAB
ALBUMIN SERPL BCP-MCNC: 3.3 G/DL (ref 3.4–5)
ANION GAP SERPL CALCULATED.3IONS-SCNC: 12 MMOL/L (ref 10–20)
ANION GAP SERPL CALCULATED.3IONS-SCNC: 12 MMOL/L (ref 10–20)
ATRIAL RATE: 57 BPM
BACTERIA UR CULT: NO GROWTH
BUN SERPL-MCNC: 29 MG/DL (ref 6–23)
BUN SERPL-MCNC: 31 MG/DL (ref 6–23)
CALCIUM SERPL-MCNC: 7.9 MG/DL (ref 8.6–10.3)
CALCIUM SERPL-MCNC: 8.1 MG/DL (ref 8.6–10.3)
CHLORIDE SERPL-SCNC: 102 MMOL/L (ref 98–107)
CHLORIDE SERPL-SCNC: 104 MMOL/L (ref 98–107)
CO2 SERPL-SCNC: 26 MMOL/L (ref 21–32)
CO2 SERPL-SCNC: 28 MMOL/L (ref 21–32)
CREAT SERPL-MCNC: 1.95 MG/DL (ref 0.5–1.05)
CREAT SERPL-MCNC: 2.2 MG/DL (ref 0.5–1.05)
EGFRCR SERPLBLD CKD-EPI 2021: 24 ML/MIN/1.73M*2
EGFRCR SERPLBLD CKD-EPI 2021: 28 ML/MIN/1.73M*2
ERYTHROCYTE [DISTWIDTH] IN BLOOD BY AUTOMATED COUNT: 13.9 % (ref 11.5–14.5)
GLUCOSE SERPL-MCNC: 107 MG/DL (ref 74–99)
GLUCOSE SERPL-MCNC: 91 MG/DL (ref 74–99)
HCT VFR BLD AUTO: 34 % (ref 36–46)
HGB BLD-MCNC: 11.6 G/DL (ref 12–16)
HOLD SPECIMEN: NORMAL
MAGNESIUM SERPL-MCNC: 1.22 MG/DL (ref 1.6–2.4)
MAGNESIUM SERPL-MCNC: 2.36 MG/DL (ref 1.6–2.4)
MCH RBC QN AUTO: 30.3 PG (ref 26–34)
MCHC RBC AUTO-ENTMCNC: 34.1 G/DL (ref 32–36)
MCV RBC AUTO: 89 FL (ref 80–100)
NRBC BLD-RTO: 0 /100 WBCS (ref 0–0)
P AXIS: 58 DEGREES
P OFFSET: 151 MS
P ONSET: 107 MS
PHOSPHATE SERPL-MCNC: 4 MG/DL (ref 2.5–4.9)
PLATELET # BLD AUTO: 223 X10*3/UL (ref 150–450)
POTASSIUM SERPL-SCNC: 3 MMOL/L (ref 3.5–5.3)
POTASSIUM SERPL-SCNC: 3.3 MMOL/L (ref 3.5–5.3)
PR INTERVAL: 198 MS
Q ONSET: 206 MS
QRS COUNT: 10 BEATS
QRS DURATION: 124 MS
QT INTERVAL: 504 MS
QTC CALCULATION(BAZETT): 490 MS
QTC FREDERICIA: 495 MS
R AXIS: -77 DEGREES
RBC # BLD AUTO: 3.83 X10*6/UL (ref 4–5.2)
SODIUM SERPL-SCNC: 137 MMOL/L (ref 136–145)
SODIUM SERPL-SCNC: 141 MMOL/L (ref 136–145)
T AXIS: 101 DEGREES
T OFFSET: 458 MS
VENTRICULAR RATE: 57 BPM
WBC # BLD AUTO: 9.2 X10*3/UL (ref 4.4–11.3)

## 2025-04-01 PROCEDURE — 80069 RENAL FUNCTION PANEL: CPT

## 2025-04-01 PROCEDURE — 85027 COMPLETE CBC AUTOMATED: CPT

## 2025-04-01 PROCEDURE — 80048 BASIC METABOLIC PNL TOTAL CA: CPT | Mod: CCI | Performed by: INTERNAL MEDICINE

## 2025-04-01 PROCEDURE — 36415 COLL VENOUS BLD VENIPUNCTURE: CPT | Performed by: INTERNAL MEDICINE

## 2025-04-01 PROCEDURE — 2500000004 HC RX 250 GENERAL PHARMACY W/ HCPCS (ALT 636 FOR OP/ED): Performed by: INTERNAL MEDICINE

## 2025-04-01 PROCEDURE — 2500000004 HC RX 250 GENERAL PHARMACY W/ HCPCS (ALT 636 FOR OP/ED)

## 2025-04-01 PROCEDURE — 97535 SELF CARE MNGMENT TRAINING: CPT | Mod: GO,CO

## 2025-04-01 PROCEDURE — 97110 THERAPEUTIC EXERCISES: CPT | Mod: GP,CQ

## 2025-04-01 PROCEDURE — G0378 HOSPITAL OBSERVATION PER HR: HCPCS

## 2025-04-01 PROCEDURE — 36415 COLL VENOUS BLD VENIPUNCTURE: CPT

## 2025-04-01 PROCEDURE — 83735 ASSAY OF MAGNESIUM: CPT

## 2025-04-01 PROCEDURE — 96365 THER/PROPH/DIAG IV INF INIT: CPT | Mod: 59

## 2025-04-01 PROCEDURE — 2500000001 HC RX 250 WO HCPCS SELF ADMINISTERED DRUGS (ALT 637 FOR MEDICARE OP): Performed by: INTERNAL MEDICINE

## 2025-04-01 PROCEDURE — 99233 SBSQ HOSP IP/OBS HIGH 50: CPT | Performed by: INTERNAL MEDICINE

## 2025-04-01 PROCEDURE — 97530 THERAPEUTIC ACTIVITIES: CPT | Mod: GP,CQ

## 2025-04-01 PROCEDURE — 96372 THER/PROPH/DIAG INJ SC/IM: CPT

## 2025-04-01 PROCEDURE — 2500000001 HC RX 250 WO HCPCS SELF ADMINISTERED DRUGS (ALT 637 FOR MEDICARE OP)

## 2025-04-01 PROCEDURE — 83735 ASSAY OF MAGNESIUM: CPT | Performed by: INTERNAL MEDICINE

## 2025-04-01 PROCEDURE — 96366 THER/PROPH/DIAG IV INF ADDON: CPT | Mod: 59

## 2025-04-01 RX ORDER — NYSTATIN 100000 [USP'U]/G
1 POWDER TOPICAL 2 TIMES DAILY
Status: DISCONTINUED | OUTPATIENT
Start: 2025-04-01 | End: 2025-04-02 | Stop reason: HOSPADM

## 2025-04-01 RX ORDER — POTASSIUM CHLORIDE 1.5 G/1.58G
40 POWDER, FOR SOLUTION ORAL
Status: DISCONTINUED | OUTPATIENT
Start: 2025-04-01 | End: 2025-04-02 | Stop reason: HOSPADM

## 2025-04-01 RX ORDER — MAGNESIUM SULFATE HEPTAHYDRATE 40 MG/ML
4 INJECTION, SOLUTION INTRAVENOUS ONCE
Status: COMPLETED | OUTPATIENT
Start: 2025-04-01 | End: 2025-04-01

## 2025-04-01 RX ADMIN — POTASSIUM CHLORIDE 40 MEQ: 1.5 POWDER, FOR SOLUTION ORAL at 08:57

## 2025-04-01 RX ADMIN — CHOLECALCIFEROL (VITAMIN D3) 10 MCG (400 UNIT) TABLET 10 MCG: at 08:34

## 2025-04-01 RX ADMIN — MAGNESIUM SULFATE HEPTAHYDRATE 4 G: 40 INJECTION, SOLUTION INTRAVENOUS at 10:24

## 2025-04-01 RX ADMIN — HEPARIN SODIUM 5000 UNITS: 5000 INJECTION INTRAVENOUS; SUBCUTANEOUS at 05:00

## 2025-04-01 RX ADMIN — POTASSIUM CHLORIDE 40 MEQ: 1.5 POWDER, FOR SOLUTION ORAL at 17:31

## 2025-04-01 RX ADMIN — ASPIRIN 81 MG: 81 TABLET, CHEWABLE ORAL at 08:34

## 2025-04-01 RX ADMIN — HEPARIN SODIUM 5000 UNITS: 5000 INJECTION INTRAVENOUS; SUBCUTANEOUS at 14:46

## 2025-04-01 RX ADMIN — ATORVASTATIN CALCIUM 40 MG: 40 TABLET, FILM COATED ORAL at 21:06

## 2025-04-01 RX ADMIN — NYSTATIN 1 APPLICATION: 100000 POWDER TOPICAL at 21:07

## 2025-04-01 RX ADMIN — HEPARIN SODIUM 5000 UNITS: 5000 INJECTION INTRAVENOUS; SUBCUTANEOUS at 21:06

## 2025-04-01 RX ADMIN — NYSTATIN 1 APPLICATION: 100000 POWDER TOPICAL at 14:46

## 2025-04-01 ASSESSMENT — PAIN SCALES - GENERAL
PAINLEVEL_OUTOF10: 0 - NO PAIN
PAINLEVEL_OUTOF10: 3
PAINLEVEL_OUTOF10: 0 - NO PAIN

## 2025-04-01 ASSESSMENT — COGNITIVE AND FUNCTIONAL STATUS - GENERAL
TURNING FROM BACK TO SIDE WHILE IN FLAT BAD: A LITTLE
TOILETING: A LITTLE
STANDING UP FROM CHAIR USING ARMS: A LOT
STANDING UP FROM CHAIR USING ARMS: A LOT
CLIMB 3 TO 5 STEPS WITH RAILING: TOTAL
WALKING IN HOSPITAL ROOM: A LOT
DRESSING REGULAR LOWER BODY CLOTHING: A LITTLE
TURNING FROM BACK TO SIDE WHILE IN FLAT BAD: A LITTLE
HELP NEEDED FOR BATHING: A LITTLE
CLIMB 3 TO 5 STEPS WITH RAILING: TOTAL
DRESSING REGULAR UPPER BODY CLOTHING: A LITTLE
MOVING TO AND FROM BED TO CHAIR: A LOT
TURNING FROM BACK TO SIDE WHILE IN FLAT BAD: A LOT
MOBILITY SCORE: 14
HELP NEEDED FOR BATHING: A LITTLE
HELP NEEDED FOR BATHING: A LOT
WALKING IN HOSPITAL ROOM: TOTAL
DAILY ACTIVITIY SCORE: 20
DRESSING REGULAR LOWER BODY CLOTHING: A LITTLE
EATING MEALS: A LITTLE
MOBILITY SCORE: 10
DAILY ACTIVITIY SCORE: 12
DRESSING REGULAR UPPER BODY CLOTHING: A LITTLE
STANDING UP FROM CHAIR USING ARMS: A LOT
DAILY ACTIVITIY SCORE: 20
MOVING FROM LYING ON BACK TO SITTING ON SIDE OF FLAT BED WITH BEDRAILS: A LOT
TOILETING: A LITTLE
MOBILITY SCORE: 14
WALKING IN HOSPITAL ROOM: A LOT
PERSONAL GROOMING: A LITTLE
DRESSING REGULAR LOWER BODY CLOTHING: TOTAL
TOILETING: TOTAL
MOVING TO AND FROM BED TO CHAIR: A LOT
DRESSING REGULAR UPPER BODY CLOTHING: A LOT
CLIMB 3 TO 5 STEPS WITH RAILING: TOTAL
MOVING TO AND FROM BED TO CHAIR: A LOT

## 2025-04-01 ASSESSMENT — ACTIVITIES OF DAILY LIVING (ADL): HOME_MANAGEMENT_TIME_ENTRY: 39

## 2025-04-01 ASSESSMENT — PAIN - FUNCTIONAL ASSESSMENT
PAIN_FUNCTIONAL_ASSESSMENT: 0-10
PAIN_FUNCTIONAL_ASSESSMENT: 0-10

## 2025-04-01 NOTE — CARE PLAN
The patient's goals for the shift include      The clinical goals for the shift include Vitals will remain stable overnight and no falls.      Problem: Pain - Adult  Goal: Verbalizes/displays adequate comfort level or baseline comfort level  Outcome: Progressing     Problem: Safety - Adult  Goal: Free from fall injury  Outcome: Progressing     Problem: Discharge Planning  Goal: Discharge to home or other facility with appropriate resources  Outcome: Progressing     Problem: Chronic Conditions and Co-morbidities  Goal: Patient's chronic conditions and co-morbidity symptoms are monitored and maintained or improved  Outcome: Progressing     Problem: Nutrition  Goal: Nutrient intake appropriate for maintaining nutritional needs  Outcome: Progressing     Problem: Skin  Goal: Decreased wound size/increased tissue granulation at next dressing change  Outcome: Progressing  Goal: Participates in plan/prevention/treatment measures  Outcome: Progressing  Goal: Prevent/manage excess moisture  Outcome: Progressing  Goal: Prevent/minimize sheer/friction injuries  Outcome: Progressing  Goal: Promote/optimize nutrition  Outcome: Progressing  Goal: Promote skin healing  Outcome: Progressing

## 2025-04-01 NOTE — PROGRESS NOTES
Occupational Therapy    OT Treatment    Patient Name: Tammy Walker  MRN: [id number]  Department: [address]  Room: [employer] [facility]  Today's Date: 4/1/2025  Time Calculation  Start Time: 0911  Stop Time: 0950  Time Calculation (min): 39 min        Assessment:  OT Assessment: Gradual progress made towards OT goals. Continue with current OT POC to increase strength, balance and functional tolerance to maximize safety and independence during ADLs.  Barriers to Discharge Home: Physical needs, Caregiver assistance  Caregiver Assistance: Caregiver assistance needed per identified barriers - however, level of patient's required assistance exceeds assistance available at home  Physical Needs: 24hr mobility assistance needed, 24hr ADL assistance needed, High falls risk due to function or environment, Ambulating household distances limited by function/safety  Evaluation/Treatment Tolerance: Patient limited by fatigue  End of Session Communication: Bedside nurse  End of Session Patient Position: Up in chair, Alarm on (all needs in reach)  OT Assessment Results: Decreased ADL status, Decreased upper extremity range of motion, Decreased upper extremity strength, Decreased safe judgment during ADL, Decreased endurance, Decreased functional mobility, Decreased IADLs  Barriers to Discharge: Inaccessible home environment, Decreased caregiver support  Evaluation/Treatment Tolerance: Patient limited by fatigue  Plan:  Treatment Interventions: ADL retraining, Functional transfer training, UE strengthening/ROM, Endurance training, Patient/family training, Equipment evaluation/education, Compensatory technique education  OT Frequency: 4 times per week  OT Discharge Recommendations: Moderate intensity level of continued care  Equipment Recommended upon Discharge: Wheeled walker  OT Recommended Transfer Status: Maximum assist, Assist of 2  OT - OK to Discharge: Yes  Treatment Interventions: ADL retraining, Functional transfer training, UE  strengthening/ROM, Endurance training, Patient/family training, Equipment evaluation/education, Compensatory technique education    Subjective   Previous Visit Info:  OT Last Visit  OT Received On: 04/01/25  General:  General  Reason for Referral: impaired ADLs, weakness  Past Medical History Relevant to Rehab: CHF, HTN, TIA, HLD, Arthritis  Prior to Session Communication: Bedside nurse  Patient Position Received: Bed, 3 rail up, Alarm on  General Comment: Pt cleared for therapy session per nursing, pleasant and cooperative this date. Increased time and rest breaks required for task completion secondary to fatigue.  Precautions:  Hearing/Visual Limitations: glasses prn  Medical Precautions: Fall precautions  Precautions Comment: h/o multiple recent falls, pt keeps L knee in extension despite education and encouragement- pt with knee flexion when prompted      Vital Signs Comment: pt on room air     Pain:  Pain Assessment  Pain Assessment: 0-10  0-10 (Numeric) Pain Score: 3  Pain Type: Chronic pain  Pain Location: Knee  Pain Orientation: Left  Clinical Progression: Not changed  Pain Interventions: Repositioned, Ambulation/increased activity  Response to Interventions: No change in pain    Objective    Cognition:  Cognition  Overall Cognitive Status: Impaired  Orientation Level: Oriented X4  Following Commands: Follows one step commands with increased time  Safety Judgment: Decreased awareness of need for assistance  Cognition Comments: intermittent confusion and decreased insight observed  Safety/Judgement: Exceptions to WFL  Insight: Moderate  Impulsive: Mildly  Task Initiation: Initiates with cues  Processing Speed: Delayed  Coordination:  Movements are Fluid and Coordinated: No  Upper Body Coordination: slower rate/ accuracy of movements  Lower Body Coordination: slower rate of movements- pt prefers to keep L kneein extension despite education and encouragement.  Activities of Daily Living: Grooming  Grooming  Comments: oral hygiene tasks completed seated in bedside chair with set-up and close supervision, however required maxA for hair management tasks this date.    UE Bathing  UE Bathing Comments: UB bathing tasks completed seated in bedside chair with Emmy- verbal cues required for task initation and sequencing.    LE Bathing  LE Bathing Comments: pt able to complete proximal BLE bathing and anterior hygiene using compensatory techniques/ weight shifting with Emmy however required maxA for distal BLE bathing while seated in bedside chair.    UE Dressing  UE Dressing Comments: Emmy required to don/[facility] gown for sequencing while seated in bedside chair.  Functional Standing Tolerance:     Bed Mobility/Transfers: Bed Mobility 1  Bed Mobility 1: Supine to sitting  Level of Assistance 1: Moderate assistance, Minimal verbal cues, +2  Bed Mobility Comments 1: HOB elevated to assist with task completion. Increased time and effort required to complete with use of draw sheet.    Transfers  Transfer: Yes  Transfer 1  Trials/Comments 1: bed>chair completed as squat-pivot with maxAx2 and B knee blocking. Observed forward flexed posture      Outcome Measures:[facility] Daily Activity  Putting on and taking off regular lower body clothing: Total  Bathing (including washing, rinsing, drying): A lot  Putting on and taking off regular upper body clothing: A lot  Toileting, which includes using toilet, bedpan or urinal: Total  Taking care of personal grooming such as brushing teeth: A little  Eating Meals: A little  Daily Activity - Total Score: 12        Education Documentation  ADL Training, taught by VIJAY Frank at 4/1/2025 10:43 AM.  Learner: Patient  Readiness: Acceptance  Method: Explanation, Demonstration  Response: Needs Reinforcement  Comment: safety awareness throughout functional tasks/ transfers.    Education Comments  Education provided on role of OT/POC, safety awareness throughout functional tasks/transfers,  importance of activity/ rest routine, EC/WS techniques, and use of call light for assistance. Questions, comments and concerns addressed regarding OT.      Goals:  Encounter Problems       Encounter Problems (Active)       OT Goals       ADLs (Progressing)       Start:  03/31/25    Expected End:  04/11/25       Patient will complete ADLs with minimal assistance.          Functional Transfer (Progressing)       Start:  03/31/25    Expected End:  04/11/25       Patient will complete functional transfer with modified independence utilizing safe transfer techniques.          Bed Mobility  (Progressing)       Start:  03/31/25    Expected End:  04/11/25       Patient will complete bed mobility with modified independence with use of bed rails.

## 2025-04-01 NOTE — CARE PLAN
Problem: Pain - Adult  Goal: Verbalizes/displays adequate comfort level or baseline comfort level  4/1/2025 1342 by Sydney Ybarra, ZOË  Outcome: Progressing  4/1/2025 1340 by Sydney Ybarra RN  Outcome: Progressing  4/1/2025 1339 by Sydney Ybarra, ZOË  Outcome: Progressing  4/1/2025 1332 by Sydney Ybarra, ZOË  Outcome: Progressing   The patient's goals for the shift include      The clinical goals for the shift include no falls, remain HDS

## 2025-04-01 NOTE — CARE PLAN
Problem: Pain - Adult  Goal: Verbalizes/displays adequate comfort level or baseline comfort level  4/1/2025 1343 by Sydney Ybarra RN  Outcome: Progressing  4/1/2025 1342 by Sydney Ybarra RN  Outcome: Progressing  4/1/2025 1340 by Sydney Ybarra RN  Outcome: Progressing  4/1/2025 1339 by Sydney Ybarra RN  Outcome: Progressing  4/1/2025 1332 by Sydney Ybarra RN  Outcome: Progressing   The patient's goals for the shift include      The clinical goals for the shift include no falls, remain HDS

## 2025-04-01 NOTE — TELEPHONE ENCOUNTER
Patient active with [facility]. Followed by Raisa Goff CNP. Admitted to [facility] 3/31/25 with generalized weakness, LORETA, acute cystitis. Family indicated desire to have patient sent to SNF upon discharge.

## 2025-04-01 NOTE — PROGRESS NOTES
Physical Therapy    Physical Therapy Treatment    Patient Name: Tammy Walker  MRN: [id number]  Department: [address]  Room: [facility]  Today's Date: 4/1/2025  Time Calculation  Start Time: 0910  Stop Time: 0933  Time Calculation (min): 23 min         Assessment/Plan   PT Assessment  Barriers to Discharge Home: Physical needs  Physical Needs: Stair navigation into home limited by function/safety, 24hr mobility assistance needed, 24hr ADL assistance needed, High falls risk due to function or environment  End of Session Communication: Bedside nurse  Assessment Comment: Pt continues to present with mobility and strength deficits, pt would benefit from moderate intensity to increase strength and improve functional mobility.  End of Session Patient Position: Up in chair, Alarm on  PT Plan  Inpatient/Swing Bed or Outpatient: Inpatient  PT Plan  Treatment/Interventions: Bed mobility, Transfer training, Gait training, Balance training, Neuromuscular re-education, Strengthening, Endurance training, Range of motion, Therapeutic exercise, Therapeutic activity, Wheelchair management  PT Plan: Ongoing PT  PT Frequency: 3 times per week  PT Discharge Recommendations: Moderate intensity level of continued care  Equipment Recommended upon Discharge: Wheeled walker, Wheelchair  PT Recommended Transfer Status: Assist x2  PT - OK to Discharge: Yes      General Visit Information:   PT  Visit  PT Received On: 04/01/25  General  Prior to Session Communication: Bedside nurse  Patient Position Received: Bed, 3 rail up, Alarm on  General Comment: Cleared by nursing to be seen for therapy, pt agreeable with tx, supine in bed upon arrival.    Subjective   Precautions:  Precautions  Medical Precautions: Fall precautions    Objective   Pain:  Pain Assessment  Pain Assessment: 0-10  0-10 (Numeric) Pain Score: 0 - No pain    Cognition:  Cognition  Overall Cognitive Status: Impaired  Orientation Level: Oriented X4     Postural Control:  Static  Sitting Balance  Static Sitting-Balance Support: Feet supported, Bilateral upper extremity supported  Static Sitting-Level of Assistance: Contact guard  Static Sitting-Comment/Number of Minutes: Fair seated static balance  Static Standing Balance  Static Standing-Balance Support: Bilateral upper extremity supported  Static Standing-Level of Assistance: Maximum assistance  Static Standing-Comment/Number of Minutes: Poor static standing balance with bilateral UE HHA.     Treatments:  Therapeutic Exercise  Therapeutic Exercise Performed: Yes  Therapeutic Exercise Activity 1: Bilateral ankle pumps x15  Therapeutic Exercise Activity 2: Bilateral hip flexion x15  Therapeutic Exercise Activity 3: Bilateral knee extension x15  Therapeutic Exercise Activity 4: Resisted hip abd/add 15    Bed Mobility  Bed Mobility: Yes  Bed Mobility 1  Bed Mobility 1: Supine to sitting  Level of Assistance 1: Moderate assistance, Minimal verbal cues, +2  Bed Mobility Comments 1: Mod assist x2 for trunk/bilateral LE's during supine to sit, mod assist to scoot EOB with use of draw sheet. Pt able to initiate LE's towards EOB.    Transfers  Transfer: Yes  Transfer 1  Transfer From 1: Sit to  Transfer to 1: Stand  Technique 1: Sit to stand, Stand to sit  Transfer Level of Assistance 1: Hand held assistance, Minimal verbal cues, +2, Maximum assistance  Trials/Comments 1: Max assist x2 for trunk up during sit to stand, poor ability to achieve an upright posture.    Transfers 2  Transfer From 2: Bed to  Transfer to 2: Chair with arms  Technique 2: Stand pivot  Transfer Level of Assistance 2: Maximum assistance, +2  Trials/Comments 2: Stand pivot transfer with bilateral arm in arm assist x2.    Outcome Measures:  [facility] Basic Mobility  Turning from your back to your side while in a flat bed without using bedrails: A lot  Moving from lying on your back to sitting on the side of a flat bed without using bedrails: A lot  Moving to and from bed to chair  (including a wheelchair): A lot  Standing up from a chair using your arms (e.g. wheelchair or bedside chair): A lot  To walk in hospital room: Total  Climbing 3-5 steps with railing: Total  Basic Mobility - Total Score: 10    Education Documentation  Mobility Training, taught by Lidia Georges PTA at 4/1/2025  9:56 AM.  Learner: Patient  Readiness: Acceptance  Method: Explanation  Response: Verbalizes Understanding    Education Comments  04/01/25 3479 Lidia Georges PTA  Patient educated on the importance of mobility and participation with therapy. Educated on safety and use of call light for assistance.           Encounter Problems       Encounter Problems (Active)       PT Problem       Strength (Progressing)       Start:  03/31/25    Expected End:  04/30/25       The patient will demonstrate an overall strength of 4/5 in BLE to assist with completion of functional mobility.           Functional Mobility (Progressing)       Start:  03/31/25    Expected End:  04/30/25       The patient will complete functional mobility (bed mobility, transfers, etc.) at a mod indep level with LRAD by DC.           Ambulation (Not Progressing)       Start:  03/31/25    Expected End:  04/30/25       The patient will be able to ambulate at a mod indep level for >10ftx1 with RW.          Balance (Progressing)       Start:  03/31/25    Expected End:  04/30/25       The patient will demonstrate good+ dynamic standing balance during activity with LRAD.          WC Management (Not Progressing)       Start:  03/31/25    Expected End:  04/30/25       The patient will be able to self-propel WC 30ftx1 on an even surface.             Pain - Adult

## 2025-04-01 NOTE — PROGRESS NOTES
Anticipate discharge soon. [facility] accepted patient and precert was started on this day. Will follow.      04/01/25 9447   Discharge Planning   Home or Post Acute Services Post acute facilities (Rehab/SNF/etc)   Type of Post Acute Facility Services Rehab   Expected Discharge Disposition SNF  ([facility])   Does the patient need discharge transport arranged? Yes   RoundTrip coordination needed? Yes

## 2025-04-01 NOTE — PROGRESS NOTES
Tammy Walker is a 69 y.o. female on day 0 of admission presenting with LORETA (acute kidney injury).      Subjective   Mrs. Velez reports that she is doing better today. She noted difficulty with ambulation.  She denied fever and chills. No chest pain.  No nausea or vomiting.  She reported that her difficulty with ambulation is not new and that she uses wheelchair at home. This has been going on for the past 4 years.       Objective     Last Recorded Vitals  /69 (BP Location: Right arm, Patient Position: Lying)   Pulse 57   Temp 36.8 °C (98.2 °F) (Oral)   Resp 18   Wt 90.7 kg (200 lb)   SpO2 95%   Intake/Output last 3 Shifts:  No intake or output data in the 24 hours ending 04/01/25 1105    Admission Weight  Weight: 90.7 kg (200 lb) (03/31/25 1008)    Daily Weight  03/31/25 : 90.7 kg (200 lb)    Image Results  XR chest 1 view  Narrative: Interpreted By:  Cristino Tavera,   STUDY:  XR CHEST 1 VIEW;  3/31/2025 11:13 am      INDICATION:  Signs/Symptoms:weakness.      COMPARISON:  03/28/2025      ACCESSION NUMBER(S):  [id number]      ORDERING CLINICIAN:  INDRA MORELOS      FINDINGS:  The patient is significantly rotated. The lungs appear clear. No  apparent pleural effusion. Unremarkable cardiomediastinal silhouette.  No pulmonary vascular congestion.      Impression: No active disease in the chest identified.      MACRO:  None      Signed by: Cristino Tavera 3/31/2025 11:31 AM  Dictation workstation:   [id number]  ECG 12 lead  Sinus bradycardia  Left anterior fascicular block  Left ventricular hypertrophy with QRS widening and repolarization abnormality ( R in aVL , Varina product , Romhilt-Bui )  Cannot rule out Septal infarct , age undetermined  Abnormal ECG  No previous ECGs available  ECG 12 Lead  Sinus bradycardia  Left anterior fascicular block  Left ventricular hypertrophy with QRS widening and repolarization abnormality ( R in aVL , Jarret product , Romhilt-Bui )  Abnormal ECG  When compared  with ECG of 03-MAR-2025 15:15,  No significant change was found  Confirmed by Stiven Orta ([id number]) on 3/31/2025 8:34:12 AM      Physical Exam  Constitutional:       Appearance: Normal appearance.   HENT:      Head: Normocephalic and atraumatic.      Nose: Nose normal.      Mouth/Throat:      Mouth: Mucous membranes are moist.   Eyes:      Extraocular Movements: Extraocular movements intact.      Pupils: Pupils are equal, round, and reactive to light.   Cardiovascular:      Rate and Rhythm: Normal rate and regular rhythm.      Pulses: Normal pulses.      Heart sounds: Normal heart sounds.   Pulmonary:      Effort: Pulmonary effort is normal.      Breath sounds: Normal breath sounds.   Abdominal:      General: Bowel sounds are normal.      Palpations: Abdomen is soft.   Musculoskeletal:      Cervical back: Neck supple.   Skin:     General: Skin is warm.   Neurological:      Mental Status: She is alert and oriented to person, place, and time.   Psychiatric:         Mood and Affect: Mood normal.         Behavior: Behavior normal.         Relevant Results                 Assessment & Plan  LORETA (acute kidney injury)  This is most likely chronic kidney disease.    Creatinine for the past 6-7 months has been between 1.6 to 2.3.  Continue to monitor.     2. Generalized weakness:  pt/ot. No organic cause. Patient reported hx of wheelchair bound and has not ambulated in over 4 years. Able to move extremities spontaneously. PT/OT. Possible placement    3. Urinary Retention:  Bladder scan per report was about 650 ml. Ordered nixon placement. Patient reported that she is no longer on oxybutinin. Med has not been restarted here. Continue to hold. PT/OT.    4.  Hypokalemia: replace with oral K.    5. Hypomagnesemia: replace     6. Hx of HFrEF not in exacerbation: cautious with IV fluid.  No additional IV fluid as renal function seems to be in baseline. Continue home meds as tolerated. ARB on hold due to renal function  compromise    Morbid Obesity: lifestyle modifications    On abx: unsure why, but urinalysis is not convincing for uti. Wait for urine culture    Alberto Abel MD

## 2025-04-01 NOTE — CARE PLAN
Problem: Pain - Adult  Goal: Verbalizes/displays adequate comfort level or baseline comfort level  4/1/2025 1340 by Sydney Ybarra, RN  Outcome: Progressing  4/1/2025 1339 by Sydney Ybarra RN  Outcome: Progressing  4/1/2025 1332 by Sydney Ybarra, RN  Outcome: Progressing   The patient's goals for the shift include      The clinical goals for the shift include no falls, remain HDS

## 2025-04-01 NOTE — CARE PLAN
Problem: Pain - Adult  Goal: Verbalizes/displays adequate comfort level or baseline comfort level  4/1/2025 1339 by Sydney Ybarra RN  Outcome: Progressing  4/1/2025 1332 by Sydney Ybarra RN  Outcome: Progressing     Problem: Pain - Adult  Goal: Verbalizes/displays adequate comfort level or baseline comfort level  4/1/2025 1339 by Sydney Ybarra RN  Outcome: Progressing  4/1/2025 1332 by Sydney Ybarra RN  Outcome: Progressing   The patient's goals for the shift include      The clinical goals for the shift include no falls, remain HDS

## 2025-04-01 NOTE — ASSESSMENT & PLAN NOTE
This is most likely chronic kidney disease.    Creatinine for the past 6-7 months has been between 1.6 to 2.3.  Continue to monitor.     2. Generalized weakness:  pt/ot. No organic cause. Patient reported hx of wheelchair bound and has not ambulated in over 4 years. Able to move extremities spontaneously. PT/OT. Possible placement    3. Urinary Retention:  Bladder scan per report was about 650 ml. Ordered nixon placement. Patient reported that she is no longer on oxybutinin. Med has not been restarted here. Continue to hold. PT/OT.    4.  Hypokalemia: replace with oral K.    5. Hypomagnesemia: replace     6. Hx of HFrEF not in exacerbation: cautious with IV fluid.  No additional IV fluid as renal function seems to be in baseline. Continue home meds as tolerated. ARB on hold due to renal function compromise    Morbid Obesity: lifestyle modifications    On abx: unsure why, but urinalysis is not convincing for uti. Wait for urine culture

## 2025-04-01 NOTE — CARE PLAN
Problem: Pain - Adult  Goal: Verbalizes/displays adequate comfort level or baseline comfort level  4/1/2025 1352 by Sydney Ybarra RN  Outcome: Progressing  4/1/2025 1351 by Sydney Ybarra RN  Outcome: Progressing  4/1/2025 1343 by Sydney Ybarra RN  Outcome: Progressing  4/1/2025 1342 by Sydney Ybarra RN  Outcome: Progressing  4/1/2025 1340 by Sydney Ybrara RN  Outcome: Progressing  4/1/2025 1339 by Sydney Ybarra RN  Outcome: Progressing  4/1/2025 1332 by Sydney Ybarra RN  Outcome: Progressing   The patient's goals for the shift include      The clinical goals for the shift include no falls, remain HDS

## 2025-04-01 NOTE — CARE PLAN
Problem: Pain - Adult  Goal: Verbalizes/displays adequate comfort level or baseline comfort level  Outcome: Progressing   The patient's goals for the shift include      The clinical goals for the shift include vitals to remain stable and no falls overnight.

## 2025-04-01 NOTE — CARE PLAN
Problem: Pain - Adult  Goal: Verbalizes/displays adequate comfort level or baseline comfort level  4/1/2025 1351 by Sydney Ybarra RN  Outcome: Progressing  4/1/2025 1343 by Sydney Ybarra RN  Outcome: Progressing  4/1/2025 1342 by Sydney Ybarra RN  Outcome: Progressing  4/1/2025 1340 by Sydney Ybarra RN  Outcome: Progressing  4/1/2025 1339 by Sydney Ybarra RN  Outcome: Progressing  4/1/2025 1332 by Sydney Ybarra RN  Outcome: Progressing   The patient's goals for the shift include      The clinical goals for the shift include no falls, remain HDS

## 2025-04-01 NOTE — CONSULTS
Reason For Consult  Urinary retention    History Of Present Illness  Tammy Walker is a 69 y.o. female presenting with complaints of generalized weakness for several days.  On the day of admission she found she just could not get up on her own.  She has been having difficulty taking care of herself.  She was found to be hypokalemic with elevated renal function tests showing a creatinine of 2.13 and a BUN of 29.  These values are slightly higher today at 31 and 2.2.  A bladder scan revealed a volume of 650 cc and a Rush catheter was placed.  She apparently has been on oxybutynin.  Patient, was not a reliable historian, said that she was urinating frequently before coming to the hospital but when she came to the hospital she was having difficulty voiding.  Her urinalysis is unremarkable.    The patient was seen in our office 2 years ago with urinary retention.  She did not follow-up after that so I do not have any information as to how well she was voiding after the catheter was removed at that time.     Past Medical History  She has a past medical history of Arthritis, HTN (hypertension), and Long COVID.    Surgical History  She has a past surgical history that includes MR angio head wo IV contrast (9/11/2023).     Social History  She reports that she quit smoking about 3 years ago. Her smoking use included cigarettes. She has never used smokeless tobacco. She reports that she does not currently use alcohol. She reports that she does not use drugs.    Family History  Family History   Problem Relation Name Age of Onset    Hypertension Mother      Diabetes Mother      Hypertension Father      Diabetes Father          Allergies  Patient has no known allergies.    Review of Systems  As per admission HPI.     Physical Exam  Awake, alert and somewhat confused.  HEENT: Normal extraocular movements  Lungs: Normal respiratory pattern  Neck: Supple  Abdomen: Obese, nondistended and nontender  : Rush catheter in  "place  Extremities: Moves all 4     Last Recorded Vitals  Blood pressure 131/55, pulse 59, temperature 36.8 °C (98.2 °F), temperature source Oral, resp. rate 18, height 1.651 m (5' 5\"), weight 90.7 kg (200 lb), SpO2 96%.    Relevant Results  Results for orders placed or performed during the hospital encounter of 03/31/25 (from the past 24 hours)   CBC   Result Value Ref Range    WBC 9.2 4.4 - 11.3 x10*3/uL    nRBC 0.0 0.0 - 0.0 /100 WBCs    RBC 3.83 (L) 4.00 - 5.20 x10*6/uL    Hemoglobin 11.6 (L) 12.0 - 16.0 g/dL    Hematocrit 34.0 (L) 36.0 - 46.0 %    MCV 89 80 - 100 fL    MCH 30.3 26.0 - 34.0 pg    MCHC 34.1 32.0 - 36.0 g/dL    RDW 13.9 11.5 - 14.5 %    Platelets 223 150 - 450 x10*3/uL   Renal Function Panel   Result Value Ref Range    Glucose 91 74 - 99 mg/dL    Sodium 141 136 - 145 mmol/L    Potassium 3.0 (L) 3.5 - 5.3 mmol/L    Chloride 104 98 - 107 mmol/L    Bicarbonate 28 21 - 32 mmol/L    Anion Gap 12 10 - 20 mmol/L    Urea Nitrogen 31 (H) 6 - 23 mg/dL    Creatinine 2.20 (H) 0.50 - 1.05 mg/dL    eGFR 24 (L) >60 mL/min/1.73m*2    Calcium 7.9 (L) 8.6 - 10.3 mg/dL    Phosphorus 4.0 2.5 - 4.9 mg/dL    Albumin 3.3 (L) 3.4 - 5.0 g/dL   Magnesium   Result Value Ref Range    Magnesium 1.22 (L) 1.60 - 2.40 mg/dL    XR chest 1 view    Result Date: 3/31/2025  Interpreted By:  Cristino Tavera, STUDY: XR CHEST 1 VIEW;  3/31/2025 11:13 am   INDICATION: Signs/Symptoms:weakness.   COMPARISON: 03/28/2025   ACCESSION NUMBER(S): [id number]   ORDERING CLINICIAN: INDRA MORELOS   FINDINGS: The patient is significantly rotated. The lungs appear clear. No apparent pleural effusion. Unremarkable cardiomediastinal silhouette. No pulmonary vascular congestion.       No active disease in the chest identified.   MACRO: None   Signed by: Cristino Tavera 3/31/2025 11:31 AM Dictation workstation:   [id number]    ECG 12 lead    Result Date: 3/31/2025  Sinus bradycardia Left anterior fascicular block Left ventricular hypertrophy with QRS widening " and repolarization abnormality ( R in aVL , Jarret product , Romhilt-Bui ) Cannot rule out Septal infarct , age undetermined Abnormal ECG No previous ECGs available       Assessment/Plan     Urinary retention: She had 650 cc of urine in her bladder.  A catheter was placed.  I do not know her baseline ability to empty.  She reportedly has been on oxybutynin and certainly this should not be ordered.  Will keep the Rush catheter in place several days to let her bladder decompressed and see if she regains some strength.  She can then have a voiding trial.    LORETA: She does have a history of an elevated creatinine in the past.  It was as low as 1.2 on 11/20/2023 but it was 2.8 on 8/14/2024.  I do not know if the urinary retention is playing a role but prior renal ultrasounds have not shown hydronephrosis.  If, when she has a voiding trial, her creatinine goes up she can get an ultrasound to see if there is hydro.          Benson Holt MD

## 2025-04-02 VITALS
HEIGHT: 65 IN | RESPIRATION RATE: 16 BRPM | DIASTOLIC BLOOD PRESSURE: 59 MMHG | OXYGEN SATURATION: 97 % | WEIGHT: 200 LBS | TEMPERATURE: 99 F | SYSTOLIC BLOOD PRESSURE: 137 MMHG | HEART RATE: 76 BPM | BODY MASS INDEX: 33.32 KG/M2

## 2025-04-02 LAB
ALBUMIN SERPL BCP-MCNC: 3.7 G/DL (ref 3.4–5)
ANION GAP SERPL CALCULATED.3IONS-SCNC: 12 MMOL/L (ref 10–20)
BUN SERPL-MCNC: 27 MG/DL (ref 6–23)
CALCIUM SERPL-MCNC: 9.1 MG/DL (ref 8.6–10.3)
CHLORIDE SERPL-SCNC: 103 MMOL/L (ref 98–107)
CO2 SERPL-SCNC: 28 MMOL/L (ref 21–32)
CREAT SERPL-MCNC: 1.83 MG/DL (ref 0.5–1.05)
EGFRCR SERPLBLD CKD-EPI 2021: 30 ML/MIN/1.73M*2
ERYTHROCYTE [DISTWIDTH] IN BLOOD BY AUTOMATED COUNT: 13.8 % (ref 11.5–14.5)
GLUCOSE SERPL-MCNC: 109 MG/DL (ref 74–99)
HCT VFR BLD AUTO: 36 % (ref 36–46)
HGB BLD-MCNC: 12.4 G/DL (ref 12–16)
MAGNESIUM SERPL-MCNC: 2.15 MG/DL (ref 1.6–2.4)
MCH RBC QN AUTO: 30.5 PG (ref 26–34)
MCHC RBC AUTO-ENTMCNC: 34.4 G/DL (ref 32–36)
MCV RBC AUTO: 89 FL (ref 80–100)
NRBC BLD-RTO: 0 /100 WBCS (ref 0–0)
PHOSPHATE SERPL-MCNC: 3 MG/DL (ref 2.5–4.9)
PLATELET # BLD AUTO: 251 X10*3/UL (ref 150–450)
POTASSIUM SERPL-SCNC: 3.5 MMOL/L (ref 3.5–5.3)
RBC # BLD AUTO: 4.07 X10*6/UL (ref 4–5.2)
SODIUM SERPL-SCNC: 139 MMOL/L (ref 136–145)
WBC # BLD AUTO: 11 X10*3/UL (ref 4.4–11.3)

## 2025-04-02 PROCEDURE — 2500000004 HC RX 250 GENERAL PHARMACY W/ HCPCS (ALT 636 FOR OP/ED)

## 2025-04-02 PROCEDURE — 2500000002 HC RX 250 W HCPCS SELF ADMINISTERED DRUGS (ALT 637 FOR MEDICARE OP, ALT 636 FOR OP/ED): Performed by: INTERNAL MEDICINE

## 2025-04-02 PROCEDURE — 85027 COMPLETE CBC AUTOMATED: CPT

## 2025-04-02 PROCEDURE — 96376 TX/PRO/DX INJ SAME DRUG ADON: CPT | Mod: 59

## 2025-04-02 PROCEDURE — 2500000001 HC RX 250 WO HCPCS SELF ADMINISTERED DRUGS (ALT 637 FOR MEDICARE OP): Performed by: INTERNAL MEDICINE

## 2025-04-02 PROCEDURE — 96375 TX/PRO/DX INJ NEW DRUG ADDON: CPT | Mod: 59

## 2025-04-02 PROCEDURE — 96372 THER/PROPH/DIAG INJ SC/IM: CPT

## 2025-04-02 PROCEDURE — 83735 ASSAY OF MAGNESIUM: CPT

## 2025-04-02 PROCEDURE — 2500000001 HC RX 250 WO HCPCS SELF ADMINISTERED DRUGS (ALT 637 FOR MEDICARE OP)

## 2025-04-02 PROCEDURE — 99239 HOSP IP/OBS DSCHRG MGMT >30: CPT | Performed by: INTERNAL MEDICINE

## 2025-04-02 PROCEDURE — 80069 RENAL FUNCTION PANEL: CPT

## 2025-04-02 PROCEDURE — 2500000004 HC RX 250 GENERAL PHARMACY W/ HCPCS (ALT 636 FOR OP/ED): Performed by: INTERNAL MEDICINE

## 2025-04-02 PROCEDURE — 36415 COLL VENOUS BLD VENIPUNCTURE: CPT

## 2025-04-02 PROCEDURE — G0378 HOSPITAL OBSERVATION PER HR: HCPCS

## 2025-04-02 RX ORDER — VALSARTAN 80 MG/1
80 TABLET ORAL 2 TIMES DAILY
Status: DISCONTINUED | OUTPATIENT
Start: 2025-04-02 | End: 2025-04-02 | Stop reason: HOSPADM

## 2025-04-02 RX ORDER — NYSTATIN 100000 [USP'U]/G
1 POWDER TOPICAL 2 TIMES DAILY
Start: 2025-04-02

## 2025-04-02 RX ORDER — HYDRALAZINE HYDROCHLORIDE 20 MG/ML
10 INJECTION INTRAMUSCULAR; INTRAVENOUS EVERY 4 HOURS PRN
Status: DISCONTINUED | OUTPATIENT
Start: 2025-04-02 | End: 2025-04-02 | Stop reason: HOSPADM

## 2025-04-02 RX ADMIN — NYSTATIN 1 APPLICATION: 100000 POWDER TOPICAL at 08:15

## 2025-04-02 RX ADMIN — HYDRALAZINE HYDROCHLORIDE 10 MG: 20 INJECTION INTRAMUSCULAR; INTRAVENOUS at 06:17

## 2025-04-02 RX ADMIN — CHOLECALCIFEROL (VITAMIN D3) 10 MCG (400 UNIT) TABLET 10 MCG: at 08:13

## 2025-04-02 RX ADMIN — ASPIRIN 81 MG: 81 TABLET, CHEWABLE ORAL at 08:08

## 2025-04-02 RX ADMIN — VALSARTAN 80 MG: 80 TABLET, FILM COATED ORAL at 13:21

## 2025-04-02 RX ADMIN — POTASSIUM CHLORIDE 40 MEQ: 1.5 POWDER, FOR SOLUTION ORAL at 08:08

## 2025-04-02 RX ADMIN — HEPARIN SODIUM 5000 UNITS: 5000 INJECTION INTRAVENOUS; SUBCUTANEOUS at 05:39

## 2025-04-02 RX ADMIN — ATENOLOL 50 MG: 50 TABLET ORAL at 08:08

## 2025-04-02 RX ADMIN — HYDRALAZINE HYDROCHLORIDE 10 MG: 20 INJECTION INTRAMUSCULAR; INTRAVENOUS at 10:56

## 2025-04-02 ASSESSMENT — COGNITIVE AND FUNCTIONAL STATUS - GENERAL
MOVING TO AND FROM BED TO CHAIR: A LOT
WALKING IN HOSPITAL ROOM: A LOT
DAILY ACTIVITIY SCORE: 20
MOVING FROM LYING ON BACK TO SITTING ON SIDE OF FLAT BED WITH BEDRAILS: A LITTLE
HELP NEEDED FOR BATHING: A LITTLE
CLIMB 3 TO 5 STEPS WITH RAILING: TOTAL
DRESSING REGULAR LOWER BODY CLOTHING: A LITTLE
STANDING UP FROM CHAIR USING ARMS: A LOT
DRESSING REGULAR UPPER BODY CLOTHING: A LITTLE
MOBILITY SCORE: 13
TURNING FROM BACK TO SIDE WHILE IN FLAT BAD: A LITTLE
TOILETING: A LITTLE

## 2025-04-02 ASSESSMENT — PAIN SCALES - GENERAL: PAINLEVEL_OUTOF10: 0 - NO PAIN

## 2025-04-02 NOTE — CARE PLAN
Problem: Pain - Adult  Goal: Verbalizes/displays adequate comfort level or baseline comfort level  Outcome: Progressing   The patient's goals for the shift include      The clinical goals for the shift include Vitals will remain stable overnight and no falls.

## 2025-04-02 NOTE — DISCHARGE SUMMARY
Discharge Diagnosis  LORETA (acute kidney injury)    Issues Requiring Follow-Up  Pt/ot    Discharge Meds     Medication List      START taking these medications     nystatin 100,000 unit/gram powder; Commonly known as: Mycostatin; Apply   1 Application topically 2 times a day. groin     CONTINUE taking these medications     allopurinol 100 mg tablet; Commonly known as: Zyloprim; TAKE 1 TABLET BY   MOUTH TWICE A DAY   aspirin 81 mg chewable tablet   atenolol 50 mg tablet; Commonly known as: Tenormin; TAKE 1 TABLET BY   MOUTH TWICE A DAY   atorvastatin 40 mg tablet; Commonly known as: Lipitor; Take 1 tablet (40   mg) by mouth once daily at bedtime.   cholecalciferol 10 MCG (400 UNIT) tablet; Commonly known as: Vitamin D3;   Take 1 tablet (10 mcg) by mouth once daily.   furosemide 40 mg tablet; Commonly known as: Lasix; TAKE 1 TABLET BY   MOUTH EVERY DAY   hydroCHLOROthiazide 50 mg tablet; Commonly known as: HYDRODiuril; Take 1   tablet (50 mg) by mouth once daily.   potassium chloride CR 10 mEq ER tablet; Commonly known as: Klor-Con;   Take 1 tablet (10 mEq) by mouth 2 times a day. Do not crush, chew, or   split.   valsartan 80 mg tablet; Commonly known as: Diovan; TAKE 1 TABLET (80 MG)   BY MOUTH 2 TIMES A DAY.     STOP taking these medications     oxybutynin 5 mg tablet; Commonly known as: Ditropan       Test Results Pending At Discharge  Pending Labs       No current pending labs.            Hospital Course  Mrs. Walker presented to the hospital with generalized weakness. Work up was essentially negative.   She was found to have possible UTI and ceftriaxone was initiated. This was discontinued when urine culture came back negative. He weakness was noted to be chronic. She was evaluated and accepted to [facility].  Her creatinine was suspected to be elevated, but additional review showed that creat of 1.6 to 2.3 seems to be her base line    She was discharged in stable condition. Time spent in discharge of patient  is greater than 30 minutes    Pertinent Physical Exam At Time of Discharge  Physical Exam  Constitutional:       Appearance: Normal appearance.   HENT:      Head: Normocephalic and atraumatic.      Nose: Nose normal.      Mouth/Throat:      Mouth: Mucous membranes are moist.   Eyes:      Extraocular Movements: Extraocular movements intact.      Pupils: Pupils are equal, round, and reactive to light.   Cardiovascular:      Rate and Rhythm: Normal rate and regular rhythm.      Pulses: Normal pulses.      Heart sounds: Normal heart sounds.   Pulmonary:      Effort: Pulmonary effort is normal.      Breath sounds: Normal breath sounds.   Abdominal:      General: Bowel sounds are normal.      Palpations: Abdomen is soft.   Musculoskeletal:      Cervical back: Neck supple.   Skin:     General: Skin is warm.   Neurological:      Mental Status: She is oriented to person, place, and time.   Psychiatric:         Mood and Affect: Mood normal.         Behavior: Behavior normal.         Outpatient Follow-Up  Future Appointments   Date Time Provider Department Center   6/11/2025  2:00 PM Raisa Goff, APRN[id number]         Alberto Abel MD

## 2025-04-02 NOTE — PROGRESS NOTES
Patient with an active discharge. Precert approved for admission to [facility]. Transport set for 2PM. Nurse to call report. Paperwork forwarded to facility. PASR completed. Will follow as needed.      04/02/25 1110   Discharge Planning   Home or Post Acute Services Post acute facilities (Rehab/SNF/etc)   Type of Post Acute Facility Services Rehab   Expected Discharge Disposition SNF  ([facility])   Does the patient need discharge transport arranged? Yes   RoundTrip coordination needed? Yes

## 2025-04-07 ENCOUNTER — APPOINTMENT (OUTPATIENT)
Dept: UROLOGY | Facility: CLINIC | Age: 70
End: 2025-04-07
Payer: MEDICARE

## 2025-04-12 ENCOUNTER — HOSPITAL ENCOUNTER (INPATIENT)
Facility: HOSPITAL | Age: 70
DRG: 699 | End: 2025-04-12
Attending: INTERNAL MEDICINE | Admitting: INTERNAL MEDICINE
Payer: MEDICARE

## 2025-04-12 ENCOUNTER — APPOINTMENT (OUTPATIENT)
Dept: RADIOLOGY | Facility: HOSPITAL | Age: 70
DRG: 699 | End: 2025-04-12
Payer: MEDICARE

## 2025-04-12 DIAGNOSIS — N17.9 AKI (ACUTE KIDNEY INJURY): ICD-10-CM

## 2025-04-12 DIAGNOSIS — I50.41 ACUTE COMBINED SYSTOLIC AND DIASTOLIC CONGESTIVE HEART FAILURE: ICD-10-CM

## 2025-04-12 DIAGNOSIS — I16.0 HYPERTENSIVE URGENCY: ICD-10-CM

## 2025-04-12 DIAGNOSIS — R33.9 URINE RETENTION: Primary | ICD-10-CM

## 2025-04-12 DIAGNOSIS — I10 PRIMARY HYPERTENSION: ICD-10-CM

## 2025-04-12 DIAGNOSIS — N30.00 ACUTE CYSTITIS WITHOUT HEMATURIA: ICD-10-CM

## 2025-04-12 DIAGNOSIS — N39.0 URINARY TRACT INFECTION WITHOUT HEMATURIA, SITE UNSPECIFIED: ICD-10-CM

## 2025-04-12 DIAGNOSIS — E86.0 DEHYDRATION: ICD-10-CM

## 2025-04-12 DIAGNOSIS — R06.00 DYSPNEA, UNSPECIFIED TYPE: ICD-10-CM

## 2025-04-12 DIAGNOSIS — R00.1 BRADYCARDIA: ICD-10-CM

## 2025-04-12 DIAGNOSIS — R53.1 WEAKNESS: ICD-10-CM

## 2025-04-12 DIAGNOSIS — N17.9 ACUTE RENAL FAILURE: ICD-10-CM

## 2025-04-12 LAB
ALBUMIN SERPL BCP-MCNC: 3.8 G/DL (ref 3.4–5)
ALP SERPL-CCNC: 148 U/L (ref 33–136)
ALT SERPL W P-5'-P-CCNC: 10 U/L (ref 7–45)
ANION GAP SERPL CALCULATED.3IONS-SCNC: 16 MMOL/L (ref 10–20)
APPEARANCE UR: CLEAR
AST SERPL W P-5'-P-CCNC: 16 U/L (ref 9–39)
BACTERIA #/AREA URNS AUTO: ABNORMAL /HPF
BASOPHILS # BLD AUTO: 0.09 X10*3/UL (ref 0–0.1)
BASOPHILS NFR BLD AUTO: 0.8 %
BILIRUB SERPL-MCNC: 0.9 MG/DL (ref 0–1.2)
BILIRUB UR STRIP.AUTO-MCNC: NEGATIVE MG/DL
BUN SERPL-MCNC: 73 MG/DL (ref 6–23)
CALCIUM SERPL-MCNC: 9 MG/DL (ref 8.6–10.3)
CHLORIDE SERPL-SCNC: 101 MMOL/L (ref 98–107)
CO2 SERPL-SCNC: 24 MMOL/L (ref 21–32)
COLOR UR: ABNORMAL
CREAT SERPL-MCNC: 2.7 MG/DL (ref 0.5–1.05)
EGFRCR SERPLBLD CKD-EPI 2021: 19 ML/MIN/1.73M*2
EOSINOPHIL # BLD AUTO: 0.27 X10*3/UL (ref 0–0.7)
EOSINOPHIL NFR BLD AUTO: 2.4 %
ERYTHROCYTE [DISTWIDTH] IN BLOOD BY AUTOMATED COUNT: 13.3 % (ref 11.5–14.5)
GLUCOSE SERPL-MCNC: 99 MG/DL (ref 74–99)
GLUCOSE UR STRIP.AUTO-MCNC: NORMAL MG/DL
HCT VFR BLD AUTO: 38.6 % (ref 36–46)
HGB BLD-MCNC: 13.4 G/DL (ref 12–16)
IMM GRANULOCYTES # BLD AUTO: 0.02 X10*3/UL (ref 0–0.7)
IMM GRANULOCYTES NFR BLD AUTO: 0.2 % (ref 0–0.9)
KETONES UR STRIP.AUTO-MCNC: NEGATIVE MG/DL
LEUKOCYTE ESTERASE UR QL STRIP.AUTO: ABNORMAL
LYMPHOCYTES # BLD AUTO: 2.05 X10*3/UL (ref 1.2–4.8)
LYMPHOCYTES NFR BLD AUTO: 18.5 %
MCH RBC QN AUTO: 30.5 PG (ref 26–34)
MCHC RBC AUTO-ENTMCNC: 34.7 G/DL (ref 32–36)
MCV RBC AUTO: 88 FL (ref 80–100)
MONOCYTES # BLD AUTO: 0.91 X10*3/UL (ref 0.1–1)
MONOCYTES NFR BLD AUTO: 8.2 %
MUCOUS THREADS #/AREA URNS AUTO: ABNORMAL /LPF
NEUTROPHILS # BLD AUTO: 7.72 X10*3/UL (ref 1.2–7.7)
NEUTROPHILS NFR BLD AUTO: 69.9 %
NITRITE UR QL STRIP.AUTO: NEGATIVE
NRBC BLD-RTO: 0 /100 WBCS (ref 0–0)
PH UR STRIP.AUTO: 5 [PH]
PLATELET # BLD AUTO: 407 X10*3/UL (ref 150–450)
POTASSIUM SERPL-SCNC: 3.6 MMOL/L (ref 3.5–5.3)
PROT SERPL-MCNC: 7.8 G/DL (ref 6.4–8.2)
PROT UR STRIP.AUTO-MCNC: NEGATIVE MG/DL
RBC # BLD AUTO: 4.4 X10*6/UL (ref 4–5.2)
RBC # UR STRIP.AUTO: NEGATIVE MG/DL
RBC #/AREA URNS AUTO: ABNORMAL /HPF
SODIUM SERPL-SCNC: 137 MMOL/L (ref 136–145)
SP GR UR STRIP.AUTO: 1.02
SQUAMOUS #/AREA URNS AUTO: ABNORMAL /HPF
UROBILINOGEN UR STRIP.AUTO-MCNC: NORMAL MG/DL
WBC # BLD AUTO: 11.1 X10*3/UL (ref 4.4–11.3)
WBC #/AREA URNS AUTO: ABNORMAL /HPF
WBC CLUMPS #/AREA URNS AUTO: ABNORMAL /HPF

## 2025-04-12 PROCEDURE — 74176 CT ABD & PELVIS W/O CONTRAST: CPT | Performed by: STUDENT IN AN ORGANIZED HEALTH CARE EDUCATION/TRAINING PROGRAM

## 2025-04-12 PROCEDURE — 85025 COMPLETE CBC W/AUTO DIFF WBC: CPT | Performed by: PHYSICIAN ASSISTANT

## 2025-04-12 PROCEDURE — 2500000004 HC RX 250 GENERAL PHARMACY W/ HCPCS (ALT 636 FOR OP/ED): Performed by: INTERNAL MEDICINE

## 2025-04-12 PROCEDURE — 2500000001 HC RX 250 WO HCPCS SELF ADMINISTERED DRUGS (ALT 637 FOR MEDICARE OP): Performed by: INTERNAL MEDICINE

## 2025-04-12 PROCEDURE — 96360 HYDRATION IV INFUSION INIT: CPT

## 2025-04-12 PROCEDURE — 2500000004 HC RX 250 GENERAL PHARMACY W/ HCPCS (ALT 636 FOR OP/ED): Performed by: PHYSICIAN ASSISTANT

## 2025-04-12 PROCEDURE — 87086 URINE CULTURE/COLONY COUNT: CPT | Mod: WESLAB | Performed by: PHYSICIAN ASSISTANT

## 2025-04-12 PROCEDURE — 99285 EMERGENCY DEPT VISIT HI MDM: CPT | Mod: 25

## 2025-04-12 PROCEDURE — 99223 1ST HOSP IP/OBS HIGH 75: CPT | Performed by: INTERNAL MEDICINE

## 2025-04-12 PROCEDURE — 96361 HYDRATE IV INFUSION ADD-ON: CPT

## 2025-04-12 PROCEDURE — 84075 ASSAY ALKALINE PHOSPHATASE: CPT | Performed by: PHYSICIAN ASSISTANT

## 2025-04-12 PROCEDURE — 1210000001 HC SEMI-PRIVATE ROOM DAILY

## 2025-04-12 PROCEDURE — 36415 COLL VENOUS BLD VENIPUNCTURE: CPT | Performed by: PHYSICIAN ASSISTANT

## 2025-04-12 PROCEDURE — 74176 CT ABD & PELVIS W/O CONTRAST: CPT

## 2025-04-12 PROCEDURE — 81003 URINALYSIS AUTO W/O SCOPE: CPT | Performed by: PHYSICIAN ASSISTANT

## 2025-04-12 RX ORDER — ATORVASTATIN CALCIUM 40 MG/1
40 TABLET, FILM COATED ORAL NIGHTLY
Status: DISCONTINUED | OUTPATIENT
Start: 2025-04-12 | End: 2025-04-15 | Stop reason: HOSPADM

## 2025-04-12 RX ORDER — CEFTRIAXONE 1 G/50ML
1 INJECTION, SOLUTION INTRAVENOUS ONCE
Status: COMPLETED | OUTPATIENT
Start: 2025-04-12 | End: 2025-04-12

## 2025-04-12 RX ORDER — ATENOLOL 50 MG/1
50 TABLET ORAL 2 TIMES DAILY
Status: DISCONTINUED | OUTPATIENT
Start: 2025-04-12 | End: 2025-04-13

## 2025-04-12 RX ORDER — AMLODIPINE BESYLATE 10 MG/1
10 TABLET ORAL DAILY
Status: DISCONTINUED | OUTPATIENT
Start: 2025-04-13 | End: 2025-04-15 | Stop reason: HOSPADM

## 2025-04-12 RX ORDER — POLYETHYLENE GLYCOL 3350 17 G/17G
17 POWDER, FOR SOLUTION ORAL DAILY
Status: DISCONTINUED | OUTPATIENT
Start: 2025-04-13 | End: 2025-04-15 | Stop reason: HOSPADM

## 2025-04-12 RX ORDER — ALLOPURINOL 100 MG/1
100 TABLET ORAL 2 TIMES DAILY
Status: DISCONTINUED | OUTPATIENT
Start: 2025-04-12 | End: 2025-04-15 | Stop reason: HOSPADM

## 2025-04-12 RX ORDER — NYSTATIN 100000 [USP'U]/G
1 POWDER TOPICAL 2 TIMES DAILY
Status: DISCONTINUED | OUTPATIENT
Start: 2025-04-12 | End: 2025-04-15 | Stop reason: HOSPADM

## 2025-04-12 RX ORDER — CEFTRIAXONE 1 G/50ML
1 INJECTION, SOLUTION INTRAVENOUS EVERY 24 HOURS
Status: DISCONTINUED | OUTPATIENT
Start: 2025-04-13 | End: 2025-04-15 | Stop reason: HOSPADM

## 2025-04-12 RX ORDER — HEPARIN SODIUM 5000 [USP'U]/ML
5000 INJECTION, SOLUTION INTRAVENOUS; SUBCUTANEOUS 2 TIMES DAILY
Status: DISCONTINUED | OUTPATIENT
Start: 2025-04-12 | End: 2025-04-15 | Stop reason: HOSPADM

## 2025-04-12 RX ORDER — CYANOCOBALAMIN (VITAMIN B-12) 500 MCG
10 TABLET ORAL DAILY
Status: DISCONTINUED | OUTPATIENT
Start: 2025-04-13 | End: 2025-04-15 | Stop reason: HOSPADM

## 2025-04-12 RX ADMIN — ATORVASTATIN CALCIUM 40 MG: 40 TABLET, FILM COATED ORAL at 22:48

## 2025-04-12 RX ADMIN — HEPARIN SODIUM 5000 UNITS: 5000 INJECTION INTRAVENOUS; SUBCUTANEOUS at 22:49

## 2025-04-12 RX ADMIN — CEFTRIAXONE SODIUM 1 G: 1 INJECTION, SOLUTION INTRAVENOUS at 21:32

## 2025-04-12 RX ADMIN — ALLOPURINOL 100 MG: 100 TABLET ORAL at 22:48

## 2025-04-12 RX ADMIN — SODIUM CHLORIDE 250 ML: 9 INJECTION, SOLUTION INTRAVENOUS at 20:46

## 2025-04-12 RX ADMIN — SODIUM CHLORIDE 250 ML: 900 INJECTION, SOLUTION INTRAVENOUS at 17:37

## 2025-04-12 RX ADMIN — NYSTATIN 1 APPLICATION: 100000 POWDER TOPICAL at 22:49

## 2025-04-12 RX ADMIN — ATENOLOL 50 MG: 50 TABLET ORAL at 22:48

## 2025-04-12 SDOH — SOCIAL STABILITY: SOCIAL INSECURITY: WITHIN THE LAST YEAR, HAVE YOU BEEN AFRAID OF YOUR PARTNER OR EX-PARTNER?: NO

## 2025-04-12 SDOH — ECONOMIC STABILITY: HOUSING INSECURITY: IN THE LAST 12 MONTHS, WAS THERE A TIME WHEN YOU WERE NOT ABLE TO PAY THE MORTGAGE OR RENT ON TIME?: NO

## 2025-04-12 SDOH — ECONOMIC STABILITY: HOUSING INSECURITY: AT ANY TIME IN THE PAST 12 MONTHS, WERE YOU HOMELESS OR LIVING IN A SHELTER (INCLUDING NOW)?: NO

## 2025-04-12 SDOH — HEALTH STABILITY: MENTAL HEALTH: HOW OFTEN DO YOU HAVE A DRINK CONTAINING ALCOHOL?: NEVER

## 2025-04-12 SDOH — SOCIAL STABILITY: SOCIAL INSECURITY: DO YOU FEEL ANYONE HAS EXPLOITED OR TAKEN ADVANTAGE OF YOU FINANCIALLY OR OF YOUR PERSONAL PROPERTY?: NO

## 2025-04-12 SDOH — SOCIAL STABILITY: SOCIAL INSECURITY: DOES ANYONE TRY TO KEEP YOU FROM HAVING/CONTACTING OTHER FRIENDS OR DOING THINGS OUTSIDE YOUR HOME?: NO

## 2025-04-12 SDOH — ECONOMIC STABILITY: INCOME INSECURITY: IN THE PAST 12 MONTHS HAS THE ELECTRIC, GAS, OIL, OR WATER COMPANY THREATENED TO SHUT OFF SERVICES IN YOUR HOME?: NO

## 2025-04-12 SDOH — SOCIAL STABILITY: SOCIAL NETWORK: HOW OFTEN DO YOU ATTEND MEETINGS OF THE CLUBS OR ORGANIZATIONS YOU BELONG TO?: NEVER

## 2025-04-12 SDOH — HEALTH STABILITY: MENTAL HEALTH: EXPERIENCED ANY OF THE FOLLOWING LIFE EVENTS: OTHER (COMMENT)

## 2025-04-12 SDOH — ECONOMIC STABILITY: HOUSING INSECURITY: IN THE PAST 12 MONTHS, HOW MANY TIMES HAVE YOU MOVED WHERE YOU WERE LIVING?: 1

## 2025-04-12 SDOH — ECONOMIC STABILITY: FOOD INSECURITY: WITHIN THE PAST 12 MONTHS, THE FOOD YOU BOUGHT JUST DIDN'T LAST AND YOU DIDN'T HAVE MONEY TO GET MORE.: NEVER TRUE

## 2025-04-12 SDOH — SOCIAL STABILITY: SOCIAL NETWORK: HOW OFTEN DO YOU ATTEND CHURCH OR RELIGIOUS SERVICES?: NEVER

## 2025-04-12 SDOH — HEALTH STABILITY: MENTAL HEALTH
DO YOU FEEL STRESS - TENSE, RESTLESS, NERVOUS, OR ANXIOUS, OR UNABLE TO SLEEP AT NIGHT BECAUSE YOUR MIND IS TROUBLED ALL THE TIME - THESE DAYS?: NOT AT ALL

## 2025-04-12 SDOH — HEALTH STABILITY: MENTAL HEALTH: HOW MANY DRINKS CONTAINING ALCOHOL DO YOU HAVE ON A TYPICAL DAY WHEN YOU ARE DRINKING?: PATIENT DOES NOT DRINK

## 2025-04-12 SDOH — HEALTH STABILITY: MENTAL HEALTH: HOW OFTEN DO YOU HAVE SIX OR MORE DRINKS ON ONE OCCASION?: NEVER

## 2025-04-12 SDOH — HEALTH STABILITY: PHYSICAL HEALTH: ON AVERAGE, HOW MANY DAYS PER WEEK DO YOU ENGAGE IN MODERATE TO STRENUOUS EXERCISE (LIKE A BRISK WALK)?: 0 DAYS

## 2025-04-12 SDOH — ECONOMIC STABILITY: FOOD INSECURITY: WITHIN THE PAST 12 MONTHS, YOU WORRIED THAT YOUR FOOD WOULD RUN OUT BEFORE YOU GOT THE MONEY TO BUY MORE.: NEVER TRUE

## 2025-04-12 SDOH — SOCIAL STABILITY: SOCIAL INSECURITY: ARE THERE ANY APPARENT SIGNS OF INJURIES/BEHAVIORS THAT COULD BE RELATED TO ABUSE/NEGLECT?: NO

## 2025-04-12 SDOH — ECONOMIC STABILITY: TRANSPORTATION INSECURITY: IN THE PAST 12 MONTHS, HAS LACK OF TRANSPORTATION KEPT YOU FROM MEDICAL APPOINTMENTS OR FROM GETTING MEDICATIONS?: NO

## 2025-04-12 SDOH — SOCIAL STABILITY: SOCIAL INSECURITY: WITHIN THE LAST YEAR, HAVE YOU BEEN HUMILIATED OR EMOTIONALLY ABUSED IN OTHER WAYS BY YOUR PARTNER OR EX-PARTNER?: NO

## 2025-04-12 SDOH — ECONOMIC STABILITY: FOOD INSECURITY: HOW HARD IS IT FOR YOU TO PAY FOR THE VERY BASICS LIKE FOOD, HOUSING, MEDICAL CARE, AND HEATING?: NOT VERY HARD

## 2025-04-12 SDOH — SOCIAL STABILITY: SOCIAL INSECURITY: ARE YOU MARRIED, WIDOWED, DIVORCED, SEPARATED, NEVER MARRIED, OR LIVING WITH A PARTNER?: NEVER MARRIED

## 2025-04-12 SDOH — SOCIAL STABILITY: SOCIAL INSECURITY: WERE YOU ABLE TO COMPLETE ALL THE BEHAVIORAL HEALTH SCREENINGS?: YES

## 2025-04-12 SDOH — SOCIAL STABILITY: SOCIAL NETWORK: HOW OFTEN DO YOU GET TOGETHER WITH FRIENDS OR RELATIVES?: MORE THAN THREE TIMES A WEEK

## 2025-04-12 SDOH — SOCIAL STABILITY: SOCIAL INSECURITY: HAVE YOU HAD ANY THOUGHTS OF HARMING ANYONE ELSE?: NO

## 2025-04-12 SDOH — SOCIAL STABILITY: SOCIAL INSECURITY: HAS ANYONE EVER THREATENED TO HURT YOUR FAMILY OR YOUR PETS?: NO

## 2025-04-12 SDOH — HEALTH STABILITY: PHYSICAL HEALTH: ON AVERAGE, HOW MANY MINUTES DO YOU ENGAGE IN EXERCISE AT THIS LEVEL?: 0 MIN

## 2025-04-12 SDOH — SOCIAL STABILITY: SOCIAL INSECURITY: DO YOU FEEL UNSAFE GOING BACK TO THE PLACE WHERE YOU ARE LIVING?: NO

## 2025-04-12 SDOH — SOCIAL STABILITY: SOCIAL INSECURITY: ARE YOU OR HAVE YOU BEEN THREATENED OR ABUSED PHYSICALLY, EMOTIONALLY, OR SEXUALLY BY ANYONE?: NO

## 2025-04-12 SDOH — SOCIAL STABILITY: SOCIAL INSECURITY: ABUSE: ADULT

## 2025-04-12 SDOH — SOCIAL STABILITY: SOCIAL INSECURITY: HAVE YOU HAD THOUGHTS OF HARMING ANYONE ELSE?: NO

## 2025-04-12 ASSESSMENT — LIFESTYLE VARIABLES
AUDIT-C TOTAL SCORE: 0
PRESCIPTION_ABUSE_PAST_12_MONTHS: NO
AUDIT-C TOTAL SCORE: 0
HOW MANY STANDARD DRINKS CONTAINING ALCOHOL DO YOU HAVE ON A TYPICAL DAY: PATIENT DOES NOT DRINK
AUDIT-C TOTAL SCORE: 0
HOW OFTEN DO YOU HAVE A DRINK CONTAINING ALCOHOL: NEVER
SUBSTANCE_ABUSE_PAST_12_MONTHS: NO
HOW OFTEN DO YOU HAVE 6 OR MORE DRINKS ON ONE OCCASION: NEVER
SKIP TO QUESTIONS 9-10: 1
SKIP TO QUESTIONS 9-10: 1

## 2025-04-12 ASSESSMENT — COGNITIVE AND FUNCTIONAL STATUS - GENERAL
MOVING TO AND FROM BED TO CHAIR: A LOT
MOBILITY SCORE: 11
TOILETING: A LOT
WALKING IN HOSPITAL ROOM: TOTAL
PATIENT BASELINE BEDBOUND: NO
HELP NEEDED FOR BATHING: A LOT
DRESSING REGULAR UPPER BODY CLOTHING: A LOT
DAILY ACTIVITIY SCORE: 15
DRESSING REGULAR LOWER BODY CLOTHING: A LOT
TURNING FROM BACK TO SIDE WHILE IN FLAT BAD: A LITTLE
MOVING FROM LYING ON BACK TO SITTING ON SIDE OF FLAT BED WITH BEDRAILS: A LITTLE
PERSONAL GROOMING: A LITTLE
STANDING UP FROM CHAIR USING ARMS: TOTAL
CLIMB 3 TO 5 STEPS WITH RAILING: TOTAL

## 2025-04-12 ASSESSMENT — PAIN - FUNCTIONAL ASSESSMENT: PAIN_FUNCTIONAL_ASSESSMENT: 0-10

## 2025-04-12 ASSESSMENT — ACTIVITIES OF DAILY LIVING (ADL)
LACK_OF_TRANSPORTATION: NO
ASSISTIVE_DEVICE: WHEELCHAIR
TOILETING: NEEDS ASSISTANCE
WALKS IN HOME: DEPENDENT
PATIENT'S MEMORY ADEQUATE TO SAFELY COMPLETE DAILY ACTIVITIES?: YES
HEARING - LEFT EAR: FUNCTIONAL
HEARING - RIGHT EAR: FUNCTIONAL
DRESSING YOURSELF: NEEDS ASSISTANCE
JUDGMENT_ADEQUATE_SAFELY_COMPLETE_DAILY_ACTIVITIES: YES
FEEDING YOURSELF: INDEPENDENT
ADEQUATE_TO_COMPLETE_ADL: YES
GROOMING: NEEDS ASSISTANCE
BATHING: NEEDS ASSISTANCE

## 2025-04-12 ASSESSMENT — PAIN SCALES - PAIN ASSESSMENT IN ADVANCED DEMENTIA (PAINAD)
BREATHING: NORMAL
FACIALEXPRESSION: SMILING OR INEXPRESSIVE
CONSOLABILITY: NO NEED TO CONSOLE
TOTALSCORE: 0
BODYLANGUAGE: RELAXED

## 2025-04-12 ASSESSMENT — PAIN SCALES - GENERAL
PAINLEVEL_OUTOF10: 0 - NO PAIN
PAINLEVEL_OUTOF10: 0 - NO PAIN

## 2025-04-12 ASSESSMENT — PATIENT HEALTH QUESTIONNAIRE - PHQ9
1. LITTLE INTEREST OR PLEASURE IN DOING THINGS: NOT AT ALL
SUM OF ALL RESPONSES TO PHQ9 QUESTIONS 1 & 2: 0
2. FEELING DOWN, DEPRESSED OR HOPELESS: NOT AT ALL

## 2025-04-12 ASSESSMENT — PAIN SCALES - WONG BAKER: WONGBAKER_NUMERICALRESPONSE: NO HURT

## 2025-04-12 NOTE — ED NOTES
Pt in #10. Pt sent to R/O LORETA. Pts nixon is draining clear yellow urine. Pt on ATBX for UTI. Pt Aox4. NO complaints at this time.      Saranya Zepeda RN  04/12/25 2900

## 2025-04-13 VITALS
HEART RATE: 50 BPM | DIASTOLIC BLOOD PRESSURE: 79 MMHG | WEIGHT: 199.96 LBS | RESPIRATION RATE: 18 BRPM | TEMPERATURE: 97.9 F | HEIGHT: 65 IN | OXYGEN SATURATION: 96 % | SYSTOLIC BLOOD PRESSURE: 123 MMHG | BODY MASS INDEX: 33.31 KG/M2

## 2025-04-13 PROBLEM — R00.1 BRADYCARDIA: Status: ACTIVE | Noted: 2025-04-13

## 2025-04-13 PROBLEM — N30.00 ACUTE CYSTITIS WITHOUT HEMATURIA: Status: ACTIVE | Noted: 2025-04-13

## 2025-04-13 PROBLEM — R53.1 WEAKNESS: Status: ACTIVE | Noted: 2025-04-13

## 2025-04-13 LAB
ANION GAP SERPL CALCULATED.3IONS-SCNC: 13 MMOL/L (ref 10–20)
BUN SERPL-MCNC: 65 MG/DL (ref 6–23)
CALCIUM SERPL-MCNC: 9.2 MG/DL (ref 8.6–10.3)
CHLORIDE SERPL-SCNC: 103 MMOL/L (ref 98–107)
CO2 SERPL-SCNC: 26 MMOL/L (ref 21–32)
CREAT SERPL-MCNC: 2.5 MG/DL (ref 0.5–1.05)
EGFRCR SERPLBLD CKD-EPI 2021: 20 ML/MIN/1.73M*2
GLUCOSE SERPL-MCNC: 95 MG/DL (ref 74–99)
HOLD SPECIMEN: NORMAL
HOLD SPECIMEN: NORMAL
POTASSIUM SERPL-SCNC: 3.4 MMOL/L (ref 3.5–5.3)
SODIUM SERPL-SCNC: 139 MMOL/L (ref 136–145)

## 2025-04-13 PROCEDURE — 36415 COLL VENOUS BLD VENIPUNCTURE: CPT | Performed by: INTERNAL MEDICINE

## 2025-04-13 PROCEDURE — 2500000004 HC RX 250 GENERAL PHARMACY W/ HCPCS (ALT 636 FOR OP/ED): Performed by: INTERNAL MEDICINE

## 2025-04-13 PROCEDURE — 2500000001 HC RX 250 WO HCPCS SELF ADMINISTERED DRUGS (ALT 637 FOR MEDICARE OP): Performed by: NURSE PRACTITIONER

## 2025-04-13 PROCEDURE — 2500000002 HC RX 250 W HCPCS SELF ADMINISTERED DRUGS (ALT 637 FOR MEDICARE OP, ALT 636 FOR OP/ED): Performed by: NURSE PRACTITIONER

## 2025-04-13 PROCEDURE — 2500000001 HC RX 250 WO HCPCS SELF ADMINISTERED DRUGS (ALT 637 FOR MEDICARE OP): Performed by: INTERNAL MEDICINE

## 2025-04-13 PROCEDURE — 99232 SBSQ HOSP IP/OBS MODERATE 35: CPT | Performed by: NURSE PRACTITIONER

## 2025-04-13 PROCEDURE — 1210000001 HC SEMI-PRIVATE ROOM DAILY

## 2025-04-13 PROCEDURE — 80048 BASIC METABOLIC PNL TOTAL CA: CPT | Performed by: INTERNAL MEDICINE

## 2025-04-13 RX ORDER — VALSARTAN 80 MG/1
80 TABLET ORAL DAILY
Status: DISCONTINUED | OUTPATIENT
Start: 2025-04-13 | End: 2025-04-14

## 2025-04-13 RX ORDER — CLOTRIMAZOLE 1 %
1 CREAM (GRAM) TOPICAL EVERY EVENING
Status: DISCONTINUED | OUTPATIENT
Start: 2025-04-13 | End: 2025-04-15 | Stop reason: HOSPADM

## 2025-04-13 RX ORDER — ATENOLOL 25 MG/1
25 TABLET ORAL 2 TIMES DAILY
Status: DISCONTINUED | OUTPATIENT
Start: 2025-04-13 | End: 2025-04-14

## 2025-04-13 RX ORDER — POTASSIUM CHLORIDE 20 MEQ/1
40 TABLET, EXTENDED RELEASE ORAL ONCE
Status: COMPLETED | OUTPATIENT
Start: 2025-04-13 | End: 2025-04-13

## 2025-04-13 RX ADMIN — ALLOPURINOL 100 MG: 100 TABLET ORAL at 09:46

## 2025-04-13 RX ADMIN — ALLOPURINOL 100 MG: 100 TABLET ORAL at 21:42

## 2025-04-13 RX ADMIN — HEPARIN SODIUM 5000 UNITS: 5000 INJECTION INTRAVENOUS; SUBCUTANEOUS at 21:42

## 2025-04-13 RX ADMIN — CHOLECALCIFEROL (VITAMIN D3) 10 MCG (400 UNIT) TABLET 10 MCG: at 09:46

## 2025-04-13 RX ADMIN — ATORVASTATIN CALCIUM 40 MG: 40 TABLET, FILM COATED ORAL at 21:42

## 2025-04-13 RX ADMIN — NYSTATIN 1 APPLICATION: 100000 POWDER TOPICAL at 09:46

## 2025-04-13 RX ADMIN — POTASSIUM CHLORIDE 40 MEQ: 1500 TABLET, EXTENDED RELEASE ORAL at 09:54

## 2025-04-13 RX ADMIN — CLOTRIMAZOLE 1 APPLICATION: 1 CREAM TOPICAL at 21:41

## 2025-04-13 RX ADMIN — ATENOLOL 25 MG: 25 TABLET ORAL at 09:46

## 2025-04-13 RX ADMIN — AMLODIPINE BESYLATE 10 MG: 10 TABLET ORAL at 09:46

## 2025-04-13 RX ADMIN — HEPARIN SODIUM 5000 UNITS: 5000 INJECTION INTRAVENOUS; SUBCUTANEOUS at 09:46

## 2025-04-13 RX ADMIN — CEFTRIAXONE SODIUM 1 G: 1 INJECTION, SOLUTION INTRAVENOUS at 21:41

## 2025-04-13 RX ADMIN — NYSTATIN 1 APPLICATION: 100000 POWDER TOPICAL at 21:41

## 2025-04-13 ASSESSMENT — ACTIVITIES OF DAILY LIVING (ADL): LACK_OF_TRANSPORTATION: NO

## 2025-04-13 ASSESSMENT — COGNITIVE AND FUNCTIONAL STATUS - GENERAL
MOVING TO AND FROM BED TO CHAIR: A LOT
DAILY ACTIVITIY SCORE: 15
TURNING FROM BACK TO SIDE WHILE IN FLAT BAD: A LITTLE
STANDING UP FROM CHAIR USING ARMS: TOTAL
TURNING FROM BACK TO SIDE WHILE IN FLAT BAD: A LITTLE
DRESSING REGULAR UPPER BODY CLOTHING: A LOT
TOILETING: A LITTLE
MOVING TO AND FROM BED TO CHAIR: TOTAL
DAILY ACTIVITIY SCORE: 14
HELP NEEDED FOR BATHING: A LOT
CLIMB 3 TO 5 STEPS WITH RAILING: TOTAL
DRESSING REGULAR LOWER BODY CLOTHING: A LOT
STANDING UP FROM CHAIR USING ARMS: A LOT
DRESSING REGULAR UPPER BODY CLOTHING: A LOT
PERSONAL GROOMING: A LOT
WALKING IN HOSPITAL ROOM: TOTAL
MOBILITY SCORE: 13
TOILETING: A LOT
MOVING FROM LYING ON BACK TO SITTING ON SIDE OF FLAT BED WITH BEDRAILS: A LITTLE
PERSONAL GROOMING: A LOT
CLIMB 3 TO 5 STEPS WITH RAILING: TOTAL
WALKING IN HOSPITAL ROOM: TOTAL
HELP NEEDED FOR BATHING: A LOT
DRESSING REGULAR LOWER BODY CLOTHING: A LOT
MOBILITY SCORE: 10

## 2025-04-13 ASSESSMENT — PAIN - FUNCTIONAL ASSESSMENT
PAIN_FUNCTIONAL_ASSESSMENT: 0-10

## 2025-04-13 ASSESSMENT — PAIN SCALES - GENERAL
PAINLEVEL_OUTOF10: 0 - NO PAIN

## 2025-04-13 NOTE — NURSING NOTE
NANCY Boateng in with pt at this time, pt being interviewed and assessed.. plan of care being discussed. Thorough education being given on rationale for cessation of three medications that were on pt' list (Lasix, Diovan and Antibiotic that was being used in response to UTI). Pt verbalizing comprehension of plan and rationale.     Pt reiterating her recent nausea, which she requested to use Ginger Ale as first line response, which she has at bedside. Pt getting replacement Potassium for 3.4 lab today.

## 2025-04-13 NOTE — H&P
History Of Present Illness  Tammy Walker is a 69 y.o. female presenting with increase in her creatinine.  She was just here about 2 weeks ago and treated for a UTI and acute renal insufficiency.  She was diagnosed around that time with bladder retention.  She was discharged with a Rush.  Rush was ultimately taken out.  Rush was put back in about 2 days ago.  Not clear if she was retaining at that point in time.  She has been sent to the hospital from rehab because her creatinine is gone from 1.8 last week  to 2.7 today.  When looks at her creatinine range over the last year it is ranged from about 1 8-2.8.  Today's value is really not that much out of the ordinary.  She has been on fairly brisk doses of valsartan furosemide and spironolactone.  She does have chronic kidney failure as well as systolic heart failure.  Patient is unaware of any clinical change that warranted her trip to the emergency room.  As far as she knows she was sent here because of abnormal lab values.     Past Medical History  She has a past medical history of Arthritis, HTN (hypertension), and Long COVID.  Chronic renal insufficiency, bladder dysfunction, systolic heart failure    Surgical History  She has a past surgical history that includes MR angio head wo IV contrast (9/11/2023).     Social History  She reports that she quit smoking about 3 years ago. Her smoking use included cigarettes. She has never used smokeless tobacco. She reports that she does not currently use alcohol. She reports that she does not use drugs.    Family History  Family History   Problem Relation Name Age of Onset    Hypertension Mother      Diabetes Mother      Hypertension Father      Diabetes Father          Allergies  Patient has no known allergies.    Review of Systems she denies any shortness of breath any acute GI symptoms any respiratory symptoms any urinary symptoms.  Otherwise 10 point review of systems negative.     Physical Exam alert oriented  nondistressed  Head atraumatic normocephalic  Pupils equal round reactive to light extraocular motion intact  Mouth normal-appearing tongue and oropharynx  Neck supple without thyromegaly  Lymph nodes the cervical or axillary lymphadenopathy  Heart regular rate and rhythm without murmurs rubs or gallops  Lungs clear to auscultation normal to percussion  Abdomen soft nontender nondistended  Extremities trace ankle edema  Neuro cranial nerves intact with good tone strength arms and legs  Musculoskeletal normal passive range of motion shoulders elbows hips and knees  Skin no rashes or ulcerations.     Last Recorded Vitals  BP (!) 153/92   Pulse 59   Temp 36.3 °C (97.3 °F) (Temporal)   Resp 13   Wt 90.7 kg (200 lb)   SpO2 95%     Relevant Results  Urine loaded with white cells.  Creatinine 2.7.  CT abdomen unremarkable.  Assessment/Plan   Assessment & Plan  Acute renal failure  In the setting of chronic systolic heart failure.  Also recent issues with bladder dysfunction/urinary retention.  I do not know that the fluctuations in her creatinine represent a clinical emergency worthy of hospitalization.  She is asymptomatic and she is certainly not decompensating in terms of her heart failure.      We will keep the Rush in for now and reassess her for urinary retention/trial of void once her kidney function improves a bit.  We can hold the diuretics and the Diovan for now and allow her creatinine creatinine to come down a bit.  She has never seen a nephrologist and she probably needs 1 so I will consult nephrology to see her while here.    Been getting Bactrim for UTI.  Not a great choice for somebody with kidney dysfunction.  Will go with ceftriaxone pending urine culture.    PT OT mj.           Lito Castillo MD

## 2025-04-13 NOTE — ASSESSMENT & PLAN NOTE
Cr 25 was 2.7 and on 4/2 was 1.82 . In the setting of chronic systolic heart failure.  Lasic and diovan were held  Pt was on Bactrim before admission for UTI wich made her Kidney function worst . It was stopped.    consult nephrology

## 2025-04-13 NOTE — ASSESSMENT & PLAN NOTE
Been getting Bactrim for UTI.  Not a great choice for a patient  with kidney dysfunction.    On  ceftriaxone pending urine culture.

## 2025-04-13 NOTE — PROGRESS NOTES
04/13/25 1333   Discharge Planning   Living Arrangements Family members  (lives with her sister Idalmis)   Support Systems Family members   Assistance Needed return to rehab   Type of Residence Private residence   Number of Stairs to Enter Residence 5   Number of Stairs Within Residence 2  (flight to basement, flight to upper level)   Do you have animals or pets at home? No   Who is requesting discharge planning? Provider   Home or Post Acute Services Post acute facilities (Rehab/SNF/etc)   Type of Post Acute Facility Services Rehab;Skilled nursing   Expected Discharge Disposition SNF   Does the patient need discharge transport arranged? Yes   RoundTrip coordination needed? Yes   Has discharge transport been arranged? No   Financial Resource Strain   How hard is it for you to pay for the very basics like food, housing, medical care, and heating? Not very   Housing Stability   In the last 12 months, was there a time when you were not able to pay the mortgage or rent on time? N   In the past 12 months, how many times have you moved where you were living? 0   At any time in the past 12 months, were you homeless or living in a shelter (including now)? N   Transportation Needs   In the past 12 months, has lack of transportation kept you from medical appointments or from getting medications? no   In the past 12 months, has lack of transportation kept you from meetings, work, or from getting things needed for daily living? No   Patient Choice   Provider Choice list and CMS website (https://medicare.gov/care-compare#search) for post-acute Quality and Resource Measure Data were provided and reviewed with: Patient   Patient / Family choosing to utilize agency / facility established prior to hospitalization Yes   Stroke Family Assessment   Stroke Family Assessment Needed No

## 2025-04-13 NOTE — ASSESSMENT & PLAN NOTE
keep the Rush in for now and reassess her for urinary retention/trial of void once her kidney function improves a bit.

## 2025-04-13 NOTE — PROGRESS NOTES
Tammy Walker is a 69 y.o. female on day 1 of admission presenting with Acute renal failure.      Subjective   Pt seens and examined. Pt c/o nausea last BM today . Pt was maria ines denies any CP or SOB . No fever or chills   Objective     Last Recorded Vitals  /83 (BP Location: Right arm, Patient Position: Lying)   Pulse 55   Temp 36.4 °C (97.5 °F) (Oral)   Resp 17   Wt 90.7 kg (200 lb)   SpO2 93%   Intake/Output last 3 Shifts:    Intake/Output Summary (Last 24 hours) at 4/13/2025 0814  Last data filed at 4/13/2025 0737  Gross per 24 hour   Intake 180 ml   Output 700 ml   Net -520 ml       Admission Weight  Weight: 90.7 kg (200 lb) (04/12/25 1644)    Daily Weight  04/12/25 : 90.7 kg (200 lb)    Image Results  CT abdomen pelvis wo IV contrast  Narrative: Interpreted By:  Bartool Ruffin,   STUDY:  CT ABDOMEN PELVIS WO IV CONTRAST;  4/12/2025 7:13 pm      INDICATION:  Signs/Symptoms:LORETA, nixon decreased output, obstruction?.      COMPARISON:  Renal ultrasound 09/11/2023      ACCESSION NUMBER(S):  [id number]      ORDERING CLINICIAN:  DEBI CABALLERO      TECHNIQUE:  Axial noncontrast CT images of the abdomen and pelvis with coronal  and sagittal reconstructed images.      FINDINGS:  Lack of intravenous contrast limits evaluation of vessels and solid  organs.      LOWER CHEST: Aortic valve calcifications. Small hiatal hernia.  BONES: Diffuse osseous demineralization. No acute osseous  abnormalities. ABDOMINAL WALL: Small fat containing umbilical hernia.      ABDOMEN:      LIVER: Unremarkable.  BILE DUCTS: Unremarkable.  GALLBLADDER: Unremarkable.  PANCREAS:Unremarkable.  SPLEEN: Unremarkable.  ADRENALS: Left adrenal gland nodule measuring 1.5 cm (3/36) with  internal Hounsfield unit of 0.8, likely a benign adenoma. KIDNEYS and  URETERS: No hydronephrosis. There are few pelvic phleboliths in  adjacent to the distal ureters but no intraluminal obstructing  defects identified. VESSELS: Mild-to-moderate  aortoiliac  atherosclerosis. Focal fusiform ectasia of the infrarenal abdominal  aorta measuring up to 2.3 cm. No saccular aneurysms identified. LYMPH  NODES: Unremarkable. RETROPERITONEUM/PERITONEUM: Unremarkable.  BOWEL: Small hiatal hernia. Small bowel loops are nonobstructive.  Colonic diverticulosis      PELVIS:      REPRODUCTIVE ORGANS: No pelvic masses.  BLADDER: Decompressed with mild wall thickening. Rush catheter in  place.      Impression: No nephrolithiasis or hydronephrosis.      Bladder is decompressed with mild wall thickening which is  nonspecific and could be reactive but cystitis is not excluded,  correlate with urinalysis. Rush catheter is in place.      Left adrenal gland 1.5 cm adenoma.      Other chronic findings as discussed above.      MACRO:  None      Signed by: Bartolo Ruffin 4/12/2025 8:19 PM  Dictation workstation:   [id number]      Physical Exam  Vitals and nursing note reviewed.   Constitutional:       Appearance: Normal appearance. She is obese.   HENT:      Head: Normocephalic and atraumatic.      Nose: Rhinorrhea present. No congestion.   Eyes:      Extraocular Movements: Extraocular movements intact.      Pupils: Pupils are equal, round, and reactive to light.   Cardiovascular:      Rate and Rhythm: Bradycardia present.   Pulmonary:      Effort: Pulmonary effort is normal.   Abdominal:      General: Bowel sounds are normal.      Palpations: Abdomen is soft.   Musculoskeletal:         General: Normal range of motion.      Right lower leg: No edema.      Left lower leg: No edema.   Skin:     General: Skin is warm.   Neurological:      General: No focal deficit present.      Mental Status: She is alert and oriented to person, place, and time.   Psychiatric:         Mood and Affect: Mood normal.         Relevant Results  No current facility-administered medications on file prior to encounter.     Current Outpatient Medications on File Prior to Encounter   Medication Sig Dispense  Refill    allopurinol (Zyloprim) 100 mg tablet TAKE 1 TABLET BY MOUTH TWICE A  tablet 3    aspirin 81 mg chewable tablet Chew 1 tablet (81 mg) once daily.      atenolol (Tenormin) 50 mg tablet TAKE 1 TABLET BY MOUTH TWICE A  tablet 3    atorvastatin (Lipitor) 40 mg tablet Take 1 tablet (40 mg) by mouth once daily at bedtime. 90 tablet 3    cholecalciferol (Vitamin D3) 10 MCG (400 UNIT) tablet Take 1 tablet (10 mcg) by mouth once daily. 30 tablet 11    furosemide (Lasix) 40 mg tablet TAKE 1 TABLET BY MOUTH EVERY DAY (Patient taking differently: Take 1 tablet (40 mg) by mouth once daily as needed.) 90 tablet 3    hydroCHLOROthiazide (HYDRODiuril) 50 mg tablet Take 1 tablet (50 mg) by mouth once daily. 30 tablet 5    nystatin (Mycostatin) 100,000 unit/gram powder Apply 1 Application topically 2 times a day. groin      potassium chloride CR (Klor-Con) 10 mEq ER tablet Take 1 tablet (10 mEq) by mouth 2 times a day. Do not crush, chew, or split. 60 tablet 11    valsartan (Diovan) 80 mg tablet TAKE 1 TABLET (80 MG) BY MOUTH 2 TIMES A DAY. 100 tablet 3       CT abdomen pelvis wo IV contrast    Result Date: 4/12/2025  Interpreted By:  Bartolo Ruffin, STUDY: CT ABDOMEN PELVIS WO IV CONTRAST;  4/12/2025 7:13 pm   INDICATION: Signs/Symptoms:LORETA, nixon decreased output, obstruction?.   COMPARISON: Renal ultrasound 09/11/2023   ACCESSION NUMBER(S): [id number]   ORDERING CLINICIAN: DEBI CABALLERO   TECHNIQUE: Axial noncontrast CT images of the abdomen and pelvis with coronal and sagittal reconstructed images.   FINDINGS: Lack of intravenous contrast limits evaluation of vessels and solid organs.   LOWER CHEST: Aortic valve calcifications. Small hiatal hernia. BONES: Diffuse osseous demineralization. No acute osseous abnormalities. ABDOMINAL WALL: Small fat containing umbilical hernia.   ABDOMEN:   LIVER: Unremarkable. BILE DUCTS: Unremarkable. GALLBLADDER: Unremarkable. PANCREAS:Unremarkable. SPLEEN: Unremarkable.  ADRENALS: Left adrenal gland nodule measuring 1.5 cm (3/36) with internal Hounsfield unit of 0.8, likely a benign adenoma. KIDNEYS and URETERS: No hydronephrosis. There are few pelvic phleboliths in adjacent to the distal ureters but no intraluminal obstructing defects identified. VESSELS: Mild-to-moderate aortoiliac atherosclerosis. Focal fusiform ectasia of the infrarenal abdominal aorta measuring up to 2.3 cm. No saccular aneurysms identified. LYMPH NODES: Unremarkable. RETROPERITONEUM/PERITONEUM: Unremarkable. BOWEL: Small hiatal hernia. Small bowel loops are nonobstructive. Colonic diverticulosis   PELVIS:   REPRODUCTIVE ORGANS: No pelvic masses. BLADDER: Decompressed with mild wall thickening. Rush catheter in place.       No nephrolithiasis or hydronephrosis.   Bladder is decompressed with mild wall thickening which is nonspecific and could be reactive but cystitis is not excluded, correlate with urinalysis. Rush catheter is in place.   Left adrenal gland 1.5 cm adenoma.   Other chronic findings as discussed above.   MACRO: None   Signed by: Bartolo Ruffin 4/12/2025 8:19 PM Dictation workstation:   [id number]          Assessment/Plan           Assessment & Plan  Acute renal failure  Cr 25 was 2.7 and on 4/2 was 1.82 . In the setting of chronic systolic heart failure.  Lasic and diovan were held  Pt was on Bactrim before admission for UTI wich made her Kidney function worst . It was stopped.    consult nephrology     Acute cystitis without hematuria  Been getting Bactrim for UTI.  Not a great choice for a patient  with kidney dysfunction.    On  ceftriaxone pending urine culture.  Urine retention   keep the Rush in for now and reassess her for urinary retention/trial of void once her kidney function improves a bit.  Weakness  PT OT eval.  Fall precautions   Bradycardia  HR is less than 60   I decrease Atenolol fro 50 mg BID to 25 mg BID   Keep monitoring       Coco Kilo, APRN-CNP

## 2025-04-13 NOTE — CARE PLAN
Problem: Skin  Goal: Prevent/minimize sheer/friction injuries  4/13/2025 1405 by Rosemary Curtis RN  Outcome: Progressing  Flowsheets (Taken 4/13/2025 1405)  Prevent/minimize sheer/friction injuries:   Complete micro-shifts as needed if patient unable. Adjust patient position to relieve pressure points, not a full turn   HOB 30 degrees or less   Increase activity/out of bed for meals   Turn/reposition every 2 hours/use positioning/transfer devices   Use pull sheet   Utilize specialty bed per algorithm  4/13/2025 1404 by Rosemary Curtis RN  Outcome: Progressing  Goal: Promote/optimize nutrition  4/13/2025 1405 by Rosemary Curtis RN  Outcome: Progressing  Flowsheets (Taken 4/13/2025 1405)  Promote/optimize nutrition:   Assist with feeding   Discuss with provider if NPO > 2 days   Offer water/supplements/favorite foods   Reassess MST if dietician not consulted   Monitor/record intake including meals   Consume > 50% meals/supplements  4/13/2025 1404 by Rosemary Curtis RN  Outcome: Progressing   The patient's goals for the shift include PT/OT eval, maintain safety    The clinical goals for the shift include he clinical goals for the shift include : SUPPORT SKIN INTEGRITY THROUGH FREQUENT ASSISTED REPOSITIONING, KEEPING SKIN CLEAN AND DRY, AND ENCOURAGING NUTRITIOUS ORAL INTAKE

## 2025-04-13 NOTE — PROGRESS NOTES
04/13/25 1336   [facility] Disability Status   Are you deaf or do you have serious difficulty hearing? N   Are you blind or do you have serious difficulty seeing, even when wearing glasses? N   Because of a physical, mental, or emotional condition, do you have serious difficulty concentrating, remembering, or making decisions? (5 years old or older) Y   Do you have serious difficulty walking or climbing stairs? Y   Do you have serious difficulty dressing or bathing? Y   Because of a physical, mental, or emotional condition, do you have serious difficulty doing errands alone such as visiting the doctor? Y

## 2025-04-13 NOTE — ED NOTES
Pt and family updated.  Pt to be admitted. Admit Dr. Anna VILLAGOMEZ at bedside.      Angelica Ritchie RN  04/12/25 8525

## 2025-04-13 NOTE — NURSING NOTE
BSSR being received, transfer of pt care acknowledged.     Pt is awake, reclined in bed, alert and oriented x 4, on room air with unlabored breathing

## 2025-04-13 NOTE — ASSESSMENT & PLAN NOTE
In the setting of chronic systolic heart failure.  Also recent issues with bladder dysfunction/urinary retention.  I do not know that the fluctuations in her creatinine represent a clinical emergency worthy of hospitalization.  She is asymptomatic and she is certainly not decompensating in terms of her heart failure.      We will keep the Rush in for now and reassess her for urinary retention/trial of void once her kidney function improves a bit.  We can hold the diuretics and the Diovan for now and allow her creatinine creatinine to come down a bit.  She has never seen a nephrologist and she probably needs 1 so I will consult nephrology to see her while here.    Been getting Bactrim for UTI.  Not a great choice for somebody with kidney dysfunction.  Will go with ceftriaxone pending urine culture.    PT BRET barker.

## 2025-04-13 NOTE — CARE PLAN
The patient's goals for the shift include PT/OT eval, maintain safety    The clinical goals for the shift include : SUPPORT SKIN INTEGRITY THROUGH FREQUENT ASSISTED REPOSITIONING, KEEPING SKIN CLEAN AND DRY, AND ENCOURAGING NUTRITIOUS ORAL INTAKE          Problem: Skin  Goal: Prevent/minimize sheer/friction injuries  Outcome: Progressing  Goal: Promote/optimize nutrition  Outcome: Progressing

## 2025-04-13 NOTE — HOSPITAL COURSE
Tammy Walker is a 69 y.o. female presenting with increase in her creatinine.  She was just here about 2 weeks ago and treated for a UTI and acute renal insufficiency.  She was diagnosed around that time with bladder retention.  She was discharged with a Rush.   Admitted with acute renal in the setting of chronic systolic heart failure.  Rush was in due to bladder retention from last admission.  Hold diuretic and Diovan.  Bactrim was stopped and started on ceftriaxone for her UTI.

## 2025-04-13 NOTE — CONSULTS
CONSULT: Nephrology SERVICE    SERVICE DATE: 4/13/2025   SERVICE TIME:  11:31 AM    REASON FOR CONSULT: Acute renal failure  REQUESTING PHYSICIAN: Dr. Castillo  PRIMARY CARE PHYSICIAN: DAV Benitez    ASSESSMENT AND PLAN  69-year-old woman with hypertension, history of systolic CHF, and CKD admitted with acute renal failure.  1.  Acute renal failure  2.  Hypokalemia  3.  Chronic kidney disease stage IIIb  4.  Essential hypertension    The acute renal failure is from Bactrim on the backdrop of hydrochlorothiazide, furosemide, and valsartan usage.  Her blood pressure is stable.  I would not have her go on Bactrim again.  Interestingly, her potassium remains low even despite the valsartan and Bactrim usage.  I think she can safely go back on valsartan, but I would use 80 mg/day.  The hydrochlorothiazide should be at 25 mg a day, but I will defer resumption of this at present.  Lets hold the furosemide for now.  She seems euvolemic to me.  Her last echocardiogram was 2 years ago and I not sure what her current ejection fraction is.  I really do not think she needs double diuretics.  For now, resume valsartan at 80 mg a day, hold both diuretics.  No further Bactrim.  She likely has hypertensive CKD with a baseline creatinine between 1.6 and 2.0.  Her urine sediment has been bland.  I reviewed serial urinalyses that are devoid of proteinuria or hematuria.    Trend daily labs while here.  Ultimately, her serum creatinine is not that far above her baseline and I am comfortable with discharge when cleared by others.  I will follow-up with her in the outpatient setting.  Case discussed with Coco Boateng CNP.  I will continue to follow this patient.  Thank you for the consultation.     SUBJECTIVE  Ms. Walker is a 69 y.o. woman with a history of hypertension, systolic CHF, and CKD admitted for acute renal failure, consulted for this reason.  She has had multiple admissions recently.  Her baseline serum creatinine  flux is quite a bit.  I believe the ranges typically between 1.6 and 2.0.  Last outpatient check was 1.8.  Creatinine was up to 2.5 and she was sent into the hospital from her nursing home.  She was discharged from her last hospitalization a few weeks ago with a Rush catheter in place.  In the outpatient setting this was removed.  Unclear if she had any recurrent retention, but she was placed on Bactrim for a presumed urinary tract infection.  The patient reports no dysuria.  She now has a Rush catheter back in place.  Her blood pressure has been fairly stable.  She chronically maintains on hydrochlorothiazide 50 mg daily, furosemide, and valsartan 80 mg twice daily.  There is no diarrhea.  There is no nausea or vomiting.  She denies swelling or dyspnea.    PAST MEDICAL HISTORY:   Past Medical History:   Diagnosis Date    Arthritis     HTN (hypertension)     Long COVID      PAST SURGICAL HISTORY:   Past Surgical History:   Procedure Laterality Date    MR HEAD ANGIO WO IV CONTRAST  9/11/2023    MR HEAD ANGIO WO IV CONTRAST [facility]     FAMILY HISTORY:   Family History   Problem Relation Name Age of Onset    Hypertension Mother      Diabetes Mother      Hypertension Father      Diabetes Father       SOCIAL HISTORY:   Social History     Tobacco Use    Smoking status: Former     Current packs/day: 0.00     Types: Cigarettes     Quit date: 2022     Years since quitting: 3.2    Smokeless tobacco: Never   Substance Use Topics    Alcohol use: Not Currently    Drug use: Never     MEDICATIONS:  allopurinol, 100 mg, oral, BID  amLODIPine, 10 mg, oral, Daily  atenolol, 25 mg, oral, BID  atorvastatin, 40 mg, oral, Nightly  cefTRIAXone, 1 g, intravenous, q24h  cholecalciferol, 10 mcg, oral, Daily  heparin, 5,000 Units, subcutaneous, BID  nystatin, 1 Application, Topical, BID  polyethylene glycol, 17 g, oral, Daily  valsartan, 80 mg, oral, Daily              CURRENT ALLERGIES:   Allergies as of 04/12/2025    (No  "Known Allergies)       COMPLETE REVIEW OF SYSTEMS:    Full ROS was negative unless mentioned above    OBJECTIVE  PHYSICAL EXAM  Heart Rate:  [53-66]   Temp:  [36.3 °C (97.3 °F)-36.5 °C (97.7 °F)]   Resp:  [12-30]   BP: (135-153)/(81-92)   Height:  [165.1 cm (5' 5\")]   Weight:  [90.7 kg (200 lb)]   SpO2:  [89 %-97 %]    Body mass index is 33.28 kg/m².  This is an obese white woman who appears chronically debilitated, but in no acute distress  Very pale skin  Appropriate affect  Hearing intact  Phonation intact  Moist mucosa  Systolic murmur  Lungs are clear to auscultation bilaterally  Abdomen is soft, nondistended, nontender, positive bowel sounds  Rush catheter in place, no suprapubic tenderness to palpation  No extremity edema  Moves 4 limbs spontaneously  No obvious joint deformities  No lymphadenopathy    DATA:   Labs:  Results for orders placed or performed during the hospital encounter of 04/12/25 (from the past 96 hours)   CBC and Auto Differential   Result Value Ref Range    WBC 11.1 4.4 - 11.3 x10*3/uL    nRBC 0.0 0.0 - 0.0 /100 WBCs    RBC 4.40 4.00 - 5.20 x10*6/uL    Hemoglobin 13.4 12.0 - 16.0 g/dL    Hematocrit 38.6 36.0 - 46.0 %    MCV 88 80 - 100 fL    MCH 30.5 26.0 - 34.0 pg    MCHC 34.7 32.0 - 36.0 g/dL    RDW 13.3 11.5 - 14.5 %    Platelets 407 150 - 450 x10*3/uL    Neutrophils % 69.9 40.0 - 80.0 %    Immature Granulocytes %, Automated 0.2 0.0 - 0.9 %    Lymphocytes % 18.5 13.0 - 44.0 %    Monocytes % 8.2 2.0 - 10.0 %    Eosinophils % 2.4 0.0 - 6.0 %    Basophils % 0.8 0.0 - 2.0 %    Neutrophils Absolute 7.72 (H) 1.20 - 7.70 x10*3/uL    Immature Granulocytes Absolute, Automated 0.02 0.00 - 0.70 x10*3/uL    Lymphocytes Absolute 2.05 1.20 - 4.80 x10*3/uL    Monocytes Absolute 0.91 0.10 - 1.00 x10*3/uL    Eosinophils Absolute 0.27 0.00 - 0.70 x10*3/uL    Basophils Absolute 0.09 0.00 - 0.10 x10*3/uL   Comprehensive metabolic panel   Result Value Ref Range    Glucose 99 74 - 99 mg/dL    Sodium 137 136 " - 145 mmol/L    Potassium 3.6 3.5 - 5.3 mmol/L    Chloride 101 98 - 107 mmol/L    Bicarbonate 24 21 - 32 mmol/L    Anion Gap 16 10 - 20 mmol/L    Urea Nitrogen 73 (H) 6 - 23 mg/dL    Creatinine 2.70 (H) 0.50 - 1.05 mg/dL    eGFR 19 (L) >60 mL/min/1.73m*2    Calcium 9.0 8.6 - 10.3 mg/dL    Albumin 3.8 3.4 - 5.0 g/dL    Alkaline Phosphatase 148 (H) 33 - 136 U/L    Total Protein 7.8 6.4 - 8.2 g/dL    AST 16 9 - 39 U/L    Bilirubin, Total 0.9 0.0 - 1.2 mg/dL    ALT 10 7 - 45 U/L   Urinalysis with Reflex Culture and Microscopic   Result Value Ref Range    Color, Urine Light-Yellow Light-Yellow, Yellow, Dark-Yellow    Appearance, Urine Clear Clear    Specific Gravity, Urine 1.016 1.005 - 1.035    pH, Urine 5.0 5.0, 5.5, 6.0, 6.5, 7.0, 7.5, 8.0    Protein, Urine NEGATIVE NEGATIVE, 10 (TRACE), 20 (TRACE) mg/dL    Glucose, Urine Normal Normal mg/dL    Blood, Urine NEGATIVE NEGATIVE mg/dL    Ketones, Urine NEGATIVE NEGATIVE mg/dL    Bilirubin, Urine NEGATIVE NEGATIVE mg/dL    Urobilinogen, Urine Normal Normal mg/dL    Nitrite, Urine NEGATIVE NEGATIVE    Leukocyte Esterase, Urine 500 Jeison/uL (A) NEGATIVE   Extra Urine Gray Tube   Result Value Ref Range    Extra Tube Hold for add-ons.    Microscopic Only, Urine   Result Value Ref Range    WBC, Urine 21-50 (A) 1-5, NONE /HPF    WBC Clumps, Urine RARE Reference range not established. /HPF    RBC, Urine 3-5 NONE, 1-2, 3-5 /HPF    Squamous Epithelial Cells, Urine 1-9 (SPARSE) Reference range not established. /HPF    Bacteria, Urine 2+ (A) NONE SEEN /HPF    Mucus, Urine FEW Reference range not established. /LPF   Basic Metabolic Panel   Result Value Ref Range    Glucose 95 74 - 99 mg/dL    Sodium 139 136 - 145 mmol/L    Potassium 3.4 (L) 3.5 - 5.3 mmol/L    Chloride 103 98 - 107 mmol/L    Bicarbonate 26 21 - 32 mmol/L    Anion Gap 13 10 - 20 mmol/L    Urea Nitrogen 65 (H) 6 - 23 mg/dL    Creatinine 2.50 (H) 0.50 - 1.05 mg/dL    eGFR 20 (L) >60 mL/min/1.73m*2    Calcium 9.2 8.6 - 10.3  mg/dL   Lavender Top   Result Value Ref Range    Extra Tube Hold for add-ons.        SIGNATURE: Roberto Castañeda MD PATIENT NAME: Tammy Walker   DATE: April 13, 2025 MRN: [id number]   TIME: 11:31 AM PAGER: [phone]

## 2025-04-13 NOTE — ED PROVIDER NOTES
HPI   Chief Complaint   Patient presents with    Urinary Retention       69-year-old female presented emergency department with a chief complaint of concern for urinary tract infection with worsening creatinine.  Patient has chronic indwelling Rush catheter coming from nursing facility.  History of heart failure on Lasix.  She denies lightheadedness dizziness numbness weakness.  Well-appearing nontoxic febrile.  No other complaint.              Patient History   Past Medical History:   Diagnosis Date    Arthritis     HTN (hypertension)     Long COVID      Past Surgical History:   Procedure Laterality Date    MR HEAD ANGIO WO IV CONTRAST  9/11/2023    MR HEAD ANGIO WO IV CONTRAST [facility]     Family History   Problem Relation Name Age of Onset    Hypertension Mother      Diabetes Mother      Hypertension Father      Diabetes Father       Social History     Tobacco Use    Smoking status: Former     Current packs/day: 0.00     Types: Cigarettes     Quit date: 2022     Years since quitting: 3.2    Smokeless tobacco: Never   Substance Use Topics    Alcohol use: Not Currently    Drug use: Never       Physical Exam   ED Triage Vitals [04/12/25 1644]   Temperature Heart Rate Respirations BP   36.3 °C (97.3 °F) 54 16 137/90      Pulse Ox Temp Source Heart Rate Source Patient Position   94 % Temporal Monitor --      BP Location FiO2 (%)     -- --       Physical Exam  Vitals and nursing note reviewed.   Constitutional:       Appearance: Normal appearance.   HENT:      Head: Normocephalic.      Nose: Nose normal.      Mouth/Throat:      Mouth: Mucous membranes are moist.   Cardiovascular:      Rate and Rhythm: Normal rate and regular rhythm.      Pulses: Normal pulses.      Heart sounds: Normal heart sounds.   Pulmonary:      Effort: Pulmonary effort is normal.      Breath sounds: Normal breath sounds.   Abdominal:      General: Abdomen is flat.      Palpations: Abdomen is soft.   Genitourinary:     Comments:  Indwelling Rush catheter with output and actively draining  Musculoskeletal:         General: Normal range of motion.      Cervical back: Normal range of motion.   Skin:     General: Skin is warm.   Neurological:      General: No focal deficit present.      Mental Status: She is alert and oriented to person, place, and time.   Psychiatric:         Mood and Affect: Mood normal.         Behavior: Behavior normal.           ED Course & MDM   Diagnoses as of 04/12/25 2047   Acute renal failure   Urinary tract infection without hematuria, site unspecified   Dehydration                 No data recorded     [place] Coma Scale Score: 15 (04/12/25 1659 : Saranya Zepeda RN)                           Medical Decision Making  I have seen and evaluated this patient.  Physician available for consultation.  Vital signs have been reviewed.  All laboratory and diagnostic imaging is reviewed by myself and interpreted by myself unless otherwise stated.  Additionally imaging is interpreted by radiologist.    CBC without significant leukocytosis or anemia metabolic panel demonstrates acute kidney injury with creatinine elevated in the high twos from her baseline.  Urinalysis infected.  Antibiotic given, fluids given.  Admitted for further treatment and management of acute kidney injury in the setting of urinary tract infection.    Labs Reviewed  CBC WITH AUTO DIFFERENTIAL - Abnormal     WBC                           11.1                   nRBC                          0.0                    RBC                           4.40                   Hemoglobin                    13.4                   Hematocrit                    38.6                   MCV                           88                     MCH                           30.5                   MCHC                          34.7                   RDW                           13.3                   Platelets                     407                    Neutrophils %                  69.9                   Immature Granulocytes %, Automated   0.2                    Lymphocytes %                 18.5                   Monocytes %                   8.2                    Eosinophils %                 2.4                    Basophils %                   0.8                    Neutrophils Absolute          7.72 (*)               Immature Granulocytes Absolute, Au*   0.02                   Lymphocytes Absolute          2.05                   Monocytes Absolute            0.91                   Eosinophils Absolute          0.27                   Basophils Absolute            0.09                COMPREHENSIVE METABOLIC PANEL - Abnormal     Glucose                       99                     Sodium                        137                    Potassium                     3.6                    Chloride                      101                    Bicarbonate                   24                     Anion Gap                     16                     Urea Nitrogen                 73 (*)                 Creatinine                    2.70 (*)               eGFR                          19 (*)                 Calcium                       9.0                    Albumin                       3.8                    Alkaline Phosphatase          148 (*)                Total Protein                 7.8                    AST                           16                     Bilirubin, Total              0.9                    ALT                           10                  URINALYSIS WITH REFLEX CULTURE AND MICROSCOPIC - Abnormal     Color, Urine                                         Appearance, Urine             Clear                  Specific Gravity, Urine       1.016                  pH, Urine                     5.0                    Protein, Urine                NEGATIVE                Glucose, Urine                Normal                 Blood, Urine                  NEGATIVE                 Ketones, Urine                NEGATIVE                Bilirubin, Urine              NEGATIVE                Urobilinogen, Urine           Normal                 Nitrite, Urine                NEGATIVE                Leukocyte Esterase, Urine     500 Jeison/uL (*)            MICROSCOPIC ONLY, URINE - Abnormal     WBC, Urine                    21-50 (*)               WBC Clumps, Urine             RARE                   RBC, Urine                    3-5                    Squamous Epithelial Cells, Urine                          Bacteria, Urine               2+ (*)                 Mucus, Urine                  FEW                 URINE CULTURE  URINALYSIS WITH REFLEX CULTURE AND MICROSCOPIC       Narrative: The following orders were created for panel order Urinalysis with Reflex Culture and Microscopic.                Procedure                               Abnormality         Status                                   ---------                               -----------         ------                                   Urinalysis with Reflex C...[[id number]]  Abnormal            Final result                             Extra Urine Gray Tube[[id number]]                            In process                                               Please view results for these tests on the individual orders.  EXTRA URINE GRAY TUBE  BASIC METABOLIC PANEL  CT abdomen pelvis wo IV contrast   Final Result    No nephrolithiasis or hydronephrosis.          Bladder is decompressed with mild wall thickening which is    nonspecific and could be reactive but cystitis is not excluded,    correlate with urinalysis. Rush catheter is in place.          Left adrenal gland 1.5 cm adenoma.          Other chronic findings as discussed above.          MACRO:    None          Signed by: Bartolo Ruffin 4/12/2025 8:19 PM    Dictation workstation:   [id number]     Medications  allopurinol (Zyloprim) tablet 100 mg (100 mg oral Given 4/12/25 4289)  atenolol  (Tenormin) tablet 50 mg (50 mg oral Given 4/12/25 2248)  amLODIPine (Norvasc) tablet 10 mg (has no administration in time range)  atorvastatin (Lipitor) tablet 40 mg (40 mg oral Given 4/12/25 2248)  cholecalciferol (Vitamin D-3) tablet 10 mcg (has no administration in time range)  nystatin (Mycostatin) 100,000 unit/gram powder 1 Application (1 Application Topical Given 4/12/25 2249)  polyethylene glycol (Glycolax, Miralax) packet 17 g (has no administration in time range)  heparin (porcine) injection 5,000 Units (5,000 Units subcutaneous Given 4/12/25 2249)  cefTRIAXone (Rocephin) 1 g in dextrose (iso) IV 50 mL (has no administration in time range)  sodium chloride 0.9 % bolus 250 mL (0 mL intravenous Stopped 4/12/25 2031)  sodium chloride 0.9 % bolus 250 mL (0 mL intravenous Stopped 4/12/25 2254)  cefTRIAXone (Rocephin) 1 g in dextrose (iso) IV 50 mL (0 g intravenous Stopped 4/12/25 2253)  Current Discharge Medication List                Procedure  Procedures     Scott Barreto PA-C  04/13/25 0049

## 2025-04-13 NOTE — CARE PLAN
The patient's goals for the shift include PT/OT eval, maintain safety    The clinical goals for the shift include: MAINTAIN SAFETY AND COMFORT, PREVENT FALLS, RETURN TO BASELINE KIDNEY FUNCTIONS      Problem: Pain - Adult  Goal: Verbalizes/displays adequate comfort level or baseline comfort level  Reactivated     Problem: Safety - Adult  Goal: Free from fall injury  Reactivated     Problem: Discharge Planning  Goal: Discharge to home or other facility with appropriate resources  Reactivated     Problem: Chronic Conditions and Co-morbidities  Goal: Patient's chronic conditions and co-morbidity symptoms are monitored and maintained or improved  Reactivated     Problem: Nutrition  Goal: Nutrient intake appropriate for maintaining nutritional needs  Reactivated     Problem: Fall/Injury  Goal: Not fall by end of shift  Reactivated  Goal: Be free from injury by end of the shift  Reactivated  Goal: Verbalize understanding of personal risk factors for fall in the hospital  Reactivated  Goal: Verbalize understanding of risk factor reduction measures to prevent injury from fall in the home  Reactivated  Goal: Use assistive devices by end of the shift  Reactivated  Goal: Pace activities to prevent fatigue by end of the shift  Reactivated     Problem: Pain  Goal: Performs ADL's with improved pain control throughout shift  Reactivated  Goal: Participates in PT with improved pain control throughout the shift  Reactivated

## 2025-04-13 NOTE — PROGRESS NOTES
"Tammy Walker is a 69 y.o. female on day 1 of admission presenting with Acute renal failure.    Patient is a readmission.   She was admitted to [facility] 03/31-04/02/2025, dc to [place] for rehab.   She lives with her sister Idalmis in a multi-level house. She uses a transfer chair. Her sister helps with bathing and dressing. Patient states she \"is a bad cook\" so her sister does the cooking as well. Patient does not drive, her sister transports. PCP is Raisa Goff CNP with [facility].   ADOD 04/17/2025  [facility] scores: not yet seen  Plan is to return to [place] for rehab, if they have a bed at that time. [facility] was advised patient will need a new auth to return. Patient has [employer] medicare, provided [employer] [facility] flyer, discussed program and that [employer] is picking up the cost.   Patient does not have a safe discharge plan. Please speak to care coordinator prior to discharging.     Dorothy Yadav RN      "

## 2025-04-14 ENCOUNTER — APPOINTMENT (OUTPATIENT)
Dept: CARDIOLOGY | Facility: CLINIC | Age: 70
End: 2025-04-14
Payer: MEDICARE

## 2025-04-14 ENCOUNTER — TELEPHONE (OUTPATIENT)
Dept: PRIMARY CARE | Facility: CLINIC | Age: 70
End: 2025-04-14
Payer: MEDICARE

## 2025-04-14 LAB
ALBUMIN SERPL BCP-MCNC: 3.6 G/DL (ref 3.4–5)
ANION GAP SERPL CALCULATED.3IONS-SCNC: 13 MMOL/L (ref 10–20)
BUN SERPL-MCNC: 57 MG/DL (ref 6–23)
CALCIUM SERPL-MCNC: 9.2 MG/DL (ref 8.6–10.3)
CHLORIDE SERPL-SCNC: 105 MMOL/L (ref 98–107)
CO2 SERPL-SCNC: 25 MMOL/L (ref 21–32)
CREAT SERPL-MCNC: 2.22 MG/DL (ref 0.5–1.05)
EGFRCR SERPLBLD CKD-EPI 2021: 24 ML/MIN/1.73M*2
ERYTHROCYTE [DISTWIDTH] IN BLOOD BY AUTOMATED COUNT: 13.6 % (ref 11.5–14.5)
GLUCOSE SERPL-MCNC: 93 MG/DL (ref 74–99)
HCT VFR BLD AUTO: 36.8 % (ref 36–46)
HGB BLD-MCNC: 12.6 G/DL (ref 12–16)
MCH RBC QN AUTO: 30.4 PG (ref 26–34)
MCHC RBC AUTO-ENTMCNC: 34.2 G/DL (ref 32–36)
MCV RBC AUTO: 89 FL (ref 80–100)
NRBC BLD-RTO: 0 /100 WBCS (ref 0–0)
PHOSPHATE SERPL-MCNC: 3.6 MG/DL (ref 2.5–4.9)
PLATELET # BLD AUTO: 315 X10*3/UL (ref 150–450)
POTASSIUM SERPL-SCNC: 3.7 MMOL/L (ref 3.5–5.3)
RBC # BLD AUTO: 4.14 X10*6/UL (ref 4–5.2)
SODIUM SERPL-SCNC: 139 MMOL/L (ref 136–145)
WBC # BLD AUTO: 8.7 X10*3/UL (ref 4.4–11.3)

## 2025-04-14 PROCEDURE — 2500000004 HC RX 250 GENERAL PHARMACY W/ HCPCS (ALT 636 FOR OP/ED): Performed by: INTERNAL MEDICINE

## 2025-04-14 PROCEDURE — 36415 COLL VENOUS BLD VENIPUNCTURE: CPT | Performed by: NURSE PRACTITIONER

## 2025-04-14 PROCEDURE — 80069 RENAL FUNCTION PANEL: CPT | Performed by: INTERNAL MEDICINE

## 2025-04-14 PROCEDURE — 85027 COMPLETE CBC AUTOMATED: CPT | Performed by: NURSE PRACTITIONER

## 2025-04-14 PROCEDURE — 2500000001 HC RX 250 WO HCPCS SELF ADMINISTERED DRUGS (ALT 637 FOR MEDICARE OP): Performed by: INTERNAL MEDICINE

## 2025-04-14 PROCEDURE — 99232 SBSQ HOSP IP/OBS MODERATE 35: CPT | Performed by: NURSE PRACTITIONER

## 2025-04-14 PROCEDURE — 97166 OT EVAL MOD COMPLEX 45 MIN: CPT | Mod: GO

## 2025-04-14 PROCEDURE — 2500000002 HC RX 250 W HCPCS SELF ADMINISTERED DRUGS (ALT 637 FOR MEDICARE OP, ALT 636 FOR OP/ED): Performed by: INTERNAL MEDICINE

## 2025-04-14 PROCEDURE — 1210000001 HC SEMI-PRIVATE ROOM DAILY

## 2025-04-14 PROCEDURE — 97162 PT EVAL MOD COMPLEX 30 MIN: CPT | Mod: GP

## 2025-04-14 RX ORDER — VALSARTAN 80 MG/1
80 TABLET ORAL 2 TIMES DAILY
Status: DISCONTINUED | OUTPATIENT
Start: 2025-04-14 | End: 2025-04-15 | Stop reason: HOSPADM

## 2025-04-14 RX ORDER — PANTOPRAZOLE SODIUM 40 MG/1
40 TABLET, DELAYED RELEASE ORAL
Status: DISCONTINUED | OUTPATIENT
Start: 2025-04-15 | End: 2025-04-15 | Stop reason: HOSPADM

## 2025-04-14 RX ADMIN — ALLOPURINOL 100 MG: 100 TABLET ORAL at 09:57

## 2025-04-14 RX ADMIN — POLYETHYLENE GLYCOL 3350 17 G: 17 POWDER, FOR SOLUTION ORAL at 09:56

## 2025-04-14 RX ADMIN — CEFTRIAXONE SODIUM 1 G: 1 INJECTION, SOLUTION INTRAVENOUS at 20:56

## 2025-04-14 RX ADMIN — VALSARTAN 80 MG: 80 TABLET, FILM COATED ORAL at 20:56

## 2025-04-14 RX ADMIN — NYSTATIN 1 APPLICATION: 100000 POWDER TOPICAL at 20:57

## 2025-04-14 RX ADMIN — HEPARIN SODIUM 5000 UNITS: 5000 INJECTION INTRAVENOUS; SUBCUTANEOUS at 09:56

## 2025-04-14 RX ADMIN — ALLOPURINOL 100 MG: 100 TABLET ORAL at 20:56

## 2025-04-14 RX ADMIN — ATORVASTATIN CALCIUM 40 MG: 40 TABLET, FILM COATED ORAL at 20:56

## 2025-04-14 RX ADMIN — AMLODIPINE BESYLATE 10 MG: 10 TABLET ORAL at 09:57

## 2025-04-14 RX ADMIN — HEPARIN SODIUM 5000 UNITS: 5000 INJECTION INTRAVENOUS; SUBCUTANEOUS at 20:56

## 2025-04-14 RX ADMIN — NYSTATIN 1 APPLICATION: 100000 POWDER TOPICAL at 09:58

## 2025-04-14 RX ADMIN — CHOLECALCIFEROL (VITAMIN D3) 10 MCG (400 UNIT) TABLET 10 MCG: at 09:58

## 2025-04-14 RX ADMIN — VALSARTAN 80 MG: 80 TABLET, FILM COATED ORAL at 09:58

## 2025-04-14 RX ADMIN — CLOTRIMAZOLE 1 APPLICATION: 1 CREAM TOPICAL at 21:53

## 2025-04-14 ASSESSMENT — COGNITIVE AND FUNCTIONAL STATUS - GENERAL
DAILY ACTIVITIY SCORE: 13
WALKING IN HOSPITAL ROOM: TOTAL
EATING MEALS: A LITTLE
STANDING UP FROM CHAIR USING ARMS: A LOT
DRESSING REGULAR LOWER BODY CLOTHING: A LOT
CLIMB 3 TO 5 STEPS WITH RAILING: TOTAL
DRESSING REGULAR UPPER BODY CLOTHING: A LOT
TURNING FROM BACK TO SIDE WHILE IN FLAT BAD: A LITTLE
CLIMB 3 TO 5 STEPS WITH RAILING: TOTAL
MOVING TO AND FROM BED TO CHAIR: A LOT
PERSONAL GROOMING: A LOT
PERSONAL GROOMING: A LOT
HELP NEEDED FOR BATHING: A LOT
MOBILITY SCORE: 13
STANDING UP FROM CHAIR USING ARMS: A LOT
WALKING IN HOSPITAL ROOM: TOTAL
CLIMB 3 TO 5 STEPS WITH RAILING: TOTAL
TURNING FROM BACK TO SIDE WHILE IN FLAT BAD: A LITTLE
MOVING TO AND FROM BED TO CHAIR: A LITTLE
CLIMB 3 TO 5 STEPS WITH RAILING: TOTAL
WALKING IN HOSPITAL ROOM: TOTAL
MOVING TO AND FROM BED TO CHAIR: TOTAL
MOVING FROM LYING ON BACK TO SITTING ON SIDE OF FLAT BED WITH BEDRAILS: A LITTLE
EATING MEALS: A LITTLE
HELP NEEDED FOR BATHING: A LOT
MOBILITY SCORE: 12
MOVING FROM LYING ON BACK TO SITTING ON SIDE OF FLAT BED WITH BEDRAILS: A LOT
WALKING IN HOSPITAL ROOM: TOTAL
MOVING TO AND FROM BED TO CHAIR: A LITTLE
TOILETING: A LOT
MOVING FROM LYING ON BACK TO SITTING ON SIDE OF FLAT BED WITH BEDRAILS: A LITTLE
DRESSING REGULAR LOWER BODY CLOTHING: A LOT
HELP NEEDED FOR BATHING: A LOT
TOILETING: TOTAL
MOVING FROM LYING ON BACK TO SITTING ON SIDE OF FLAT BED WITH BEDRAILS: A LITTLE
TURNING FROM BACK TO SIDE WHILE IN FLAT BAD: A LITTLE
TOILETING: A LOT
DAILY ACTIVITIY SCORE: 13
DRESSING REGULAR LOWER BODY CLOTHING: A LOT
DAILY ACTIVITIY SCORE: 13
DAILY ACTIVITIY SCORE: 14
STANDING UP FROM CHAIR USING ARMS: TOTAL
STANDING UP FROM CHAIR USING ARMS: A LOT
DRESSING REGULAR UPPER BODY CLOTHING: A LOT
HELP NEEDED FOR BATHING: A LOT
PERSONAL GROOMING: A LOT
EATING MEALS: A LITTLE
DRESSING REGULAR LOWER BODY CLOTHING: A LOT
TOILETING: A LOT
DRESSING REGULAR UPPER BODY CLOTHING: A LOT
MOBILITY SCORE: 8
EATING MEALS: A LITTLE
PERSONAL GROOMING: A LITTLE
TURNING FROM BACK TO SIDE WHILE IN FLAT BAD: A LOT
MOBILITY SCORE: 13
DRESSING REGULAR UPPER BODY CLOTHING: A LITTLE

## 2025-04-14 ASSESSMENT — PAIN SCALES - GENERAL
PAINLEVEL_OUTOF10: 0 - NO PAIN

## 2025-04-14 ASSESSMENT — PAIN - FUNCTIONAL ASSESSMENT
PAIN_FUNCTIONAL_ASSESSMENT: 0-10

## 2025-04-14 ASSESSMENT — ACTIVITIES OF DAILY LIVING (ADL)
ADL_ASSISTANCE: NEEDS ASSISTANCE
BATHING_ASSISTANCE: MODERATE
ADL_ASSISTANCE: NEEDS ASSISTANCE

## 2025-04-14 NOTE — PROGRESS NOTES
Physical Therapy    Physical Therapy Evaluation    Patient Name: Tammy Walker  MRN: [id number]  Department: [address]  Room: [address] [place] [address]  Today's Date: 4/14/2025   Time Calculation  Start Time: 1009  Stop Time: 1030  Time Calculation (min): 21 min    Assessment/Plan   PT Assessment  PT Assessment Results: Decreased strength, Decreased range of motion, Decreased endurance, Impaired balance, Decreased mobility, Decreased coordination, Decreased cognition, Impaired judgement, Decreased safety awareness, Impaired vision, Impaired hearing, Obesity, Pain, Decreased skin integrity  Rehab Prognosis: Good  Barriers to Discharge Home: Caregiver assistance, Physical needs  Caregiver Assistance: Caregiver assistance needed per identified barriers - however, level of patient's required assistance exceeds assistance available at home  Physical Needs: Stair navigation into home limited by function/safety, 24hr mobility assistance needed, 24hr ADL assistance needed, High falls risk due to function or environment  Evaluation/Treatment Tolerance: Patient limited by fatigue  Medical Staff Made Aware: Yes  Strengths: Support of Caregivers  Barriers to Participation: Comorbidities  End of Session Communication: Bedside nurse  Assessment Comment: The patient is a 69 y.o. female admitted to the hospital for renal failure. The patient currently requires modA for bed mobility; pt declining OOB mobility citing fear of falling. The patient is a high fall risk at this time; pt would continue to benefit from skilled therapy services to address functional deficits.  End of Session Patient Position: Bed, 3 rail up, Alarm on  IP OR SWING BED PT PLAN  Inpatient or Swing Bed: Inpatient  PT Plan  Treatment/Interventions: Bed mobility, Transfer training, Gait training, Balance training, Neuromuscular re-education, Strengthening, Endurance training, Range of motion, Therapeutic activity, Therapeutic exercise, Home exercise program  PT Plan: Ongoing  PT  PT Frequency: 3 times per week  PT Discharge Recommendations: Moderate intensity level of continued care  Equipment Recommended upon Discharge: Wheeled walker, Wheelchair  PT Recommended Transfer Status: Assist x2  PT - OK to Discharge: Yes    Subjective   General Visit Information:  General  Reason for Referral: mobility impairment due to renal failure  Referred By: Lito Castillo MD  Past Medical History Relevant to Rehab: systolic heart failure, chronic kidney failure, arthritis, HTN, bladder dysfunction  Family/Caregiver Present: No  Co-Treatment: OT  Co-Treatment Reason: to maximize pt outcomes  Prior to Session Communication: Bedside nurse  Patient Position Received: Bed, 3 rail up, Alarm off, caregiver present  Preferred Learning Style: verbal, visual  General Comment: The patient is a 69 y.o. female admitted to the hospital for abnormal lab results.  Home Living:  Home Living  Type of Home: House  Lives With: Siblings (sister)  Home Adaptive Equipment: Wheelchair-manual, Walker rolling or standard  Home Layout: Multi-level, Able to live on main level with bedroom/bathroom  Home Access: Stairs to enter with rails  Entrance Stairs-Rails:  (unilateral)  Entrance Stairs-Number of Steps: 5  Bathroom Shower/Tub: Tub/shower unit  Bathroom Toilet: Standard  Bathroom Equipment: Grab bars in shower  Home Living Comments: multi-level home with 1st floor set up  Prior Level of Function:  Prior Function Per Pt/Caregiver Report  Level of [place]: Needs assistance with ADLs, Needs assistance with homemaking  Receives Help From: Family (sister)  ADL Assistance: Needs assistance (sponge bath; assist with bathing and dressing)  Homemaking Assistance: Needs assistance (completed by family)  Ambulatory Assistance: Independent (modified independent with WC, pt reports she performs stand pivot transfer to get in and out of wheelchair; assist with mobility at SNF)  Vocational: Retired  Prior Function Comments: Patient has  been at [facility] for skilled therapy services since 4/2/25. Pt reports using a spin disc for stand pivot transfers, WC, and parallel bars for standing activity  Precautions:  Precautions  Hearing/Visual Limitations: glasses prn; mild [place]  Medical Precautions: Fall precautions  Precautions Comment: h/o falls prior to SNF placement       Vital Signs Comment: HR 71 bpm while seated EOB     Objective   Pain:  Pain Assessment  Pain Assessment: 0-10  0-10 (Numeric) Pain Score: 0 - No pain  Cognition:  Cognition  Overall Cognitive Status: Impaired  Orientation Level: Oriented X4  Following Commands: Follows one step commands with increased time  Safety Judgment: Decreased awareness of need for assistance  Insight: Moderate  Impulsive: Mildly  Processing Speed: Delayed    General Assessments:  General Observation  General Observation: pleasant; anxious    Activity Tolerance  Endurance: Decreased tolerance for upright activites  Activity Tolerance Comments: limited due to fear of falling  Rate of Perceived Exertion (RPE): 5/10    Sensation  Sensation Comment: pt denies paresthesia BLE    Strength  Strength Comments: BLE grossly 3-3+/5  Coordination  Coordination Comment: increased time and effort for mobility    Postural Control  Posture Comment: forward head; rounded shoulders    Static Sitting Balance  Static Sitting-Balance Support: Feet supported, Bilateral upper extremity supported  Static Sitting-Level of Assistance: Minimum assistance  Static Sitting-Comment/Number of Minutes: retrolean while seated EOB; approx. 4 min  Functional Assessments:  Bed Mobility  Bed Mobility: Yes  Bed Mobility 1  Bed Mobility 1: Supine to sitting  Level of Assistance 1: Moderate assistance  Bed Mobility Comments 1: pt able to bring BLE to EOB; trunk elevation requires modA; pt with diffculty following simple VCs due to fearful and impulsive behavior  Bed Mobility 2  Bed Mobility  2: Sitting to supine  Level of Assistance 2: Minimum  assistance  Bed Mobility Comments 2: Yun for safe return to supine; assist for trunk down and repositioning in bed.    Transfers  Transfer: No (pt encouraged to attempt to stand with assist of 2; pt expressing fear of falling and impulsively attempting to return to supine)  Extremity/Trunk Assessments:  RLE   RLE :  (grossly 3-3+/5)  LLE   LLE :  (grossly 3-3+/5)  Outcome Measures:  [facility] Basic Mobility  Turning from your back to your side while in a flat bed without using bedrails: A lot  Moving from lying on your back to sitting on the side of a flat bed without using bedrails: A lot  Moving to and from bed to chair (including a wheelchair): Total  Standing up from a chair using your arms (e.g. wheelchair or bedside chair): Total  To walk in hospital room: Total  Climbing 3-5 steps with railing: Total  Basic Mobility - Total Score: 8    Encounter Problems       Encounter Problems (Active)       PT Problem       Strength (Progressing)       Start:  04/14/25    Expected End:  05/14/25       The patient will demonstrate an overall strength of >4/5 in BLE to assist with completion of functional mobility.           Functional Mobility (Progressing)       Start:  04/14/25    Expected End:  05/14/25       The patient will complete functional mobility (bed mobility, transfers, etc.) at a close supervision level with LRAD by DC.           Ambulation (Progressing)       Start:  04/14/25    Expected End:  05/14/25       The patient will be able to ambulate at a close supervision level for >10ftx1 with RW.          Stand Pivot Transfer (Progressing)       Start:  04/14/25    Expected End:  05/14/25       The patient will be able to complete a stand pivot transfer with LRAD and close supervision.         Seated Balance (Progressing)       Start:  04/14/25    Expected End:  05/14/25       The patient will tolerate completing dynamic seated activities with distant supervision for > 10 min.            Pain - Adult               Education Documentation  Mobility Training, taught by Lupe Castaneda PT at 4/14/2025 10:50 AM.  Learner: Patient  Readiness: Acceptance  Method: Explanation  Response: Verbalizes Understanding  Comment: educated pt on PT POC    Education Comments  No comments found.

## 2025-04-14 NOTE — CARE PLAN
Problem: Pain - Adult  Goal: Verbalizes/displays adequate comfort level or baseline comfort level  Outcome: Progressing     Problem: Safety - Adult  Goal: Free from fall injury  Outcome: Progressing     Problem: Discharge Planning  Goal: Discharge to home or other facility with appropriate resources  Outcome: Progressing     Problem: Chronic Conditions and Co-morbidities  Goal: Patient's chronic conditions and co-morbidity symptoms are monitored and maintained or improved  Outcome: Progressing     Problem: Nutrition  Goal: Nutrient intake appropriate for maintaining nutritional needs  Outcome: Progressing     Problem: Fall/Injury  Goal: Not fall by end of shift  Outcome: Progressing  Goal: Be free from injury by end of the shift  Outcome: Progressing  Goal: Verbalize understanding of personal risk factors for fall in the hospital  Outcome: Progressing  Goal: Verbalize understanding of risk factor reduction measures to prevent injury from fall in the home  Outcome: Progressing  Goal: Use assistive devices by end of the shift  Outcome: Progressing  Goal: Pace activities to prevent fatigue by end of the shift  Outcome: Progressing     Problem: Pain  Goal: Performs ADL's with improved pain control throughout shift  Outcome: Progressing  Goal: Participates in PT with improved pain control throughout the shift  Outcome: Progressing     Problem: Skin  Goal: Prevent/minimize sheer/friction injuries  Outcome: Progressing  Flowsheets (Taken 4/13/2025 2233)  Prevent/minimize sheer/friction injuries:   Complete micro-shifts as needed if patient unable. Adjust patient position to relieve pressure points, not a full turn   HOB 30 degrees or less   Increase activity/out of bed for meals   Turn/reposition every 2 hours/use positioning/transfer devices   Use pull sheet   Utilize specialty bed per algorithm  Goal: Promote/optimize nutrition  Outcome: Progressing  Flowsheets (Taken 4/13/2025 2233)  Promote/optimize nutrition:    Assist with feeding   Consume > 50% meals/supplements   Monitor/record intake including meals   Discuss with provider if NPO > 2 days   Offer water/supplements/favorite foods   Reassess MST if dietician not consulted   The patient's goals for the shift include PT/OT eval, maintain safety    The clinical goals for the shift include MAINTAIN SAFETY AND COMFORT, RETURN TO BASELINE KIDNEY FUNCTIONS    Over the shift, the patient did not make progress toward the following goals. Barriers to progression include . Recommendations to address these barriers include .

## 2025-04-14 NOTE — PROGRESS NOTES
Occupational Therapy    Evaluation    Patient Name: Tammy Walker  MRN: [id number]  Department: [address]  Room: [address] [place] [address]  Today's Date: 04/14/25  Time Calculation  Start Time: 1012  Stop Time: 1032  Time Calculation (min): 20 min       Assessment:  OT Assessment: Pt would benefit from acute OT services to address deficits in ADLs and transfers  Prognosis: Fair  Barriers to Discharge Home: Caregiver assistance, Physical needs  Caregiver Assistance: Caregiver assistance needed per identified barriers - however, level of patient's required assistance exceeds assistance available at home  Physical Needs: 24hr mobility assistance needed, 24hr ADL assistance needed, High falls risk due to function or environment  Evaluation/Treatment Tolerance: Other (Comment) (limited mobility/participation secondary to reported fear of falling)  Medical Staff Made Aware: Yes  End of Session Communication: Bedside nurse  End of Session Patient Position: Bed, 3 rail up, Alarm on (all needs in reach)  OT Assessment Results: Decreased ADL status, Decreased upper extremity strength, Decreased endurance, Decreased cognition, Decreased safe judgment during ADL, Decreased functional mobility  Prognosis: Fair  Evaluation/Treatment Tolerance: Other (Comment) (limited mobility/participation secondary to reported fear of falling)  Medical Staff Made Aware: Yes  Strengths: Support of Caregivers  Barriers to Participation: Comorbidities  Plan:  Treatment Interventions: ADL retraining, Functional transfer training, UE strengthening/ROM, Endurance training, Cognitive reorientation, Patient/family training  OT Frequency: 3 times per week  OT Discharge Recommendations: Moderate intensity level of continued care  Equipment Recommended upon Discharge: Wheeled walker, Wheelchair  OT Recommended Transfer Status: Assist of 2  OT - OK to Discharge: Yes  Treatment Interventions: ADL retraining, Functional transfer training, UE strengthening/ROM, Endurance  training, Cognitive reorientation, Patient/family training    Subjective   Current Problem:  1. Urine retention  Referral to Healthy at Home Program      2. Acute renal failure        3. Urinary tract infection without hematuria, site unspecified        4. Dehydration        5. LORETA (acute kidney injury)  Referral to Healthy at Home Program      6. Bradycardia  Transthoracic Echo (TTE) Complete    Transthoracic Echo (TTE) Complete      7. Dyspnea, unspecified type  Transthoracic Echo (TTE) Complete    Transthoracic Echo (TTE) Complete        General:   OT Received On: 04/14/25  General  Reason for Referral: Impaired ADLs. The patient is a 69 y.o. female admitted to the hospital for abnormal lab results.  Referred By: Lito Castillo MD  Past Medical History Relevant to Rehab: systolic heart failure, chronic kidney failure, arthritis, HTN, bladder dysfunction  Family/Caregiver Present: No  Co-Treatment: PT  Prior to Session Communication: Bedside nurse  Patient Position Received: Bed, 3 rail up, Alarm off, caregiver present  General Comment:  (Cleared by nursing. Pt pleasant and agreeable to therapy)   Precautions:  Hearing/Visual Limitations: glasses prn; mild [place]  Medical Precautions: Fall precautions    Vital Signs Comment: HR 71 seated EOB     Pain:  Pain Assessment  Pain Assessment: 0-10  0-10 (Numeric) Pain Score: 0 - No pain    Objective   Cognition:  Overall Cognitive Status: Impaired  Orientation Level: Oriented X4  Following Commands: Follows one step commands with increased time  Safety Judgment: Decreased awareness of need for assistance  Insight: Moderate  Processing Speed: Delayed         Home Living:  Type of Home: House  Lives With: Siblings (sister)  Home Adaptive Equipment: Wheelchair-manual, Walker rolling or standard  Home Layout: Multi-level, Able to live on main level with bedroom/bathroom  Home Access: Stairs to enter with rails  Entrance Stairs-Rails:  (Unilateral)  Entrance Stairs-Number of  Steps: 5  Bathroom Shower/Tub: Tub/shower unit  Bathroom Toilet: Standard  Bathroom Equipment: Grab bars in shower  Home Living Comments: multi-level home with 1st floor set up  Prior Function:  Level of [place]: Needs assistance with ADLs, Needs assistance with homemaking  Receives Help From: Family (Sister)  ADL Assistance: Needs assistance (sponge bath; assist with bathing and dressing)  Homemaking Assistance: Needs assistance (completed by family)  Ambulatory Assistance:  (modified independent with WC, pt reports she performs stand pivot transfer to get in and out of wheelchair; assist with mobility at SNF)  Vocational: Retired  Prior Function Comments: Patient has been at [facility] services since 4/2/25. Pt reports using a spin disc for stand pivot transfers, WC, and parallel bars for standing activity     ADL:  Eating Assistance: Stand by  Grooming Assistance: Minimal  Bathing Assistance: Moderate  UE Dressing Assistance: Minimal  LE Dressing Assistance: Maximal  Toileting Assistance with Device: Total    Activity Tolerance:  Endurance: Decreased tolerance for upright activites     Bed Mobility/Transfers: Bed Mobility  Bed Mobility:  (mod A for trunk upright, pt able to advance BLEs off bed supine>seated EOB. min A for controlled descent of trunk, able to lift BLEs onto bed seated EOB>supine. pt impulsive, VCs for safe transfer technique)    Transfers  Transfer: No (pt adamantly declining transfers this date despite encouragement/education. pt reporting a fear of falling, impulsively attempting to return to supine)     Sensation:  Sensation Comment: pt denies numbness/paresthesia BUEs  Strength:  Strength Comments: BUEs grossly 3+/5      Hand Function:  Hand Function  Gross Grasp: Functional  Coordination: Functional  Extremities: RUE   RUE : Within Functional Limits and LUE   LUE: Within Functional Limits    Outcome Measures: [facility] Daily Activity  Putting on and taking off regular lower  body clothing: A lot  Bathing (including washing, rinsing, drying): A lot  Putting on and taking off regular upper body clothing: A little  Toileting, which includes using toilet, bedpan or urinal: Total  Taking care of personal grooming such as brushing teeth: A little  Eating Meals: A little  Daily Activity - Total Score: 14    Education Documentation  ADL Training, taught by Nava Dias OT at 4/14/2025 12:50 PM.  Learner: Patient  Readiness: Acceptance  Method: Explanation, Demonstration  Response: Needs Reinforcement, Verbalizes Understanding  Comment: Educated on OT POC    Education Comments  No comments found.      Goals:  Encounter Problems       Encounter Problems (Active)       ADLs       Pt will complete ADL tasks at min A with use of AE prn  (Not Progressing)       Start:  04/14/25    Expected End:  05/05/25               OT Transfers       Pt will perform stand pivot transfers at supervision with use of RW.   (Not Progressing)       Start:  04/14/25    Expected End:  05/05/25            Pt will perform bed mobility at mod I with use of bed rail  (Progressing)       Start:  04/14/25    Expected End:  05/05/25

## 2025-04-14 NOTE — PROGRESS NOTES
Tammy Walker is a 69 y.o. female on day 2 of admission presenting with Acute renal failure.      Subjective   Pt seens and examined.  Pt was maria ines.  Denies any CP or SOB .  Tolerates well her diet without any issues.  No fever or chills   Objective     Last Recorded Vitals  /66 (BP Location: Right arm, Patient Position: Lying)   Pulse 53   Temp 36.2 °C (97.2 °F) (Oral)   Resp 17   Wt 90.7 kg (199 lb 15.3 oz)   SpO2 95%   Intake/Output last 3 Shifts:    Intake/Output Summary (Last 24 hours) at 4/14/2025 1933  Last data filed at 4/14/2025 1344  Gross per 24 hour   Intake 310 ml   Output 610 ml   Net -300 ml       Admission Weight  Weight: 90.7 kg (200 lb) (04/12/25 1644)    Daily Weight  04/13/25 : 90.7 kg (199 lb 15.3 oz)    Image Results  CT abdomen pelvis wo IV contrast  Narrative: Interpreted By:  Bartolo Ruffin,   STUDY:  CT ABDOMEN PELVIS WO IV CONTRAST;  4/12/2025 7:13 pm      INDICATION:  Signs/Symptoms:LORETA, nixon decreased output, obstruction?.      COMPARISON:  Renal ultrasound 09/11/2023      ACCESSION NUMBER(S):  [id number]      ORDERING CLINICIAN:  DEBI CABALLERO      TECHNIQUE:  Axial noncontrast CT images of the abdomen and pelvis with coronal  and sagittal reconstructed images.      FINDINGS:  Lack of intravenous contrast limits evaluation of vessels and solid  organs.      LOWER CHEST: Aortic valve calcifications. Small hiatal hernia.  BONES: Diffuse osseous demineralization. No acute osseous  abnormalities. ABDOMINAL WALL: Small fat containing umbilical hernia.      ABDOMEN:      LIVER: Unremarkable.  BILE DUCTS: Unremarkable.  GALLBLADDER: Unremarkable.  PANCREAS:Unremarkable.  SPLEEN: Unremarkable.  ADRENALS: Left adrenal gland nodule measuring 1.5 cm (3/36) with  internal Hounsfield unit of 0.8, likely a benign adenoma. KIDNEYS and  URETERS: No hydronephrosis. There are few pelvic phleboliths in  adjacent to the distal ureters but no intraluminal obstructing  defects identified.  VESSELS: Mild-to-moderate aortoiliac  atherosclerosis. Focal fusiform ectasia of the infrarenal abdominal  aorta measuring up to 2.3 cm. No saccular aneurysms identified. LYMPH  NODES: Unremarkable. RETROPERITONEUM/PERITONEUM: Unremarkable.  BOWEL: Small hiatal hernia. Small bowel loops are nonobstructive.  Colonic diverticulosis      PELVIS:      REPRODUCTIVE ORGANS: No pelvic masses.  BLADDER: Decompressed with mild wall thickening. Rush catheter in  place.      Impression: No nephrolithiasis or hydronephrosis.      Bladder is decompressed with mild wall thickening which is  nonspecific and could be reactive but cystitis is not excluded,  correlate with urinalysis. Rush catheter is in place.      Left adrenal gland 1.5 cm adenoma.      Other chronic findings as discussed above.      MACRO:  None      Signed by: Bartolo Ruffin 4/12/2025 8:19 PM  Dictation workstation:   [id number]      Physical Exam  Vitals and nursing note reviewed.   Constitutional:       Appearance: Normal appearance. She is obese.   HENT:      Head: Normocephalic and atraumatic.      Nose: Rhinorrhea present. No congestion.   Eyes:      Extraocular Movements: Extraocular movements intact.      Pupils: Pupils are equal, round, and reactive to light.   Cardiovascular:      Rate and Rhythm: Bradycardia present.   Pulmonary:      Effort: Pulmonary effort is normal.   Abdominal:      General: Bowel sounds are normal.      Palpations: Abdomen is soft.   Musculoskeletal:         General: Normal range of motion.      Right lower leg: No edema.      Left lower leg: No edema.   Skin:     General: Skin is warm.   Neurological:      General: No focal deficit present.      Mental Status: She is alert and oriented to person, place, and time.   Psychiatric:         Mood and Affect: Mood normal.         Relevant Results  No current facility-administered medications on file prior to encounter.     Current Outpatient Medications on File Prior to Encounter    Medication Sig Dispense Refill    allopurinol (Zyloprim) 100 mg tablet TAKE 1 TABLET BY MOUTH TWICE A  tablet 3    aspirin 81 mg chewable tablet Chew 1 tablet (81 mg) once daily.      atenolol (Tenormin) 50 mg tablet TAKE 1 TABLET BY MOUTH TWICE A  tablet 3    atorvastatin (Lipitor) 40 mg tablet Take 1 tablet (40 mg) by mouth once daily at bedtime. 90 tablet 3    cholecalciferol (Vitamin D3) 10 MCG (400 UNIT) tablet Take 1 tablet (10 mcg) by mouth once daily. 30 tablet 11    furosemide (Lasix) 40 mg tablet TAKE 1 TABLET BY MOUTH EVERY DAY (Patient taking differently: Take 1 tablet (40 mg) by mouth once daily as needed.) 90 tablet 3    hydroCHLOROthiazide (HYDRODiuril) 50 mg tablet Take 1 tablet (50 mg) by mouth once daily. 30 tablet 5    nystatin (Mycostatin) 100,000 unit/gram powder Apply 1 Application topically 2 times a day. groin      potassium chloride CR (Klor-Con) 10 mEq ER tablet Take 1 tablet (10 mEq) by mouth 2 times a day. Do not crush, chew, or split. 60 tablet 11    valsartan (Diovan) 80 mg tablet TAKE 1 TABLET (80 MG) BY MOUTH 2 TIMES A DAY. 100 tablet 3       No results found.         Assessment/Plan           Assessment & Plan  Acute renal failure  Cr 25 was 2.7 and on 4/2 was 1.82 . In the setting of chronic systolic heart failure.  Lasic and diovan were held  Pt was on Bactrim before admission for UTI wich made her Kidney function worst . It was stopped.    consult nephrology     Acute cystitis without hematuria  Been getting Bactrim for UTI.  Not a great choice for a patient  with kidney dysfunction.    On  ceftriaxone   Urine retention   keep the Rush in for now and reassess her for urinary retention/trial of void once her kidney function improves a bit.  Weakness  PT OT eval.  Fall precautions   Bradycardia  HR is less than 60   I discontinue atenolol , Keep monitoring   Follow-up with cardiology  Follow-up with the echo    PT discharge patient to skilled of nursing when cleared  by cardiology    Coco Boateng, APRN-CNP

## 2025-04-14 NOTE — PROGRESS NOTES
Anticipate discharge soon. Patient came from [place]. We are waiting for the facility to let us know if they can accept patient back under the 72 hour rule. If not then patient will need a new precert. At this time there is not a safe discharge plan in place. Will follow.      04/14/25 155   Discharge Planning   Home or Post Acute Services Post acute facilities (Rehab/SNF/etc)   Type of Post Acute Facility Services Rehab   Expected Discharge Disposition SNF  (TBD)   Does the patient need discharge transport arranged? Yes   RoundTrip coordination needed? Yes

## 2025-04-14 NOTE — DISCHARGE SUMMARY
Discharge Diagnosis  Acute renal failure  Bradycardia  Urinary retention on Rush    Issues Requiring Follow-Up  Rush removal with voiding trial at rehab, monitor for retention  Follow up with nephrology and urology as outpatient.  Follow-up with cardiology, follow-up with echo cardiogram results    Hospital Course  HPI from admission:  A 69 y.o. female presenting with increase in her creatinine.  She was just here about 2 weeks ago and treated for a UTI and acute renal insufficiency.  She was diagnosed around that time with bladder retention.  She was discharged with a Rush.  Rush was ultimately taken out.  Rush was put back in about 2 days ago.  Not clear if she was retaining at that point in time.  She has been sent to the hospital from rehab because her creatinine is gone from 1.8 last week  to 2.7 today.  When looks at her creatinine range over the last year it is ranged from about 1 8-2.8.  Today's value is really not that much out of the ordinary.  She has been on fairly brisk doses of valsartan furosemide and spironolactone.  She does have chronic kidney failure as well as systolic heart failure.  Patient is unaware of any clinical change that warranted her trip to the  emergency room. As far as she knows she was sent here because of abnormal lab values.      Brief Hospital course:  Tammy Walker is a 69 y.o. female presenting LORETA on CKD with increase in her creatinine at 2.7 with chronic systolic heart failure. Admitted 2 weeks ago for UTI and renal insufficiency. Rush was placed at that time due to bladder retention, which she was discharged with. Found to have hypokalemia.  Hypertension, LORETA On this admission  Bactrim, hydrochlorothiazide, furosemide, and valsartan were stopped and function improved.  Evaluated by  Nephrology okay to resume Valsartan at 80 mg/day and continue holding  furosemide and Hydrochlorothiazide.   Seen by cardiology echo was done , Per Dr. Orta, patient is okay for  discharge without final echocardiogram interpretation.   Patient 's urine cultures is between 20-80,000 which is weak UTI no need of antibiotic for UTI.  Potassium level normal.  Patient will be discharged with pantoprazole due to epigastric abdominal pain.  Blood pressure is stable.  Patient is hemodynamically stable to return to rehab with Rush.  Has to follow-up with urology as outpatient    Discharge Meds     Medication List      START taking these medications     amLODIPine 10 mg tablet; Commonly known as: Norvasc; Take 1 tablet (10   mg) by mouth once daily.; Start taking on: April 16, 2025   clotrimazole 1 % cream; Commonly known as: Lotrimin; Apply 1 Application   topically once daily in the evening for 5 doses.   pantoprazole 40 mg EC tablet; Commonly known as: ProtoNix; Take 1 tablet   (40 mg) by mouth once daily in the morning. Take before meals. Do not   crush, chew, or split.; Start taking on: April 16, 2025   polyethylene glycol 17 gram packet; Commonly known as: Glycolax,   Miralax; Take 17 g by mouth once daily.; Start taking on: April 16, 2025     CHANGE how you take these medications     furosemide 40 mg tablet; Commonly known as: Lasix; Take 1 tablet (40 mg)   by mouth once daily as needed (edema).; What changed: when to take this,   reasons to take this     CONTINUE taking these medications     allopurinol 100 mg tablet; Commonly known as: Zyloprim; TAKE 1 TABLET BY   MOUTH TWICE A DAY   atorvastatin 40 mg tablet; Commonly known as: Lipitor; Take 1 tablet (40   mg) by mouth once daily at bedtime.   cholecalciferol 10 mcg (400 units) tablet; Commonly known as: Vitamin   D3; Take 1 tablet (10 mcg) by mouth once daily.   nystatin 100,000 unit/gram powder; Commonly known as: Mycostatin; Apply   1 Application topically 2 times a day. groin   valsartan 80 mg tablet; Commonly known as: Diovan; TAKE 1 TABLET (80 MG)   BY MOUTH 2 TIMES A DAY.     STOP taking these medications     aspirin 81 mg chewable  tablet   atenolol 50 mg tablet; Commonly known as: Tenormin   hydroCHLOROthiazide 50 mg tablet; Commonly known as: HYDRODiuril   potassium chloride CR 10 mEq ER tablet; Commonly known as: Klor-Con       Test Results Pending At Discharge  Pending Labs       No current pending labs.              Pertinent Physical Exam At Time of Discharge  Physical Exam  Constitutional:       General: She is not in acute distress.     Appearance: Normal appearance. She is obese. She is not ill-appearing or toxic-appearing.   HENT:      Head: Normocephalic and atraumatic.      Nose: Nose normal.      Mouth/Throat:      Mouth: Mucous membranes are moist.   Eyes:      Extraocular Movements: Extraocular movements intact.      Pupils: Pupils are equal, round, and reactive to light.   Cardiovascular:      Rate and Rhythm: Bradycardia present.      Pulses: Normal pulses.   Pulmonary:      Effort: Pulmonary effort is normal. No respiratory distress.      Breath sounds: Normal breath sounds. No stridor. No wheezing or rales.   Abdominal:      General: Bowel sounds are normal. There is no distension.      Palpations: Abdomen is soft.      Tenderness: There is no abdominal tenderness. There is no guarding.   Musculoskeletal:         General: Normal range of motion.      Cervical back: Normal range of motion and neck supple.      Right lower leg: No edema.      Left lower leg: No edema.   Skin:     General: Skin is warm.      Capillary Refill: Capillary refill takes less than 2 seconds.   Neurological:      General: No focal deficit present.      Mental Status: She is alert and oriented to person, place, and time.   Psychiatric:         Mood and Affect: Mood normal.         Behavior: Behavior normal.         Outpatient Follow-Up  Future Appointments   Date Time Provider Department Center   6/11/2025  2:00 PM DAV Benitez [id number]     Total spent time in overall care 40 minutes    DAV Pablo

## 2025-04-14 NOTE — TELEPHONE ENCOUNTER
Patient is active with [facility]. Followed by Raisa Goff CNP. Presented to [facility] ED 4/12/25 from SNF with concern for UTI with worsening creatinine (2.70). Admitted with LORETA with UTI. Nephrology consulted. Will follow.

## 2025-04-14 NOTE — PROGRESS NOTES
CONSULT PROGRESS NOTES    SERVICE DATE: 4/14/2025   SERVICE TIME: 11:21 AM    CONSULTING SERVICE: Nephrology    ASSESSMENT AND PLAN   69-year-old woman with hypertension, history of systolic CHF, and CKD admitted with acute renal failure.  1.  Acute renal failure  2.  Hypokalemia  3.  Chronic kidney disease stage IIIb  4.  Essential hypertension     The acute renal failure is from Bactrim on the backdrop of hydrochlorothiazide, furosemide, and valsartan usage.  Her blood pressure is elevated and I will call back on the home dose of valsartan which is 80 mg p.o. twice daily.    I would not have her go on Bactrim again.  Valsartan should help with her low potassium.  The hydrochlorothiazide should be at 25 mg a day, but I will defer resumption of this at present.  Lets hold the furosemide for now.  She seems euvolemic to me.  Cardiology evaluating her.  She likely has hypertensive CKD with a baseline creatinine between 1.6 and 2.0.  Her urine sediment has been bland.  I reviewed serial urinalyses that are devoid of proteinuria or hematuria.    Trend daily labs while here.  Ultimately, her serum creatinine is not that far above her baseline and I am comfortable with discharge when cleared by others.  Looks like she is undergoing Cardiologic evaluation now.  I will follow-up with her in the outpatient setting.    SUBJECTIVE  INTERVAL HPI: She says that she is feeling chest pressure in her heart.  No other major concerns.  She denies any dysuria.  The blood pressure has been stable.    MEDICATIONS:  allopurinol, 100 mg, oral, BID  amLODIPine, 10 mg, oral, Daily  [Held by provider] atenolol, 25 mg, oral, BID  atorvastatin, 40 mg, oral, Nightly  cefTRIAXone, 1 g, intravenous, q24h  cholecalciferol, 10 mcg, oral, Daily  clotrimazole, 1 Application, Topical, q PM  heparin, 5,000 Units, subcutaneous, BID  nystatin, 1 Application, Topical, BID  polyethylene glycol, 17 g, oral, Daily  valsartan, 80 mg, oral, BID                 OBJECTIVE  PHYSICAL EXAM:   Heart Rate:  [50-54]   Temp:  [36.4 °C (97.5 °F)-36.7 °C (98.1 °F)]   Resp:  [16-18]   BP: (123-149)/(78-80)   SpO2:  [94 %-99 %]   Body mass index is 33.27 kg/m².  This is an obese white woman who appears chronically debilitated, but in no acute distress  Very pale skin  Somewhat anxious affect  Hearing intact  Phonation intact  Moist mucosa  Systolic murmur  Lungs are clear to auscultation bilaterally  Abdomen is soft, nondistended, nontender, positive bowel sounds  Rush catheter in place, no suprapubic tenderness to palpation  No extremity edema  Moves 4 limbs spontaneously  No obvious joint deformities  No lymphadenopathy    DATA:   Labs:  Results for orders placed or performed during the hospital encounter of 04/12/25 (from the past 96 hours)   CBC and Auto Differential   Result Value Ref Range    WBC 11.1 4.4 - 11.3 x10*3/uL    nRBC 0.0 0.0 - 0.0 /100 WBCs    RBC 4.40 4.00 - 5.20 x10*6/uL    Hemoglobin 13.4 12.0 - 16.0 g/dL    Hematocrit 38.6 36.0 - 46.0 %    MCV 88 80 - 100 fL    MCH 30.5 26.0 - 34.0 pg    MCHC 34.7 32.0 - 36.0 g/dL    RDW 13.3 11.5 - 14.5 %    Platelets 407 150 - 450 x10*3/uL    Neutrophils % 69.9 40.0 - 80.0 %    Immature Granulocytes %, Automated 0.2 0.0 - 0.9 %    Lymphocytes % 18.5 13.0 - 44.0 %    Monocytes % 8.2 2.0 - 10.0 %    Eosinophils % 2.4 0.0 - 6.0 %    Basophils % 0.8 0.0 - 2.0 %    Neutrophils Absolute 7.72 (H) 1.20 - 7.70 x10*3/uL    Immature Granulocytes Absolute, Automated 0.02 0.00 - 0.70 x10*3/uL    Lymphocytes Absolute 2.05 1.20 - 4.80 x10*3/uL    Monocytes Absolute 0.91 0.10 - 1.00 x10*3/uL    Eosinophils Absolute 0.27 0.00 - 0.70 x10*3/uL    Basophils Absolute 0.09 0.00 - 0.10 x10*3/uL   Comprehensive metabolic panel   Result Value Ref Range    Glucose 99 74 - 99 mg/dL    Sodium 137 136 - 145 mmol/L    Potassium 3.6 3.5 - 5.3 mmol/L    Chloride 101 98 - 107 mmol/L    Bicarbonate 24 21 - 32 mmol/L    Anion Gap 16 10 - 20 mmol/L    Urea  Nitrogen 73 (H) 6 - 23 mg/dL    Creatinine 2.70 (H) 0.50 - 1.05 mg/dL    eGFR 19 (L) >60 mL/min/1.73m*2    Calcium 9.0 8.6 - 10.3 mg/dL    Albumin 3.8 3.4 - 5.0 g/dL    Alkaline Phosphatase 148 (H) 33 - 136 U/L    Total Protein 7.8 6.4 - 8.2 g/dL    AST 16 9 - 39 U/L    Bilirubin, Total 0.9 0.0 - 1.2 mg/dL    ALT 10 7 - 45 U/L   Urinalysis with Reflex Culture and Microscopic   Result Value Ref Range    Color, Urine Light-Yellow Light-Yellow, Yellow, Dark-Yellow    Appearance, Urine Clear Clear    Specific Gravity, Urine 1.016 1.005 - 1.035    pH, Urine 5.0 5.0, 5.5, 6.0, 6.5, 7.0, 7.5, 8.0    Protein, Urine NEGATIVE NEGATIVE, 10 (TRACE), 20 (TRACE) mg/dL    Glucose, Urine Normal Normal mg/dL    Blood, Urine NEGATIVE NEGATIVE mg/dL    Ketones, Urine NEGATIVE NEGATIVE mg/dL    Bilirubin, Urine NEGATIVE NEGATIVE mg/dL    Urobilinogen, Urine Normal Normal mg/dL    Nitrite, Urine NEGATIVE NEGATIVE    Leukocyte Esterase, Urine 500 Jeison/uL (A) NEGATIVE   Extra Urine Gray Tube   Result Value Ref Range    Extra Tube Hold for add-ons.    Microscopic Only, Urine   Result Value Ref Range    WBC, Urine 21-50 (A) 1-5, NONE /HPF    WBC Clumps, Urine RARE Reference range not established. /HPF    RBC, Urine 3-5 NONE, 1-2, 3-5 /HPF    Squamous Epithelial Cells, Urine 1-9 (SPARSE) Reference range not established. /HPF    Bacteria, Urine 2+ (A) NONE SEEN /HPF    Mucus, Urine FEW Reference range not established. /LPF   Urine Culture    Specimen: Clean Catch/Voided; Urine   Result Value Ref Range    Urine Culture 20,000 - 80,000 CFU/mL Enteric bacilli (A)    Basic Metabolic Panel   Result Value Ref Range    Glucose 95 74 - 99 mg/dL    Sodium 139 136 - 145 mmol/L    Potassium 3.4 (L) 3.5 - 5.3 mmol/L    Chloride 103 98 - 107 mmol/L    Bicarbonate 26 21 - 32 mmol/L    Anion Gap 13 10 - 20 mmol/L    Urea Nitrogen 65 (H) 6 - 23 mg/dL    Creatinine 2.50 (H) 0.50 - 1.05 mg/dL    eGFR 20 (L) >60 mL/min/1.73m*2    Calcium 9.2 8.6 - 10.3 mg/dL    Lavender Top   Result Value Ref Range    Extra Tube Hold for add-ons.    Renal Function Panel   Result Value Ref Range    Glucose 93 74 - 99 mg/dL    Sodium 139 136 - 145 mmol/L    Potassium 3.7 3.5 - 5.3 mmol/L    Chloride 105 98 - 107 mmol/L    Bicarbonate 25 21 - 32 mmol/L    Anion Gap 13 10 - 20 mmol/L    Urea Nitrogen 57 (H) 6 - 23 mg/dL    Creatinine 2.22 (H) 0.50 - 1.05 mg/dL    eGFR 24 (L) >60 mL/min/1.73m*2    Calcium 9.2 8.6 - 10.3 mg/dL    Phosphorus 3.6 2.5 - 4.9 mg/dL    Albumin 3.6 3.4 - 5.0 g/dL   CBC   Result Value Ref Range    WBC 8.7 4.4 - 11.3 x10*3/uL    nRBC 0.0 0.0 - 0.0 /100 WBCs    RBC 4.14 4.00 - 5.20 x10*6/uL    Hemoglobin 12.6 12.0 - 16.0 g/dL    Hematocrit 36.8 36.0 - 46.0 %    MCV 89 80 - 100 fL    MCH 30.4 26.0 - 34.0 pg    MCHC 34.2 32.0 - 36.0 g/dL    RDW 13.6 11.5 - 14.5 %    Platelets 315 150 - 450 x10*3/uL         SIGNATURE: Roberto Castañeda MD PATIENT NAME: Tammy Walker   DATE: April 14, 2025 MRN: [id number]   TIME: 11:21 AM PAGER: [phone]

## 2025-04-14 NOTE — CONSULTS
Inpatient consult to Cardiology  Consult performed by: DAV Barrientos  Consult ordered by: DAV Pablo  Reason for consult: Bradycardia        History Of Present Illness:    Tammy Walker is a 69 y.o. female presenting with abnormal labs from her nursing facility. This patient does not currently follow with a cardiologist. She is essentially homebound and is seen by Raisa Goff with [facility]. She does have a past medical history of heart failure with reduced ejection fraction, hypertensive disorder, hyperlipidemia, transient ischemic attack, chronic kidney disease. She was sent to the [facility] [place] Emergency Department from her skilled nursing facility with concerns for urinary tract infection and worsening creatinine. She denies chest pain or pressure. Denies nausea or vomiting. Denies worsening shortness of breath. Lab work in the emergency department significant for a sodium of 137, potassium 3.6, creatinine 2.70, and hemoglobin 13.4. Chest x-ray negative for acute process. CT abdomen pelvis with no nephrolithiasis or hydronephrosis, bladder decompressed with mild wall thickening, 1.5 cm adenoma on the left adrenal gland. EKG on admission sinus bradycardia with a rate of 57, left anterior fascicular block and evidence of left ventricular hypertrophy. She was started on IV Rocephin in the emergency department and given 500 ml's of normal saline and admitted to the hospital on telemetry for further assessment and management. Cardiology were consulted to see the patient regarding her bradycardia.     ROS    10 point system review completed and is otherwise negative other than what is mentioned above in the HPI.     Last Recorded Vitals:  Vitals:    04/13/25 1631 04/13/25 2137 04/14/25 0030 04/14/25 0720   BP: 147/80 123/79 149/78 143/80   BP Location: Left arm Right arm Left arm Right arm   Patient Position: Lying Lying Lying Lying   Pulse: 52 50 54 53   Resp: 18 18 16 17   Temp: 36.6 °C  (97.9 °F)  36.7 °C (98.1 °F) 36.4 °C (97.5 °F)   TempSrc: Oral  Oral Oral   SpO2: 99% 96% 97% 94%   Weight:       Height:           Last Labs:  CBC - 4/14/2025:  5:47 AM  8.7 12.6 315    36.8      CMP - 4/14/2025:  5:47 AM  9.2 7.8 16 --- 0.9   3.6 3.6 10 148      PTT - No results in last year.  _   _ _     Troponin I, High Sensitivity   Date/Time Value Ref Range Status   03/31/2025 12:02 PM 21 (H) 0 - 13 ng/L Final   03/31/2025 10:44 AM 21 (H) 0 - 13 ng/L Final   03/28/2025 10:19 AM 19 (H) 0 - 13 ng/L Final     BNP   Date/Time Value Ref Range Status   03/31/2025 10:44 AM 66 0 - 99 pg/mL Final   03/28/2025 09:18  (H) 0 - 99 pg/mL Final     Hemoglobin A1C   Date/Time Value Ref Range Status   09/10/2023 11:30 PM 5.4 4.0 - 6.0 % Final     Comment:     Hemoglobin A1C levels are related to mean blood glucose during the   preceding 2-3 months. The relationship table below may be used as a   general guide. Each 1% increase in HGB A1C is a reflection of an   increase in mean glucose of approximately 30 mg/dl.   Reference: Diabetes [employer], volume 29, supplement 1 Jan. 2006                        HGB A1C ................. Approx. Mean Glucose   _______________________________________________   6%   ...............................  120 mg/dl   7%   ...............................  150 mg/dl   8%   ...............................  180 mg/dl   9%   ...............................  210 mg/dl   10%  ...............................  240 mg/dl  Performed at [address] [address]       LDL Calculated   Date/Time Value Ref Range Status   10/30/2024 03:15 PM 63 <=99 mg/dL Final     Comment:                                 Near   Borderline      AGE      Desirable  Optimal    High     High     Very High     0-19 Y     0 - 109     ---    110-129   >/= 130     ----    20-24 Y     0 - 119     ---    120-159   >/= 160     ----      >24 Y     0 -  99   100-129  130-159   160-189     >/=190     11/20/2023 01:19 PM  "69 65 - 130 mg/dL Final   09/11/2023 05:42  65 - 130 MG/DL Final     VLDL   Date/Time Value Ref Range Status   10/30/2024 03:15 PM 29 0 - 40 mg/dL Final      Last I/O:  I/O last 3 completed shifts:  In: 330 (3.6 mL/kg) [P.O.:280; IV Piggyback:50]  Out: 1310 (14.4 mL/kg) [Urine:1310 (0.4 mL/kg/hr)]  Weight: 90.7 kg     Past Cardiology Tests (Last 3 Years):  EKG:  ECG 12 lead 03/31/2025      ECG 12 Lead 03/28/2025      ECG 12 lead 03/03/2025    Echo:  No results found for this or any previous visit from the past 1095 days.    Ejection Fractions:  No results found for: \"EF\"  Cath:  No results found for this or any previous visit from the past 1095 days.    Stress Test:  No results found for this or any previous visit from the past 1095 days.    Cardiac Imaging:  No results found for this or any previous visit from the past 1095 days.      Past Medical History:  She has a past medical history of Arthritis, HTN (hypertension), and Long COVID.    Past Surgical History:  She has a past surgical history that includes MR angio head wo IV contrast (9/11/2023).      Social History:  She reports that she quit smoking about 3 years ago. Her smoking use included cigarettes. She has never used smokeless tobacco. She reports that she does not currently use alcohol. She reports that she does not use drugs.    Family History:  Family History   Problem Relation Name Age of Onset    Hypertension Mother      Diabetes Mother      Hypertension Father      Diabetes Father          Allergies:  Patient has no known allergies.    Inpatient Medications:  Scheduled medications   Medication Dose Route Frequency    allopurinol  100 mg oral BID    amLODIPine  10 mg oral Daily    [Held by provider] atenolol  25 mg oral BID    atorvastatin  40 mg oral Nightly    cefTRIAXone  1 g intravenous q24h    cholecalciferol  10 mcg oral Daily    clotrimazole  1 Application Topical q PM    heparin  5,000 Units subcutaneous BID    nystatin  1 Application " Topical BID    polyethylene glycol  17 g oral Daily    valsartan  80 mg oral BID     PRN medications   Medication     Continuous Medications   Medication Dose Last Rate     Outpatient Medications:  Current Outpatient Medications   Medication Instructions    allopurinol (ZYLOPRIM) 100 mg, oral, 2 times daily    aspirin 81 mg, oral, Daily    atenolol (TENORMIN) 50 mg, oral, 2 times daily    atorvastatin (LIPITOR) 40 mg, oral, Nightly    cholecalciferol (VITAMIN D3) 10 mcg, oral, Daily    furosemide (LASIX) 40 mg, oral, Daily    hydroCHLOROthiazide (HYDRODIURIL) 50 mg, oral, Daily    nystatin (Mycostatin) 100,000 unit/gram powder 1 Application, Topical, 2 times daily, groin    potassium chloride CR (Klor-Con) 10 mEq ER tablet 10 mEq, oral, 2 times daily, Do not crush, chew, or split.    valsartan (DIOVAN) 80 mg, oral, 2 times daily       Physical Exam  HENT:      Head: Normocephalic and atraumatic.   Cardiovascular:      Rate and Rhythm: Regular rhythm. Bradycardia present.      Heart sounds: No murmur heard.     No friction rub. No gallop.   Pulmonary:      Effort: Pulmonary effort is normal.      Breath sounds: Normal breath sounds. No wheezing, rhonchi or rales.   Abdominal:      General: Bowel sounds are normal.      Palpations: Abdomen is soft.   Musculoskeletal:      Right lower leg: No edema.      Left lower leg: No edema.   Skin:     General: Skin is warm and dry.      Capillary Refill: Capillary refill takes less than 2 seconds.   Neurological:      Mental Status: She is alert and oriented to person, place, and time.           Assessment/Plan   Bradycardia  Acute on Chronic Kidney Disease  Hypertensive Disorder  Hyperlipidemia  Heart Failure with Reduced Ejection Fraction    Impression and Plan:    4/14: Described above.  This patient was admitted with concerns for urinary tract infection as well as acute kidney injury.  We were consulted to see this patient regarding her bradycardia.  The patient has been  bradycardic this admission, which seems to be new for her.  Reviewed her telemetry she has remained sinus bradycardic with rates in the 40s and 50s without significant pauses or arrhythmias.  The patient does report to me intermittent lightheadedness and dizziness.  At this point I am going to place her atenolol on hold to avoid further significant bradycardia.  Blood pressures have remained somewhat elevated last recorded at 143/80.  Would continue current blood pressure management with amlodipine 10 mg daily as well as valsartan 80 mg twice daily.  She appears euvolemic on examination and is breathing comfortably on room air.  Additionally, the patient's previous echocardiogram completed in 2023 did show a reduced ejection fraction of 35 to 40% with mild aortic valve regurgitation and moderate concentric left ventricular hypertrophy.  She was advised to follow-up with Dr. Alonzo in the outpatient setting, but unfortunately was lost to follow-up.  I am going to repeat an echocardiogram this admission to assess her LV function.  Will await echocardiogram results and follow with you.       Peripheral IV 04/12/25 18 G Right Antecubital (Active)   Site Assessment Clean;Dry;Intact 04/13/25 2100   Dressing Type Transparent 04/13/25 2100   Line Status Flushed;Infusing 04/13/25 2100   Dressing Status Clean;Dry 04/13/25 2100   Number of days: 2       Code Status:  Full Code          ANGELI Barrientos-CNP

## 2025-04-14 NOTE — ASSESSMENT & PLAN NOTE
Been getting Bactrim for UTI.  Not a great choice for a patient  with kidney dysfunction.    On  ceftriaxone

## 2025-04-15 ENCOUNTER — APPOINTMENT (OUTPATIENT)
Dept: CARDIOLOGY | Facility: HOSPITAL | Age: 70
DRG: 699 | End: 2025-04-15
Payer: MEDICARE

## 2025-04-15 VITALS
DIASTOLIC BLOOD PRESSURE: 76 MMHG | TEMPERATURE: 97.9 F | OXYGEN SATURATION: 96 % | HEIGHT: 65 IN | BODY MASS INDEX: 33.31 KG/M2 | HEART RATE: 71 BPM | RESPIRATION RATE: 17 BRPM | WEIGHT: 199.96 LBS | SYSTOLIC BLOOD PRESSURE: 123 MMHG

## 2025-04-15 PROBLEM — N17.9 ACUTE RENAL FAILURE: Status: RESOLVED | Noted: 2025-04-12 | Resolved: 2025-04-15

## 2025-04-15 LAB
ALBUMIN SERPL BCP-MCNC: 3.6 G/DL (ref 3.4–5)
ANION GAP SERPL CALCULATED.3IONS-SCNC: 13 MMOL/L (ref 10–20)
BACTERIA UR CULT: ABNORMAL
BUN SERPL-MCNC: 52 MG/DL (ref 6–23)
CALCIUM SERPL-MCNC: 9.3 MG/DL (ref 8.6–10.3)
CHLORIDE SERPL-SCNC: 105 MMOL/L (ref 98–107)
CO2 SERPL-SCNC: 26 MMOL/L (ref 21–32)
CREAT SERPL-MCNC: 2.19 MG/DL (ref 0.5–1.05)
EGFRCR SERPLBLD CKD-EPI 2021: 24 ML/MIN/1.73M*2
ERYTHROCYTE [DISTWIDTH] IN BLOOD BY AUTOMATED COUNT: 13.6 % (ref 11.5–14.5)
GLUCOSE SERPL-MCNC: 95 MG/DL (ref 74–99)
HCT VFR BLD AUTO: 38.2 % (ref 36–46)
HGB BLD-MCNC: 12.9 G/DL (ref 12–16)
MCH RBC QN AUTO: 30.4 PG (ref 26–34)
MCHC RBC AUTO-ENTMCNC: 33.8 G/DL (ref 32–36)
MCV RBC AUTO: 90 FL (ref 80–100)
NRBC BLD-RTO: 0 /100 WBCS (ref 0–0)
PHOSPHATE SERPL-MCNC: 3.3 MG/DL (ref 2.5–4.9)
PLATELET # BLD AUTO: 299 X10*3/UL (ref 150–450)
POTASSIUM SERPL-SCNC: 3.8 MMOL/L (ref 3.5–5.3)
RBC # BLD AUTO: 4.25 X10*6/UL (ref 4–5.2)
SODIUM SERPL-SCNC: 140 MMOL/L (ref 136–145)
WBC # BLD AUTO: 8.4 X10*3/UL (ref 4.4–11.3)

## 2025-04-15 PROCEDURE — 93306 TTE W/DOPPLER COMPLETE: CPT | Performed by: INTERNAL MEDICINE

## 2025-04-15 PROCEDURE — 85027 COMPLETE CBC AUTOMATED: CPT | Performed by: NURSE PRACTITIONER

## 2025-04-15 PROCEDURE — 2500000001 HC RX 250 WO HCPCS SELF ADMINISTERED DRUGS (ALT 637 FOR MEDICARE OP): Performed by: NURSE PRACTITIONER

## 2025-04-15 PROCEDURE — 2500000004 HC RX 250 GENERAL PHARMACY W/ HCPCS (ALT 636 FOR OP/ED): Performed by: INTERNAL MEDICINE

## 2025-04-15 PROCEDURE — 97530 THERAPEUTIC ACTIVITIES: CPT | Mod: GP

## 2025-04-15 PROCEDURE — 2500000004 HC RX 250 GENERAL PHARMACY W/ HCPCS (ALT 636 FOR OP/ED)

## 2025-04-15 PROCEDURE — 2500000001 HC RX 250 WO HCPCS SELF ADMINISTERED DRUGS (ALT 637 FOR MEDICARE OP): Performed by: INTERNAL MEDICINE

## 2025-04-15 PROCEDURE — 99239 HOSP IP/OBS DSCHRG MGMT >30: CPT | Performed by: NURSE PRACTITIONER

## 2025-04-15 PROCEDURE — 80069 RENAL FUNCTION PANEL: CPT | Performed by: INTERNAL MEDICINE

## 2025-04-15 PROCEDURE — 36415 COLL VENOUS BLD VENIPUNCTURE: CPT | Performed by: INTERNAL MEDICINE

## 2025-04-15 PROCEDURE — C8929 TTE W OR WO FOL WCON,DOPPLER: HCPCS

## 2025-04-15 PROCEDURE — 97530 THERAPEUTIC ACTIVITIES: CPT | Mod: GO,CO

## 2025-04-15 PROCEDURE — 97110 THERAPEUTIC EXERCISES: CPT | Mod: GP

## 2025-04-15 PROCEDURE — 2500000002 HC RX 250 W HCPCS SELF ADMINISTERED DRUGS (ALT 637 FOR MEDICARE OP, ALT 636 FOR OP/ED): Performed by: INTERNAL MEDICINE

## 2025-04-15 PROCEDURE — 97535 SELF CARE MNGMENT TRAINING: CPT | Mod: GO,CO

## 2025-04-15 RX ORDER — POLYETHYLENE GLYCOL 3350 17 G/17G
17 POWDER, FOR SOLUTION ORAL DAILY
Start: 2025-04-16

## 2025-04-15 RX ORDER — FUROSEMIDE 40 MG/1
40 TABLET ORAL DAILY PRN
Start: 2025-04-15

## 2025-04-15 RX ORDER — PANTOPRAZOLE SODIUM 40 MG/1
40 TABLET, DELAYED RELEASE ORAL
Start: 2025-04-16

## 2025-04-15 RX ORDER — CLOTRIMAZOLE 1 %
1 CREAM (GRAM) TOPICAL EVERY EVENING
Start: 2025-04-15 | End: 2025-04-20

## 2025-04-15 RX ORDER — AMLODIPINE BESYLATE 10 MG/1
10 TABLET ORAL DAILY
Start: 2025-04-16

## 2025-04-15 RX ADMIN — CHOLECALCIFEROL (VITAMIN D3) 10 MCG (400 UNIT) TABLET 10 MCG: at 09:38

## 2025-04-15 RX ADMIN — AMLODIPINE BESYLATE 10 MG: 10 TABLET ORAL at 09:38

## 2025-04-15 RX ADMIN — HEPARIN SODIUM 5000 UNITS: 5000 INJECTION INTRAVENOUS; SUBCUTANEOUS at 09:38

## 2025-04-15 RX ADMIN — NYSTATIN 1 APPLICATION: 100000 POWDER TOPICAL at 09:46

## 2025-04-15 RX ADMIN — VALSARTAN 80 MG: 80 TABLET, FILM COATED ORAL at 09:38

## 2025-04-15 RX ADMIN — PERFLUTREN 3 ML OF DILUTION: 6.52 INJECTION, SUSPENSION INTRAVENOUS at 15:46

## 2025-04-15 RX ADMIN — POLYETHYLENE GLYCOL 3350 17 G: 17 POWDER, FOR SOLUTION ORAL at 09:37

## 2025-04-15 RX ADMIN — ALLOPURINOL 100 MG: 100 TABLET ORAL at 09:37

## 2025-04-15 RX ADMIN — PANTOPRAZOLE SODIUM 40 MG: 40 TABLET, DELAYED RELEASE ORAL at 06:19

## 2025-04-15 ASSESSMENT — COGNITIVE AND FUNCTIONAL STATUS - GENERAL
HELP NEEDED FOR BATHING: A LOT
DRESSING REGULAR UPPER BODY CLOTHING: A LITTLE
TOILETING: TOTAL
TOILETING: A LITTLE
DRESSING REGULAR LOWER BODY CLOTHING: A LOT
WALKING IN HOSPITAL ROOM: TOTAL
PERSONAL GROOMING: A LITTLE
CLIMB 3 TO 5 STEPS WITH RAILING: TOTAL
STANDING UP FROM CHAIR USING ARMS: A LOT
MOVING FROM LYING ON BACK TO SITTING ON SIDE OF FLAT BED WITH BEDRAILS: A LOT
MOVING TO AND FROM BED TO CHAIR: TOTAL
DRESSING REGULAR UPPER BODY CLOTHING: A LITTLE
MOBILITY SCORE: 13
DRESSING REGULAR LOWER BODY CLOTHING: A LOT
TURNING FROM BACK TO SIDE WHILE IN FLAT BAD: A LITTLE
STANDING UP FROM CHAIR USING ARMS: A LOT
PERSONAL GROOMING: A LITTLE
DAILY ACTIVITIY SCORE: 14
HELP NEEDED FOR BATHING: A LITTLE
MOBILITY SCORE: 9
CLIMB 3 TO 5 STEPS WITH RAILING: TOTAL
MOVING TO AND FROM BED TO CHAIR: A LOT
DAILY ACTIVITIY SCORE: 18
WALKING IN HOSPITAL ROOM: TOTAL
TURNING FROM BACK TO SIDE WHILE IN FLAT BAD: A LOT
EATING MEALS: A LITTLE

## 2025-04-15 ASSESSMENT — PAIN - FUNCTIONAL ASSESSMENT
PAIN_FUNCTIONAL_ASSESSMENT: 0-10
PAIN_FUNCTIONAL_ASSESSMENT: 0-10

## 2025-04-15 ASSESSMENT — PAIN SCALES - GENERAL
PAINLEVEL_OUTOF10: 0 - NO PAIN
PAINLEVEL_OUTOF10: 0 - NO PAIN

## 2025-04-15 ASSESSMENT — ACTIVITIES OF DAILY LIVING (ADL): HOME_MANAGEMENT_TIME_ENTRY: 8

## 2025-04-15 NOTE — CARE PLAN
The patient's goals for the shift include PT/OT eval, maintain safety    The clinical goals for the shift include: maintain normal sleeping cycle.

## 2025-04-15 NOTE — CARE PLAN
The patient's goals for the shift include PT/OT eval, maintain safety    The clinical goals for the shift include Maintian normal wake/sleep cycle, monitor surgical site, pain management      Problem: Pain - Adult  Goal: Verbalizes/displays adequate comfort level or baseline comfort level  Outcome: Progressing     Problem: Safety - Adult  Goal: Free from fall injury  Outcome: Progressing     Problem: Discharge Planning  Goal: Discharge to home or other facility with appropriate resources  Outcome: Progressing     Problem: Chronic Conditions and Co-morbidities  Goal: Patient's chronic conditions and co-morbidity symptoms are monitored and maintained or improved  Outcome: Progressing     Problem: Nutrition  Goal: Nutrient intake appropriate for maintaining nutritional needs  Outcome: Progressing     Problem: Fall/Injury  Goal: Not fall by end of shift  Outcome: Progressing  Goal: Be free from injury by end of the shift  Outcome: Progressing  Goal: Verbalize understanding of personal risk factors for fall in the hospital  Outcome: Progressing  Goal: Verbalize understanding of risk factor reduction measures to prevent injury from fall in the home  Outcome: Progressing  Goal: Use assistive devices by end of the shift  Outcome: Progressing  Goal: Pace activities to prevent fatigue by end of the shift  Outcome: Progressing     Problem: Pain  Goal: Performs ADL's with improved pain control throughout shift  Outcome: Progressing  Goal: Participates in PT with improved pain control throughout the shift  Outcome: Progressing     Problem: Skin  Goal: Prevent/minimize sheer/friction injuries  Outcome: Progressing  Goal: Promote/optimize nutrition  Outcome: Progressing

## 2025-04-15 NOTE — NURSING NOTE
Patient gpoing down for a ECHO. Therapy was in the room at the time. We tried to stand the patient up to pivot to the cart. Patient was very shaky and unsteady. She was not able to follow commands, and appeared to be very anxious. We had to pull her over to the cart.

## 2025-04-15 NOTE — CARE PLAN
Problem: Skin  Goal: Prevent/minimize sheer/friction injuries  4/15/2025 1501 by Akanksha Persaud RN  Flowsheets (Taken 4/15/2025 1501)  Prevent/minimize sheer/friction injuries:   Complete micro-shifts as needed if patient unable. Adjust patient position to relieve pressure points, not a full turn   Increase activity/out of bed for meals   Use pull sheet   HOB 30 degrees or less   Turn/reposition every 2 hours/use positioning/transfer devices   Utilize specialty bed per algorithm  4/15/2025 1459 by Akanksha Persaud RN  Outcome: Progressing  Goal: Promote/optimize nutrition  4/15/2025 1501 by Akanksha Persaud RN  Flowsheets (Taken 4/15/2025 1501)  Promote/optimize nutrition:   Assist with feeding   Monitor/record intake including meals   Consume > 50% meals/supplements   Offer water/supplements/favorite foods  4/15/2025 1459 by Akanksha Persaud RN  Outcome: Progressing   The patient's goals for the shift include PT/OT eval, maintain safety    The clinical goals for the shift include safety, stable vs/labs

## 2025-04-15 NOTE — PROGRESS NOTES
Anticipate discharge soon. Precert needed for patient to return to [facility], to be started today. Will continue to follow.     UPDATE 4:14PM: Patient's insurance stating that she can return to [facility]. Transport to be set up. Nurse to call report. Paperwork sent to facility. Will follow.      04/15/25 1038   Discharge Planning   Home or Post Acute Services Post acute facilities (Rehab/SNF/etc)   Type of Post Acute Facility Services Rehab   Expected Discharge Disposition SNF  ([facility])   Does the patient need discharge transport arranged? Yes   RoundTrip coordination needed? Yes

## 2025-04-15 NOTE — PROGRESS NOTES
"Tammy Walker is a 69 y.o. female on day 3 of admission presenting with Acute renal failure.    Subjective   Patient resting up in bed.  Heart rates improved this afternoon currently in sinus rhythm on telemetry and rates in 70s and 80s.       Objective     Physical Exam  Vitals reviewed.   HENT:      Head: Normocephalic and atraumatic.   Cardiovascular:      Rate and Rhythm: Normal rate and regular rhythm.      Heart sounds: No murmur heard.     No friction rub. No gallop.   Pulmonary:      Effort: Pulmonary effort is normal.      Breath sounds: Normal breath sounds. No wheezing, rhonchi or rales.   Abdominal:      General: Bowel sounds are normal.      Palpations: Abdomen is soft.   Musculoskeletal:      Right lower leg: No edema.      Left lower leg: No edema.   Skin:     General: Skin is warm and dry.      Capillary Refill: Capillary refill takes less than 2 seconds.   Neurological:      Mental Status: She is alert and oriented to person, place, and time.   Psychiatric:         Mood and Affect: Mood normal.         Behavior: Behavior normal.         Last Recorded Vitals  Blood pressure 141/67, pulse (!) 48, temperature 36.4 °C (97.5 °F), temperature source Oral, resp. rate 17, height 1.651 m (5' 5\"), weight 90.7 kg (199 lb 15.3 oz), SpO2 97%.  Intake/Output last 3 Shifts:  I/O last 3 completed shifts:  In: 560 (6.2 mL/kg) [P.O.:460; IV Piggyback:100]  Out: 1110 (12.2 mL/kg) [Urine:1110 (0.3 mL/kg/hr)]  Weight: 90.7 kg     Relevant Results  Results for orders placed or performed during the hospital encounter of 04/12/25 (from the past 24 hours)   Renal Function Panel   Result Value Ref Range    Glucose 95 74 - 99 mg/dL    Sodium 140 136 - 145 mmol/L    Potassium 3.8 3.5 - 5.3 mmol/L    Chloride 105 98 - 107 mmol/L    Bicarbonate 26 21 - 32 mmol/L    Anion Gap 13 10 - 20 mmol/L    Urea Nitrogen 52 (H) 6 - 23 mg/dL    Creatinine 2.19 (H) 0.50 - 1.05 mg/dL    eGFR 24 (L) >60 mL/min/1.73m*2    Calcium 9.3 8.6 - " 10.3 mg/dL    Phosphorus 3.3 2.5 - 4.9 mg/dL    Albumin 3.6 3.4 - 5.0 g/dL   CBC   Result Value Ref Range    WBC 8.4 4.4 - 11.3 x10*3/uL    nRBC 0.0 0.0 - 0.0 /100 WBCs    RBC 4.25 4.00 - 5.20 x10*6/uL    Hemoglobin 12.9 12.0 - 16.0 g/dL    Hematocrit 38.2 36.0 - 46.0 %    MCV 90 80 - 100 fL    MCH 30.4 26.0 - 34.0 pg    MCHC 33.8 32.0 - 36.0 g/dL    RDW 13.6 11.5 - 14.5 %    Platelets 299 150 - 450 x10*3/uL             Assessment/Plan   Assessment & Plan  Acute renal failure    Urine retention    Acute cystitis without hematuria    Weakness    Bradycardia    Bradycardia  Acute on Chronic Kidney Disease  Hypertensive Disorder  Hyperlipidemia  Heart Failure with Reduced Ejection Fraction     Impression and Plan:     4/14: Described above.  This patient was admitted with concerns for urinary tract infection as well as acute kidney injury.  We were consulted to see this patient regarding her bradycardia.  The patient has been bradycardic this admission, which seems to be new for her.  Reviewed her telemetry she has remained sinus bradycardic with rates in the 40s and 50s without significant pauses or arrhythmias.  The patient does report to me intermittent lightheadedness and dizziness.  At this point I am going to place her atenolol on hold to avoid further significant bradycardia.  Blood pressures have remained somewhat elevated last recorded at 143/80.  Would continue current blood pressure management with amlodipine 10 mg daily as well as valsartan 80 mg twice daily.  She appears euvolemic on examination and is breathing comfortably on room air.  Additionally, the patient's previous echocardiogram completed in 2023 did show a reduced ejection fraction of 35 to 40% with mild aortic valve regurgitation and moderate concentric left ventricular hypertrophy.  She was advised to follow-up with Dr. Alonzo in the outpatient setting, but unfortunately was lost to follow-up.  I am going to repeat an echocardiogram this  admission to assess her LV function.  Will await echocardiogram results and follow with you.    4/15: As above.  Patient resting comfortably in bed today.  Denies chest pain, pressure or palpitations.  I reviewed her telemetry and she remained bradycardic overnight, but this afternoon her heart rates have improved currently in sinus rhythm on telemetry with rates in the 70s and 80s while she is resting comfortably in bed.  Blood pressures are reasonable with last recorded 141/67.  She is breathing comfortably on room air with pulse oximetry of 97%.  Appears euvolemic on examination.  Lab work this morning revealed sodium 140, potassium 3.8, creatinine 2.19 and hemoglobin of 12.9. Would continue to hold atenolol given her bradycardia.  Continue amlodipine and valsartan for blood pressure control.  Continue atorvastatin 40 mg nightly for hyperlipidemia.  Echocardiogram has been ordered and will be completed this afternoon, pending these results believe patient will be stable from the cardiac perspective for discharge.        1600: Echocardiogram completed this afternoon pending final interpretation. Discussed this patient with my collaborating physician Dr. Orta and he stated that she can be discharged to skilled nursing facility without final echocardiogram interpretation. If there are any abnormalities we will reach out to the patient and her primary provider. Patient should follow up with Dr. Alonzo in the outpatient setting in 2-3 weeks following discharge.       ANGELI Barrientos-CNP

## 2025-04-15 NOTE — ASSESSMENT & PLAN NOTE
HR is less than 60   I discontinue atenolol , Keep monitoring   Follow-up with cardiology  Follow-up with the echo

## 2025-04-15 NOTE — PROGRESS NOTES
Occupational Therapy    OT Treatment    Patient Name: Tammy Walker  MRN: [id number]  Department: [facility]  Room: [address] [place], [place] [address]  Today's Date: 4/15/2025  Time Calculation  Start Time: 1429  Stop Time: 1453  Time Calculation (min): 24 min        Assessment:  OT Assessment: pt limited by fear of falling but tolerated EOB grooming tasks fairly which displays progress towards OT goals. pt would benefit from continued OT services to increase strentgh, balance, endurance, safety, and activity tolerance for ADLs  Prognosis: Fair  Barriers to Discharge Home: Caregiver assistance, Physical needs  Caregiver Assistance: Caregiver assistance needed per identified barriers - however, level of patient's required assistance exceeds assistance available at home  Physical Needs: 24hr mobility assistance needed, 24hr ADL assistance needed, High falls risk due to function or environment  Evaluation/Treatment Tolerance: Other (Comment) (limited mobility/participation secondary to reported fear of falling)  Medical Staff Made Aware: Yes  End of Session Communication: Bedside nurse  End of Session Patient Position: On cart (pt on transport cart and transported down for testing; RN in room during transfer onto transport cart)  OT Assessment Results: Decreased ADL status, Decreased upper extremity strength, Decreased endurance, Decreased cognition, Decreased safe judgment during ADL, Decreased functional mobility  Prognosis: Fair  Barriers to Discharge: Decreased caregiver support  Evaluation/Treatment Tolerance: Other (Comment) (limited mobility/participation secondary to reported fear of falling)  Medical Staff Made Aware: Yes  Strengths: Support of Caregivers  Barriers to Participation: Comorbidities  Plan:  Treatment Interventions: ADL retraining, Functional transfer training, UE strengthening/ROM, Endurance training, Cognitive reorientation, Patient/family training  OT Frequency: 3 times per week  OT Discharge Recommendations:  Moderate intensity level of continued care  Equipment Recommended upon Discharge: Wheeled walker, Wheelchair  OT Recommended Transfer Status: Assist of 2  OT - OK to Discharge: Yes  Treatment Interventions: ADL retraining, Functional transfer training, UE strengthening/ROM, Endurance training, Cognitive reorientation, Patient/family training    Subjective   Previous Visit Info:  OT Last Visit  OT Received On: 04/15/25  General:  General  Reason for Referral: The patient is a 69 y.o. female admitted to the hospital for abnormal lab results and acute renal failure; impaired mobility  Referred By: Lito Castillo MD  Past Medical History Relevant to Rehab: systolic heart failure, chronic kidney failure, arthritis, HTN, bladder dysfunction  Family/Caregiver Present: No  Prior to Session Communication: Bedside nurse  Patient Position Received: Bed, 4 rail up, Alarm on  Preferred Learning Style: verbal, visual  General Comment: pt cleared for OT treatment by carlito; pt agreeable to EOB activity after motivation; pt stating fear of falling, reassured pt and provided education of OT and safety during EOB activity and transfer tasks  Precautions:  Hearing/Visual Limitations: glasses prn; mild Native  Medical Precautions: Fall precautions  Precautions Comment: h/o falls prior to SNF placement, +nixon    Pain:  Pain Assessment  Pain Assessment: 0-10  0-10 (Numeric) Pain Score: 0 - No pain    Objective    Cognition:  Cognition  Orientation Level: Oriented X4  Following Commands: Follows one step commands with repetition  Cognition Comments: severe fear of falling; pt very impulsive and frequently returns to supine due to fear of falling; follows commands after repitition  Insight: Moderate  Impulsive: Moderately  Processing Speed: Delayed  Coordination:  Movements are Fluid and Coordinated: No  Upper Body Coordination: decreased rate and accuracy of BUEs movements  Activities of Daily Living:      Grooming  Grooming Level of  Assistance: Setup, Minimum assistance  Grooming Where Assessed: Edge of bed  Grooming Comments: pt engaged in washing face and oral hygiene tasks while seated on EOB with set up of items and intermittment Min A for balance and safety; pt expressing fear of falling and impulsively returns to supine and required min A for safety    UE Bathing  UE Bathing Comments: pt declining UB ADLs at this time    LE Bathing  LE Bathing Comments: pt declining LB ADLs at this time    Toileting  Toileting Comments: +nixon    Bed Mobility/Transfers:   Bed Mobility  Bed Mobility: Yes  Bed Mobility 1  Bed Mobility 1: Supine to sitting  Level of Assistance 1: Moderate assistance  Bed Mobility Comments 1: Mod A to bring trunk up into sitting and transition BLEs towards EOB: completed with significantly increased time and effort due to pt expressing fear of falling; completed multiple trials due to pt impulsivley returning to supine due to fear of falling  Bed Mobility 2  Bed Mobility  2: Sitting to supine  Level of Assistance 2: Minimum assistance  Bed Mobility Comments 2: Min A to lower trunk into supine position and bring BLEs into supine; pt impulsively returning to supine position multiple times throughout session due to fear of falling  Bed Mobility 3  Bed Mobility 3: Rolling right, Rolling left  Level of Assistance 3: Minimum assistance  Bed Mobility Comments 3: Min A to roll R and L onto transport cart; pt impuslive and rolling back on bed; pt required scoot with use of draw sheet to transfer onto transport cart  Bed Mobility 4  Bed Mobility 4: Scooting  Level of Assistance 4: Maximum assistance, +2  Bed Mobility Comments 4: Max A to laterally scoot with use of draw sheet onto transport cart due to pt fear of falling and impulsiveness    Transfers  Transfer: Yes  Transfer 1  Transfer From 1: Bed to  Transfer to 1: Stand  Technique 1: Sit to stand  Transfer Level of Assistance 1: Arm in arm assistance, Moderate  assistance  Trials/Comments 1: completed 2 trials; pt impulsively reaching for therapists arms to stand and required arm in arm assist and mod A for trunk elevation; pt very impuslive due to fear of falling  Transfers 2  Transfer From 2: Stand to  Transfer to 2: Bed  Technique 2: Stand to sit  Transfer Level of Assistance 2: Arm in arm assistance, Moderate assistance  Trials/Comments 2: completed 2 trials; pt very impulsive in performing stand to sit due to fear of falling; pt required arm in arm assist and mod A for controlled descent onto bed    Functional Mobility:  Functional Mobility  Functional Mobility Performed: No (attempted to transfer pt from bed to transport cart with FWW but pt becoming impuslive and unsafe at this time to perform functional mobility)  Sitting Balance:  Dynamic Sitting Balance  Dynamic Sitting-Balance Support: Right upper extremity supported, Left upper extremity supported (alternating)  Dynamic Sitting-Level of Assistance: Minimum assistance  Dynamic Sitting-Balance: Forward lean, Reaching for objects, Reaching across midline  Dynamic Sitting-Comments: Min A for balance and safety while completing grooming tasks while seated on EOB for ~5 minutes: poor seated balance due to fear of falling; reassured her safety but pt still impulsive    Therapy/Activity:      Therapeutic Activity  Therapeutic Activity Performed: Yes  Therapeutic Activity 1: provided therapeutic use of self, education on safety and balance technqiues, and education on benefits of OT treatment/mobility due to pt expressing fear of falling  Therapeutic Activity 2: attempted functional mobility and transfer tasks onto transport cart - unsafe due to impuslivness and fear of falling. Attempted rolling onto transport cart from bed - pt impuslive and rolled back onto bed due to fear of falling. Utilized draw sheet to laterally scoot pt onto transport cart      Outcome Measures:  [facility] Daily Activity  Putting on and taking off  regular lower body clothing: A lot  Bathing (including washing, rinsing, drying): A lot  Putting on and taking off regular upper body clothing: A little  Toileting, which includes using toilet, bedpan or urinal: Total  Taking care of personal grooming such as brushing teeth: A little  Eating Meals: A little  Daily Activity - Total Score: 14        Education Documentation  ADL Training, taught by CARMELA Vazquez at 4/15/2025  3:17 PM.  Learner: Patient  Readiness: Acceptance  Method: Explanation  Response: Needs Reinforcement  Comment: pt educated on OT POC, body mechanics, balance/safety technqiues, and precautions during ADL retraining and transfer task    Goals:  Encounter Problems       Encounter Problems (Active)       ADLs       Pt will complete ADL tasks at min A with use of AE prn  (Progressing)       Start:  04/14/25    Expected End:  05/05/25               OT Transfers       Pt will perform stand pivot transfers at supervision with use of RW.   (Not Progressing)       Start:  04/14/25    Expected End:  05/05/25            Pt will perform bed mobility at mod I with use of bed rail  (Progressing)       Start:  04/14/25    Expected End:  05/05/25

## 2025-04-15 NOTE — NURSING NOTE
Assumed care.  Seen patient lying on bed, in no distress or discomfort noted.   Denies any needs at this time.  Safety measures ensured, call light within reach.

## 2025-04-15 NOTE — PROGRESS NOTES
CONSULT PROGRESS NOTES    SERVICE DATE: 4/15/2025   SERVICE TIME: 12:45 PM    CONSULTING SERVICE: Nephrology    ASSESSMENT AND PLAN   69-year-old woman with hypertension, history of systolic CHF, and CKD admitted with acute renal failure.  1.  Acute renal failure  2.  Hypokalemia  3.  Chronic kidney disease stage IIIb  4.  Essential hypertension     The acute renal failure is from Bactrim on the backdrop of hydrochlorothiazide, furosemide, and valsartan usage.  Her blood pressure is improved on her home dose of valsartan.  I would not have her go on Bactrim again.  She has improvement in her homeostasis of potassium.  She seems euvolemic to me.  I do not see any point in diuretics.  She agrees.  She likely has hypertensive CKD with a baseline creatinine between 1.6 and 2.0.  Her urine sediment has been bland.  I reviewed serial urinalyses that are devoid of proteinuria or hematuria.    Trend daily labs while here.  Ultimately, her serum creatinine is not that far above her baseline and I am comfortable with discharge when cleared by others.  I will follow-up with her in the outpatient setting.    SUBJECTIVE  INTERVAL HPI: She feels okay.  She is not overly garrulous with me today.    MEDICATIONS:  allopurinol, 100 mg, oral, BID  amLODIPine, 10 mg, oral, Daily  atorvastatin, 40 mg, oral, Nightly  cefTRIAXone, 1 g, intravenous, q24h  cholecalciferol, 10 mcg, oral, Daily  clotrimazole, 1 Application, Topical, q PM  heparin, 5,000 Units, subcutaneous, BID  nystatin, 1 Application, Topical, BID  pantoprazole, 40 mg, oral, Daily before breakfast  polyethylene glycol, 17 g, oral, Daily  valsartan, 80 mg, oral, BID                OBJECTIVE  PHYSICAL EXAM:   Heart Rate:  [48-53]   Temp:  [36.2 °C (97.2 °F)-36.4 °C (97.5 °F)]   Resp:  [17]   BP: (122-141)/(66-73)   SpO2:  [95 %-100 %]   Body mass index is 33.27 kg/m².  This is an obese white woman who appears chronically debilitated, but in no acute distress  Very pale  skin  Somewhat anxious affect  Hearing intact  Phonation intact  Moist mucosa  Systolic murmur  Lungs are clear to auscultation bilaterally  Abdomen is soft, nondistended, nontender, positive bowel sounds  Rush catheter in place, no suprapubic tenderness to palpation  No extremity edema  Moves 4 limbs spontaneously  No obvious joint deformities  No lymphadenopathy    DATA:   Labs:  Results for orders placed or performed during the hospital encounter of 04/12/25 (from the past 96 hours)   CBC and Auto Differential   Result Value Ref Range    WBC 11.1 4.4 - 11.3 x10*3/uL    nRBC 0.0 0.0 - 0.0 /100 WBCs    RBC 4.40 4.00 - 5.20 x10*6/uL    Hemoglobin 13.4 12.0 - 16.0 g/dL    Hematocrit 38.6 36.0 - 46.0 %    MCV 88 80 - 100 fL    MCH 30.5 26.0 - 34.0 pg    MCHC 34.7 32.0 - 36.0 g/dL    RDW 13.3 11.5 - 14.5 %    Platelets 407 150 - 450 x10*3/uL    Neutrophils % 69.9 40.0 - 80.0 %    Immature Granulocytes %, Automated 0.2 0.0 - 0.9 %    Lymphocytes % 18.5 13.0 - 44.0 %    Monocytes % 8.2 2.0 - 10.0 %    Eosinophils % 2.4 0.0 - 6.0 %    Basophils % 0.8 0.0 - 2.0 %    Neutrophils Absolute 7.72 (H) 1.20 - 7.70 x10*3/uL    Immature Granulocytes Absolute, Automated 0.02 0.00 - 0.70 x10*3/uL    Lymphocytes Absolute 2.05 1.20 - 4.80 x10*3/uL    Monocytes Absolute 0.91 0.10 - 1.00 x10*3/uL    Eosinophils Absolute 0.27 0.00 - 0.70 x10*3/uL    Basophils Absolute 0.09 0.00 - 0.10 x10*3/uL   Comprehensive metabolic panel   Result Value Ref Range    Glucose 99 74 - 99 mg/dL    Sodium 137 136 - 145 mmol/L    Potassium 3.6 3.5 - 5.3 mmol/L    Chloride 101 98 - 107 mmol/L    Bicarbonate 24 21 - 32 mmol/L    Anion Gap 16 10 - 20 mmol/L    Urea Nitrogen 73 (H) 6 - 23 mg/dL    Creatinine 2.70 (H) 0.50 - 1.05 mg/dL    eGFR 19 (L) >60 mL/min/1.73m*2    Calcium 9.0 8.6 - 10.3 mg/dL    Albumin 3.8 3.4 - 5.0 g/dL    Alkaline Phosphatase 148 (H) 33 - 136 U/L    Total Protein 7.8 6.4 - 8.2 g/dL    AST 16 9 - 39 U/L    Bilirubin, Total 0.9 0.0 -  1.2 mg/dL    ALT 10 7 - 45 U/L   Urinalysis with Reflex Culture and Microscopic   Result Value Ref Range    Color, Urine Light-Yellow Light-Yellow, Yellow, Dark-Yellow    Appearance, Urine Clear Clear    Specific Gravity, Urine 1.016 1.005 - 1.035    pH, Urine 5.0 5.0, 5.5, 6.0, 6.5, 7.0, 7.5, 8.0    Protein, Urine NEGATIVE NEGATIVE, 10 (TRACE), 20 (TRACE) mg/dL    Glucose, Urine Normal Normal mg/dL    Blood, Urine NEGATIVE NEGATIVE mg/dL    Ketones, Urine NEGATIVE NEGATIVE mg/dL    Bilirubin, Urine NEGATIVE NEGATIVE mg/dL    Urobilinogen, Urine Normal Normal mg/dL    Nitrite, Urine NEGATIVE NEGATIVE    Leukocyte Esterase, Urine 500 Jeison/uL (A) NEGATIVE   Extra Urine Gray Tube   Result Value Ref Range    Extra Tube Hold for add-ons.    Microscopic Only, Urine   Result Value Ref Range    WBC, Urine 21-50 (A) 1-5, NONE /HPF    WBC Clumps, Urine RARE Reference range not established. /HPF    RBC, Urine 3-5 NONE, 1-2, 3-5 /HPF    Squamous Epithelial Cells, Urine 1-9 (SPARSE) Reference range not established. /HPF    Bacteria, Urine 2+ (A) NONE SEEN /HPF    Mucus, Urine FEW Reference range not established. /LPF   Urine Culture    Specimen: Clean Catch/Voided; Urine   Result Value Ref Range    Urine Culture 20,000 - 80,000 CFU/mL Escherichia coli (A)        Susceptibility    Escherichia coli - MICROSCAN     Ampicillin  Susceptible ug/ml     Cefazolin  Susceptible ug/ml     Cefazolin (uncomplicated UTIs only)  Susceptible ug/ml     Piperacillin/Tazobactam  Susceptible ug/ml     Nitrofurantoin  Susceptible ug/ml     Trimethoprim/Sulfamethoxazole  Susceptible ug/ml     Ciprofloxacin  Susceptible ug/ml     Gentamicin  Susceptible ug/ml   Basic Metabolic Panel   Result Value Ref Range    Glucose 95 74 - 99 mg/dL    Sodium 139 136 - 145 mmol/L    Potassium 3.4 (L) 3.5 - 5.3 mmol/L    Chloride 103 98 - 107 mmol/L    Bicarbonate 26 21 - 32 mmol/L    Anion Gap 13 10 - 20 mmol/L    Urea Nitrogen 65 (H) 6 - 23 mg/dL    Creatinine 2.50  (H) 0.50 - 1.05 mg/dL    eGFR 20 (L) >60 mL/min/1.73m*2    Calcium 9.2 8.6 - 10.3 mg/dL   Lavender Top   Result Value Ref Range    Extra Tube Hold for add-ons.    Renal Function Panel   Result Value Ref Range    Glucose 93 74 - 99 mg/dL    Sodium 139 136 - 145 mmol/L    Potassium 3.7 3.5 - 5.3 mmol/L    Chloride 105 98 - 107 mmol/L    Bicarbonate 25 21 - 32 mmol/L    Anion Gap 13 10 - 20 mmol/L    Urea Nitrogen 57 (H) 6 - 23 mg/dL    Creatinine 2.22 (H) 0.50 - 1.05 mg/dL    eGFR 24 (L) >60 mL/min/1.73m*2    Calcium 9.2 8.6 - 10.3 mg/dL    Phosphorus 3.6 2.5 - 4.9 mg/dL    Albumin 3.6 3.4 - 5.0 g/dL   CBC   Result Value Ref Range    WBC 8.7 4.4 - 11.3 x10*3/uL    nRBC 0.0 0.0 - 0.0 /100 WBCs    RBC 4.14 4.00 - 5.20 x10*6/uL    Hemoglobin 12.6 12.0 - 16.0 g/dL    Hematocrit 36.8 36.0 - 46.0 %    MCV 89 80 - 100 fL    MCH 30.4 26.0 - 34.0 pg    MCHC 34.2 32.0 - 36.0 g/dL    RDW 13.6 11.5 - 14.5 %    Platelets 315 150 - 450 x10*3/uL   Renal Function Panel   Result Value Ref Range    Glucose 95 74 - 99 mg/dL    Sodium 140 136 - 145 mmol/L    Potassium 3.8 3.5 - 5.3 mmol/L    Chloride 105 98 - 107 mmol/L    Bicarbonate 26 21 - 32 mmol/L    Anion Gap 13 10 - 20 mmol/L    Urea Nitrogen 52 (H) 6 - 23 mg/dL    Creatinine 2.19 (H) 0.50 - 1.05 mg/dL    eGFR 24 (L) >60 mL/min/1.73m*2    Calcium 9.3 8.6 - 10.3 mg/dL    Phosphorus 3.3 2.5 - 4.9 mg/dL    Albumin 3.6 3.4 - 5.0 g/dL   CBC   Result Value Ref Range    WBC 8.4 4.4 - 11.3 x10*3/uL    nRBC 0.0 0.0 - 0.0 /100 WBCs    RBC 4.25 4.00 - 5.20 x10*6/uL    Hemoglobin 12.9 12.0 - 16.0 g/dL    Hematocrit 38.2 36.0 - 46.0 %    MCV 90 80 - 100 fL    MCH 30.4 26.0 - 34.0 pg    MCHC 33.8 32.0 - 36.0 g/dL    RDW 13.6 11.5 - 14.5 %    Platelets 299 150 - 450 x10*3/uL         SIGNATURE: Roberto Castañeda MD PATIENT NAME: Tammy Walker   DATE: April 15, 2025 MRN: [id number]   TIME: 12:45 PM PAGER: [phone]

## 2025-04-15 NOTE — PROGRESS NOTES
Physical Therapy    Physical Therapy Treatment    Patient Name: Tammy Walker  MRN: [id number]  Department: [address]  Room: [address] [place] [address]  Today's Date: 4/15/2025  Time Calculation  Start Time: 1038  Stop Time: 1102  Time Calculation (min): 24 min         Assessment/Plan   PT Assessment  PT Assessment Results: Decreased strength, Decreased range of motion, Decreased endurance, Impaired balance, Decreased mobility, Decreased coordination, Decreased cognition, Impaired judgement, Decreased safety awareness, Impaired vision, Impaired hearing, Obesity, Pain, Decreased skin integrity  Rehab Prognosis: Good  Barriers to Discharge Home: Caregiver assistance, Physical needs  Caregiver Assistance: Caregiver assistance needed per identified barriers - however, level of patient's required assistance exceeds assistance available at home  Physical Needs: Stair navigation into home limited by function/safety, 24hr mobility assistance needed, 24hr ADL assistance needed, High falls risk due to function or environment  Evaluation/Treatment Tolerance: Patient tolerated treatment well (limited by fear of falling)  Medical Staff Made Aware: Yes  Barriers to Participation: Comorbidities  End of Session Communication: Bedside nurse  Assessment Comment: pt slowly progressing; pt needed encouragement and reassurance to participate. MOD A x 1-2 for transfers. pt limited by a fear of falling  End of Session Patient Position: Bed, 4 rail up, Alarm on (call button in reach. pt requested all 4 rails up for safety and fear of falling OOB)  PT Plan  Inpatient/Swing Bed or Outpatient: Inpatient  PT Plan  Treatment/Interventions: Bed mobility, Transfer training, Gait training, Balance training, Neuromuscular re-education, Strengthening, Endurance training, Range of motion, Therapeutic activity, Therapeutic exercise, Home exercise program  PT Plan: Ongoing PT  PT Frequency: 3 times per week  PT Discharge Recommendations: Moderate intensity level of  continued care  Equipment Recommended upon Discharge: Wheeled walker, Wheelchair  PT Recommended Transfer Status:  (MOD A x 2)  PT - OK to Discharge: Yes      General Visit Information:      General  Reason for Referral: The patient is a 69 y.o. female admitted to the hospital for abnormal lab results and acute renal failure; impaired mobility  Referred By: Lito Castillo MD  Past Medical History Relevant to Rehab: systolic heart failure, chronic kidney failure, arthritis, HTN, bladder dysfunction  Family/Caregiver Present: No  Prior to Session Communication: Bedside nurse  Patient Position Received: Bed, 4 rail up, Alarm on  General Comment: pt alert and cooperative; pt needed encouragement to participate. pt reported being fearful of falling. reassurance and patience given.    Subjective   Precautions:  Precautions  Hearing/Visual Limitations: glasses prn; mild [place]  Medical Precautions: Fall precautions  Precautions Comment: h/o falls prior to SNF placement            Objective   Pain:  Pain Assessment  Pain Assessment:  (0/10)      Cognition:  Cognition  Overall Cognitive Status: Within Functional Limits  Orientation Level: Oriented X4  Following Commands: Follows one step commands with repetition  Insight: Moderate  Impulsive: Mildly  Processing Speed: Delayed  Coordination:  Coordination Comment: increased time and effort for mobility      Postural Control:  Postural Control  Posture Comment: forward head; rounded shoulders    Activity Tolerance:  Activity Tolerance  Endurance:  (GOOD-/FAIR+ activity tolerance)      Treatments:  Therapeutic Exercise  Therapeutic Exercise Performed:  (B LE AAROM/AROM therex: AP, GS, HS, ABD, LAQ and hip flexion x 10-15 reps; frequent VC given to stay on task and slow down pace)         Bed Mobility  Bed Mobility:  (supine to sit with MOD A for trunk and scooting. pt appeared anxious and very hesitant to sit on EOB. occasional posterior trunk lean noted. frequent VC to stay on  task. patience and reassurance given. placed RW in front of patient. pt sat on EOB with GOOD sitting balance. Pt sat ~10 minutes on EOB. Pt returned to supine via log rolling technique with MOD A for trunk down and B LE.        Transfers  Transfer:  (sit <-> stand with MOD A x 2/Handheld A x 2; pt presented wtih mild posterior lean and soft B knees. donned clean pads on bed. pt requested back to supine. declined up in chair.)       Outcome Measures:  [facility] Basic Mobility  Turning from your back to your side while in a flat bed without using bedrails: A lot  Moving from lying on your back to sitting on the side of a flat bed without using bedrails: A lot  Moving to and from bed to chair (including a wheelchair): Total  Standing up from a chair using your arms (e.g. wheelchair or bedside chair): A lot  To walk in hospital room: Total  Climbing 3-5 steps with railing: Total  Basic Mobility - Total Score: 9        Encounter Problems       Encounter Problems (Active)       PT Problem       Strength (Progressing)       Start:  04/14/25    Expected End:  05/14/25       The patient will demonstrate an overall strength of >4/5 in BLE to assist with completion of functional mobility.           Functional Mobility (Progressing)       Start:  04/14/25    Expected End:  05/14/25       The patient will complete functional mobility (bed mobility, transfers, etc.) at a close supervision level with LRAD by DC.           Ambulation (Progressing)       Start:  04/14/25    Expected End:  05/14/25       The patient will be able to ambulate at a close supervision level for >10ftx1 with RW.          Stand Pivot Transfer (Progressing)       Start:  04/14/25    Expected End:  05/14/25       The patient will be able to complete a stand pivot transfer with LRAD and close supervision.         Seated Balance (Progressing)       Start:  04/14/25    Expected End:  05/14/25       The patient will tolerate completing dynamic seated activities with  distant supervision for > 10 min.            Pain - Adult

## 2025-04-15 NOTE — NURSING NOTE
Assumed care of patient. Patient was resting in bed with no needs at this time. Patient was made aware that she could order her breakfast. Call light was in reach will continue to monitor.

## 2025-04-15 NOTE — PROGRESS NOTES
Tammy Walker is a 69 y.o. female on day 3 of admission presenting with Acute renal failure.      Subjective   Pt seens and examined.  Pt was maria ines.  Denies any CP or SOB .  Tolerates well her diet without any issues.  No fever or chills   Objective     Last Recorded Vitals  /67 (BP Location: Right arm, Patient Position: Lying)   Pulse (!) 48 Comment: nurse notified  Temp 36.4 °C (97.5 °F) (Oral)   Resp 17   Wt 90.7 kg (199 lb 15.3 oz)   SpO2 97%   Intake/Output last 3 Shifts:    Intake/Output Summary (Last 24 hours) at 4/15/2025 0936  Last data filed at 4/15/2025 0500  Gross per 24 hour   Intake 250 ml   Output 750 ml   Net -500 ml       Admission Weight  Weight: 90.7 kg (200 lb) (04/12/25 1644)    Daily Weight  04/13/25 : 90.7 kg (199 lb 15.3 oz)    Image Results  CT abdomen pelvis wo IV contrast  Narrative: Interpreted By:  Bartolo Ruffin,   STUDY:  CT ABDOMEN PELVIS WO IV CONTRAST;  4/12/2025 7:13 pm      INDICATION:  Signs/Symptoms:LORETA, nixon decreased output, obstruction?.      COMPARISON:  Renal ultrasound 09/11/2023      ACCESSION NUMBER(S):  [id number]      ORDERING CLINICIAN:  DEBI CABALLERO      TECHNIQUE:  Axial noncontrast CT images of the abdomen and pelvis with coronal  and sagittal reconstructed images.      FINDINGS:  Lack of intravenous contrast limits evaluation of vessels and solid  organs.      LOWER CHEST: Aortic valve calcifications. Small hiatal hernia.  BONES: Diffuse osseous demineralization. No acute osseous  abnormalities. ABDOMINAL WALL: Small fat containing umbilical hernia.      ABDOMEN:      LIVER: Unremarkable.  BILE DUCTS: Unremarkable.  GALLBLADDER: Unremarkable.  PANCREAS:Unremarkable.  SPLEEN: Unremarkable.  ADRENALS: Left adrenal gland nodule measuring 1.5 cm (3/36) with  internal Hounsfield unit of 0.8, likely a benign adenoma. KIDNEYS and  URETERS: No hydronephrosis. There are few pelvic phleboliths in  adjacent to the distal ureters but no intraluminal  obstructing  defects identified. VESSELS: Mild-to-moderate aortoiliac  atherosclerosis. Focal fusiform ectasia of the infrarenal abdominal  aorta measuring up to 2.3 cm. No saccular aneurysms identified. LYMPH  NODES: Unremarkable. RETROPERITONEUM/PERITONEUM: Unremarkable.  BOWEL: Small hiatal hernia. Small bowel loops are nonobstructive.  Colonic diverticulosis      PELVIS:      REPRODUCTIVE ORGANS: No pelvic masses.  BLADDER: Decompressed with mild wall thickening. Rush catheter in  place.      Impression: No nephrolithiasis or hydronephrosis.      Bladder is decompressed with mild wall thickening which is  nonspecific and could be reactive but cystitis is not excluded,  correlate with urinalysis. Rush catheter is in place.      Left adrenal gland 1.5 cm adenoma.      Other chronic findings as discussed above.      MACRO:  None      Signed by: Bartolo Ruffin 4/12/2025 8:19 PM  Dictation workstation:   [id number]      Physical Exam  Vitals and nursing note reviewed.   Constitutional:       Appearance: Normal appearance. She is obese.   HENT:      Head: Normocephalic and atraumatic.      Nose: Rhinorrhea present. No congestion.   Eyes:      Extraocular Movements: Extraocular movements intact.      Pupils: Pupils are equal, round, and reactive to light.   Cardiovascular:      Rate and Rhythm: Bradycardia present.   Pulmonary:      Effort: Pulmonary effort is normal.   Abdominal:      General: Bowel sounds are normal.      Palpations: Abdomen is soft.   Musculoskeletal:         General: Normal range of motion.      Right lower leg: No edema.      Left lower leg: No edema.   Skin:     General: Skin is warm.   Neurological:      General: No focal deficit present.      Mental Status: She is alert and oriented to person, place, and time.   Psychiatric:         Mood and Affect: Mood normal.         Relevant Results  No current facility-administered medications on file prior to encounter.     Current Outpatient  Medications on File Prior to Encounter   Medication Sig Dispense Refill    allopurinol (Zyloprim) 100 mg tablet TAKE 1 TABLET BY MOUTH TWICE A  tablet 3    aspirin 81 mg chewable tablet Chew 1 tablet (81 mg) once daily.      atenolol (Tenormin) 50 mg tablet TAKE 1 TABLET BY MOUTH TWICE A  tablet 3    atorvastatin (Lipitor) 40 mg tablet Take 1 tablet (40 mg) by mouth once daily at bedtime. 90 tablet 3    cholecalciferol (Vitamin D3) 10 MCG (400 UNIT) tablet Take 1 tablet (10 mcg) by mouth once daily. 30 tablet 11    furosemide (Lasix) 40 mg tablet TAKE 1 TABLET BY MOUTH EVERY DAY (Patient taking differently: Take 1 tablet (40 mg) by mouth once daily as needed.) 90 tablet 3    hydroCHLOROthiazide (HYDRODiuril) 50 mg tablet Take 1 tablet (50 mg) by mouth once daily. 30 tablet 5    nystatin (Mycostatin) 100,000 unit/gram powder Apply 1 Application topically 2 times a day. groin      potassium chloride CR (Klor-Con) 10 mEq ER tablet Take 1 tablet (10 mEq) by mouth 2 times a day. Do not crush, chew, or split. 60 tablet 11    valsartan (Diovan) 80 mg tablet TAKE 1 TABLET (80 MG) BY MOUTH 2 TIMES A DAY. 100 tablet 3       No results found.         Assessment/Plan           Assessment & Plan  Acute renal failure  Cr 25 was 2.7 and on 4/2 was 1.82 . In the setting of chronic systolic heart failure.  Lasic and diovan were held  Pt was on Bactrim before admission for UTI wich made her Kidney function worst . It was stopped.    consult nephrology     Acute cystitis without hematuria  Been getting Bactrim for UTI.  Not a great choice for a patient  with kidney dysfunction.    On  ceftriaxone   Urine retention   keep the Rush in for now and reassess her for urinary retention/trial of void once her kidney function improves a bit.  Weakness  PT OT eval.  Fall precautions   Bradycardia  HR is less than 60   I discontinue atenolol , Keep monitoring   Follow-up with cardiology  Follow-up with the echo    PT discharge  patient to skilled of nursing when cleared by cardiology    Coco Boateng, APRN-CNP

## 2025-04-16 ENCOUNTER — PATIENT OUTREACH (OUTPATIENT)
Dept: CARE COORDINATION | Age: 70
End: 2025-04-16
Payer: MEDICARE

## 2025-04-16 LAB
AORTIC VALVE MEAN GRADIENT: 4 MMHG
AORTIC VALVE PEAK VELOCITY: 1.47 M/S
AV PEAK GRADIENT: 9 MMHG
AVA (PEAK VEL): 2.86 CM2
AVA (VTI): 2.47 CM2
EJECTION FRACTION APICAL 4 CHAMBER: 71.7
EJECTION FRACTION: 58 %
LEFT VENTRICULAR OUTFLOW TRACT DIAMETER: 2 CM
MITRAL VALVE E/A RATIO: 0.63
RIGHT VENTRICLE FREE WALL PEAK S': 19 CM/S
RIGHT VENTRICLE PEAK SYSTOLIC PRESSURE: 21.7 MMHG

## 2025-04-17 ENCOUNTER — PATIENT OUTREACH (OUTPATIENT)
Dept: CARE COORDINATION | Age: 70
End: 2025-04-17
Payer: MEDICARE

## 2025-05-02 ENCOUNTER — TELEPHONE (OUTPATIENT)
Dept: PRIMARY CARE | Facility: CLINIC | Age: 70
End: 2025-05-02
Payer: MEDICARE

## 2025-05-02 NOTE — TELEPHONE ENCOUNTER
Received a message from [facility]. Per message: Patient will be discharging tomorrow. She was cut by her insurance and did not want to appeal or transition to Intermediate LOC. She is discharging home with her sister. We are strongly encouraging a higher level of care environment for her at this time. However, she and sister are adamant about going home. She will have [facility]. She has a nixon and will need to follow up with urology to see about its removal.

## 2025-05-04 DIAGNOSIS — E87.6 HYPOKALEMIA: Primary | ICD-10-CM

## 2025-05-05 ENCOUNTER — TELEPHONE (OUTPATIENT)
Dept: PRIMARY CARE | Facility: CLINIC | Age: 70
End: 2025-05-05
Payer: MEDICARE

## 2025-05-05 RX ORDER — POTASSIUM CHLORIDE 750 MG/1
TABLET, EXTENDED RELEASE ORAL
Qty: 60 TABLET | Refills: 5 | Status: SHIPPED | OUTPATIENT
Start: 2025-05-05

## 2025-05-05 NOTE — TELEPHONE ENCOUNTER
Florin calls to inquire if provider will follow for Home Health Care Services, PT, OT, Skilled Nursing.  Patient discharged from [facility] to home.  Please advise.

## 2025-05-06 ENCOUNTER — TELEPHONE (OUTPATIENT)
Dept: PRIMARY CARE | Facility: CLINIC | Age: 70
End: 2025-05-06
Payer: MEDICARE

## 2025-05-06 RX ORDER — LOSARTAN POTASSIUM 50 MG/1
50 TABLET ORAL 2 TIMES DAILY
COMMUNITY
End: 2025-05-09 | Stop reason: ALTCHOICE

## 2025-05-06 RX ORDER — HYDRALAZINE HYDROCHLORIDE 25 MG/1
25 TABLET, FILM COATED ORAL 4 TIMES DAILY
COMMUNITY
Start: 2025-05-03

## 2025-05-06 NOTE — TELEPHONE ENCOUNTER
Call placed to , this nurse speaking with Connie, information relayed provider will follow as per provider has written.

## 2025-05-07 ENCOUNTER — TELEPHONE (OUTPATIENT)
Dept: PRIMARY CARE | Facility: CLINIC | Age: 70
End: 2025-05-07
Payer: MEDICARE

## 2025-05-07 PROCEDURE — G0180 MD CERTIFICATION HHA PATIENT: HCPCS | Performed by: NURSE PRACTITIONER

## 2025-05-08 ENCOUNTER — TELEPHONE (OUTPATIENT)
Dept: PRIMARY CARE | Facility: CLINIC | Age: 70
End: 2025-05-08
Payer: MEDICARE

## 2025-05-09 ENCOUNTER — OFFICE VISIT (OUTPATIENT)
Dept: PRIMARY CARE | Facility: CLINIC | Age: 70
End: 2025-05-09
Payer: MEDICARE

## 2025-05-09 DIAGNOSIS — R33.9 URINE RETENTION: ICD-10-CM

## 2025-05-09 DIAGNOSIS — I50.43 ACUTE ON CHRONIC COMBINED SYSTOLIC AND DIASTOLIC CONGESTIVE HEART FAILURE: Primary | Chronic | ICD-10-CM

## 2025-05-09 DIAGNOSIS — M1A.09X0 IDIOPATHIC CHRONIC GOUT OF MULTIPLE SITES WITHOUT TOPHUS: Chronic | ICD-10-CM

## 2025-05-09 DIAGNOSIS — I10 PRIMARY HYPERTENSION: Chronic | ICD-10-CM

## 2025-05-09 DIAGNOSIS — E55.9 VITAMIN D DEFICIENCY: Chronic | ICD-10-CM

## 2025-05-09 PROCEDURE — 1159F MED LIST DOCD IN RCRD: CPT | Performed by: NURSE PRACTITIONER

## 2025-05-09 PROCEDURE — 3078F DIAST BP <80 MM HG: CPT | Performed by: NURSE PRACTITIONER

## 2025-05-09 PROCEDURE — 3075F SYST BP GE 130 - 139MM HG: CPT | Performed by: NURSE PRACTITIONER

## 2025-05-09 PROCEDURE — 1111F DSCHRG MED/CURRENT MED MERGE: CPT | Performed by: NURSE PRACTITIONER

## 2025-05-09 PROCEDURE — 1160F RVW MEDS BY RX/DR IN RCRD: CPT | Performed by: NURSE PRACTITIONER

## 2025-05-09 PROCEDURE — 99350 HOME/RES VST EST HIGH MDM 60: CPT | Performed by: NURSE PRACTITIONER

## 2025-05-09 ASSESSMENT — ENCOUNTER SYMPTOMS
WEAKNESS: 1
PSYCHIATRIC NEGATIVE: 1
UNEXPECTED WEIGHT CHANGE: 1
DIARRHEA: 1
SHORTNESS OF BREATH: 1
WHEEZING: 1

## 2025-05-09 NOTE — PROGRESS NOTES
Subjective   Patient ID: Tammy Walker is a 69 y.o. female who presents for Follow-up (Follow up for [facility] patient for multiple chronic issues including Post acute.//).    Patient is seen in her private home sitting up on the sofa in the living room. Resides at home where she lives with her sister, and she is her primary caregiver.  She is awake and alert with appropriate awareness and is her own decision maker.  Sister is present for home visit.  Currently is non-ambulatory, can bear weight only and propels herself in the wheelchair. PMH: HTN, Covid-19 appx 2021, Hyperlipidemia, TIA.   Medical Necessity - Homebound due to functional decline from Covid-19 Long Hauler.      Post Acute 4-12-25 Per EMR: Presented to the hospital with generalized weakness. Work up was essentially negative. She was found to have possible UTI and ceftriaxone was initiated. This was discontinued when urine culture came back negative. He weakness was noted to be chronic. She was evaluated and accepted to [facility].  Her creatinine was suspected to be elevated, but additional review showed that creat of 1.6 to 2.3 seems to be her base line.  female presenting LORETA on CKD with increase in her creatinine at 2.7 with chronic systolic heart failure. Admitted 2 weeks ago for UTI and renal insufficiency. Rush was placed at that time due to bladder retention, which she was discharged with. Found to have hypokalemia.  Hypertension, LORETA On this admission  Bactrim, hydrochlorothiazide, furosemide, and valsartan were stopped and function improved.  Evaluated by  Nephrology okay to resume Valsartan at 80 mg/day and continue holding  furosemide and Hydrochlorothiazide.   Seen by cardiology echo was done , Per Dr. Orta, patient is okay for discharge without final echocardiogram interpretation. Patient 's urine cultures is between 20-80,000 which is weak UTI no need of antibiotic for UTI.  Potassium level normal.  Patient will be discharged  "with pantoprazole due to epigastric abdominal pain.  Blood pressure is stable.  Patient is hemodynamically stable to return to rehab with Rush.  Has to follow-up with urology as outpatient    Today is a follow up for overall functional decline, HTN and post acute.  Tammy is asking for catheter to be removed.  I've endorsed that she needs to follow up with urology since it is noted that she has a paralyzed bladder.  Sister is working on having a ramp build so that she can get her out of the house and to necessary appointments.  She is in a weakened state, agreeable to start PT/OT for strengthening.  Goal is to be able to stand and transfer on her own.  Remains stationary on the sofa all day with legs elevated watching TV.  Reports that she has resumed taking all medications as prescribed, including vitamind D supplement. Sister is managing all medications.  Reports blood pressures have been \"pretty good\".  Log reviewed via machine with 's - 190's and DBP 80's-90's.  Has not made appointment with cardiology Dr. Alonzo or with nephrology as recommended.  She remains stationary on the sofa throughout the day, and also where she sleeps.  Does transfer herself and propel via wheelchair to commode.    Reports that appetite is good, staying hydrated and urinating adequate amounts.  Denies chest pain, palpitations, tachycardia, and shortness of breath, wheezing, no PND/orthopnea, or respiratory complaints for the patient. There is no fever, or chills. No nausea, vomiting, diarrhea, headaches, or vision changes or recent falls. There is no hematuria, dysuria, flank pain, or increased urgency.     Home Visit: Medically necessary due to: dementia/cognitive impairment, unsteady gait/poor balance, shortness of breath with minimal exertion, Illness or condition that results in activity limitation or restriction that impacts the ability to leave home such as: equipment and /or human assistance needed to safely leave the " home, patient has limited support systems to help the patient attend office visits, the office visit would require excessive physical effort or pain for the patient.         Current Medications[1]     allopurinol (Zyloprim) 100 mg tablet, TAKE 1 TABLET BY MOUTH TWICE A DAY, Disp: 180 tablet, Rfl: 3    amLODIPine (Norvasc) 10 mg tablet, Take 1 tablet (10 mg) by mouth once daily., Disp: , Rfl:     atorvastatin (Lipitor) 40 mg tablet, Take 1 tablet (40 mg) by mouth once daily at bedtime., Disp: 90 tablet, Rfl: 3    furosemide (Lasix) 40 mg tablet, Take 1 tablet (40 mg) by mouth once daily as needed (edema)., Disp: , Rfl:     hydrALAZINE (Apresoline) 25 mg tablet, Take 1 tablet (25 mg) by mouth 4 times a day., Disp: , Rfl:     nystatin (Mycostatin) 100,000 unit/gram powder, Apply 1 Application topically 2 times a day. groin, Disp: , Rfl:     potassium chloride CR 10 mEq ER tablet, TAKE 1 TABLET BY MOUTH TWICE A DAY (DO NOT CRUSH, CHEW, OR SPLIT), Disp: 60 tablet, Rfl: 5    valsartan (Diovan) 80 mg tablet, TAKE 1 TABLET (80 MG) BY MOUTH 2 TIMES A DAY., Disp: 100 tablet, Rfl: 3    cholecalciferol (Vitamin D3) 10 MCG (400 UNIT) tablet, Take 1 tablet (10 mcg) by mouth once daily., Disp: 30 tablet, Rfl: 11    pantoprazole (ProtoNix) 40 mg EC tablet, Take 1 tablet (40 mg) by mouth once daily in the morning. Take before meals. Do not crush, chew, or split., Disp: , Rfl:     Review of Systems   Constitutional:  Positive for unexpected weight change.   HENT: Negative.     Respiratory:  Positive for shortness of breath and wheezing.    Cardiovascular:  Positive for leg swelling.   Gastrointestinal:  Positive for diarrhea.   Genitourinary: Negative.    Skin:  Positive for pallor.   Neurological:  Positive for weakness.   Psychiatric/Behavioral: Negative.         Objective   /78 (BP Location: Left arm, Patient Position: Sitting, BP Cuff Size: Adult)   Pulse 78   Temp 36.1 °C (97 °F) (Temporal)   Resp 16   Ht 1.626 m (5'  "4\")   Wt 90.3 kg (199 lb) Comment: hospital weight  SpO2 97%   BMI 34.16 kg/m²     Physical Exam  Constitutional:       Appearance: Normal appearance. She is obese. She is ill-appearing.   HENT:      Head: Normocephalic and atraumatic.      Right Ear: External ear normal.      Left Ear: External ear normal.      Nose: Nose normal.      Mouth/Throat:      Mouth: Mucous membranes are moist.      Pharynx: Oropharynx is clear.   Eyes:      Extraocular Movements: Extraocular movements intact.      Pupils: Pupils are equal, round, and reactive to light.   Cardiovascular:      Rate and Rhythm: Normal rate and regular rhythm.      Pulses: Normal pulses.      Heart sounds: Normal heart sounds.   Pulmonary:      Effort: Pulmonary effort is normal.      Breath sounds: Normal breath sounds. No wheezing or rhonchi.   Abdominal:      General: Bowel sounds are normal.      Palpations: Abdomen is soft.      Tenderness: There is no abdominal tenderness.   Genitourinary:     Comments: Chronic nixon catheter  Musculoskeletal:      Cervical back: Neck supple. No tenderness.      Right lower leg: Edema present.      Left lower leg: Edema present.      Comments: Limited ROM lower extremity   Skin:     General: Skin is warm and dry.      Capillary Refill: Capillary refill takes 2 to 3 seconds.      Coloration: Skin is pale.   Neurological:      Mental Status: She is alert and oriented to person, place, and time.      Motor: Weakness present.      Gait: Gait abnormal.   Psychiatric:         Mood and Affect: Mood normal.       Lab Results   Component Value Date    WBC 8.4 04/15/2025    HGB 12.9 04/15/2025    HCT 38.2 04/15/2025    MCV 90 04/15/2025     04/15/2025      Lab Results   Component Value Date    GLUCOSE 95 04/15/2025    CALCIUM 9.3 04/15/2025     04/15/2025    K 3.8 04/15/2025    CO2 26 04/15/2025     04/15/2025    BUN 52 (H) 04/15/2025    CREATININE 2.19 (H) 04/15/2025        Assessment/Plan   Diagnoses and " all orders for this visit:  Acute on chronic combined systolic and diastolic congestive heart failure  Comments:  Continuje furosemide 40mg daily, potassium 10meq bid,  Primary hypertension  Comments:  Managed - cont amlodipin 10mg Qd, hydralazine 25mg 4x daily, valsartan 80mg daily  Vitamin D deficiency  Comments:  Managed - cont vit D3 10mcg daily  Urine retention  Comments:  Rush catheter - follow up with urology  Idiopathic chronic gout of multiple sites without tophus  Comments:  Cont with allopurinol 100mg daily    More than 50% of time spent face-to-face discussion, a total of 60 minutes with this patient on counseling, coordination of care, and review of medical records and diagnostics. Advised pt to contact the [facility] office with any acute concerns or medication needs.     Will continue with [facility] home visits. Patient has limited support, limited mobility and unable to navigate to traditional office appointments.                 ANGELI Benitez-NANCY        [1]   Current Outpatient Medications:     allopurinol (Zyloprim) 100 mg tablet, TAKE 1 TABLET BY MOUTH TWICE A DAY, Disp: 180 tablet, Rfl: 3    amLODIPine (Norvasc) 10 mg tablet, Take 1 tablet (10 mg) by mouth once daily., Disp: , Rfl:     atorvastatin (Lipitor) 40 mg tablet, Take 1 tablet (40 mg) by mouth once daily at bedtime., Disp: 90 tablet, Rfl: 3    furosemide (Lasix) 40 mg tablet, Take 1 tablet (40 mg) by mouth once daily as needed (edema)., Disp: , Rfl:     hydrALAZINE (Apresoline) 25 mg tablet, Take 1 tablet (25 mg) by mouth 4 times a day., Disp: , Rfl:     nystatin (Mycostatin) 100,000 unit/gram powder, Apply 1 Application topically 2 times a day. groin, Disp: , Rfl:     potassium chloride CR 10 mEq ER tablet, TAKE 1 TABLET BY MOUTH TWICE A DAY (DO NOT CRUSH, CHEW, OR SPLIT), Disp: 60 tablet, Rfl: 5    valsartan (Diovan) 80 mg tablet, TAKE 1 TABLET (80 MG) BY MOUTH 2 TIMES A DAY., Disp: 100 tablet, Rfl: 3     cholecalciferol (Vitamin D3) 10 MCG (400 UNIT) tablet, Take 1 tablet (10 mcg) by mouth once daily., Disp: 30 tablet, Rfl: 11    pantoprazole (ProtoNix) 40 mg EC tablet, Take 1 tablet (40 mg) by mouth once daily in the morning. Take before meals. Do not crush, chew, or split., Disp: , Rfl:

## 2025-05-15 VITALS
TEMPERATURE: 97 F | WEIGHT: 199 LBS | HEIGHT: 64 IN | DIASTOLIC BLOOD PRESSURE: 78 MMHG | RESPIRATION RATE: 16 BRPM | HEART RATE: 78 BPM | SYSTOLIC BLOOD PRESSURE: 132 MMHG | BODY MASS INDEX: 33.97 KG/M2 | OXYGEN SATURATION: 97 %

## 2025-05-16 ENCOUNTER — HOSPITAL ENCOUNTER (INPATIENT)
Facility: HOSPITAL | Age: 70
DRG: 696 | End: 2025-05-16
Attending: STUDENT IN AN ORGANIZED HEALTH CARE EDUCATION/TRAINING PROGRAM | Admitting: INTERNAL MEDICINE
Payer: MEDICARE

## 2025-05-16 ENCOUNTER — APPOINTMENT (OUTPATIENT)
Dept: CARDIOLOGY | Facility: HOSPITAL | Age: 70
DRG: 696 | End: 2025-05-16
Payer: MEDICARE

## 2025-05-16 ENCOUNTER — TELEPHONE (OUTPATIENT)
Dept: PRIMARY CARE | Facility: CLINIC | Age: 70
End: 2025-05-16

## 2025-05-16 ENCOUNTER — APPOINTMENT (OUTPATIENT)
Dept: RADIOLOGY | Facility: HOSPITAL | Age: 70
DRG: 696 | End: 2025-05-16
Payer: MEDICARE

## 2025-05-16 DIAGNOSIS — I50.21 ACUTE SYSTOLIC HEART FAILURE: ICD-10-CM

## 2025-05-16 DIAGNOSIS — R19.7 DIARRHEA, UNSPECIFIED TYPE: ICD-10-CM

## 2025-05-16 DIAGNOSIS — I10 PRIMARY HYPERTENSION: ICD-10-CM

## 2025-05-16 DIAGNOSIS — R53.1 WEAKNESS: Primary | ICD-10-CM

## 2025-05-16 LAB
ALBUMIN SERPL BCP-MCNC: 3.8 G/DL (ref 3.4–5)
ALP SERPL-CCNC: 145 U/L (ref 33–136)
ALT SERPL W P-5'-P-CCNC: 12 U/L (ref 7–45)
ANION GAP SERPL CALCULATED.3IONS-SCNC: 16 MMOL/L (ref 10–20)
APPEARANCE UR: CLEAR
AST SERPL W P-5'-P-CCNC: 17 U/L (ref 9–39)
ATRIAL RATE: 63 BPM
BACTERIA #/AREA URNS AUTO: ABNORMAL /HPF
BASOPHILS # BLD AUTO: 0.09 X10*3/UL (ref 0–0.1)
BASOPHILS NFR BLD AUTO: 0.7 %
BILIRUB SERPL-MCNC: 1 MG/DL (ref 0–1.2)
BILIRUB UR STRIP.AUTO-MCNC: NEGATIVE MG/DL
BUN SERPL-MCNC: 23 MG/DL (ref 6–23)
C DIF TOX TCDA+TCDB STL QL NAA+PROBE: NOT DETECTED
CALCIUM SERPL-MCNC: 8.9 MG/DL (ref 8.6–10.3)
CARDIAC TROPONIN I PNL SERPL HS: 15 NG/L (ref 0–13)
CARDIAC TROPONIN I PNL SERPL HS: 16 NG/L (ref 0–13)
CHLORIDE SERPL-SCNC: 109 MMOL/L (ref 98–107)
CO2 SERPL-SCNC: 21 MMOL/L (ref 21–32)
COLOR UR: YELLOW
CREAT SERPL-MCNC: 1.65 MG/DL (ref 0.5–1.05)
EGFRCR SERPLBLD CKD-EPI 2021: 34 ML/MIN/1.73M*2
EOSINOPHIL # BLD AUTO: 0.11 X10*3/UL (ref 0–0.7)
EOSINOPHIL NFR BLD AUTO: 0.9 %
ERYTHROCYTE [DISTWIDTH] IN BLOOD BY AUTOMATED COUNT: 14.8 % (ref 11.5–14.5)
GLUCOSE SERPL-MCNC: 100 MG/DL (ref 74–99)
GLUCOSE UR STRIP.AUTO-MCNC: NORMAL MG/DL
HCT VFR BLD AUTO: 35.5 % (ref 36–46)
HGB BLD-MCNC: 11.8 G/DL (ref 12–16)
HYALINE CASTS #/AREA URNS AUTO: ABNORMAL /LPF
IMM GRANULOCYTES # BLD AUTO: 0.04 X10*3/UL (ref 0–0.7)
IMM GRANULOCYTES NFR BLD AUTO: 0.3 % (ref 0–0.9)
KETONES UR STRIP.AUTO-MCNC: NEGATIVE MG/DL
LEUKOCYTE ESTERASE UR QL STRIP.AUTO: ABNORMAL
LYMPHOCYTES # BLD AUTO: 1.07 X10*3/UL (ref 1.2–4.8)
LYMPHOCYTES NFR BLD AUTO: 8.4 %
MCH RBC QN AUTO: 30.3 PG (ref 26–34)
MCHC RBC AUTO-ENTMCNC: 33.2 G/DL (ref 32–36)
MCV RBC AUTO: 91 FL (ref 80–100)
MONOCYTES # BLD AUTO: 0.76 X10*3/UL (ref 0.1–1)
MONOCYTES NFR BLD AUTO: 6 %
MUCOUS THREADS #/AREA URNS AUTO: ABNORMAL /LPF
NEUTROPHILS # BLD AUTO: 10.69 X10*3/UL (ref 1.2–7.7)
NEUTROPHILS NFR BLD AUTO: 83.7 %
NITRITE UR QL STRIP.AUTO: NEGATIVE
NRBC BLD-RTO: 0 /100 WBCS (ref 0–0)
P AXIS: 22 DEGREES
P OFFSET: 174 MS
P ONSET: 116 MS
PH UR STRIP.AUTO: 5.5 [PH]
PLATELET # BLD AUTO: 329 X10*3/UL (ref 150–450)
POTASSIUM SERPL-SCNC: 4.3 MMOL/L (ref 3.5–5.3)
PR INTERVAL: 196 MS
PROT SERPL-MCNC: 7.2 G/DL (ref 6.4–8.2)
PROT UR STRIP.AUTO-MCNC: ABNORMAL MG/DL
Q ONSET: 214 MS
QRS COUNT: 10 BEATS
QRS DURATION: 112 MS
QT INTERVAL: 456 MS
QTC CALCULATION(BAZETT): 466 MS
QTC FREDERICIA: 463 MS
R AXIS: -71 DEGREES
RBC # BLD AUTO: 3.9 X10*6/UL (ref 4–5.2)
RBC # UR STRIP.AUTO: NEGATIVE MG/DL
RBC #/AREA URNS AUTO: ABNORMAL /HPF
SODIUM SERPL-SCNC: 142 MMOL/L (ref 136–145)
SP GR UR STRIP.AUTO: 1.02
T AXIS: 93 DEGREES
T OFFSET: 442 MS
UROBILINOGEN UR STRIP.AUTO-MCNC: NORMAL MG/DL
VENTRICULAR RATE: 63 BPM
WBC # BLD AUTO: 12.8 X10*3/UL (ref 4.4–11.3)
WBC #/AREA URNS AUTO: ABNORMAL /HPF

## 2025-05-16 PROCEDURE — 71045 X-RAY EXAM CHEST 1 VIEW: CPT

## 2025-05-16 PROCEDURE — 81001 URINALYSIS AUTO W/SCOPE: CPT | Performed by: STUDENT IN AN ORGANIZED HEALTH CARE EDUCATION/TRAINING PROGRAM

## 2025-05-16 PROCEDURE — 70450 CT HEAD/BRAIN W/O DYE: CPT | Performed by: RADIOLOGY

## 2025-05-16 PROCEDURE — 2500000005 HC RX 250 GENERAL PHARMACY W/O HCPCS

## 2025-05-16 PROCEDURE — 93005 ELECTROCARDIOGRAM TRACING: CPT

## 2025-05-16 PROCEDURE — 84484 ASSAY OF TROPONIN QUANT: CPT | Performed by: STUDENT IN AN ORGANIZED HEALTH CARE EDUCATION/TRAINING PROGRAM

## 2025-05-16 PROCEDURE — 36415 COLL VENOUS BLD VENIPUNCTURE: CPT | Performed by: STUDENT IN AN ORGANIZED HEALTH CARE EDUCATION/TRAINING PROGRAM

## 2025-05-16 PROCEDURE — 70450 CT HEAD/BRAIN W/O DYE: CPT

## 2025-05-16 PROCEDURE — 97166 OT EVAL MOD COMPLEX 45 MIN: CPT | Mod: GO

## 2025-05-16 PROCEDURE — 87493 C DIFF AMPLIFIED PROBE: CPT | Performed by: STUDENT IN AN ORGANIZED HEALTH CARE EDUCATION/TRAINING PROGRAM

## 2025-05-16 PROCEDURE — 87086 URINE CULTURE/COLONY COUNT: CPT | Mod: WESLAB | Performed by: STUDENT IN AN ORGANIZED HEALTH CARE EDUCATION/TRAINING PROGRAM

## 2025-05-16 PROCEDURE — 80053 COMPREHEN METABOLIC PANEL: CPT | Performed by: STUDENT IN AN ORGANIZED HEALTH CARE EDUCATION/TRAINING PROGRAM

## 2025-05-16 PROCEDURE — 71045 X-RAY EXAM CHEST 1 VIEW: CPT | Performed by: RADIOLOGY

## 2025-05-16 PROCEDURE — 83036 HEMOGLOBIN GLYCOSYLATED A1C: CPT | Mod: WESLAB

## 2025-05-16 PROCEDURE — 99285 EMERGENCY DEPT VISIT HI MDM: CPT | Mod: 25 | Performed by: STUDENT IN AN ORGANIZED HEALTH CARE EDUCATION/TRAINING PROGRAM

## 2025-05-16 PROCEDURE — 99223 1ST HOSP IP/OBS HIGH 75: CPT

## 2025-05-16 PROCEDURE — 2500000001 HC RX 250 WO HCPCS SELF ADMINISTERED DRUGS (ALT 637 FOR MEDICARE OP)

## 2025-05-16 PROCEDURE — 2500000004 HC RX 250 GENERAL PHARMACY W/ HCPCS (ALT 636 FOR OP/ED): Mod: JZ

## 2025-05-16 PROCEDURE — 1200000002 HC GENERAL ROOM WITH TELEMETRY DAILY

## 2025-05-16 PROCEDURE — 85025 COMPLETE CBC W/AUTO DIFF WBC: CPT | Performed by: STUDENT IN AN ORGANIZED HEALTH CARE EDUCATION/TRAINING PROGRAM

## 2025-05-16 RX ORDER — FUROSEMIDE 40 MG/1
40 TABLET ORAL DAILY PRN
Status: DISCONTINUED | OUTPATIENT
Start: 2025-05-16 | End: 2025-05-16

## 2025-05-16 RX ORDER — ALLOPURINOL 100 MG/1
100 TABLET ORAL 2 TIMES DAILY
Status: DISCONTINUED | OUTPATIENT
Start: 2025-05-16 | End: 2025-05-20 | Stop reason: HOSPADM

## 2025-05-16 RX ORDER — PANTOPRAZOLE SODIUM 40 MG/1
40 TABLET, DELAYED RELEASE ORAL
Status: DISCONTINUED | OUTPATIENT
Start: 2025-05-17 | End: 2025-05-20 | Stop reason: HOSPADM

## 2025-05-16 RX ORDER — L. ACIDOPHILUS/L.BULGARICUS 1MM CELL
1 TABLET ORAL 2 TIMES DAILY
Status: DISCONTINUED | OUTPATIENT
Start: 2025-05-16 | End: 2025-05-18 | Stop reason: SDUPTHER

## 2025-05-16 RX ORDER — ASPIRIN 81 MG/1
81 TABLET ORAL DAILY
Status: DISCONTINUED | OUTPATIENT
Start: 2025-05-17 | End: 2025-05-20 | Stop reason: HOSPADM

## 2025-05-16 RX ORDER — ATORVASTATIN CALCIUM 40 MG/1
40 TABLET, FILM COATED ORAL NIGHTLY
Status: DISCONTINUED | OUTPATIENT
Start: 2025-05-16 | End: 2025-05-20 | Stop reason: HOSPADM

## 2025-05-16 RX ORDER — NYSTATIN 100000 [USP'U]/G
1 POWDER TOPICAL 2 TIMES DAILY
Status: DISCONTINUED | OUTPATIENT
Start: 2025-05-16 | End: 2025-05-20 | Stop reason: HOSPADM

## 2025-05-16 RX ORDER — DOCUSATE SODIUM 100 MG/1
100 CAPSULE, LIQUID FILLED ORAL 2 TIMES DAILY PRN
Status: DISCONTINUED | OUTPATIENT
Start: 2025-05-16 | End: 2025-05-20 | Stop reason: HOSPADM

## 2025-05-16 RX ORDER — HYDRALAZINE HYDROCHLORIDE 25 MG/1
25 TABLET, FILM COATED ORAL 4 TIMES DAILY
Status: DISCONTINUED | OUTPATIENT
Start: 2025-05-16 | End: 2025-05-20 | Stop reason: HOSPADM

## 2025-05-16 RX ORDER — ATENOLOL 50 MG/1
50 TABLET ORAL 2 TIMES DAILY
COMMUNITY

## 2025-05-16 RX ORDER — CEFTRIAXONE 1 G/50ML
1 INJECTION, SOLUTION INTRAVENOUS EVERY 24 HOURS
Status: DISCONTINUED | OUTPATIENT
Start: 2025-05-16 | End: 2025-05-18

## 2025-05-16 RX ORDER — TALC
3 POWDER (GRAM) TOPICAL NIGHTLY PRN
Status: DISCONTINUED | OUTPATIENT
Start: 2025-05-16 | End: 2025-05-20 | Stop reason: HOSPADM

## 2025-05-16 RX ORDER — ATENOLOL 50 MG/1
50 TABLET ORAL 2 TIMES DAILY
Status: DISCONTINUED | OUTPATIENT
Start: 2025-05-16 | End: 2025-05-20 | Stop reason: HOSPADM

## 2025-05-16 RX ORDER — ASPIRIN 81 MG/1
81 TABLET ORAL DAILY
COMMUNITY

## 2025-05-16 RX ORDER — AMLODIPINE BESYLATE 10 MG/1
10 TABLET ORAL DAILY
Status: DISCONTINUED | OUTPATIENT
Start: 2025-05-17 | End: 2025-05-20 | Stop reason: HOSPADM

## 2025-05-16 RX ORDER — CYANOCOBALAMIN (VITAMIN B-12) 500 MCG
10 TABLET ORAL DAILY
Status: DISCONTINUED | OUTPATIENT
Start: 2025-05-17 | End: 2025-05-20 | Stop reason: HOSPADM

## 2025-05-16 RX ORDER — HEPARIN SODIUM 5000 [USP'U]/ML
5000 INJECTION, SOLUTION INTRAVENOUS; SUBCUTANEOUS EVERY 12 HOURS
Status: DISCONTINUED | OUTPATIENT
Start: 2025-05-16 | End: 2025-05-20 | Stop reason: HOSPADM

## 2025-05-16 RX ORDER — ACETAMINOPHEN 325 MG/1
650 TABLET ORAL EVERY 6 HOURS PRN
Status: DISCONTINUED | OUTPATIENT
Start: 2025-05-16 | End: 2025-05-20 | Stop reason: HOSPADM

## 2025-05-16 RX ADMIN — CEFTRIAXONE SODIUM 1 G: 1 INJECTION, SOLUTION INTRAVENOUS at 18:22

## 2025-05-16 RX ADMIN — ALLOPURINOL 100 MG: 100 TABLET ORAL at 21:12

## 2025-05-16 RX ADMIN — Medication 3 MG: at 21:12

## 2025-05-16 RX ADMIN — ATENOLOL 50 MG: 50 TABLET ORAL at 21:12

## 2025-05-16 RX ADMIN — HYDRALAZINE HYDROCHLORIDE 25 MG: 25 TABLET ORAL at 21:12

## 2025-05-16 RX ADMIN — HYDRALAZINE HYDROCHLORIDE 25 MG: 25 TABLET ORAL at 18:22

## 2025-05-16 RX ADMIN — ATORVASTATIN CALCIUM 40 MG: 40 TABLET, FILM COATED ORAL at 21:11

## 2025-05-16 RX ADMIN — NYSTATIN 1 APPLICATION: 100000 POWDER TOPICAL at 21:11

## 2025-05-16 RX ADMIN — Medication 1 TABLET: at 21:12

## 2025-05-16 RX ADMIN — HEPARIN SODIUM 5000 UNITS: 5000 INJECTION, SOLUTION INTRAVENOUS; SUBCUTANEOUS at 21:12

## 2025-05-16 SDOH — SOCIAL STABILITY: SOCIAL NETWORK: HOW OFTEN DO YOU GET TOGETHER WITH FRIENDS OR RELATIVES?: TWICE A WEEK

## 2025-05-16 SDOH — ECONOMIC STABILITY: HOUSING INSECURITY: AT ANY TIME IN THE PAST 12 MONTHS, WERE YOU HOMELESS OR LIVING IN A SHELTER (INCLUDING NOW)?: NO

## 2025-05-16 SDOH — SOCIAL STABILITY: SOCIAL INSECURITY: HAVE YOU HAD THOUGHTS OF HARMING ANYONE ELSE?: NO

## 2025-05-16 SDOH — HEALTH STABILITY: MENTAL HEALTH: HOW OFTEN DO YOU HAVE A DRINK CONTAINING ALCOHOL?: NEVER

## 2025-05-16 SDOH — HEALTH STABILITY: PHYSICAL HEALTH: ON AVERAGE, HOW MANY MINUTES DO YOU ENGAGE IN EXERCISE AT THIS LEVEL?: 0 MIN

## 2025-05-16 SDOH — SOCIAL STABILITY: SOCIAL INSECURITY: ABUSE: ADULT

## 2025-05-16 SDOH — ECONOMIC STABILITY: HOUSING INSECURITY: IN THE LAST 12 MONTHS, WAS THERE A TIME WHEN YOU WERE NOT ABLE TO PAY THE MORTGAGE OR RENT ON TIME?: NO

## 2025-05-16 SDOH — HEALTH STABILITY: MENTAL HEALTH: HOW OFTEN DO YOU HAVE SIX OR MORE DRINKS ON ONE OCCASION?: NEVER

## 2025-05-16 SDOH — HEALTH STABILITY: PHYSICAL HEALTH
HOW OFTEN DO YOU NEED TO HAVE SOMEONE HELP YOU WHEN YOU READ INSTRUCTIONS, PAMPHLETS, OR OTHER WRITTEN MATERIAL FROM YOUR DOCTOR OR PHARMACY?: NEVER

## 2025-05-16 SDOH — HEALTH STABILITY: MENTAL HEALTH: HOW MANY DRINKS CONTAINING ALCOHOL DO YOU HAVE ON A TYPICAL DAY WHEN YOU ARE DRINKING?: PATIENT DOES NOT DRINK

## 2025-05-16 SDOH — ECONOMIC STABILITY: FOOD INSECURITY: HOW HARD IS IT FOR YOU TO PAY FOR THE VERY BASICS LIKE FOOD, HOUSING, MEDICAL CARE, AND HEATING?: NOT HARD AT ALL

## 2025-05-16 SDOH — ECONOMIC STABILITY: HOUSING INSECURITY: IN THE PAST 12 MONTHS, HOW MANY TIMES HAVE YOU MOVED WHERE YOU WERE LIVING?: 0

## 2025-05-16 SDOH — SOCIAL STABILITY: SOCIAL INSECURITY: DOES ANYONE TRY TO KEEP YOU FROM HAVING/CONTACTING OTHER FRIENDS OR DOING THINGS OUTSIDE YOUR HOME?: NO

## 2025-05-16 SDOH — SOCIAL STABILITY: SOCIAL INSECURITY: WITHIN THE LAST YEAR, HAVE YOU BEEN AFRAID OF YOUR PARTNER OR EX-PARTNER?: NO

## 2025-05-16 SDOH — SOCIAL STABILITY: SOCIAL INSECURITY: WITHIN THE LAST YEAR, HAVE YOU BEEN HUMILIATED OR EMOTIONALLY ABUSED IN OTHER WAYS BY YOUR PARTNER OR EX-PARTNER?: NO

## 2025-05-16 SDOH — ECONOMIC STABILITY: FOOD INSECURITY: WITHIN THE PAST 12 MONTHS, YOU WORRIED THAT YOUR FOOD WOULD RUN OUT BEFORE YOU GOT THE MONEY TO BUY MORE.: NEVER TRUE

## 2025-05-16 SDOH — SOCIAL STABILITY: SOCIAL INSECURITY: ARE YOU MARRIED, WIDOWED, DIVORCED, SEPARATED, NEVER MARRIED, OR LIVING WITH A PARTNER?: NEVER MARRIED

## 2025-05-16 SDOH — HEALTH STABILITY: PHYSICAL HEALTH: ON AVERAGE, HOW MANY DAYS PER WEEK DO YOU ENGAGE IN MODERATE TO STRENUOUS EXERCISE (LIKE A BRISK WALK)?: 0 DAYS

## 2025-05-16 SDOH — ECONOMIC STABILITY: INCOME INSECURITY: IN THE PAST 12 MONTHS HAS THE ELECTRIC, GAS, OIL, OR WATER COMPANY THREATENED TO SHUT OFF SERVICES IN YOUR HOME?: NO

## 2025-05-16 SDOH — ECONOMIC STABILITY: FOOD INSECURITY: WITHIN THE PAST 12 MONTHS, THE FOOD YOU BOUGHT JUST DIDN'T LAST AND YOU DIDN'T HAVE MONEY TO GET MORE.: NEVER TRUE

## 2025-05-16 SDOH — SOCIAL STABILITY: SOCIAL INSECURITY: DO YOU FEEL UNSAFE GOING BACK TO THE PLACE WHERE YOU ARE LIVING?: NO

## 2025-05-16 SDOH — SOCIAL STABILITY: SOCIAL NETWORK: HOW OFTEN DO YOU ATTEND MEETINGS OF THE CLUBS OR ORGANIZATIONS YOU BELONG TO?: NEVER

## 2025-05-16 SDOH — SOCIAL STABILITY: SOCIAL INSECURITY: ARE THERE ANY APPARENT SIGNS OF INJURIES/BEHAVIORS THAT COULD BE RELATED TO ABUSE/NEGLECT?: NO

## 2025-05-16 SDOH — SOCIAL STABILITY: SOCIAL INSECURITY: HAS ANYONE EVER THREATENED TO HURT YOUR FAMILY OR YOUR PETS?: NO

## 2025-05-16 SDOH — SOCIAL STABILITY: SOCIAL NETWORK: HOW OFTEN DO YOU ATTEND CHURCH OR RELIGIOUS SERVICES?: NEVER

## 2025-05-16 SDOH — ECONOMIC STABILITY: TRANSPORTATION INSECURITY: IN THE PAST 12 MONTHS, HAS LACK OF TRANSPORTATION KEPT YOU FROM MEDICAL APPOINTMENTS OR FROM GETTING MEDICATIONS?: NO

## 2025-05-16 SDOH — SOCIAL STABILITY: SOCIAL INSECURITY: DO YOU FEEL ANYONE HAS EXPLOITED OR TAKEN ADVANTAGE OF YOU FINANCIALLY OR OF YOUR PERSONAL PROPERTY?: NO

## 2025-05-16 SDOH — SOCIAL STABILITY: SOCIAL INSECURITY: HAVE YOU HAD ANY THOUGHTS OF HARMING ANYONE ELSE?: NO

## 2025-05-16 SDOH — SOCIAL STABILITY: SOCIAL INSECURITY: ARE YOU OR HAVE YOU BEEN THREATENED OR ABUSED PHYSICALLY, EMOTIONALLY, OR SEXUALLY BY ANYONE?: NO

## 2025-05-16 ASSESSMENT — ACTIVITIES OF DAILY LIVING (ADL)
TOILETING: DEPENDENT
LACK_OF_TRANSPORTATION: NO
LACK_OF_TRANSPORTATION: NO
DRESSING YOURSELF: NEEDS ASSISTANCE
WALKS IN HOME: DEPENDENT
BATHING: DEPENDENT
ADL_ASSISTANCE: NEEDS ASSISTANCE
DRESSING YOURSELF: NEEDS ASSISTANCE
WALKS IN HOME: DEPENDENT
HEARING - RIGHT EAR: FUNCTIONAL
GROOMING: NEEDS ASSISTANCE
ADEQUATE_TO_COMPLETE_ADL: YES
JUDGMENT_ADEQUATE_SAFELY_COMPLETE_DAILY_ACTIVITIES: YES
HEARING - LEFT EAR: FUNCTIONAL
BATHING_ASSISTANCE: MODERATE
FEEDING YOURSELF: INDEPENDENT
TOILETING: DEPENDENT
ADEQUATE_TO_COMPLETE_ADL: YES
JUDGMENT_ADEQUATE_SAFELY_COMPLETE_DAILY_ACTIVITIES: YES
PATIENT'S MEMORY ADEQUATE TO SAFELY COMPLETE DAILY ACTIVITIES?: YES
FEEDING YOURSELF: NEEDS ASSISTANCE
ASSISTIVE_DEVICE: WALKER;DENTURES UPPER
BATHING: DEPENDENT
ASSISTIVE_DEVICE: WALKER;DENTURES UPPER
HEARING - LEFT EAR: FUNCTIONAL
PATIENT'S MEMORY ADEQUATE TO SAFELY COMPLETE DAILY ACTIVITIES?: YES
HEARING - RIGHT EAR: FUNCTIONAL
GROOMING: NEEDS ASSISTANCE

## 2025-05-16 ASSESSMENT — COGNITIVE AND FUNCTIONAL STATUS - GENERAL
MOVING FROM LYING ON BACK TO SITTING ON SIDE OF FLAT BED WITH BEDRAILS: A LITTLE
DAILY ACTIVITIY SCORE: 20
HELP NEEDED FOR BATHING: A LITTLE
TURNING FROM BACK TO SIDE WHILE IN FLAT BAD: A LITTLE
DRESSING REGULAR LOWER BODY CLOTHING: A LITTLE
TOILETING: A LITTLE
DRESSING REGULAR UPPER BODY CLOTHING: A LITTLE
DRESSING REGULAR UPPER BODY CLOTHING: A LOT
WALKING IN HOSPITAL ROOM: A LITTLE
CLIMB 3 TO 5 STEPS WITH RAILING: TOTAL
CLIMB 3 TO 5 STEPS WITH RAILING: TOTAL
MOVING TO AND FROM BED TO CHAIR: A LITTLE
HELP NEEDED FOR BATHING: A LITTLE
EATING MEALS: A LITTLE
WALKING IN HOSPITAL ROOM: A LOT
PERSONAL GROOMING: A LITTLE
DRESSING REGULAR LOWER BODY CLOTHING: A LITTLE
DAILY ACTIVITIY SCORE: 20
DAILY ACTIVITIY SCORE: 14
STANDING UP FROM CHAIR USING ARMS: A LOT
PATIENT BASELINE BEDBOUND: NO
MOVING FROM LYING ON BACK TO SITTING ON SIDE OF FLAT BED WITH BEDRAILS: A LITTLE
STANDING UP FROM CHAIR USING ARMS: A LITTLE
MOBILITY SCORE: 16
MOVING TO AND FROM BED TO CHAIR: A LOT
DRESSING REGULAR LOWER BODY CLOTHING: A LOT
TOILETING: A LOT
TOILETING: A LITTLE
DRESSING REGULAR UPPER BODY CLOTHING: A LITTLE
TURNING FROM BACK TO SIDE WHILE IN FLAT BAD: A LITTLE
MOBILITY SCORE: 13
HELP NEEDED FOR BATHING: A LOT

## 2025-05-16 ASSESSMENT — ENCOUNTER SYMPTOMS
ALLERGIC/IMMUNOLOGIC NEGATIVE: 1
HEMATOLOGIC/LYMPHATIC NEGATIVE: 1
ENDOCRINE NEGATIVE: 1
GASTROINTESTINAL NEGATIVE: 1
DIFFICULTY URINATING: 1
EYES NEGATIVE: 1
FATIGUE: 1
ACTIVITY CHANGE: 1
RESPIRATORY NEGATIVE: 1
PSYCHIATRIC NEGATIVE: 1
CARDIOVASCULAR NEGATIVE: 1

## 2025-05-16 ASSESSMENT — LIFESTYLE VARIABLES
HOW OFTEN DO YOU HAVE A DRINK CONTAINING ALCOHOL: NEVER
SKIP TO QUESTIONS 9-10: 1
SKIP TO QUESTIONS 9-10: 1
HOW OFTEN DO YOU HAVE 6 OR MORE DRINKS ON ONE OCCASION: NEVER
AUDIT-C TOTAL SCORE: 0
PRESCIPTION_ABUSE_PAST_12_MONTHS: NO
AUDIT-C TOTAL SCORE: 0
HOW MANY STANDARD DRINKS CONTAINING ALCOHOL DO YOU HAVE ON A TYPICAL DAY: PATIENT DOES NOT DRINK
SUBSTANCE_ABUSE_PAST_12_MONTHS: NO
AUDIT-C TOTAL SCORE: 0

## 2025-05-16 ASSESSMENT — PAIN - FUNCTIONAL ASSESSMENT
PAIN_FUNCTIONAL_ASSESSMENT: 0-10

## 2025-05-16 ASSESSMENT — PATIENT HEALTH QUESTIONNAIRE - PHQ9
2. FEELING DOWN, DEPRESSED OR HOPELESS: NOT AT ALL
1. LITTLE INTEREST OR PLEASURE IN DOING THINGS: NOT AT ALL
SUM OF ALL RESPONSES TO PHQ9 QUESTIONS 1 & 2: 0

## 2025-05-16 ASSESSMENT — PAIN SCALES - GENERAL
PAINLEVEL_OUTOF10: 0 - NO PAIN

## 2025-05-16 NOTE — TELEPHONE ENCOUNTER
Patient is active with [facility], followed by Raisa Goff NP. Patient currently in the the ED at [facility]. Per ED note: She has not been able to get up off her chair at all in the last few days. She feels that her legs are very weak. Patient has been having daily diarrhea for the last month as well, patient estimates about 3 episodes daily. Will follow ED course.

## 2025-05-16 NOTE — PROGRESS NOTES
05/16/25 1750   Discharge Planning   Living Arrangements Family members   Support Systems Family members   Type of Residence Private residence   Number of Stairs to Enter Residence 5   Number of Stairs Within Residence 36  (pt lives in a 3 story home with a basement)   Who is requesting discharge planning? Provider   Home or Post Acute Services Other (Comment)  (TBD)   Does the patient need discharge transport arranged? Yes  (PT IS NON AMBULATORY)   RoundTrip coordination needed? Yes   Has discharge transport been arranged? No   Financial Resource Strain   How hard is it for you to pay for the very basics like food, housing, medical care, and heating? Not hard   Housing Stability   In the last 12 months, was there a time when you were not able to pay the mortgage or rent on time? N   In the past 12 months, how many times have you moved where you were living? 0   At any time in the past 12 months, were you homeless or living in a shelter (including now)? N   Transportation Needs   In the past 12 months, has lack of transportation kept you from medical appointments or from getting medications? no   In the past 12 months, has lack of transportation kept you from meetings, work, or from getting things needed for daily living? No   Patient Choice   Provider Choice list and CMS website (https://medicare.gov/care-compare#search) for post-acute Quality and Resource Measure Data were provided and reviewed with: Family   Patient / Family choosing to utilize agency / facility established prior to hospitalization No   Stroke Family Assessment   Stroke Family Assessment Needed No

## 2025-05-16 NOTE — CARE PLAN
Pt does not have a POA/Living Will  ADOD: 2 days    Pt lives at home with her sister in a 3 story house with a basement; ramp on the front of the home  Pt is wheelchair bound/homebound. Pt said that she became wheelchair bound since she developed COVID 2 years ago  Pt said that she has been able to transfer self from bed to wheelchair until recently  Pt dx with pyuria, started on Rocephin  Pt does not use home 02,cpap, bipap  She does not wear glasses or hearing aids  She said that she can read and understand what she reads  Denies depression and anxiety  Her clinician is MALISSA Goff and she uses [facility] and she can afford her meds  Her sister Devika is her caregiver  List of SNF and HHC given to the pts sister Devika with instructions to choose several from each list    DISCHARGE PLAN: TBD--DO NOT DISCHARGE PATIENT BEFORE SPEAKING WITH CARE COORDINATION; PT DOES NOT HAVE A DISCHARGE PLAN AT THIS TIME

## 2025-05-16 NOTE — ED TRIAGE NOTES
Patient bib ems for generalized weakness, has been unable to ambulate properly for two years since having covid.  EMS assists daily for transferring from bed to chair.  She has been to rehab but left before fully recovering.  She lives with her sister.

## 2025-05-16 NOTE — ED PROVIDER NOTES
HPI   Chief Complaint   Patient presents with   • Weakness, Gen     Patient bib ems for generalized weakness, has been unable to ambulate properly for two years since having covid.  EMS assists daily for transferring from bed to chair.  She has been to rehab but left before fully recovering.  She lives with her sister.       Patient presents with weakness.  She was at a rehab facility and left the facility 2 weeks ago because family did not feel that it was helping her weakness.  Since then, she has needed extensive help getting around.  Particularly over the last few days, she has not been able to get up off her chair at all.  She feels that her legs are very weak.  Her sister reports that her right leg is usually somewhat stronger than her left.  Patient has been having daily diarrhea for the last month as well, patient estimates about 3 episodes daily.            Patient History   Medical History[1]  Surgical History[2]  Family History[3]  Social History[4]    Physical Exam   ED Triage Vitals [05/16/25 1136]   Temperature Heart Rate Respirations BP   36.8 °C (98.2 °F) 79 19 126/73      Pulse Ox Temp src Heart Rate Source Patient Position   95 % -- -- --      BP Location FiO2 (%)     -- --       Physical Exam  HENT:      Head: Normocephalic.   Eyes:      General: No scleral icterus.  Cardiovascular:      Rate and Rhythm: Normal rate.   Pulmonary:      Effort: Pulmonary effort is normal.   Neurological:      Mental Status: She is alert.      Comments: Patient is awake and alert.  She is able to state the correct month of the year but believes that she is 59.  Her arms are strong.  She is able to lift both legs off the bed, but is able to more easily lift the right than the left.  Sister states this is chronic.  Sensation is intact.         ED Course & MDM   ED Course as of 05/16/25 1737   Fri May 16, 2025   1144 EKG interpreted by me: Normal sinus rhythm, rate 63.  Leftward axis.  LAFB with LVH.  Repolarization  abnormality.  No other significant ST or T wave abnormalities. [ML]      ED Course User Index  [ML] Adam Lima MD         Diagnoses as of 05/16/25 1737   Weakness   Diarrhea, unspecified type                 No data recorded     Sue Coma Scale Score: 15 (05/16/25 1134 : Nica Cao, RN)       NIH Stroke Scale: 1 (05/16/25 1210 : Adam Lima MD)                   Medical Decision Making  Head CT is without acute findings.  Chest x-ray does not reveal pneumonia.  Blood work reveals chronic kidney disease actually better than previous measurements but are otherwise unremarkable.        Procedure  Procedures         [1]  Past Medical History:  Diagnosis Date   • Arthritis    • HTN (hypertension)    • Long COVID    [2]  Past Surgical History:  Procedure Laterality Date   • MR HEAD ANGIO WO IV CONTRAST  9/11/2023    MR HEAD ANGIO WO IV CONTRAST [facility]   [3]  Family History  Problem Relation Name Age of Onset   • Hypertension Mother     • Diabetes Mother     • Hypertension Father     • Diabetes Father     [4]  Social History  Tobacco Use   • Smoking status: Former     Current packs/day: 0.00     Types: Cigarettes     Quit date: 2022     Years since quitting: 3.3   • Smokeless tobacco: Never   Substance Use Topics   • Alcohol use: Not Currently   • Drug use: Never

## 2025-05-16 NOTE — PROGRESS NOTES
Occupational Therapy    Evaluation    Patient Name: Tammy Walker  MRN: [id number]  Department: [facility] ED  Room: [place] [address]  Today's Date: 5/16/2025  Time Calculation  Start Time: 1448  Stop Time: 1504  Time Calculation (min): 16 min        Assessment:  OT Assessment: pt presents with generalized weakness, fear of falling, reduced endurance and decreased balance which impedes ADL performance. pt would benefit from skilled OT services to address these deficits and to facilitate highest level of independence.  Prognosis: Fair  Barriers to Discharge Home: Caregiver assistance, Physical needs  Caregiver Assistance: Caregiver assistance needed per identified barriers - however, level of patient's required assistance exceeds assistance available at home  Physical Needs: 24hr ADL assistance needed, 24hr mobility assistance needed, High falls risk due to function or environment  Evaluation/Treatment Tolerance: Patient limited by fatigue  Medical Staff Made Aware: Yes  End of Session Communication: Bedside nurse  End of Session Patient Position: On cart, Alarm off, caregiver present  OT Assessment Results: Decreased ADL status, Decreased upper extremity range of motion, Decreased upper extremity strength, Decreased cognition, Decreased endurance, Decreased functional mobility, Decreased trunk control for functional activities  Prognosis: Fair  Evaluation/Treatment Tolerance: Patient limited by fatigue  Medical Staff Made Aware: Yes  Strengths: Ability to acquire knowledge, Attitude of self  Barriers to Participation: Comorbidities  Plan:  Treatment Interventions: ADL retraining, Functional transfer training, UE strengthening/ROM, Endurance training, Equipment evaluation/education, Compensatory technique education  OT Frequency: 3 times per week  OT Discharge Recommendations: Moderate intensity level of continued care  Equipment Recommended upon Discharge: Wheeled walker, Bedside commode  OT Recommended Transfer Status: Assist  of 1, Moderate assist  OT - OK to Discharge: Yes  Treatment Interventions: ADL retraining, Functional transfer training, UE strengthening/ROM, Endurance training, Equipment evaluation/education, Compensatory technique education    Subjective     OT Visit Info:  OT Received On: 05/16/25  General:  General  Reason for Referral: ADL impairment; 69 yr old female presenting with generalized weakness and difficulty w/ taking care of self after returning home from rehab ~2 weeks ago.  Past Medical History Relevant to Rehab: Arthritis, Covid, systolic heart failure, chronic kidney failure, arthritis, HTN, bladder dysfunction  Family/Caregiver Present: Yes  Caregiver Feedback: sister present  Prior to Session Communication: Bedside nurse  Patient Position Received: On cart, Alarm off, caregiver present  Preferred Learning Style: verbal, visual  General Comment: pt on ED cart upon arrival, pt agreeable with therapy however demonstrating significant fear of falling during standing  Precautions:  Medical Precautions: Fall precautions     Date/Time Vitals Session Patient Position Pulse Resp SpO2 BP MAP (mmHg)    05/16/25 1400 --  --  58  15  98 %  141/76  92     05/16/25 1448 During OT  --  61  --  96 %  --  --                 Pain:  Pain Assessment  Pain Assessment: 0-10  0-10 (Numeric) Pain Score: 0 - No pain    Objective   Cognition:  Overall Cognitive Status: Impaired  Orientation Level: Disoriented to time  Cognition Comments: fear of falling, decreased insight  Insight: Mild           Home Living:  Type of Home: House  Lives With:  (sister)  Home Adaptive Equipment: Walker rolling or standard, Wheelchair-manual (transport chair, platform rollator)  Home Layout: Able to live on main level with bedroom/bathroom, 1/2 bath on main level  Home Access: Ramped entrance  Bathroom Shower/Tub:  (pt stays on first floor w/ half bath)  Bathroom Toilet: Handicapped height  Bathroom Equipment: Grab bars around toilet  Bathroom  Accessibility: 1/2 bath on main floor  Home Living Comments: pt stays on first floor, sleeps on couch and only uses half bath  Prior Function:  Level of [place]: Needs assistance with ADLs, Needs assistance with homemaking  Receives Help From:  (sister)  ADL Assistance: Needs assistance  Homemaking Assistance: Needs assistance  Ambulatory Assistance: Independent (independent with wheelchair/transport chair, pt stating she was able to transfer self via stand pivot independently)  Prior Function Comments: sister stating that the patient has been requiring more assistance with transfers and ADLs, often having to call EMS to assist with transfers     ADL:  Eating Assistance:  (set up)  Grooming Assistance: Minimal (anticipated)  Bathing Assistance: Moderate (anticipated)  UE Dressing Assistance: Minimal (anticipated)  LE Dressing Assistance: Maximal (anticipated)  Toileting Assistance with Device: Maximal (anticipated)  ADL Comments: pt with limited ADL participation due to being in ED and pt also with significant fear of falling  Activity Tolerance:  Endurance: Decreased tolerance for upright activites  Bed Mobility/Transfers: Bed Mobility  Bed Mobility: Yes  Bed Mobility 1  Bed Mobility 1: Supine to sitting  Level of Assistance 1: Minimum assistance  Bed Mobility Comments 1: pt able to manage BLE to edge of bed, required MIN A for lifting trunk and scooting hips forward.  effortful and performed with HOB slightly elevated  Bed Mobility 2  Bed Mobility  2: Sitting to supine  Level of Assistance 2: Maximum assistance  Bed Mobility Comments 2: after sitting back down from standing attempt, pt with significant fear of falling and began to become retropulsive w/ hips beginning to slide off bed.  pt required urgent assistance to transition back to supine position to prevent trunk/hips from sliding off bed.    Transfers  Transfer: Yes  Transfer 1  Technique 1: Sit to stand, Stand to sit  Transfer Device 1:  Walker  Transfer Level of Assistance 1: Moderate assistance  Trials/Comments 1: from edge of bed, cues for hand placement and positioning.  pt required MOD A to ascend into standing position.  pt with significant fear of falling and began to become retropulsive w/ posterior lean.  pt verbalizing distress during standing requiring cues for self-regulation.  pt stood for ~15 seconds total       Sitting Balance:  Static Sitting Balance  Static Sitting-Balance Support: Feet supported  Static Sitting-Level of Assistance: Contact guard  Standing Balance:  Static Standing Balance  Static Standing-Balance Support: Bilateral upper extremity supported  Static Standing-Level of Assistance: Moderate assistance  Static Standing-Comment/Number of Minutes: significant fear of falling, heavy reliance on BUE for support, retropulsive      Vision:Vision - Basic Assessment  Current Vision: No visual deficits  Sensation:  Light Touch: No apparent deficits  Strength:  Strength Comments: Distal BUE strength grossly 3+/5  Perception:  Inattention/Neglect: Appears intact  Coordination:  Movements are Fluid and Coordinated: Yes   Hand Function:  Gross Grasp: Functional  Coordination: Functional  Extremities: RUE   RUE :  (Sh flexion ~90 degrees, WFL distally) and LUE   LUE:  (Sh flexion ~90 degrees, WFL distally)    Outcome Measures:[facility] Daily Activity  Putting on and taking off regular lower body clothing: A lot  Bathing (including washing, rinsing, drying): A lot  Putting on and taking off regular upper body clothing: A lot  Toileting, which includes using toilet, bedpan or urinal: A lot  Taking care of personal grooming such as brushing teeth: A little  Eating Meals: A little  Daily Activity - Total Score: 14        Education Documentation  Body Mechanics, taught by Stephen Peralta OT at 5/16/2025  3:21 PM.  Learner: Patient  Readiness: Acceptance  Method: Explanation, Demonstration  Response: Verbalizes Understanding  Comment:  ADL/functional mobility techniques, facilitating OOB activity, OT POC    ADL Training, taught by Stephen Peralta OT at 5/16/2025  3:21 PM.  Learner: Patient  Readiness: Acceptance  Method: Explanation, Demonstration  Response: Verbalizes Understanding  Comment: ADL/functional mobility techniques, facilitating OOB activity, OT POC    Education Comments  No comments found.        OP EDUCATION:       Goals:  Encounter Problems       Encounter Problems (Active)       ADLs       Patient with complete lower body dressing with minimal assist  level of assistance donning and doffing all LE clothes  with PRN adaptive equipment while edge of bed  (Progressing)       Start:  05/16/25    Expected End:  06/16/25            Patient will complete daily grooming tasks with set-up level of assistance and PRN adaptive equipment while supported sitting. (Progressing)       Start:  05/16/25    Expected End:  06/16/25            Patient will complete toileting including hygiene clothing management/hygiene with minimal assist  level of assistance and bedside commode. (Progressing)       Start:  05/16/25    Expected End:  06/16/25               TRANSFERS       Patient will complete functional transfer to BSC/toilet with least restrictive device with contact guard assist level of assistance. (Progressing)       Start:  05/16/25    Expected End:  06/16/25               TRANSFERS       Patient will complete sit to stand transfer with contact guard assist level of assistance and least restrictive device in order to improve safety and prepare for out of bed mobility. (Progressing)       Start:  05/16/25    Expected End:  06/16/25

## 2025-05-16 NOTE — CARE PLAN
"Received a call from the ED concerning this pt.  Pt was discharged about 2 weeks ago from [place] for rehab  Pt and her sister Idalmis said that the pt has been weak for 2 years following COVID  List of SNF and HHC given to pt. Pt said that she now wants to go home and not to rehab  Pt is here c/o diarrhea x 1 month, she has 3 loose stools per day.  She said that she feels weak  Pts sister Idalmis cares of her at home. Sometimes they have to call the fire dept to transfer her from the bed to a chair.  Also gave pt/sister the booklet , \"Senior Living Guide\"  Encouraged Idalmis to call [employer] and find out if they offer long term HHA care  Also encouraged Idalmis to apply for Medicaid on line for Tammy.   PT OT has been ordered for pt. Pt may change her mind and want to go to rehab  Unknown if pt will be admitted at this time, unknown if pt will go home with HHC or decide that she wants to go to rehab again.    DISCHARGE PLAN: TBD--DO NOT DISCHARGE PATIENT BEFORE SPEAKING WITH CARE COORDINATION; PT DOES NOT HAVE A DISCHARGE PLAN AT THIS TIME.  "

## 2025-05-16 NOTE — PROGRESS NOTES
Pharmacy Medication History Review    Tammy Walker is a 69 y.o. female admitted for Weakness. Pharmacy reviewed the patient's [id number] medications and allergies for accuracy.    Medications ADDED:  Aspirin 81mg  Atenolol 50mg   Medications CHANGED:  none  Medications REMOVED:   None      The list below reflects the updated PTA list. Comments regarding how patient may be taking medications differently can be found in the Admit Orders Activity  Prior to Admission Medications   Prescriptions Last Dose Informant   allopurinol (Zyloprim) 100 mg tablet 5/16/2025 Self, Family Member   Sig: TAKE 1 TABLET BY MOUTH TWICE A DAY   amLODIPine (Norvasc) 10 mg tablet 5/16/2025 Self, Family Member   Sig: Take 1 tablet (10 mg) by mouth once daily.   aspirin 81 mg EC tablet 5/16/2025 Self, Family Member   Sig: Take 1 tablet (81 mg) by mouth once daily.   atenolol (Tenormin) 50 mg tablet 5/16/2025 Self, Family Member   Sig: Take 1 tablet (50 mg) by mouth 2 times a day.   atorvastatin (Lipitor) 40 mg tablet 5/15/2025 Self, Family Member   Sig: Take 1 tablet (40 mg) by mouth once daily at bedtime.   cholecalciferol (Vitamin D3) 10 MCG (400 UNIT) tablet Past Month Self, Family Member   Sig: Take 1 tablet (10 mcg) by mouth once daily.   furosemide (Lasix) 40 mg tablet Past Week Self, Family Member   Sig: Take 1 tablet (40 mg) by mouth once daily as needed (edema).   hydrALAZINE (Apresoline) 25 mg tablet 5/16/2025 Self, Family Member   Sig: Take 1 tablet (25 mg) by mouth 4 times a day.   nystatin (Mycostatin) 100,000 unit/gram powder Past Week Self, Family Member   Sig: Apply 1 Application topically 2 times a day. groin   pantoprazole (ProtoNix) 40 mg EC tablet 5/16/2025 Self, Family Member   Sig: Take 1 tablet (40 mg) by mouth once daily in the morning. Take before meals. Do not crush, chew, or split.   potassium chloride CR 10 mEq ER tablet 5/16/2025 Self, Family Member   Sig: TAKE 1 TABLET BY MOUTH TWICE A DAY (DO NOT  CRUSH, CHEW, OR SPLIT)   valsartan (Diovan) 80 mg tablet 5/16/2025 Self, Family Member   Sig: TAKE 1 TABLET (80 MG) BY MOUTH 2 TIMES A DAY.      Facility-Administered Medications: None        The list below reflects the updated allergy list. Please review each documented allergy for additional clarification and justification.  Allergies  Reviewed by DAV Fernando on 5/16/2025   No Known Allergies         Pharmacy has been updated to [facility].    Sources used to complete the med history include dispense history, PTA medication list, patient/family interview. Informants are good historians.    Below are additional concerns with the patient's PTA list.  Bactrim was prescribed by a provider Benson Holt MD earlier today. Patient has not started this therapy.     Genevieve Wylie, CPChester-Adv  Please reach out via [employer] Secure Chat for questions

## 2025-05-16 NOTE — H&P
History Of Present Illness  Tammy Walker is a 69 y.o. female presenting with generalized weakness.    69 year old female evaluated at [facility] for chief complaint of generalized weakness. Past medical history notable for HTN, HLD, Remote TIA, IBS, Chronic urinary retention, Chronic functional decline.     Patient reports generalized weakness more pronounced after returning home from SNF two weeks ago. Recalls feeling more weak than usual. Was seen earlier ~9AM today for a scheduled urology appointment where her chronic nixon was removed. Admits she has been unable to void since.    Patient denies recent illness, travel or sick contacts. Reports chronic/intermittent diarrhea associated with IBS. Unable to urinate s/p nixon removal today. Denies any pain. No shortness of breath. Denies nausea/vomiting/fever/chills. No recent falls/accidents/trauma.     In ED: Negative CXR. CT-Head demonstrating remote infarcts otherwise non-acute findings. Chemistry panel revealing known-stable CKD. Mildly elevated troponins: 15, 16. EKG nonischemic.     Presently patient awake, alert oriented x3. Denies any pain. Denies shortness of breath. She asks about a diet order. She offers no further complaints.     Past Medical History  Medical History[1]    Surgical History  Surgical History[2]     Social History  She reports that she quit smoking about 3 years ago. Her smoking use included cigarettes. She has never used smokeless tobacco. She reports that she does not currently use alcohol. She reports that she does not use drugs.    Family History  Family History[3]     Allergies  Patient has no known allergies.    Review of Systems   Constitutional:  Positive for activity change and fatigue.   HENT: Negative.     Eyes: Negative.    Respiratory: Negative.     Cardiovascular: Negative.    Gastrointestinal: Negative.    Endocrine: Negative.    Genitourinary:  Positive for difficulty urinating.   Musculoskeletal:  Positive for gait problem.  "  Skin: Negative.    Allergic/Immunologic: Negative.    Hematological: Negative.    Psychiatric/Behavioral: Negative.     All other systems reviewed and are negative.       Physical Exam  Vitals and nursing note reviewed. Exam conducted with a chaperone present (Sister Susy+Bedside RN).   Constitutional:       Appearance: Normal appearance. She is obese.   HENT:      Head: Normocephalic and atraumatic.      Right Ear: External ear normal.      Left Ear: External ear normal.      Nose: Nose normal.      Mouth/Throat:      Mouth: Mucous membranes are moist.   Eyes:      Extraocular Movements: Extraocular movements intact.      Conjunctiva/sclera: Conjunctivae normal.      Pupils: Pupils are equal, round, and reactive to light.   Cardiovascular:      Rate and Rhythm: Normal rate and regular rhythm.      Pulses: Normal pulses.      Heart sounds: Normal heart sounds.      Comments: NSR  Pulmonary:      Effort: Pulmonary effort is normal.      Breath sounds: Normal breath sounds.   Abdominal:      General: Bowel sounds are normal.      Palpations: Abdomen is soft.   Musculoskeletal:         General: Normal range of motion.      Cervical back: Normal range of motion and neck supple.      Comments: Trace BLLE edema versus Lack of subcutaneous fat differentiation ankle/foot   Skin:     General: Skin is warm and dry.      Capillary Refill: Capillary refill takes less than 2 seconds.   Neurological:      Mental Status: She is alert and oriented to person, place, and time.   Psychiatric:         Mood and Affect: Mood normal.         Behavior: Behavior normal.          Last Recorded Vitals  Blood pressure 141/76, pulse 61, temperature 36.8 °C (98.2 °F), resp. rate 15, height 1.626 m (5' 4\"), weight 68.5 kg (151 lb), SpO2 96%.    Relevant Results        Results for orders placed or performed during the hospital encounter of 05/16/25 (from the past 24 hours)   CBC and Auto Differential   Result Value Ref Range    WBC 12.8 (H) 4.4 " - 11.3 x10*3/uL    nRBC 0.0 0.0 - 0.0 /100 WBCs    RBC 3.90 (L) 4.00 - 5.20 x10*6/uL    Hemoglobin 11.8 (L) 12.0 - 16.0 g/dL    Hematocrit 35.5 (L) 36.0 - 46.0 %    MCV 91 80 - 100 fL    MCH 30.3 26.0 - 34.0 pg    MCHC 33.2 32.0 - 36.0 g/dL    RDW 14.8 (H) 11.5 - 14.5 %    Platelets 329 150 - 450 x10*3/uL    Neutrophils % 83.7 40.0 - 80.0 %    Immature Granulocytes %, Automated 0.3 0.0 - 0.9 %    Lymphocytes % 8.4 13.0 - 44.0 %    Monocytes % 6.0 2.0 - 10.0 %    Eosinophils % 0.9 0.0 - 6.0 %    Basophils % 0.7 0.0 - 2.0 %    Neutrophils Absolute 10.69 (H) 1.20 - 7.70 x10*3/uL    Immature Granulocytes Absolute, Automated 0.04 0.00 - 0.70 x10*3/uL    Lymphocytes Absolute 1.07 (L) 1.20 - 4.80 x10*3/uL    Monocytes Absolute 0.76 0.10 - 1.00 x10*3/uL    Eosinophils Absolute 0.11 0.00 - 0.70 x10*3/uL    Basophils Absolute 0.09 0.00 - 0.10 x10*3/uL   Comprehensive Metabolic Panel   Result Value Ref Range    Glucose 100 (H) 74 - 99 mg/dL    Sodium 142 136 - 145 mmol/L    Potassium 4.3 3.5 - 5.3 mmol/L    Chloride 109 (H) 98 - 107 mmol/L    Bicarbonate 21 21 - 32 mmol/L    Anion Gap 16 10 - 20 mmol/L    Urea Nitrogen 23 6 - 23 mg/dL    Creatinine 1.65 (H) 0.50 - 1.05 mg/dL    eGFR 34 (L) >60 mL/min/1.73m*2    Calcium 8.9 8.6 - 10.3 mg/dL    Albumin 3.8 3.4 - 5.0 g/dL    Alkaline Phosphatase 145 (H) 33 - 136 U/L    Total Protein 7.2 6.4 - 8.2 g/dL    AST 17 9 - 39 U/L    Bilirubin, Total 1.0 0.0 - 1.2 mg/dL    ALT 12 7 - 45 U/L   Troponin I, High Sensitivity, Initial   Result Value Ref Range    Troponin I, High Sensitivity 15 (H) 0 - 13 ng/L   Troponin, High Sensitivity, 1 Hour   Result Value Ref Range    Troponin I, High Sensitivity 16 (H) 0 - 13 ng/L   Urinalysis with Reflex Culture and Microscopic   Result Value Ref Range    Color, Urine Yellow Light-Yellow, Yellow, Dark-Yellow    Appearance, Urine Clear Clear    Specific Gravity, Urine 1.021 1.005 - 1.035    pH, Urine 5.5 5.0, 5.5, 6.0, 6.5, 7.0, 7.5, 8.0    Protein,  Urine 20 (TRACE) NEGATIVE, 10 (TRACE), 20 (TRACE) mg/dL    Glucose, Urine Normal Normal mg/dL    Blood, Urine NEGATIVE NEGATIVE mg/dL    Ketones, Urine NEGATIVE NEGATIVE mg/dL    Bilirubin, Urine NEGATIVE NEGATIVE mg/dL    Urobilinogen, Urine Normal Normal mg/dL    Nitrite, Urine NEGATIVE NEGATIVE    Leukocyte Esterase, Urine 250 Jeison/uL (A) NEGATIVE   Microscopic Only, Urine   Result Value Ref Range    WBC, Urine 11-20 (A) 1-5, NONE /HPF    RBC, Urine 1-2 NONE, 1-2, 3-5 /HPF    Bacteria, Urine 1+ (A) NONE SEEN /HPF    Mucus, Urine FEW Reference range not established. /LPF    Hyaline Casts, Urine 1+ (A) NONE /LPF        Electrocardiogram, 12-lead PRN ACS symptoms  Result Date: 5/16/2025  Normal sinus rhythm Left anterior fascicular block Left ventricular hypertrophy with repolarization abnormality ( R in aVL , Jarret product ) Abnormal ECG When compared with ECG of 31-MAR-2025 10:55, Minimal criteria for Septal infarct are no longer Present    CT head wo IV contrast  Result Date: 5/16/2025  Interpreted By:  Ketan Perez, STUDY: CT HEAD WO IV CONTRAST;  5/16/2025 12:33 pm   INDICATION: Signs/Symptoms:weakness.   COMPARISON: 03/03/2025   ACCESSION NUMBER(S): [id number]   ORDERING CLINICIAN: ARTIS WATTS   TECHNIQUE: Sequential trans axial images were obtained  .   FINDINGS: INTRACRANIAL:   There is moderate prominence of the cortical sulci indicating atrophy.   There is moderate ventriculomegaly, again consistent with atrophy. Additionally there is slightly greater dilatation of the right lateral ventricle may be ex vacuo from remote infarction at the corona radiata.   Moderate to fairly extensive decreased attenuation of the periventricular and long tracks of the white matter most consistent with gliosis from arterial disease.There is more discrete focal hypodensity in the corona radiata probably from remote infarctions. There is also remote lacunar infarction of the left thalamus that may have been subacute on the  previous exam. Otherwise there is no evidence of definite subacute infarction, intracranial hemorrhage or mass.     EXTRACRANIAL: Visualized paranasal sinuses and mastoids are clear. The calvarium is intact.       Moderate age related degenerative change as described without acute findings or significant change from the prior exam.   Signed by: Ketan Perez 5/16/2025 1:00 PM Dictation workstation:   [id number]    XR chest 1 view  Result Date: 5/16/2025  Interpreted By:  Efe Laird, STUDY: XR CHEST 1 VIEW;  5/16/2025 12:05 pm   INDICATION: Signs/Symptoms:weakness.     COMPARISON: 03/31/2025   ACCESSION NUMBER(S): [id number]   ORDERING CLINICIAN: ARTIS WATTS   FINDINGS:         CARDIOMEDIASTINAL SILHOUETTE: Cardiomediastinal silhouette is normal in size and configuration.   LUNGS: Lungs are clear.   ABDOMEN: No remarkable upper abdominal findings.   BONES: No acute osseous changes.       1.  No evidence of acute cardiopulmonary process.       MACRO: None   Signed by: Efe Laird 5/16/2025 12:11 PM Dictation workstation:   [id number]        Assessment & Plan  Weakness      Generalized Weakness  Acute on chronic.  PT OT. Fall precautions  Investigate possible underlying etiology as discussed below     Pyuria, presumed UTI  Start Rocephin, follow cultures    Acute/Chronic Urinary Retention  Unable to void s/p indwelling nixon catheter removal today ~9AM  Insert nixon catheter.  Urology consult--Follows with Dr. Holt     Diarrhea  Hx IBS  Pending Stool pathogen/C. Diff PCR     Leukocytosis   Suspect reactive to the above  VSS. Afebrile  Regardless will obtain Blood cultures x2  Monitor CBC    Hx Chronic Systolic HF  CXR reviewed: unremarkable.   EF 55-60% April 2025.  Doesn't appear acutely overloaded on exam   Hold home lasix for now.   Daily weights. Cardiac diet. Monitor I/O    Hx HTN  Continue home amlodipine, hydralazine, atenolol  Monitor BP    Hx Remote TIA/CVA  No obvious residual deficits noted  on exam  Substantiated by CT Head--reviewed.   Continue home statin, ASA    CKD  Currently stable at baseline  Trend renal function, avoid nephrotoxins when possible    Elevated Troponin  Mildly elevated/flat: 15, 16.   EKG reviewed: Nonischemic  Discussed with patient: No chest pain   Suspect 2/2 to chronic conditions discussed above.   Monitor on telemetry     DVT/GI  Heparin subcutaneous/PPI     Disposition: Pending PT OT mj.       I spent 75 minutes in the professional and overall care of this patient.      Lisa Adkins, APRN-CNP         [1]   Past Medical History:  Diagnosis Date    Arthritis     CHF (congestive heart failure)     Chronic kidney disease     HLD (hyperlipidemia)     HTN (hypertension)     Long COVID     TIA (transient ischemic attack)     Urinary tract infection    [2]   Past Surgical History:  Procedure Laterality Date    MR HEAD ANGIO WO IV CONTRAST  9/11/2023    MR HEAD ANGIO WO IV CONTRAST [facility] INPATIENT LEGACY   [3]   Family History  Problem Relation Name Age of Onset    Hypertension Mother      Diabetes Mother      Hypertension Father      Diabetes Father

## 2025-05-16 NOTE — PROGRESS NOTES
05/16/25 1747   Physical Activity   On average, how many days per week do you engage in moderate to strenuous exercise (like a brisk walk)? 0 days   On average, how many minutes do you engage in exercise at this level? 0 min   Financial Resource Strain   How hard is it for you to pay for the very basics like food, housing, medical care, and heating? Not hard   Housing Stability   In the last 12 months, was there a time when you were not able to pay the mortgage or rent on time? N   In the past 12 months, how many times have you moved where you were living? 0   At any time in the past 12 months, were you homeless or living in a shelter (including now)? N   Transportation Needs   In the past 12 months, has lack of transportation kept you from medical appointments or from getting medications? no   In the past 12 months, has lack of transportation kept you from meetings, work, or from getting things needed for daily living? No   Food Insecurity   Within the past 12 months, you worried that your food would run out before you got the money to buy more. Never true   Within the past 12 months, the food you bought just didn't last and you didn't have money to get more. Never true   Stress   Do you feel stress - tense, restless, nervous, or anxious, or unable to sleep at night because your mind is troubled all the time - these days? Not at all   Social Connections   In a typical week, how many times do you talk on the phone with family, friends, or neighbors? More than 3   How often do you get together with friends or relatives? Twice   How often do you attend Orthodoxy or Caodaism services? Never   Do you belong to any clubs or organizations such as Orthodoxy groups, unions, fraternal or athletic groups, or school groups? No   How often do you attend meetings of the clubs or organizations you belong to? Never   Are you , , , , never , or living with a partner? Never  marrie   Intimate Partner Violence   Within the last year, have you been afraid of your partner or ex-partner? No   Within the last year, have you been humiliated or emotionally abused in other ways by your partner or ex-partner? No   Within the last year, have you been kicked, hit, slapped, or otherwise physically hurt by your partner or ex-partner? No   Within the last year, have you been raped or forced to have any kind of sexual activity by your partner or ex-partner? No   Alcohol Use   Q1: How often do you have a drink containing alcohol? Never   Q2: How many drinks containing alcohol do you have on a typical day when you are drinking? None   Q3: How often do you have six or more drinks on one occasion? Never   Utilities   In the past 12 months has the electric, gas, oil, or water company threatened to shut off services in your home? No   Health Literacy   How often do you need to have someone help you when you read instructions, pamphlets, or other written material from your doctor or pharmacy? Never   Follow-Ups   We make community resources available to all of our patients to assist with everyday needs. We may be able to connect you with those resources. Would you be interested? N

## 2025-05-16 NOTE — PROGRESS NOTES
05/16/25 1758   [facility] Disability Status   Are you deaf or do you have serious difficulty hearing? N   Are you blind or do you have serious difficulty seeing, even when wearing glasses? N   Because of a physical, mental, or emotional condition, do you have serious difficulty concentrating, remembering, or making decisions? (5 years old or older) Y   Do you have serious difficulty walking or climbing stairs? Y   Do you have serious difficulty dressing or bathing? Y   Because of a physical, mental, or emotional condition, do you have serious difficulty doing errands alone such as visiting the doctor? Y

## 2025-05-17 LAB
ANION GAP SERPL CALCULATED.3IONS-SCNC: 13 MMOL/L (ref 10–20)
BASOPHILS # BLD AUTO: 0.07 X10*3/UL (ref 0–0.1)
BASOPHILS NFR BLD AUTO: 0.9 %
BUN SERPL-MCNC: 24 MG/DL (ref 6–23)
CALCIUM SERPL-MCNC: 8.9 MG/DL (ref 8.6–10.3)
CHLORIDE SERPL-SCNC: 108 MMOL/L (ref 98–107)
CO2 SERPL-SCNC: 24 MMOL/L (ref 21–32)
CREAT SERPL-MCNC: 1.53 MG/DL (ref 0.5–1.05)
EGFRCR SERPLBLD CKD-EPI 2021: 37 ML/MIN/1.73M*2
EOSINOPHIL # BLD AUTO: 0.32 X10*3/UL (ref 0–0.7)
EOSINOPHIL NFR BLD AUTO: 4.1 %
ERYTHROCYTE [DISTWIDTH] IN BLOOD BY AUTOMATED COUNT: 14.7 % (ref 11.5–14.5)
EST. AVERAGE GLUCOSE BLD GHB EST-MCNC: 117 MG/DL
GLUCOSE SERPL-MCNC: 96 MG/DL (ref 74–99)
HBA1C MFR BLD: 5.7 % (ref ?–5.7)
HCT VFR BLD AUTO: 33.3 % (ref 36–46)
HGB BLD-MCNC: 10.9 G/DL (ref 12–16)
HOLD SPECIMEN: NORMAL
IMM GRANULOCYTES # BLD AUTO: 0.03 X10*3/UL (ref 0–0.7)
IMM GRANULOCYTES NFR BLD AUTO: 0.4 % (ref 0–0.9)
LYMPHOCYTES # BLD AUTO: 1.49 X10*3/UL (ref 1.2–4.8)
LYMPHOCYTES NFR BLD AUTO: 19.1 %
MCH RBC QN AUTO: 30.2 PG (ref 26–34)
MCHC RBC AUTO-ENTMCNC: 32.7 G/DL (ref 32–36)
MCV RBC AUTO: 92 FL (ref 80–100)
MONOCYTES # BLD AUTO: 0.65 X10*3/UL (ref 0.1–1)
MONOCYTES NFR BLD AUTO: 8.3 %
NEUTROPHILS # BLD AUTO: 5.26 X10*3/UL (ref 1.2–7.7)
NEUTROPHILS NFR BLD AUTO: 67.2 %
NRBC BLD-RTO: 0 /100 WBCS (ref 0–0)
PLATELET # BLD AUTO: 264 X10*3/UL (ref 150–450)
POTASSIUM SERPL-SCNC: 4.1 MMOL/L (ref 3.5–5.3)
RBC # BLD AUTO: 3.61 X10*6/UL (ref 4–5.2)
SODIUM SERPL-SCNC: 141 MMOL/L (ref 136–145)
WBC # BLD AUTO: 7.8 X10*3/UL (ref 4.4–11.3)

## 2025-05-17 PROCEDURE — 99223 1ST HOSP IP/OBS HIGH 75: CPT | Performed by: UROLOGY

## 2025-05-17 PROCEDURE — 97530 THERAPEUTIC ACTIVITIES: CPT | Mod: GP

## 2025-05-17 PROCEDURE — 2500000001 HC RX 250 WO HCPCS SELF ADMINISTERED DRUGS (ALT 637 FOR MEDICARE OP)

## 2025-05-17 PROCEDURE — 87075 CULTR BACTERIA EXCEPT BLOOD: CPT | Mod: WESLAB

## 2025-05-17 PROCEDURE — 36415 COLL VENOUS BLD VENIPUNCTURE: CPT

## 2025-05-17 PROCEDURE — 2500000005 HC RX 250 GENERAL PHARMACY W/O HCPCS

## 2025-05-17 PROCEDURE — 1200000002 HC GENERAL ROOM WITH TELEMETRY DAILY

## 2025-05-17 PROCEDURE — 97162 PT EVAL MOD COMPLEX 30 MIN: CPT | Mod: GP

## 2025-05-17 PROCEDURE — 87077 CULTURE AEROBIC IDENTIFY: CPT | Mod: WESLAB

## 2025-05-17 PROCEDURE — 99232 SBSQ HOSP IP/OBS MODERATE 35: CPT | Performed by: NURSE PRACTITIONER

## 2025-05-17 PROCEDURE — 85025 COMPLETE CBC W/AUTO DIFF WBC: CPT

## 2025-05-17 PROCEDURE — 2500000004 HC RX 250 GENERAL PHARMACY W/ HCPCS (ALT 636 FOR OP/ED): Mod: JZ

## 2025-05-17 PROCEDURE — 82374 ASSAY BLOOD CARBON DIOXIDE: CPT

## 2025-05-17 RX ADMIN — ALLOPURINOL 100 MG: 100 TABLET ORAL at 21:15

## 2025-05-17 RX ADMIN — NYSTATIN 1 APPLICATION: 100000 POWDER TOPICAL at 08:21

## 2025-05-17 RX ADMIN — HYDRALAZINE HYDROCHLORIDE 25 MG: 25 TABLET ORAL at 15:35

## 2025-05-17 RX ADMIN — ALLOPURINOL 100 MG: 100 TABLET ORAL at 08:21

## 2025-05-17 RX ADMIN — PANTOPRAZOLE SODIUM 40 MG: 40 TABLET, DELAYED RELEASE ORAL at 05:38

## 2025-05-17 RX ADMIN — CHOLECALCIFEROL (VITAMIN D3) 10 MCG (400 UNIT) TABLET 10 MCG: at 11:07

## 2025-05-17 RX ADMIN — HYDRALAZINE HYDROCHLORIDE 25 MG: 25 TABLET ORAL at 21:11

## 2025-05-17 RX ADMIN — Medication 1 TABLET: at 08:21

## 2025-05-17 RX ADMIN — ATORVASTATIN CALCIUM 40 MG: 40 TABLET, FILM COATED ORAL at 21:11

## 2025-05-17 RX ADMIN — AMLODIPINE BESYLATE 10 MG: 10 TABLET ORAL at 08:21

## 2025-05-17 RX ADMIN — NYSTATIN 1 APPLICATION: 100000 POWDER TOPICAL at 21:11

## 2025-05-17 RX ADMIN — HEPARIN SODIUM 5000 UNITS: 5000 INJECTION, SOLUTION INTRAVENOUS; SUBCUTANEOUS at 21:11

## 2025-05-17 RX ADMIN — HYDRALAZINE HYDROCHLORIDE 25 MG: 25 TABLET ORAL at 11:07

## 2025-05-17 RX ADMIN — CEFTRIAXONE SODIUM 1 G: 1 INJECTION, SOLUTION INTRAVENOUS at 18:26

## 2025-05-17 RX ADMIN — HEPARIN SODIUM 5000 UNITS: 5000 INJECTION, SOLUTION INTRAVENOUS; SUBCUTANEOUS at 08:21

## 2025-05-17 RX ADMIN — ATENOLOL 50 MG: 50 TABLET ORAL at 08:21

## 2025-05-17 RX ADMIN — HYDRALAZINE HYDROCHLORIDE 25 MG: 25 TABLET ORAL at 18:25

## 2025-05-17 RX ADMIN — ASPIRIN 81 MG: 81 TABLET, COATED ORAL at 08:21

## 2025-05-17 RX ADMIN — ATENOLOL 50 MG: 50 TABLET ORAL at 21:11

## 2025-05-17 RX ADMIN — Medication 1 TABLET: at 21:11

## 2025-05-17 RX ADMIN — Medication 3 MG: at 21:11

## 2025-05-17 ASSESSMENT — COGNITIVE AND FUNCTIONAL STATUS - GENERAL
TURNING FROM BACK TO SIDE WHILE IN FLAT BAD: A LITTLE
CLIMB 3 TO 5 STEPS WITH RAILING: TOTAL
CLIMB 3 TO 5 STEPS WITH RAILING: TOTAL
MOVING TO AND FROM BED TO CHAIR: A LOT
HELP NEEDED FOR BATHING: A LITTLE
TOILETING: A LITTLE
WALKING IN HOSPITAL ROOM: A LOT
MOVING TO AND FROM BED TO CHAIR: A LITTLE
MOBILITY SCORE: 8
TURNING FROM BACK TO SIDE WHILE IN FLAT BAD: A LITTLE
MOVING TO AND FROM BED TO CHAIR: TOTAL
DRESSING REGULAR LOWER BODY CLOTHING: A LITTLE
TURNING FROM BACK TO SIDE WHILE IN FLAT BAD: A LOT
STANDING UP FROM CHAIR USING ARMS: A LITTLE
MOVING FROM LYING ON BACK TO SITTING ON SIDE OF FLAT BED WITH BEDRAILS: A LITTLE
MOBILITY SCORE: 14
DRESSING REGULAR UPPER BODY CLOTHING: A LITTLE
CLIMB 3 TO 5 STEPS WITH RAILING: TOTAL
HELP NEEDED FOR BATHING: A LITTLE
DAILY ACTIVITIY SCORE: 19
DAILY ACTIVITIY SCORE: 20
STANDING UP FROM CHAIR USING ARMS: A LOT
DRESSING REGULAR UPPER BODY CLOTHING: A LITTLE
WALKING IN HOSPITAL ROOM: A LITTLE
STANDING UP FROM CHAIR USING ARMS: TOTAL
TOILETING: A LITTLE
MOBILITY SCORE: 16
MOVING FROM LYING ON BACK TO SITTING ON SIDE OF FLAT BED WITH BEDRAILS: A LOT
PERSONAL GROOMING: A LITTLE
WALKING IN HOSPITAL ROOM: TOTAL
DRESSING REGULAR LOWER BODY CLOTHING: A LITTLE

## 2025-05-17 ASSESSMENT — PAIN SCALES - GENERAL
PAINLEVEL_OUTOF10: 0 - NO PAIN

## 2025-05-17 ASSESSMENT — PAIN - FUNCTIONAL ASSESSMENT
PAIN_FUNCTIONAL_ASSESSMENT: 0-10

## 2025-05-17 ASSESSMENT — ACTIVITIES OF DAILY LIVING (ADL): ADL_ASSISTANCE: NEEDS ASSISTANCE

## 2025-05-17 NOTE — CARE PLAN
Cortez Aly Patient Age: 73 year old  MESSAGE:   **pt is requesting email to send medication paperwork for review*     Next and Last Visit with Provider and Department  Last visit with this provider: 3/5/2019  Last visit with this department: 3/5/2019    Next visit with this provider: 4/10/2019  Next visit with this department: 4/10/2019    ALLERGIES: not on file.  No current outpatient medications on file.     No current facility-administered medications for this visit.      PHARMACY to use:           Pharmacy preference(s) on file: No Pharmacies Listed    CALL BACK INFO: Ok to leave response (including medical information) on answering machine    PCP: No primary care provider on file.         INS: No billing information found for this encounter.     PATIENT ADDRESS:  [address] [place] [id number]  [facility] [id number]     Problem: Skin  Goal: Decreased wound size/increased tissue granulation at next dressing change  Outcome: Progressing   The patient's goals for the shift include improve mobility    The clinical goals for the shift include improve mobility    Over the shift, the patient did not make progress toward the following goals. Barriers to progression include limited mobility.. Recommendations to address these barriers include increase mobility..

## 2025-05-17 NOTE — ASSESSMENT & PLAN NOTE
Generalized Weakness  Acute on chronic  PT OT  Fall precautions  Likely secondary to UTI     Pyuria, presumed UTI  Start Rocephin, follow cultures     Acute/Chronic Urinary Retention  Unable to void s/p indwelling nixon catheter removal   Insert nixon catheter  Urology consult--Follows with Dr. Holt      Diarrhea  Hx IBS  Pending Stool pathogen/C. Diff PCR      Leukocytosis   Suspect reactive to the above  improved  Follow cultures, on ATB    Chronic Diastolic HF  CXR reviewed: unremarkable.   EF 55-60% April 2025.  Doesn't appear acutely overloaded on exam   Hold home lasix for now.   Daily weights. Cardiac diet. Monitor I/O     HTN  Continue home amlodipine, hydralazine, atenolol  Monitor BP     Hx Remote TIA/CVA  No obvious residual deficits noted on exam  Continue home statin, ASA     CKD  Currently stable at baseline  Trend renal function, avoid nephrotoxins when possible     Elevated Troponin  Mildly elevated/flat: 15, 16.   EKG reviewed: Nonischemic  Monitor on telemetry      DVT/GI  Heparin subcutaneous/PPI      Disposition: Pending PT OT eval.

## 2025-05-17 NOTE — CARE PLAN
Problem: Skin  Goal: Decreased wound size/increased tissue granulation at next dressing change  5/17/2025 1917 by Nancy Frankenfield, RN  Outcome: Progressing  5/17/2025 1904 by Nancy Frankenfield, RN  Outcome: Progressing  Goal: Participates in plan/prevention/treatment measures  5/17/2025 1917 by Nancy Frankenfield, RN  Outcome: Progressing  5/17/2025 1904 by Nancy Frankenfield, RN  Outcome: Progressing  Goal: Prevent/manage excess moisture  5/17/2025 1917 by Nancy Frankenfield, RN  Outcome: Progressing  5/17/2025 1904 by Nancy Frankenfield, RN  Outcome: Progressing  Goal: Prevent/minimize sheer/friction injuries  5/17/2025 1917 by Nancy Frankenfield, RN  Outcome: Progressing  5/17/2025 1904 by Nancy Frankenfield, RN  Outcome: Progressing  Goal: Promote/optimize nutrition  Outcome: Progressing  Goal: Promote skin healing  5/17/2025 1917 by Nancy Frankenfield, RN  Outcome: Progressing  5/17/2025 1904 by Nancy Frankenfield, RN  Outcome: Progressing   The patient's goals for the shift include improve mobility    The clinical goals for the shift include improve mobility    Over the shift, the patient did not make progress toward the following goals. Barriers to progression include . Recommendations to address these barriers include .

## 2025-05-17 NOTE — CARE PLAN
The patient's goals for the shift include      The clinical goals for the shift include safe mobility      Problem: Skin  Goal: Decreased wound size/increased tissue granulation at next dressing change  Outcome: Progressing  Goal: Participates in plan/prevention/treatment measures  Outcome: Progressing  Goal: Prevent/manage excess moisture  Outcome: Progressing  Goal: Prevent/minimize sheer/friction injuries  Outcome: Progressing  Goal: Promote/optimize nutrition  Outcome: Progressing  Goal: Promote skin healing  Outcome: Progressing

## 2025-05-17 NOTE — CARE PLAN
Problem: Skin  Goal: Decreased wound size/increased tissue granulation at next dressing change  Outcome: Progressing  Goal: Participates in plan/prevention/treatment measures  Outcome: Progressing  Goal: Prevent/manage excess moisture  Outcome: Progressing  Goal: Prevent/minimize sheer/friction injuries  Outcome: Progressing  Goal: Promote/optimize nutrition  Outcome: Progressing  Goal: Promote skin healing  Outcome: Progressing   The patient's goals for the shift include      The clinical goals for the shift include safe mobility    Over the shift, the patient did not make progress toward the following goals. Barriers to progression include Bedbound. Recommendations to address these barriers include continue to work with pt and ot.    Pt states that she intends to get stronger and participate more with pt.

## 2025-05-17 NOTE — PROGRESS NOTES
Physical Therapy    Physical Therapy Evaluation & Treatment    Patient Name: Tammy Walker  MRN: [id number]  Department: [address]  Room: [address] [place] [address]  Today's Date: 5/17/2025   Time Calculation  Start Time: 1318  Stop Time: 1344  Time Calculation (min): 26 min    Assessment/Plan   PT Assessment  PT Assessment Results: Decreased strength, Decreased mobility, Decreased coordination, Impaired balance, Decreased endurance, Impaired judgement, Obesity  Rehab Prognosis: Good  Barriers to Discharge Home: Physical needs  Physical Needs: 24hr mobility assistance needed  Evaluation/Treatment Tolerance: Patient limited by fatigue  Medical Staff Made Aware: Yes  Barriers to Participation: Comorbidities  End of Session Communication: Bedside nurse  Assessment Comment: pt would benefit from skilled therapy services to increase strength and improve overall mobility  End of Session Patient Position: Bed, 3 rail up, Alarm on (call button in reach)  IP OR SWING BED PT PLAN  Inpatient or Swing Bed: Inpatient      PT Plan  Treatment/Interventions: Bed mobility, Transfer training, Gait training, Balance training, Strengthening, Endurance training, Therapeutic exercise  PT Plan: Ongoing PT  PT Frequency: 4 times per week  PT Discharge Recommendations: Moderate intensity level of continued care  Equipment Recommended upon Discharge: Wheeled walker  PT Recommended Transfer Status:  (MOD/MAX A)  PT - OK to Discharge: Yes    Subjective     PT Visit Info:  PT Received On: 05/17/25  General Visit Information:  General  Reason for Referral: mobility impairment; 69 yr old female presenting with generalized weakness and difficulty w/ taking care of self after returning home from rehab ~2 weeks ago. pt found to have leukocytosis and urinary retention  Referred By: Dr. Elias  Past Medical History Relevant to Rehab: Arthritis, Covid, systolic heart failure, chronic kidney failure, arthritis, HTN, bladder dysfunction; urinary retention;  hyperlipidemia; IBS; UTI  Family/Caregiver Present: Yes  Caregiver Feedback: sister present  Prior to Session Communication: Bedside nurse  Patient Position Received: Bed, 3 rail up, Alarm on  General Comment: pt alert and cooperative; pt willing to work with therapy      Home Living:  Home Living  Type of Home: House  Lives With:  (sister)  Home Adaptive Equipment:  (RW, manual w/c; transport chair; rollator;)  Home Layout: One level  Home Access: Ramped entrance  Bathroom Shower/Tub: Tub/shower unit, Walk-in shower  Bathroom Equipment: Grab bars around toilet  Bathroom Accessibility: 1/2 bath on main floor  Home Living Comments: pt has 12 stairs with 1 railing to a basement laundry and shower - but DOES NOT USE; pt sleeps on couch on main floor;      Prior Level of Function:  Prior Function Per Pt/Caregiver Report  Level of [place]: Needs assistance with ADLs, Needs assistance with homemaking  Receives Help From:  (pts sister provides 24/7 care and assists as needed)  ADL Assistance: Needs assistance (+ sponge bathes)  Ambulatory Assistance: Independent (pt reported being non-ambulatory ~ 2 years; pt able to perform SPT independently into w/c;)  Vocational: Retired  Prior Function Comments: sister stating that the patient has been requiring more assistance with transfers and ADLs, often having to call EMS to assist with transfers      Precautions:  Precautions  Hearing/Visual Limitations: vision WNL; mild [place]  LE Weight Bearing Status:  (WBAT B LE)  Medical Precautions: Fall precautions  Precautions Comment: educated pt on safety techniques during transfers             Objective   Pain:  Pain Assessment  Pain Assessment:  (0/10)  Cognition:  Cognition  Overall Cognitive Status: Within Functional Limits  Orientation Level: Oriented X4  Cognition Comments: pt expressed a fear of falling;  Safety/Judgement:  (impaired safety awareness)  Insight: Mild  Processing Speed: Delayed    General Assessments:      Activity Tolerance  Endurance:  (FAIR+ activity tolerance)    Sensation  Sensation Comment: denies any numbness/tingling    Coordination  Coordination Comment: increased time and effort for mobility    Postural Control  Posture Comment: frequent retro-lean sitting on EOB    Static Sitting Balance  Static Sitting-Comment/Number of Minutes: GOOD-  Dynamic Sitting Balance  Dynamic Sitting-Comments: GOOD-/FAIR+    Static Standing Balance  Static Standing-Comment/Number of Minutes: POOR    Treatment:    Transfers  Transfer:  (placed RW in front of pt will sitting on EOB. pt expressed a fear of falling; compassion and patience given. sit <-> stand with MOD/MAX A. pt pulled up on braced RW. pt unable to partially stand. pt requested back to supine)    Functional Assessments:  Bed Mobility  Bed Mobility:  (supine to sit with MOD A for trunk up and B LE; MAX A for scooting to EOB. pt sat on EOB  with frequent retro-lean. MIN/MOD A given to assist with upright posture.  sit to supine with MAX A. pt rolled side to side with MOD A to straighten pads; placed b)    Transfers  Transfer:  (placed RW in front of pt will sitting on EOB. pt expressed a fear of falling; compassion and patience given. sit <-> stand with MOD/MAX A. pt pulled up on braced RW. pt unable to partially stand. pt requested back to supine)       Extremity/Trunk Assessments:  RUE   RUE :  (WFL with 4/5 strength)  LUE   LUE:  (WFL with 4/5 strength)  RLE   RLE :  (WFL with 3+/5 strength)  LLE   LLE :  (WFL with 3+/5 strength)      Outcome Measures:  [facility] Basic Mobility  Turning from your back to your side while in a flat bed without using bedrails: A lot  Moving from lying on your back to sitting on the side of a flat bed without using bedrails: A lot  Moving to and from bed to chair (including a wheelchair): Total  Standing up from a chair using your arms (e.g. wheelchair or bedside chair): Total  To walk in hospital room: Total  Climbing 3-5 steps with  railing: Total  Basic Mobility - Total Score: 8        Encounter Problems       Encounter Problems (Active)       PT Transfers       STG - Patient to transfer to and from sit to supine with MIN A (Progressing)       Start:  05/17/25    Expected End:  05/31/25            STG - Patient will transfer sit to and from stand with MIN A using rolling walker (Progressing)       Start:  05/17/25    Expected End:  05/31/25               PT Transfers       STG - Transfer from bed to chair via SPT with MIN A (Progressing)       Start:  05/17/25    Expected End:  05/31/25            STG - Patient will roll side to side with CGA (Progressing)       Start:  05/17/25    Expected End:  05/31/25                   Education Documentation  Precautions, taught by Silverio Ceja, PT at 5/17/2025  2:23 PM.  Learner: Patient  Readiness: Eager  Method: Explanation  Response: Verbalizes Understanding, Needs Reinforcement    Mobility Training, taught by Silverio Ceja, PT at 5/17/2025  2:23 PM.  Learner: Patient  Readiness: Eager  Method: Explanation  Response: Verbalizes Understanding, Needs Reinforcement    Body Mechanics, taught by Silverio Ceja, PT at 5/17/2025  2:23 PM.  Learner: Patient  Readiness: Eager  Method: Explanation  Response: Verbalizes Understanding, Needs Reinforcement

## 2025-05-17 NOTE — PROGRESS NOTES
Tammy Walker is a 69 y.o. female on day 1 of admission presenting with Weakness.      Subjective   Patient seen and examined.  Awake/alert/oriented.  Resting in bed.  Denies chest pain, shortness of breath, fevers, chills, nausea, or vomiting. No abdominal discomfort. Denies lightheadedness or dizziness.     Objective     Last Recorded Vitals  /69 (BP Location: Left arm, Patient Position: Lying)   Pulse 56   Temp 36.9 °C (98.4 °F) (Oral)   Resp 18   Wt 95.6 kg (210 lb 12.2 oz)   SpO2 95%   Intake/Output last 3 Shifts:    Intake/Output Summary (Last 24 hours) at 5/17/2025 1329  Last data filed at 5/17/2025 0222  Gross per 24 hour   Intake 100 ml   Output 300 ml   Net -200 ml       Admission Weight  Weight: 68.5 kg (151 lb) (05/16/25 1136)    Daily Weight  05/17/25 : 95.6 kg (210 lb 12.2 oz)    Image Results  Electrocardiogram, 12-lead PRN ACS symptoms  Normal sinus rhythm  Left anterior fascicular block  Left ventricular hypertrophy with repolarization abnormality ( R in aVL , Jarret product )  Abnormal ECG  When compared with ECG of 31-MAR-2025 10:55,  Minimal criteria for Septal infarct are no longer Present  CT head wo IV contrast  Narrative: Interpreted By:  Ketan Perez,   STUDY:  CT HEAD WO IV CONTRAST;  5/16/2025 12:33 pm      INDICATION:  Signs/Symptoms:weakness.      COMPARISON:  03/03/2025      ACCESSION NUMBER(S):  [id number]      ORDERING CLINICIAN:  ARTIS WATTS      TECHNIQUE:  Sequential trans axial images were obtained  .      FINDINGS:  INTRACRANIAL:      There is moderate prominence of the cortical sulci indicating atrophy.      There is moderate ventriculomegaly, again consistent with atrophy.  Additionally there is slightly greater dilatation of the right  lateral ventricle may be ex vacuo from remote infarction at the  corona radiata.      Moderate to fairly extensive decreased attenuation of the  periventricular and long tracks of the white matter most consistent  with gliosis  from arterial disease.There is more discrete focal  hypodensity in the corona radiata probably from remote infarctions.  There is also remote lacunar infarction of the left thalamus that may  have been subacute on the previous exam. Otherwise there is no  evidence of definite subacute infarction, intracranial hemorrhage or  mass.          EXTRACRANIAL:  Visualized paranasal sinuses and mastoids are clear.  The calvarium is intact.      Impression: Moderate age related degenerative change as described without acute  findings or significant change from the prior exam.      Signed by: Ketan Perez 5/16/2025 1:00 PM  Dictation workstation:   [id number]  XR chest 1 view  Narrative: Interpreted By:  Efe Laird,   STUDY:  XR CHEST 1 VIEW;  5/16/2025 12:05 pm      INDICATION:  Signs/Symptoms:weakness.          COMPARISON:  03/31/2025      ACCESSION NUMBER(S):  [id number]      ORDERING CLINICIAN:  ARTIS WATTS      FINDINGS:                  CARDIOMEDIASTINAL SILHOUETTE:  Cardiomediastinal silhouette is normal in size and configuration.      LUNGS:  Lungs are clear.      ABDOMEN:  No remarkable upper abdominal findings.      BONES:  No acute osseous changes.      Impression: 1.  No evidence of acute cardiopulmonary process.              MACRO:  None      Signed by: Efe Laird 5/16/2025 12:11 PM  Dictation workstation:   [id number]      Physical Exam  Vitals reviewed.   Constitutional:       Appearance: Normal appearance.   HENT:      Head: Normocephalic and atraumatic.   Eyes:      Extraocular Movements: Extraocular movements intact.      Conjunctiva/sclera: Conjunctivae normal.   Cardiovascular:      Rate and Rhythm: Normal rate and regular rhythm.   Pulmonary:      Effort: Pulmonary effort is normal.      Breath sounds: Normal breath sounds. No wheezing, rhonchi or rales.   Abdominal:      General: Bowel sounds are normal.      Palpations: Abdomen is soft.      Tenderness: There is no abdominal tenderness.    Skin:     General: Skin is warm and dry.   Neurological:      General: No focal deficit present.      Mental Status: She is alert and oriented to person, place, and time.         Relevant Results  Lab Results   Component Value Date    GLUCOSE 96 05/17/2025    CALCIUM 8.9 05/17/2025     05/17/2025    K 4.1 05/17/2025    CO2 24 05/17/2025     (H) 05/17/2025    BUN 24 (H) 05/17/2025    CREATININE 1.53 (H) 05/17/2025      Lab Results   Component Value Date    WBC 7.8 05/17/2025    HGB 10.9 (L) 05/17/2025    HCT 33.3 (L) 05/17/2025    MCV 92 05/17/2025     05/17/2025    Electrocardiogram, 12-lead PRN ACS symptoms  Result Date: 5/16/2025  Normal sinus rhythm Left anterior fascicular block Left ventricular hypertrophy with repolarization abnormality ( R in aVL , Jarret product ) Abnormal ECG When compared with ECG of 31-MAR-2025 10:55, Minimal criteria for Septal infarct are no longer Present    CT head wo IV contrast  Result Date: 5/16/2025  Moderate age related degenerative change as described without acute findings or significant change from the prior exam.   Signed by: Ketan Perez 5/16/2025 1:00 PM Dictation workstation:   [id number]    XR chest 1 view  Result Date: 5/16/2025  1.  No evidence of acute cardiopulmonary process.       MACRO: None   Signed by: Efe Laird 5/16/2025 12:11 PM Dictation workstation:   [id number]     CBC, BMP, CT head, chest x-ray, EKG reviewed.  Vitals reviewed.        Assessment & Plan  Weakness    Generalized Weakness  Acute on chronic  PT OT  Fall precautions  Likely secondary to UTI     Pyuria, presumed UTI  Start Rocephin, follow cultures     Acute/Chronic Urinary Retention  Unable to void s/p indwelling nixon catheter removal   Insert nixon catheter  Urology consult--Follows with Dr. Holt      Diarrhea  Hx IBS  Pending Stool pathogen/C. Diff PCR      Leukocytosis   Suspect reactive to the above  improved  Follow cultures, on ATB    Chronic Diastolic  HF  CXR reviewed: unremarkable.   EF 55-60% April 2025.  Doesn't appear acutely overloaded on exam   Hold home lasix for now.   Daily weights. Cardiac diet. Monitor I/O     HTN  Continue home amlodipine, hydralazine, atenolol  Monitor BP     Hx Remote TIA/CVA  No obvious residual deficits noted on exam  Continue home statin, ASA     CKD  Currently stable at baseline  Trend renal function, avoid nephrotoxins when possible     Elevated Troponin  Mildly elevated/flat: 15, 16.   EKG reviewed: Nonischemic  Monitor on telemetry      DVT/GI  Heparin subcutaneous/PPI      Disposition: Pending PT OT eval.                   Jamee Lowry, APRN-CNP

## 2025-05-17 NOTE — CONSULTS
Consults    Reason For Consult  Urinary retention    History Of Present Illness  Tammy Walker is a 69 y.o. female presenting with generalized weakness.  She has a history of urinary retention and had a catheter in place over the last month.  She states that she had 1500 mL drained with prior catheterization.  She previously had been on oxybutynin which had been discontinued.  She also has a history of LORETA and CKD.  Creatinine decreased to 1.53 today.  Her catheter was recently removed in the office.  Intermittent catheterization was performed yesterday for residual of 400 mL.     Past Medical History  She has a past medical history of Arthritis, CHF (congestive heart failure), Chronic kidney disease, HLD (hyperlipidemia), HTN (hypertension), Long COVID, TIA (transient ischemic attack), and Urinary tract infection.    Surgical History  She has a past surgical history that includes MR angio head wo IV contrast (9/11/2023).     Social History  She reports that she quit smoking about 3 years ago. Her smoking use included cigarettes. She has never used smokeless tobacco. She reports that she does not currently use alcohol. She reports that she does not use drugs.    Family History  Family History[1]     Allergies  Patient has no known allergies.    Review of Systems  Weakness, urinary retention     Physical Exam  Alert, oriented  Normal respiratory effort  Abdomen soft nontender nondistended  Pure wick in position, voided urine clear       Last Recorded Vitals  /69 (BP Location: Left arm, Patient Position: Lying)   Pulse 56   Temp 36.9 °C (98.4 °F) (Oral)   Resp 18   Wt 95.6 kg (210 lb 12.2 oz)   SpO2 95%     Relevant Results  Creatinine 1.53  Urine culture pending  Urinalysis with 11-20 WBCs and 1+ bacteria, 1-2 RBCs  Urine culture 4/12/2025 with E. Coli  CT scan abdomen pelvis 4/12/2025 with no stones or hydronephrosis, Rush catheter in place         Assessment/Plan     69-year-old has history of  urinary retention and appears to be continuing to retain.  She will undergo another bladder scan this morning and catheter replacement is planned if she has persistent significant retention.  Her urinalysis is positive and a urine culture is pending.  She is currently on Rocephin.  She has a history of CKD and had recent LORETA.  Retention will need to be followed closely.  Her oxybutynin was previously discontinued and she should remain off of it.  She may require further evaluation with cystoscopy.  She should follow-up with Dr. Holt in the office following discharge.    Slim Peng MD             [1]   Family History  Problem Relation Name Age of Onset    Hypertension Mother      Diabetes Mother      Hypertension Father      Diabetes Father

## 2025-05-18 VITALS
OXYGEN SATURATION: 97 % | BODY MASS INDEX: 35 KG/M2 | WEIGHT: 205.03 LBS | DIASTOLIC BLOOD PRESSURE: 68 MMHG | HEART RATE: 62 BPM | HEIGHT: 64 IN | SYSTOLIC BLOOD PRESSURE: 150 MMHG | RESPIRATION RATE: 18 BRPM | TEMPERATURE: 97.7 F

## 2025-05-18 LAB
ANION GAP SERPL CALCULATED.3IONS-SCNC: 14 MMOL/L (ref 10–20)
BACTERIA BLD CULT: NORMAL
BACTERIA BLD CULT: NORMAL
BACTERIA UR CULT: NORMAL
BASOPHILS # BLD AUTO: 0.07 X10*3/UL (ref 0–0.1)
BASOPHILS NFR BLD AUTO: 0.8 %
BUN SERPL-MCNC: 23 MG/DL (ref 6–23)
CALCIUM SERPL-MCNC: 8.8 MG/DL (ref 8.6–10.3)
CHLORIDE SERPL-SCNC: 108 MMOL/L (ref 98–107)
CO2 SERPL-SCNC: 22 MMOL/L (ref 21–32)
CREAT SERPL-MCNC: 1.47 MG/DL (ref 0.5–1.05)
EGFRCR SERPLBLD CKD-EPI 2021: 39 ML/MIN/1.73M*2
EOSINOPHIL # BLD AUTO: 0.34 X10*3/UL (ref 0–0.7)
EOSINOPHIL NFR BLD AUTO: 4 %
ERYTHROCYTE [DISTWIDTH] IN BLOOD BY AUTOMATED COUNT: 14.6 % (ref 11.5–14.5)
GLUCOSE SERPL-MCNC: 104 MG/DL (ref 74–99)
HCT VFR BLD AUTO: 33.4 % (ref 36–46)
HGB BLD-MCNC: 11.2 G/DL (ref 12–16)
IMM GRANULOCYTES # BLD AUTO: 0.03 X10*3/UL (ref 0–0.7)
IMM GRANULOCYTES NFR BLD AUTO: 0.4 % (ref 0–0.9)
LYMPHOCYTES # BLD AUTO: 1.46 X10*3/UL (ref 1.2–4.8)
LYMPHOCYTES NFR BLD AUTO: 17.1 %
MCH RBC QN AUTO: 30.3 PG (ref 26–34)
MCHC RBC AUTO-ENTMCNC: 33.5 G/DL (ref 32–36)
MCV RBC AUTO: 90 FL (ref 80–100)
MONOCYTES # BLD AUTO: 0.72 X10*3/UL (ref 0.1–1)
MONOCYTES NFR BLD AUTO: 8.4 %
NEUTROPHILS # BLD AUTO: 5.91 X10*3/UL (ref 1.2–7.7)
NEUTROPHILS NFR BLD AUTO: 69.3 %
NRBC BLD-RTO: 0 /100 WBCS (ref 0–0)
PLATELET # BLD AUTO: 259 X10*3/UL (ref 150–450)
POTASSIUM SERPL-SCNC: 4 MMOL/L (ref 3.5–5.3)
RBC # BLD AUTO: 3.7 X10*6/UL (ref 4–5.2)
SODIUM SERPL-SCNC: 140 MMOL/L (ref 136–145)
WBC # BLD AUTO: 8.5 X10*3/UL (ref 4.4–11.3)

## 2025-05-18 PROCEDURE — 2500000004 HC RX 250 GENERAL PHARMACY W/ HCPCS (ALT 636 FOR OP/ED)

## 2025-05-18 PROCEDURE — 80048 BASIC METABOLIC PNL TOTAL CA: CPT | Performed by: NURSE PRACTITIONER

## 2025-05-18 PROCEDURE — 99232 SBSQ HOSP IP/OBS MODERATE 35: CPT | Performed by: NURSE PRACTITIONER

## 2025-05-18 PROCEDURE — 85025 COMPLETE CBC W/AUTO DIFF WBC: CPT | Performed by: NURSE PRACTITIONER

## 2025-05-18 PROCEDURE — 99232 SBSQ HOSP IP/OBS MODERATE 35: CPT | Performed by: UROLOGY

## 2025-05-18 PROCEDURE — 1210000001 HC SEMI-PRIVATE ROOM DAILY

## 2025-05-18 PROCEDURE — 2500000001 HC RX 250 WO HCPCS SELF ADMINISTERED DRUGS (ALT 637 FOR MEDICARE OP)

## 2025-05-18 PROCEDURE — 36415 COLL VENOUS BLD VENIPUNCTURE: CPT | Performed by: NURSE PRACTITIONER

## 2025-05-18 PROCEDURE — 2500000001 HC RX 250 WO HCPCS SELF ADMINISTERED DRUGS (ALT 637 FOR MEDICARE OP): Performed by: NURSE PRACTITIONER

## 2025-05-18 RX ORDER — LOPERAMIDE HYDROCHLORIDE 2 MG/1
2 CAPSULE ORAL 4 TIMES DAILY PRN
Status: DISCONTINUED | OUTPATIENT
Start: 2025-05-18 | End: 2025-05-20 | Stop reason: HOSPADM

## 2025-05-18 RX ORDER — L. ACIDOPHILUS/L.BULGARICUS 1MM CELL
1 TABLET ORAL 2 TIMES DAILY
Status: DISCONTINUED | OUTPATIENT
Start: 2025-05-18 | End: 2025-05-20 | Stop reason: HOSPADM

## 2025-05-18 RX ADMIN — HYDRALAZINE HYDROCHLORIDE 25 MG: 25 TABLET ORAL at 21:25

## 2025-05-18 RX ADMIN — Medication 1 TABLET: at 08:22

## 2025-05-18 RX ADMIN — Medication 1 TABLET: at 21:26

## 2025-05-18 RX ADMIN — ASPIRIN 81 MG: 81 TABLET, COATED ORAL at 08:22

## 2025-05-18 RX ADMIN — PANTOPRAZOLE SODIUM 40 MG: 40 TABLET, DELAYED RELEASE ORAL at 05:55

## 2025-05-18 RX ADMIN — HYDRALAZINE HYDROCHLORIDE 25 MG: 25 TABLET ORAL at 15:41

## 2025-05-18 RX ADMIN — NYSTATIN 1 APPLICATION: 100000 POWDER TOPICAL at 08:23

## 2025-05-18 RX ADMIN — HEPARIN SODIUM 5000 UNITS: 5000 INJECTION, SOLUTION INTRAVENOUS; SUBCUTANEOUS at 08:22

## 2025-05-18 RX ADMIN — ATORVASTATIN CALCIUM 40 MG: 40 TABLET, FILM COATED ORAL at 21:25

## 2025-05-18 RX ADMIN — Medication 1 TABLET: at 15:41

## 2025-05-18 RX ADMIN — HYDRALAZINE HYDROCHLORIDE 25 MG: 25 TABLET ORAL at 06:34

## 2025-05-18 RX ADMIN — AMLODIPINE BESYLATE 10 MG: 10 TABLET ORAL at 08:22

## 2025-05-18 RX ADMIN — HEPARIN SODIUM 5000 UNITS: 5000 INJECTION, SOLUTION INTRAVENOUS; SUBCUTANEOUS at 21:26

## 2025-05-18 RX ADMIN — CHOLECALCIFEROL (VITAMIN D3) 10 MCG (400 UNIT) TABLET 10 MCG: at 08:22

## 2025-05-18 RX ADMIN — NYSTATIN 1 APPLICATION: 100000 POWDER TOPICAL at 21:26

## 2025-05-18 RX ADMIN — ALLOPURINOL 100 MG: 100 TABLET ORAL at 21:25

## 2025-05-18 RX ADMIN — ALLOPURINOL 100 MG: 100 TABLET ORAL at 08:22

## 2025-05-18 ASSESSMENT — COGNITIVE AND FUNCTIONAL STATUS - GENERAL
TURNING FROM BACK TO SIDE WHILE IN FLAT BAD: A LITTLE
DAILY ACTIVITIY SCORE: 19
PERSONAL GROOMING: A LITTLE
TOILETING: A LITTLE
STANDING UP FROM CHAIR USING ARMS: A LOT
HELP NEEDED FOR BATHING: A LITTLE
MOBILITY SCORE: 13
DRESSING REGULAR UPPER BODY CLOTHING: A LITTLE
STANDING UP FROM CHAIR USING ARMS: A LOT
TOILETING: A LITTLE
HELP NEEDED FOR BATHING: A LITTLE
DRESSING REGULAR UPPER BODY CLOTHING: A LITTLE
CLIMB 3 TO 5 STEPS WITH RAILING: TOTAL
CLIMB 3 TO 5 STEPS WITH RAILING: TOTAL
DRESSING REGULAR LOWER BODY CLOTHING: A LITTLE
TURNING FROM BACK TO SIDE WHILE IN FLAT BAD: A LITTLE
PERSONAL GROOMING: A LITTLE
WALKING IN HOSPITAL ROOM: TOTAL
MOBILITY SCORE: 13
MOVING TO AND FROM BED TO CHAIR: A LOT
DRESSING REGULAR LOWER BODY CLOTHING: A LITTLE
WALKING IN HOSPITAL ROOM: TOTAL
MOVING TO AND FROM BED TO CHAIR: A LOT
DAILY ACTIVITIY SCORE: 19

## 2025-05-18 ASSESSMENT — PAIN SCALES - GENERAL
PAINLEVEL_OUTOF10: 0 - NO PAIN
PAINLEVEL_OUTOF10: 0 - NO PAIN

## 2025-05-18 ASSESSMENT — PAIN - FUNCTIONAL ASSESSMENT: PAIN_FUNCTIONAL_ASSESSMENT: 0-10

## 2025-05-18 NOTE — DOCUMENTATION CLARIFICATION NOTE
"    PATIENT:               HOMERO BANDA  ACCT #:                  [id number]  MRN:                       [id number]  :                       1955  ADMIT DATE:       2025 11:34 AM  DISCH DATE:  RESPONDING PROVIDER #:        [id number]          PROVIDER RESPONSE TEXT:    Chronic Diastolic Congestive Heart Failure    CDI QUERY TEXT:    Clarification        Instruction:    Based on your assessment of the patient and the clinical information, please provide the requested documentation by clicking on the appropriate radio button and enter any additional information if prompted.    Question: Please further clarify the discrepancy of chronic congestive heart failure    When answering this query, please exercise your independent professional judgment. The fact that a question is being asked, does not imply that any particular answer is desired or expected.    The patient's clinical indicators include:  Clinical Information:    Clinical Indicators:  2025- Echo- EF 55-60%    - CXR- \"No evidence of acute cardiopulmonary process\".    - H/P- BRADLEY Adkins CNP- \"Hx Chronic Systolic HF  CXR reviewed: unremarkable.  EF 55-60% 2025. Doesn't appear acutely overloaded on exam  Hold home lasix for now\".    - H/P- Dr. Elias- \"CHF with diastolic dysfunction\".    - JRuss Amusat CNP- \" Chronic Systolic HF\".      Treatment:  Echo from 2024- EF 55-60%  Home lasix on hold  Daily weights  Monitor I/O  Cardiac diet    Risk Factors:  History CHF  hOME MEDICATION- lASIX 40MG TAB DAILY  Options provided:  -- Chronic Diastolic Congestive Heart Failure  -- Chronic Systolic Congestive Heart Failure  -- Other - I will add my own diagnosis  -- Refer to Clinical Documentation Reviewer    Query created by: Radha Roy on 2025 11:50 AM      Electronically signed by:  XU SUH-CNP 2025 3:48 PM          "

## 2025-05-18 NOTE — PROGRESS NOTES
"Tammy Walker is a 69 y.o. female on day 2 of admission presenting with Weakness.    Subjective   She failed her voiding trial and a Rush was placed for 900 ml.     Objective     Physical Exam    Last Recorded Vitals  Blood pressure 144/72, pulse 60, temperature 36.7 °C (98.1 °F), temperature source Oral, resp. rate 16, height 1.626 m (5' 4\"), weight 94.8 kg (209 lb), SpO2 94%.  Intake/Output last 3 Shifts:  I/O last 3 completed shifts:  In: 350 (3.7 mL/kg) [P.O.:300; IV Piggyback:50]  Out: 2050 (21.6 mL/kg) [Urine:2050 (0.6 mL/kg/hr)]  Weight: 94.8 kg     Relevant Results  Scheduled medications  Scheduled Medications[1]  Continuous medications  Continuous Medications[2]  PRN medications  PRN Medications[3]    Results for orders placed or performed during the hospital encounter of 05/16/25 (from the past 24 hours)   Basic Metabolic Panel   Result Value Ref Range    Glucose 104 (H) 74 - 99 mg/dL    Sodium 140 136 - 145 mmol/L    Potassium 4.0 3.5 - 5.3 mmol/L    Chloride 108 (H) 98 - 107 mmol/L    Bicarbonate 22 21 - 32 mmol/L    Anion Gap 14 10 - 20 mmol/L    Urea Nitrogen 23 6 - 23 mg/dL    Creatinine 1.47 (H) 0.50 - 1.05 mg/dL    eGFR 39 (L) >60 mL/min/1.73m*2    Calcium 8.8 8.6 - 10.3 mg/dL   CBC and Auto Differential   Result Value Ref Range    WBC 8.5 4.4 - 11.3 x10*3/uL    nRBC 0.0 0.0 - 0.0 /100 WBCs    RBC 3.70 (L) 4.00 - 5.20 x10*6/uL    Hemoglobin 11.2 (L) 12.0 - 16.0 g/dL    Hematocrit 33.4 (L) 36.0 - 46.0 %    MCV 90 80 - 100 fL    MCH 30.3 26.0 - 34.0 pg    MCHC 33.5 32.0 - 36.0 g/dL    RDW 14.6 (H) 11.5 - 14.5 %    Platelets 259 150 - 450 x10*3/uL    Neutrophils % 69.3 40.0 - 80.0 %    Immature Granulocytes %, Automated 0.4 0.0 - 0.9 %    Lymphocytes % 17.1 13.0 - 44.0 %    Monocytes % 8.4 2.0 - 10.0 %    Eosinophils % 4.0 0.0 - 6.0 %    Basophils % 0.8 0.0 - 2.0 %    Neutrophils Absolute 5.91 1.20 - 7.70 x10*3/uL    Immature Granulocytes Absolute, Automated 0.03 0.00 - 0.70 x10*3/uL    Lymphocytes " Absolute 1.46 1.20 - 4.80 x10*3/uL    Monocytes Absolute 0.72 0.10 - 1.00 x10*3/uL    Eosinophils Absolute 0.34 0.00 - 0.70 x10*3/uL    Basophils Absolute 0.07 0.00 - 0.10 x10*3/uL       Imaging  CT head wo IV contrast  Result Date: 5/16/2025  Moderate age related degenerative change as described without acute findings or significant change from the prior exam.   Signed by: Ketan Perez 5/16/2025 1:00 PM Dictation workstation:   [id number]    XR chest 1 view  Result Date: 5/16/2025  1.  No evidence of acute cardiopulmonary process.       MACRO: None   Signed by: Efe Laird 5/16/2025 12:11 PM Dictation workstation:   [id number]      Cardiology, Vascular, and Other Imaging  Electrocardiogram, 12-lead PRN ACS symptoms  Result Date: 5/16/2025  Normal sinus rhythm Left anterior fascicular block Left ventricular hypertrophy with repolarization abnormality ( R in aVL , [place] product ) Abnormal ECG When compared with ECG of 31-MAR-2025 10:55, Minimal criteria for Septal infarct are no longer Present        Assessment & Plan  Weakness    69-year-old has history of urinary retention and failed a voiding trial here with a 900 ml PVR.     Her urinalysis is positive and a urine culture grew insignificant.    She has a history of CKD and had recent LORETA.  Retention will need to be followed closely.    Her oxybutynin was previously discontinued and she should remain off of it.    She may require further evaluation with cystoscopy.  She should follow-up with Dr. Holt in the office following discharge.         Slim Peng MD           [1] allopurinol, 100 mg, oral, BID  amLODIPine, 10 mg, oral, Daily  aspirin, 81 mg, oral, Daily  atenolol, 50 mg, oral, BID  atorvastatin, 40 mg, oral, Nightly  cholecalciferol, 10 mcg, oral, Daily  heparin, 5,000 Units, subcutaneous, q12h  hydrALAZINE, 25 mg, oral, 4x daily  lactobacillus acidophilus, 1 tablet, oral, BID  lactobacillus acidophilus, 1 tablet, oral, BID  nystatin, 1  Application, Topical, BID  pantoprazole, 40 mg, oral, Daily before breakfast    [2]    [3] PRN medications: acetaminophen, docusate sodium, loperamide, melatonin

## 2025-05-18 NOTE — PROGRESS NOTES
Tammy Walker is a 69 y.o. female on day 2 of admission presenting with Weakness.      Subjective   Patient seen and examined.  Awake/alert/oriented.  Resting in bed.  Denies chest pain, shortness of breath, fevers, chills, nausea, or vomiting. No abdominal discomfort. Denies lightheadedness or dizziness.     Objective     Last Recorded Vitals  /59 (BP Location: Left arm, Patient Position: Lying)   Pulse 58   Temp 36.5 °C (97.7 °F) (Oral)   Resp 17   Wt 94.8 kg (209 lb)   SpO2 97%   Intake/Output last 3 Shifts:    Intake/Output Summary (Last 24 hours) at 5/18/2025 1052  Last data filed at 5/18/2025 0837  Gross per 24 hour   Intake 350 ml   Output 2250 ml   Net -1900 ml       Admission Weight  Weight: 68.5 kg (151 lb) (05/16/25 1136)    Daily Weight  05/18/25 : 94.8 kg (209 lb)    Image Results  Electrocardiogram, 12-lead PRN ACS symptoms  Normal sinus rhythm  Left anterior fascicular block  Left ventricular hypertrophy with repolarization abnormality ( R in aVL , [facility] product )  Abnormal ECG  When compared with ECG of 31-MAR-2025 10:55,  Minimal criteria for Septal infarct are no longer Present  CT head wo IV contrast  Narrative: Interpreted By:  Ketan Perez,   STUDY:  CT HEAD WO IV CONTRAST;  5/16/2025 12:33 pm      INDICATION:  Signs/Symptoms:weakness.      COMPARISON:  03/03/2025      ACCESSION NUMBER(S):  [id number]      ORDERING CLINICIAN:  ARTIS WATTS      TECHNIQUE:  Sequential trans axial images were obtained  .      FINDINGS:  INTRACRANIAL:      There is moderate prominence of the cortical sulci indicating atrophy.      There is moderate ventriculomegaly, again consistent with atrophy.  Additionally there is slightly greater dilatation of the right  lateral ventricle may be ex vacuo from remote infarction at the  corona radiata.      Moderate to fairly extensive decreased attenuation of the  periventricular and long tracks of the white matter most consistent  with gliosis from arterial  disease.There is more discrete focal  hypodensity in the corona radiata probably from remote infarctions.  There is also remote lacunar infarction of the left thalamus that may  have been subacute on the previous exam. Otherwise there is no  evidence of definite subacute infarction, intracranial hemorrhage or  mass.          EXTRACRANIAL:  Visualized paranasal sinuses and mastoids are clear.  The calvarium is intact.      Impression: Moderate age related degenerative change as described without acute  findings or significant change from the prior exam.      Signed by: Ketan Perez 5/16/2025 1:00 PM  Dictation workstation:   [id number]  XR chest 1 view  Narrative: Interpreted By:  Efe Laird,   STUDY:  XR CHEST 1 VIEW;  5/16/2025 12:05 pm      INDICATION:  Signs/Symptoms:weakness.          COMPARISON:  03/31/2025      ACCESSION NUMBER(S):  [id number]      ORDERING CLINICIAN:  ARTIS WATTS      FINDINGS:                  CARDIOMEDIASTINAL SILHOUETTE:  Cardiomediastinal silhouette is normal in size and configuration.      LUNGS:  Lungs are clear.      ABDOMEN:  No remarkable upper abdominal findings.      BONES:  No acute osseous changes.      Impression: 1.  No evidence of acute cardiopulmonary process.              MACRO:  None      Signed by: Efe Laird 5/16/2025 12:11 PM  Dictation workstation:   [id number]      Physical Exam  Vitals reviewed.   Constitutional:       Appearance: Normal appearance.   HENT:      Head: Normocephalic and atraumatic.   Eyes:      Extraocular Movements: Extraocular movements intact.      Conjunctiva/sclera: Conjunctivae normal.   Cardiovascular:      Rate and Rhythm: Normal rate and regular rhythm.   Pulmonary:      Effort: Pulmonary effort is normal.      Breath sounds: Normal breath sounds. No wheezing, rhonchi or rales.   Abdominal:      General: Bowel sounds are normal.      Palpations: Abdomen is soft.      Tenderness: There is no abdominal tenderness.   Skin:      General: Skin is warm and dry.   Neurological:      General: No focal deficit present.      Mental Status: She is alert and oriented to person, place, and time.         Relevant Results  Lab Results   Component Value Date    GLUCOSE 104 (H) 05/18/2025    CALCIUM 8.8 05/18/2025     05/18/2025    K 4.0 05/18/2025    CO2 22 05/18/2025     (H) 05/18/2025    BUN 23 05/18/2025    CREATININE 1.47 (H) 05/18/2025      Lab Results   Component Value Date    WBC 8.5 05/18/2025    HGB 11.2 (L) 05/18/2025    HCT 33.4 (L) 05/18/2025    MCV 90 05/18/2025     05/18/2025    Electrocardiogram, 12-lead PRN ACS symptoms  Result Date: 5/16/2025  Normal sinus rhythm Left anterior fascicular block Left ventricular hypertrophy with repolarization abnormality ( R in aVL , [facility] product ) Abnormal ECG When compared with ECG of 31-MAR-2025 10:55, Minimal criteria for Septal infarct are no longer Present    CT head wo IV contrast  Result Date: 5/16/2025  Moderate age related degenerative change as described without acute findings or significant change from the prior exam.   Signed by: Ketan Perez 5/16/2025 1:00 PM Dictation workstation:   [id number]    XR chest 1 view  Result Date: 5/16/2025  1.  No evidence of acute cardiopulmonary process.       MACRO: None   Signed by: Efe Laird 5/16/2025 12:11 PM Dictation workstation:   [id number]     CBC, BMP, reviewed.  Vitals reviewed.        Assessment & Plan  Weakness    Generalized Weakness  Acute on chronic  PT OT  Fall precautions  Urine culture negative     Pyuria  culture no growth, will observe off ATB     Acute/Chronic Urinary Retention  Unable to void s/p indwelling nixon catheter removal   Insert nixon catheter  Urology consult-recommended voiding trial     Diarrhea  Hx IBS  C diff negative  PRN imodium, start probiotic     Leukocytosis-resolved  Urine culture negative, observe off ATB  C diff negative    Chronic Diastolic HF  CXR reviewed: unremarkable.   EF  55-60% April 2025.  Doesn't appear acutely overloaded on exam   Hold home lasix for now.   Daily weights. Cardiac diet. Monitor I/O     HTN  Continue home amlodipine, hydralazine, atenolol  Monitor BP     Hx Remote TIA/CVA  No obvious residual deficits noted on exam  Continue home statin, ASA     CKD  Currently stable at baseline  Trend renal function, avoid nephrotoxins when possible     Elevated Troponin  Mildly elevated/flat: 15, 16.   EKG reviewed: Nonischemic  Monitor on telemetry      DVT/GI  Heparin subcutaneous/PPI      Disposition: PT/OT. Discharge planning to SNF when arrangements made.                  Jamee Lowry, APRN-CNP

## 2025-05-18 NOTE — CARE PLAN
Problem: Skin  Goal: Decreased wound size/increased tissue granulation at next dressing change  Outcome: Progressing  Goal: Participates in plan/prevention/treatment measures  Outcome: Progressing  Goal: Prevent/manage excess moisture  Outcome: Progressing  Goal: Prevent/minimize sheer/friction injuries  Outcome: Progressing  Goal: Promote/optimize nutrition  Outcome: Progressing  Goal: Promote skin healing  Outcome: Progressing     Problem: Pain - Adult  Goal: Verbalizes/displays adequate comfort level or baseline comfort level  Outcome: Progressing     Problem: Safety - Adult  Goal: Free from fall injury  Outcome: Progressing     Problem: Discharge Planning  Goal: Discharge to home or other facility with appropriate resources  Outcome: Progressing     Problem: Chronic Conditions and Co-morbidities  Goal: Patient's chronic conditions and co-morbidity symptoms are monitored and maintained or improved  Outcome: Progressing     Problem: Nutrition  Goal: Nutrient intake appropriate for maintaining nutritional needs  Outcome: Progressing   The patient's goals for the shift include improve mobility    The clinical goals for the shift include patient remain safe and free from falls overnight    Over the shift, the patient did not make progress toward the following goals. Barriers to progression include . Recommendations to address these barriers include .

## 2025-05-18 NOTE — NURSING NOTE
Patient voided 300 ml clear yellow urine at 01:00.Bladder scanned completed per order residual 630 ml. Unable to insert straight cath x 2. Patient has not urinated since then,bladder scanned at 03:45 showing over 930 ml.Hospitalist notified via secure chart.Order for Rush catheter insertion received. Procedure explained to the patient .#16 F Rush catheter inserted per hospital protocol using aseptic technique. More than 1000 ml clear yellow urine noted in drainage bag.Patient tolerated well.

## 2025-05-18 NOTE — ASSESSMENT & PLAN NOTE
Generalized Weakness  Acute on chronic  PT OT  Fall precautions  Urine culture negative     Pyuria  culture no growth, will observe off ATB     Acute/Chronic Urinary Retention  Unable to void s/p indwelling nixon catheter removal   Insert nixon catheter  Urology consult-recommended voiding trial     Diarrhea  Hx IBS  C diff negative  PRN imodium, start probiotic     Leukocytosis-resolved  Urine culture negative, observe off ATB  C diff negative    Chronic Diastolic HF  CXR reviewed: unremarkable.   EF 55-60% April 2025.  Doesn't appear acutely overloaded on exam   Hold home lasix for now.   Daily weights. Cardiac diet. Monitor I/O     HTN  Continue home amlodipine, hydralazine, atenolol  Monitor BP     Hx Remote TIA/CVA  No obvious residual deficits noted on exam  Continue home statin, ASA     CKD  Currently stable at baseline  Trend renal function, avoid nephrotoxins when possible     Elevated Troponin  Mildly elevated/flat: 15, 16.   EKG reviewed: Nonischemic  Monitor on telemetry      DVT/GI  Heparin subcutaneous/PPI      Disposition: PT/OT. Discharge planning to SNF when arrangements made.

## 2025-05-19 ENCOUNTER — APPOINTMENT (OUTPATIENT)
Dept: UROLOGY | Facility: CLINIC | Age: 70
End: 2025-05-19
Payer: MEDICARE

## 2025-05-19 LAB
ANION GAP SERPL CALCULATED.3IONS-SCNC: 12 MMOL/L (ref 10–20)
BASOPHILS # BLD AUTO: 0.07 X10*3/UL (ref 0–0.1)
BASOPHILS NFR BLD AUTO: 0.9 %
BUN SERPL-MCNC: 20 MG/DL (ref 6–23)
CALCIUM SERPL-MCNC: 8.9 MG/DL (ref 8.6–10.3)
CHLORIDE SERPL-SCNC: 109 MMOL/L (ref 98–107)
CK SERPL-CCNC: 50 U/L (ref 0–215)
CO2 SERPL-SCNC: 23 MMOL/L (ref 21–32)
CREAT SERPL-MCNC: 1.44 MG/DL (ref 0.5–1.05)
EGFRCR SERPLBLD CKD-EPI 2021: 40 ML/MIN/1.73M*2
EOSINOPHIL # BLD AUTO: 0.32 X10*3/UL (ref 0–0.7)
EOSINOPHIL NFR BLD AUTO: 4.1 %
ERYTHROCYTE [DISTWIDTH] IN BLOOD BY AUTOMATED COUNT: 14.7 % (ref 11.5–14.5)
GLUCOSE SERPL-MCNC: 103 MG/DL (ref 74–99)
HCT VFR BLD AUTO: 36.1 % (ref 36–46)
HGB BLD-MCNC: 12.4 G/DL (ref 12–16)
IMM GRANULOCYTES # BLD AUTO: 0.03 X10*3/UL (ref 0–0.7)
IMM GRANULOCYTES NFR BLD AUTO: 0.4 % (ref 0–0.9)
LYMPHOCYTES # BLD AUTO: 1.35 X10*3/UL (ref 1.2–4.8)
LYMPHOCYTES NFR BLD AUTO: 17.1 %
MCH RBC QN AUTO: 30.5 PG (ref 26–34)
MCHC RBC AUTO-ENTMCNC: 34.3 G/DL (ref 32–36)
MCV RBC AUTO: 89 FL (ref 80–100)
MONOCYTES # BLD AUTO: 0.64 X10*3/UL (ref 0.1–1)
MONOCYTES NFR BLD AUTO: 8.1 %
NEUTROPHILS # BLD AUTO: 5.48 X10*3/UL (ref 1.2–7.7)
NEUTROPHILS NFR BLD AUTO: 69.4 %
NRBC BLD-RTO: 0 /100 WBCS (ref 0–0)
PLATELET # BLD AUTO: 293 X10*3/UL (ref 150–450)
POTASSIUM SERPL-SCNC: 3.9 MMOL/L (ref 3.5–5.3)
RBC # BLD AUTO: 4.07 X10*6/UL (ref 4–5.2)
SODIUM SERPL-SCNC: 140 MMOL/L (ref 136–145)
WBC # BLD AUTO: 7.9 X10*3/UL (ref 4.4–11.3)

## 2025-05-19 PROCEDURE — 2500000004 HC RX 250 GENERAL PHARMACY W/ HCPCS (ALT 636 FOR OP/ED)

## 2025-05-19 PROCEDURE — 2500000001 HC RX 250 WO HCPCS SELF ADMINISTERED DRUGS (ALT 637 FOR MEDICARE OP)

## 2025-05-19 PROCEDURE — 97530 THERAPEUTIC ACTIVITIES: CPT | Mod: GP

## 2025-05-19 PROCEDURE — 99232 SBSQ HOSP IP/OBS MODERATE 35: CPT

## 2025-05-19 PROCEDURE — 97530 THERAPEUTIC ACTIVITIES: CPT | Mod: GO,CO

## 2025-05-19 PROCEDURE — 97535 SELF CARE MNGMENT TRAINING: CPT | Mod: GO,CO

## 2025-05-19 PROCEDURE — 85025 COMPLETE CBC W/AUTO DIFF WBC: CPT | Performed by: NURSE PRACTITIONER

## 2025-05-19 PROCEDURE — 2500000001 HC RX 250 WO HCPCS SELF ADMINISTERED DRUGS (ALT 637 FOR MEDICARE OP): Performed by: NURSE PRACTITIONER

## 2025-05-19 PROCEDURE — 36415 COLL VENOUS BLD VENIPUNCTURE: CPT | Performed by: NURSE PRACTITIONER

## 2025-05-19 PROCEDURE — 82550 ASSAY OF CK (CPK): CPT | Performed by: INTERNAL MEDICINE

## 2025-05-19 PROCEDURE — 1210000001 HC SEMI-PRIVATE ROOM DAILY

## 2025-05-19 PROCEDURE — 2500000004 HC RX 250 GENERAL PHARMACY W/ HCPCS (ALT 636 FOR OP/ED): Mod: JZ | Performed by: INTERNAL MEDICINE

## 2025-05-19 PROCEDURE — 80048 BASIC METABOLIC PNL TOTAL CA: CPT | Performed by: NURSE PRACTITIONER

## 2025-05-19 PROCEDURE — 97110 THERAPEUTIC EXERCISES: CPT | Mod: GP

## 2025-05-19 RX ORDER — DAPTOMYCIN IN SODIUM CHLORIDE 500 MG/50ML
500 INJECTION, SOLUTION INTRAVENOUS EVERY 24 HOURS
Status: DISCONTINUED | OUTPATIENT
Start: 2025-05-19 | End: 2025-05-19

## 2025-05-19 RX ADMIN — Medication 1 TABLET: at 21:57

## 2025-05-19 RX ADMIN — ASPIRIN 81 MG: 81 TABLET, COATED ORAL at 08:43

## 2025-05-19 RX ADMIN — AMLODIPINE BESYLATE 10 MG: 10 TABLET ORAL at 08:43

## 2025-05-19 RX ADMIN — HYDRALAZINE HYDROCHLORIDE 25 MG: 25 TABLET ORAL at 21:58

## 2025-05-19 RX ADMIN — PANTOPRAZOLE SODIUM 40 MG: 40 TABLET, DELAYED RELEASE ORAL at 05:35

## 2025-05-19 RX ADMIN — Medication 1 TABLET: at 08:43

## 2025-05-19 RX ADMIN — HYDRALAZINE HYDROCHLORIDE 25 MG: 25 TABLET ORAL at 06:06

## 2025-05-19 RX ADMIN — CHOLECALCIFEROL (VITAMIN D3) 10 MCG (400 UNIT) TABLET 10 MCG: at 08:43

## 2025-05-19 RX ADMIN — ATENOLOL 50 MG: 50 TABLET ORAL at 21:57

## 2025-05-19 RX ADMIN — NYSTATIN 1 APPLICATION: 100000 POWDER TOPICAL at 21:58

## 2025-05-19 RX ADMIN — ATENOLOL 50 MG: 50 TABLET ORAL at 08:43

## 2025-05-19 RX ADMIN — ALLOPURINOL 100 MG: 100 TABLET ORAL at 21:57

## 2025-05-19 RX ADMIN — DAPTOMYCIN IN SODIUM CHLORIDE 500 MG: 500 INJECTION, SOLUTION INTRAVENOUS at 13:47

## 2025-05-19 RX ADMIN — ALLOPURINOL 100 MG: 100 TABLET ORAL at 08:43

## 2025-05-19 RX ADMIN — HYDRALAZINE HYDROCHLORIDE 25 MG: 25 TABLET ORAL at 13:47

## 2025-05-19 RX ADMIN — HEPARIN SODIUM 5000 UNITS: 5000 INJECTION, SOLUTION INTRAVENOUS; SUBCUTANEOUS at 08:43

## 2025-05-19 RX ADMIN — HYDRALAZINE HYDROCHLORIDE 25 MG: 25 TABLET ORAL at 16:13

## 2025-05-19 RX ADMIN — NYSTATIN 1 APPLICATION: 100000 POWDER TOPICAL at 08:44

## 2025-05-19 RX ADMIN — HEPARIN SODIUM 5000 UNITS: 5000 INJECTION, SOLUTION INTRAVENOUS; SUBCUTANEOUS at 21:58

## 2025-05-19 ASSESSMENT — ENCOUNTER SYMPTOMS
PALPITATIONS: 0
COUGH: 0
CONSTIPATION: 0
NAUSEA: 0
FATIGUE: 1
SHORTNESS OF BREATH: 0
CHILLS: 0
DIARRHEA: 0
VOMITING: 0
FEVER: 0

## 2025-05-19 ASSESSMENT — COGNITIVE AND FUNCTIONAL STATUS - GENERAL
DRESSING REGULAR UPPER BODY CLOTHING: A LITTLE
DRESSING REGULAR UPPER BODY CLOTHING: A LOT
WALKING IN HOSPITAL ROOM: TOTAL
MOVING TO AND FROM BED TO CHAIR: TOTAL
MOVING TO AND FROM BED TO CHAIR: A LITTLE
MOBILITY SCORE: 11
MOVING FROM LYING ON BACK TO SITTING ON SIDE OF FLAT BED WITH BEDRAILS: A LITTLE
DAILY ACTIVITIY SCORE: 14
CLIMB 3 TO 5 STEPS WITH RAILING: TOTAL
EATING MEALS: A LITTLE
MOBILITY SCORE: 14
CLIMB 3 TO 5 STEPS WITH RAILING: TOTAL
DAILY ACTIVITIY SCORE: 18
MOVING FROM LYING ON BACK TO SITTING ON SIDE OF FLAT BED WITH BEDRAILS: A LITTLE
STANDING UP FROM CHAIR USING ARMS: A LOT
TOILETING: A LOT
HELP NEEDED FOR BATHING: A LOT
DRESSING REGULAR LOWER BODY CLOTHING: A LOT
MOBILITY SCORE: 14
DRESSING REGULAR LOWER BODY CLOTHING: A LITTLE
MOVING TO AND FROM BED TO CHAIR: A LITTLE
STANDING UP FROM CHAIR USING ARMS: A LOT
HELP NEEDED FOR BATHING: A LITTLE
DRESSING REGULAR UPPER BODY CLOTHING: A LITTLE
DRESSING REGULAR LOWER BODY CLOTHING: A LITTLE
PERSONAL GROOMING: A LITTLE
PERSONAL GROOMING: A LITTLE
EATING MEALS: A LITTLE
WALKING IN HOSPITAL ROOM: A LOT
HELP NEEDED FOR BATHING: A LITTLE
TOILETING: A LITTLE
STANDING UP FROM CHAIR USING ARMS: A LOT
TURNING FROM BACK TO SIDE WHILE IN FLAT BAD: A LITTLE
TOILETING: A LITTLE
PERSONAL GROOMING: A LITTLE
WALKING IN HOSPITAL ROOM: A LOT
CLIMB 3 TO 5 STEPS WITH RAILING: TOTAL
TURNING FROM BACK TO SIDE WHILE IN FLAT BAD: A LITTLE
EATING MEALS: A LITTLE
MOVING FROM LYING ON BACK TO SITTING ON SIDE OF FLAT BED WITH BEDRAILS: A LITTLE
TURNING FROM BACK TO SIDE WHILE IN FLAT BAD: A LITTLE
DAILY ACTIVITIY SCORE: 18

## 2025-05-19 ASSESSMENT — PAIN SCALES - GENERAL
PAINLEVEL_OUTOF10: 0 - NO PAIN

## 2025-05-19 ASSESSMENT — ACTIVITIES OF DAILY LIVING (ADL): HOME_MANAGEMENT_TIME_ENTRY: 10

## 2025-05-19 ASSESSMENT — PAIN - FUNCTIONAL ASSESSMENT
PAIN_FUNCTIONAL_ASSESSMENT: 0-10
PAIN_FUNCTIONAL_ASSESSMENT: FLACC (FACE, LEGS, ACTIVITY, CRY, CONSOLABILITY)

## 2025-05-19 NOTE — PROGRESS NOTES
"Pt is currently comfortable w nixon, no dysuria, no hematuria, no complaints    Blood pressure 138/70, pulse 63, temperature 36.9 °C (98.4 °F), temperature source Oral, resp. rate 18, height 1.626 m (5' 4\"), weight 94.1 kg (207 lb 7.3 oz), SpO2 97%.     I/O last 3 completed shifts:  In: 650 (6.9 mL/kg) [P.O.:600; IV Piggyback:50]  Out: 3700 (39.3 mL/kg) [Urine:3700 (1.1 mL/kg/hr)]  Weight: 94.1 kg   I/O this shift:  In: 540 [P.O.:540]  Out: -        PE:  awake, pleasant,   No sob w talking, nor audible wheezes  Urine is dark yellow      AP:  pt w retention in face of other medical concerns, nixon to remain for now  "

## 2025-05-19 NOTE — CONSULTS
"Nutrition Assessement Note    Nutrition Assessment    Reason for Assessment: Admission nursing screening    Reason for Hospital Admission:  Tammy Walker is a 69 y.o. female who is admitted for weakness. PMH of CHF, HTN, CKD, chronic urinary retention, and IBS.  Pending SNF arrangements.     Malnutrition Screening Tool (MST)  Have you recently lost weight without trying?: No  If yes, how much weight have you lost?: Unsure  Weight Loss Score: 0  Have you been eating poorly because of a decreased appetite?: No  Malnutrition Score: 0  Nutrition Screen  Stage 3 or 4 Pressure Injury or Multiple Non-Healing Wounds: Yes (Comment)  Home Tube Feeding or Total Parenteral Nutrition (TPN): Yes (Comment)  Dietitian Consult Needed: Yes (Comment)    Medical History[1]   Surgical History[2]    Nutrition History:  Food and Nutrient History: Pt. denies having a poor appetite or decreased intakes. Eating full 3 meals/day. Snacks at bedside and per chart summary 100% of meals consumed today. Denies any n/v/d/c, no chewing/swallowing difficulties       Anthropometrics:  Ht: 162.6 cm (5' 4\"), Wt: 94.1 kg (207 lb 7.3 oz), BMI: 35.59  IBW/kg (Dietitian Calculated): 54.5 kg  Percent of IBW: 172.7 %  Adjusted Body Weight (kg): 70.3 kg    Weight Change:  Daily Weight  05/19/25 : 94.1 kg (207 lb 7.3 oz)  05/15/25 : 90.3 kg (199 lb)  04/13/25 : 90.7 kg (199 lb 15.3 oz)  03/31/25 : 90.7 kg (200 lb)  03/28/25 : 90.7 kg (200 lb)  03/05/25 : 90.7 kg (200 lb)  03/03/25 : 90.7 kg (200 lb)  11/05/24 : 90.7 kg (200 lb)  09/25/24 : 90.7 kg (200 lb)  08/14/24 : 90.7 kg (200 lb)    Weight History / % Weight Change: Pt denies any recent wt. loss. Reports a UBW of 190# (86.3 kg) but is unsure, per pt report no wt. gain appretiated. No recent wt. hx found in chart review.             Nutrition Focused Physical Exam Findings: no nutrition related deficits noted at this time  Subcutaneous Fat Loss  Defer Subcutaneous Fat Loss Assessment: Defer all    Muscle " "Wasting  Defer Muscle Wasting Assessment: Defer all    Edema  Edema: none    Physical Findings  Skin: Negative  Digestive System Findings: Diarrhea, Nausea (\"on/off\" x 1 month; Pt has Hx of IBS)    Nutrition Significant Labs:  Lab Results   Component Value Date    WBC 7.9 05/19/2025    HGB 12.4 05/19/2025    HCT 36.1 05/19/2025     05/19/2025    CHOL 126 10/30/2024    TRIG 147 10/30/2024    HDL 34.0 10/30/2024    ALT 12 05/16/2025    AST 17 05/16/2025     05/19/2025    K 3.9 05/19/2025     (H) 05/19/2025    CREATININE 1.44 (H) 05/19/2025    BUN 20 05/19/2025    CO2 23 05/19/2025    INR 1.0 09/10/2023    HGBA1C 5.7 (H) 05/16/2025     Nutrition Specific Medications:  Scheduled Medications[3]  Continuous Medications[4]    Dietary Orders (From admission, onward)       Start     Ordered    05/16/25 1826  May Participate in Room Service  ( ROOM SERVICE MAY PARTICIPATE)  Once        Question:  .  Answer:  Yes    05/16/25 1825    05/16/25 1610  Adult diet Regular, Cardiac; 70 gm fat; 2 - 3 grams Sodium  Diet effective now        Question Answer Comment   Diet type Regular    Diet type Cardiac    Fat restriction: 70 gm fat    Sodium restriction: 2 - 3 grams Sodium        05/16/25 1610                   Estimated Needs:   Estimated Energy Needs  Total Energy Estimated Needs in 24 hours (kCal): 1758 kCal  Energy Estimated Needs per kg Body Weight in 24 hours (kCal/kg): 25 kCal/kg  Method for Estimating Needs: ABW    Estimated Protein Needs  Total Protein Estimated Needs in 24 Hours (g): 70 g  Protein Estimated Needs per kg Body Weight in 24 Hours (g/kg): 1 g/kg  Method for Estimating 24 Hour Protein Needs: ABW    Estimated Fluid Needs  Total Fluid Estimated Needs in 24 Hours (mL): 1758 mL  Method for Estimating 24 Hour Fluid Needs: 1 mL/kcal or per MD  Patient on Order Fluid Restriction: No     Nutrition Diagnosis   Nutrition Diagnosis:  Malnutrition Diagnosis  Patient has Malnutrition Diagnosis: " No    Nutrition Diagnosis  Patient has Nutrition Diagnosis: No     Nutrition Interventions/Recommendations   Nutrition Interventions and Recommendations:  Nutrition Prescription: Nutrition prescription for oral nutrition    Nutrition Recommendations:  Individualized Nutrition Prescription Provided for : 1758 kcal and 70 g protein to be provided by diet    Nutrition Interventions/Goals:   Food and/or Nutrient Delivery Interventions  Interventions: Meals and snacks  Meals and Snacks: Fat-modified diet, Mineral-modified diet  Goal: provide as ordered 70g fat and 2-3 g Na restriction    Education Documentation  No documentation found.            Nutrition Monitoring and Evaluation   Monitoring/Evaluation:   Food/Nutrient Related History Monitoring  Monitoring and Evaluation Plan: Estimated Energy Intake  Estimated Energy Intake: Energy intake greater or equal to 75% of estimated energy needs  Additional Plans: will continue to monitor adequacy of po intake and order supplements if indicated  Anthropometric Measurements  Monitoring and Evaluation Plan: Body weight  Body Weight: Body weight - Maintain stable weight    Biochemical Data, Medical Tests and Procedures  Monitoring and Evaluation Plan: Electrolyte/renal panel  Electrolyte and Renal Panel: BUN, Creatinine, GFR    Physical Exam Findings  Monitoring and Evaluation Plan: Digestive System  Digestive System Finding: Diarrhea, Nausea     Goal Status: New goal(s) identified    Follow Up  Time Spent (min): 30 minutes  Last Date of Nutrition Visit: 05/19/25  Nutrition Follow-Up Needed?: 7-10 days  Follow up Comment: 5/29/25          [1]   Past Medical History:  Diagnosis Date    Arthritis     CHF (congestive heart failure)     Chronic kidney disease     HLD (hyperlipidemia)     HTN (hypertension)     Long COVID     TIA (transient ischemic attack)     Urinary tract infection    [2]   Past Surgical History:  Procedure Laterality Date    MR HEAD ANGIO WO IV CONTRAST   9/11/2023    MR HEAD ANGIO WO IV CONTRAST [facility]   [3] allopurinol, 100 mg, oral, BID  amLODIPine, 10 mg, oral, Daily  aspirin, 81 mg, oral, Daily  atenolol, 50 mg, oral, BID  [Held by provider] atorvastatin, 40 mg, oral, Nightly  cholecalciferol, 10 mcg, oral, Daily  daptomycin, 500 mg, intravenous, q24h  heparin, 5,000 Units, subcutaneous, q12h  hydrALAZINE, 25 mg, oral, 4x daily  lactobacillus acidophilus, 1 tablet, oral, BID  nystatin, 1 Application, Topical, BID  pantoprazole, 40 mg, oral, Daily before breakfast     [4]

## 2025-05-19 NOTE — PROGRESS NOTES
Physical Therapy    Physical Therapy Treatment    Patient Name: Tammy Walker  MRN: [id number]  Department: [address]  Room: [address] [place] [address]  Today's Date: 5/19/2025  Time Calculation  Start Time: 1116  Stop Time: 1140  Time Calculation (min): 24 min         Assessment/Plan   PT Assessment  PT Assessment Results: Decreased strength, Decreased mobility, Decreased coordination, Impaired balance, Decreased endurance, Impaired judgement, Obesity  Rehab Prognosis: Good  Barriers to Discharge Home: Physical needs  Physical Needs: 24hr mobility assistance needed  Evaluation/Treatment Tolerance: Patient tolerated treatment well  Medical Staff Made Aware: Yes  Strengths: Ability to acquire knowledge, Support of extended family/friends, Support of pets  Barriers to Participation: Comorbidities  End of Session Communication: Bedside nurse  Assessment Comment: pt demonstrates slow progress toward goals. pt req mod A x 2 for transfers this date; pt continues to be limited by weakness, impaired balance and overall decreased activity tolerance. pt would benefit from continued skilled PT to address the above deficits.  End of Session Patient Position: Bed, 3 rail up, Alarm on (call bell in reach, all needs met. RN aware)  PT Plan  Inpatient/Swing Bed or Outpatient: Inpatient  PT Plan  Treatment/Interventions: Bed mobility, Transfer training, Gait training, Balance training, Neuromuscular re-education, Strengthening, Endurance training, Range of motion, Therapeutic exercise, Therapeutic activity, Home exercise program  PT Plan: Ongoing PT  PT Frequency: 4 times per week  PT Discharge Recommendations: Moderate intensity level of continued care  Equipment Recommended upon Discharge: Wheeled walker  PT Recommended Transfer Status: Assist x2, Assistive device (RW)  PT - OK to Discharge: Yes    PT Visit Info:  PT Received On: 05/19/25  Response to Previous Treatment: Patient with no complaints from previous session.     General Visit  Information:   General  Reason for Referral: mobility impairment; 69 yr old female presenting with generalized weakness and difficulty w/ taking care of self after returning home from rehab ~2 weeks ago. pt found to have leukocytosis and urinary retention  Referred By: Dr. Elias  Past Medical History Relevant to Rehab: Arthritis, Covid, systolic heart failure, chronic kidney failure, arthritis, HTN, bladder dysfunction; urinary retention; hyperlipidemia; IBS; UTI  Family/Caregiver Present: No  Co-Treatment: OT  Co-Treatment Reason: pt requires two skilled therapists to mobilize  Prior to Session Communication: Bedside nurse  Patient Position Received: Bed, 3 rail up, Alarm off, not on at start of session  Preferred Learning Style: verbal, visual  General Comment: pt cleared for therapy via RN, agreeable to participate; +urinary catheter, tele    Subjective   Precautions:  Precautions  Hearing/Visual Limitations: vision WNL; mild [place]  Medical Precautions: Fall precautions     Date/Time Vitals Session Patient Position Pulse Resp SpO2 BP MAP (mmHg)    05/19/25 1116 During PT  --  --  --  94% supine in bed upon arrival on RA; 97% following activity seated on EOB  --  --                 Objective   Pain:  Pain Assessment  Pain Assessment: FLACC (Face, Legs, Activity, Cry, Consolability)  0-10 (Numeric) Pain Score: 0 - No pain  Cognition:  Cognition  Orientation Level: Oriented X4  Coordination:  Movements are Fluid and Coordinated: No  Coordination Comment: decreased rate and accuracy of movements; pt with L sided weakness/ tremor like movements with ther ex  Postural Control:  Postural Control  Postural Control: Impaired  Posture Comment: pt with tendency for L lateral trunk lean and intermittent retrolean sitting on EOB  Static Sitting Balance  Static Sitting-Balance Support: Bilateral upper extremity supported, Feet supported  Static Sitting-Level of Assistance: Contact guard  Static Sitting-Comment/Number of  foot ulcer Minutes: fair+ static sitting on EOB  Dynamic Sitting Balance  Dynamic Sitting-Balance Support: Bilateral upper extremity supported, Feet supported  Dynamic Sitting-Comments: CGA > mod A for stability with dynamic sitting balance as pt performed seated ther ex; pt sat EOB ~20 mins  Static Standing Balance  Static Standing-Balance Support: Bilateral upper extremity supported (on RW)  Static Standing-Level of Assistance: Maximum assistance (x2)  Static Standing-Comment/Number of Minutes: x3 trials; poor standing balance with max A x 2 to steady; pt able to maintain partially upright standing x25 seconds       Activity Tolerance:  Activity Tolerance  Endurance: Decreased tolerance for upright activites  Activity Tolerance Comments: limited by weakness and fatigue  Rate of Perceived Exertion (RPE): 6/10  Treatments:  Therapeutic Exercise  Therapeutic Exercise Performed: Yes  Therapeutic Exercise Activity 1: seated on EOB: samia APs x20 reps, samia LAQs x10 reps; pt began to fatigue and retrolean requiring min-mod A for upright sitting stability limiting further ther ex    Therapeutic Activity  Therapeutic Activity Performed: Yes  Therapeutic Activity 1: refer to bed mobility, transfers and balance    Bed Mobility  Bed Mobility: Yes  Bed Mobility 1  Bed Mobility 1: Supine to sitting  Level of Assistance 1: Minimum assistance  Bed Mobility Comments 1: min A to assist with trunk elevation and guiding B LEs off EOB  Bed Mobility 2  Bed Mobility  2: Scooting  Level of Assistance 2: Maximum assistance  Bed Mobility Comments 2: max A x 1-2 to square samia hips on EOB  Bed Mobility 3  Bed Mobility 3: Sitting to supine  Level of Assistance 3: Maximum assistance, +2  Bed Mobility Comments 3: max A x 2 for B LEs onto EOB and controlled trunk lowering; dependent boost to HOB    Ambulation/Gait Training  Ambulation/Gait Training Performed: No  Transfers  Transfer: Yes  Transfer 1  Transfer From 1: Sit to, Stand to  Transfer to 1: Stand,  foot ulcer Sit  Technique 1: Sit to stand, Stand to sit  Transfer Device 1: Walker  Transfer Level of Assistance 1: Moderate assistance, +2  Trials/Comments 1: pt pulled to stand from walker with mod A x 2 for fwd weight shift and trunk elevation; VCs to tuck bottom and tighten gluts for upright standing; pt able to maintain partially upright standing x25 seconds; x3 trials; mod A x 2 for controlled lowering to EOB    Stairs  Stairs: No         Outcome Measures:  [facility] Basic Mobility  Turning from your back to your side while in a flat bed without using bedrails: A little  Moving from lying on your back to sitting on the side of a flat bed without using bedrails: A little  Moving to and from bed to chair (including a wheelchair): Total  Standing up from a chair using your arms (e.g. wheelchair or bedside chair): A lot  To walk in hospital room: Total  Climbing 3-5 steps with railing: Total  Basic Mobility - Total Score: 11    Education Documentation  Precautions, taught by Raj Spain PT at 5/19/2025 12:12 PM.  Learner: Patient  Readiness: Acceptance  Method: Explanation  Response: Needs Reinforcement    Mobility Training, taught by Raj Spain PT at 5/19/2025 12:12 PM.  Learner: Patient  Readiness: Acceptance  Method: Explanation  Response: Needs Reinforcement    Education Comments  No comments found.        OP EDUCATION:       Encounter Problems       Encounter Problems (Active)       PT Transfers       STG - Patient to transfer to and from sit to supine with MIN A (Progressing)       Start:  05/17/25    Expected End:  05/31/25            STG - Patient will transfer sit to and from stand with MIN A using rolling walker (Progressing)       Start:  05/17/25    Expected End:  05/31/25               PT Transfers       STG - Transfer from bed to chair via SPT with MIN A (Progressing)       Start:  05/17/25    Expected End:  05/31/25            STG - Patient will roll side to side with CGA (Progressing)        Start:  05/17/25    Expected End:  05/31/25               Pain - Adult               foot ulcer foot ulcer

## 2025-05-19 NOTE — PROGRESS NOTES
Occupational Therapy    OT Treatment    Patient Name: Tammy Walker  MRN: [id number]  Department: [address]  Room: [address] [place] [address]  Today's Date: 5/19/2025  Time Calculation  Start Time: 1117  Stop Time: 1140  Time Calculation (min): 23 min        Assessment:  OT Assessment: Gradual progress made towards OT goals. Continue with current OT POC to increase strength, balance and functional tolerance to maximize safety and independence during ADLs.  Barriers to Discharge Home: Caregiver assistance, Physical needs  Caregiver Assistance: Caregiver assistance needed per identified barriers - however, level of patient's required assistance exceeds assistance available at home  Physical Needs: 24hr ADL assistance needed, 24hr mobility assistance needed, High falls risk due to function or environment  Evaluation/Treatment Tolerance: Patient limited by fatigue  End of Session Communication: Bedside nurse  End of Session Patient Position: Bed, 3 rail up, Alarm on (all needs in reach)  OT Assessment Results: Decreased ADL status, Decreased upper extremity range of motion, Decreased upper extremity strength, Decreased cognition, Decreased endurance, Decreased functional mobility, Decreased trunk control for functional activities  Barriers to Discharge: Decreased caregiver support  Evaluation/Treatment Tolerance: Patient limited by fatigue  Plan:  Treatment Interventions: ADL retraining, Functional transfer training, UE strengthening/ROM, Endurance training, Equipment evaluation/education, Compensatory technique education  OT Frequency: 3 times per week  OT Discharge Recommendations: Moderate intensity level of continued care  Equipment Recommended upon Discharge: Wheeled walker, Bedside commode  OT Recommended Transfer Status: Assist of 1, Moderate assist  OT - OK to Discharge: Yes  Treatment Interventions: ADL retraining, Functional transfer training, UE strengthening/ROM, Endurance training, Equipment evaluation/education,  Compensatory technique education    Subjective   OT Visit Info:     General Visit Info:  General  Reason for Referral: ADL impairment; 69 yr old female presenting with generalized weakness and difficulty w/ taking care of self after returning home from rehab ~2 weeks ago.  Past Medical History Relevant to Rehab: Arthritis, Covid, systolic heart failure, chronic kidney failure, arthritis, HTN, bladder dysfunction; urinary retention; hyperlipidemia; IBS; UTI  Co-Treatment: PT  Co-Treatment Reason: pt requires two skilled therapists to mobilize  Prior to Session Communication: Bedside nurse  Patient Position Received: Bed, 3 rail up, Alarm off, not on at start of session  General Comment: Patient cleared for therapy session per nursing, pleasant and cooperative this date.  Precautions:  Hearing/Visual Limitations: vision WNL; mild [place]  Medical Precautions: Fall precautions  Precautions Comment: nixon     Date/Time Vitals Session Patient Position Pulse Resp SpO2 BP MAP (mmHg)    05/19/25 1557 --  --  63  18  97 %  138/70  88           Vital Signs Comment: SpO2 94-97% on room air throughout therapy session     Pain:  Pain Assessment  Pain Assessment: 0-10  0-10 (Numeric) Pain Score: 0 - No pain  Clinical Progression: Not changed    Objective    Cognition:  Cognition  Overall Cognitive Status: Impaired  Orientation Level: Oriented X4  Cognition Comments: intermittent confusion  Safety/Judgement: Exceptions to WFL  Insight: Mild  Impulsive: Mildly  Task Initiation: Delayed initiation  Processing Speed: Delayed  Coordination:  Movements are Fluid and Coordinated: No  Upper Body Coordination: decreased rate and accuracy of movements L>R, intermittent tremors obeserved L side  Lower Body Coordination: slower rate of movement, observed decreased L knee flexion when seated EOB. Intremittent L side tremors observed  Activities of Daily Living: Grooming  Grooming Comments: oral hygiene tasks completed seated EOB with Emmy to  maintain upright seated posture.    LE Dressing  LE Dressing: Yes  LE Dressing Comments: total dependent assist required to don socks at bed level  Functional Standing Tolerance:  Time: <1min  Activity: static standing  Functional Standing Tolerance Comments: Standing tasks completed this date with FWW and maxAx2 to maintain balance in standing d/t retropulsion to increase functional tolerance, strength and challenge balance to maximize safety and independence during ADLs. Seated rest breaks required between standing trials secondary to fatigue, longest stand time ~25seconds  Bed Mobility/Transfers: Bed Mobility  Bed Mobility: Yes  Bed Mobility 1  Bed Mobility 1: Supine to sitting  Level of Assistance 1: Minimum assistance  Bed Mobility Comments 1: increased assistance required for trunk elevation  Bed Mobility 2  Bed Mobility  2: Scooting  Level of Assistance 2: Maximum assistance  Bed Mobility Comments 2: maxAx1-2 required for scooting hips towards EOB with use of draw sheet. MaxAx2 required for scooting tasks towards HOB with use of draw sheet.  Bed Mobility 3  Bed Mobility 3: Sitting to supine  Level of Assistance 3: Maximum assistance, +2    Transfers  Transfer: Yes  Transfer 1  Trials/Comments 1: sit<>stand trials completed x3 from standard EOB height with B knee blocking and modAx2 with use of FWW- verbal/ tactile cues required for proper hand placement to increase safety and decrease risk of falls. Once in standing patient required maxAx2 to maintain upright erect posture. Frequent verbal/ tactile cues for pelvic positioning to maintain balance.    Therapy/Activity: Therapeutic Exercise  Therapeutic Exercise Performed: Yes  Therapeutic Exercise Activity 1: BUE exercises completed 1x10 with min resistance for following exercises: wrist flexion/ extension, pronation/supination, bicep curl, triceps extension, and shoulder press  Therapeutic Exercise Activity 2: Exercises completed this date to increase strength  and functional tolerance for safety and ease of ADL/ADL transfer completion. Verbal instruction and demonstration provided to ensure proper muscle recruitment.    Therapeutic Activity  Therapeutic Activity Performed: Yes  Therapeutic Activity 1: EOB sitting trials completed ~10min with Emmy to complete functional tasks. Fair- sitting balance observed this date, R lateral lean observed with fatigue. EOB sitting trials completed to increase functional tolerance, strength and challenge sitting balance to maximize potential during ADLs.    Outcome Measures:[facility] Daily Activity  Putting on and taking off regular lower body clothing: A lot  Bathing (including washing, rinsing, drying): A lot  Putting on and taking off regular upper body clothing: A lot  Toileting, which includes using toilet, bedpan or urinal: A lot  Taking care of personal grooming such as brushing teeth: A little  Eating Meals: A little  Daily Activity - Total Score: 14        Education Documentation  Body Mechanics, taught by VIJAY Frank at 5/19/2025  4:49 PM.  Learner: Patient  Readiness: Acceptance  Method: Explanation, Demonstration  Response: Needs Reinforcement  Comment: safety awareness throughout functional tasks/ transfers    ADL Training, taught by VIJAY Frank at 5/19/2025  4:49 PM.  Learner: Patient  Readiness: Acceptance  Method: Explanation, Demonstration  Response: Needs Reinforcement  Comment: safety awareness throughout functional tasks/ transfers    Education Comments  Education provided on role of OT/POC, safety awareness throughout functional tasks/transfers, importance of activity/ rest routine, EC/WS techniques, and use of call light for assistance. Questions, comments and concerns addressed regarding OT.      Goals:  Encounter Problems       Encounter Problems (Active)       ADLs       Patient with complete lower body dressing with minimal assist  level of assistance donning and doffing all LE clothes  with  PRN adaptive equipment while edge of bed  (Progressing)       Start:  05/16/25    Expected End:  06/16/25            Patient will complete daily grooming tasks with set-up level of assistance and PRN adaptive equipment while supported sitting. (Progressing)       Start:  05/16/25    Expected End:  06/16/25            Patient will complete toileting including hygiene clothing management/hygiene with minimal assist  level of assistance and bedside commode. (Progressing)       Start:  05/16/25    Expected End:  06/16/25               TRANSFERS       Patient will complete functional transfer to BSC/toilet with least restrictive device with contact guard assist level of assistance. (Progressing)       Start:  05/16/25    Expected End:  06/16/25               TRANSFERS       Patient will complete sit to stand transfer with contact guard assist level of assistance and least restrictive device in order to improve safety and prepare for out of bed mobility. (Progressing)       Start:  05/16/25    Expected End:  06/16/25

## 2025-05-19 NOTE — CONSULTS
Inpatient consult to Infectious Diseases  Consult performed by: Pedrito Grande MD  Consult ordered by: ANGELI Fernando-NANCY            Primary MD: DAV Benitez    Reason For Consult  Positive blood culture    History Of Present Illness  Tammy Walker is a 69 y.o. female presenting with generalized weakness.  She has history of hypertension, hyperlipidemia.    She also had urinary retention.  She was placed on Rocephin, but this was discontinued because of negative urine culture.  She had interval positive blood culture for gram-positive cocci in clusters.  She denies any fever or chills.  She has history of chronic kidney disease, with recent acute kidney injury.  I reviewed her chart, and started her empirically on IV daptomycin.     Past Medical History  She has a past medical history of Arthritis, CHF (congestive heart failure), Chronic kidney disease, HLD (hyperlipidemia), HTN (hypertension), Long COVID, TIA (transient ischemic attack), and Urinary tract infection.    Surgical History  She has a past surgical history that includes MR angio head wo IV contrast (9/11/2023).     Social History     Occupational History    Not on file   Tobacco Use    Smoking status: Former     Current packs/day: 0.00     Types: Cigarettes     Quit date: 2022     Years since quitting: 3.3    Smokeless tobacco: Never   Substance and Sexual Activity    Alcohol use: Not Currently    Drug use: Never    Sexual activity: Not on file     Travel History   Travel since 04/19/25    No documented travel since 04/19/25         Family History  Family History[1]  Allergies  Patient has no known allergies.     Immunization History   Administered Date(s) Administered    Moderna SARS-CoV-2 Vaccination 05/08/2021, 01/02/2022    PPD Test 09/14/2023    Tdap vaccine, age 7 year and older (BOOSTRIX, ADACEL) 05/10/2024     Medications  Home medications:  Prescriptions Prior to Admission[2]  Current medications:  Scheduled  medications  Scheduled Medications[3]  Continuous medications  Continuous Medications[4]  PRN medications  PRN Medications[5]    Review of Systems   Constitutional:  Positive for fatigue. Negative for chills and fever.   Respiratory:  Negative for cough and shortness of breath.    Cardiovascular:  Negative for chest pain, palpitations and leg swelling.   Gastrointestinal:  Negative for constipation, diarrhea, nausea and vomiting.        Objective  Range of Vitals (last 24 hours)  Heart Rate:  [54-64]   Temp:  [36.5 °C (97.7 °F)-36.7 °C (98.1 °F)]   Resp:  [16-18]   BP: (143-167)/(68-78)   Weight:  [93 kg (205 lb 0.4 oz)-94.1 kg (207 lb 7.3 oz)]   SpO2:  [94 %-99 %]   Daily Weight  05/19/25 : 94.1 kg (207 lb 7.3 oz)    Body mass index is 35.61 kg/m².     Physical Exam  Constitutional:       Appearance: Normal appearance.   HENT:      Head: Normocephalic and atraumatic.      Right Ear: External ear normal.      Left Ear: External ear normal.      Nose: Nose normal.   Eyes:      General: No scleral icterus.     Extraocular Movements: Extraocular movements intact.      Conjunctiva/sclera: Conjunctivae normal.   Cardiovascular:      Rate and Rhythm: Normal rate and regular rhythm.      Heart sounds: Normal heart sounds.   Pulmonary:      Effort: Pulmonary effort is normal.      Breath sounds: Normal breath sounds.   Abdominal:      General: Bowel sounds are normal.      Palpations: Abdomen is soft.      Tenderness: There is no abdominal tenderness.   Musculoskeletal:      Cervical back: Normal range of motion and neck supple.      Right lower leg: No edema.      Left lower leg: No edema.   Skin:     General: Skin is warm and dry.   Neurological:      Mental Status: She is alert and oriented to person, place, and time.   Psychiatric:         Behavior: Behavior normal.          Relevant Results  Outside Hospital Results    Labs  Results from last 72 hours   Lab Units 05/19/25  0632 05/18/25  0659 05/17/25  0629   WBC AUTO  "x10*3/uL 7.9 8.5 7.8   HEMOGLOBIN g/dL 12.4 11.2* 10.9*   HEMATOCRIT % 36.1 33.4* 33.3*   PLATELETS AUTO x10*3/uL 293 259 264   NEUTROS PCT AUTO % 69.4 69.3 67.2   LYMPHS PCT AUTO % 17.1 17.1 19.1   MONOS PCT AUTO % 8.1 8.4 8.3   EOS PCT AUTO % 4.1 4.0 4.1     Results from last 72 hours   Lab Units 05/19/25  0632 05/18/25  0659 05/17/25  0629   SODIUM mmol/L 140 140 141   POTASSIUM mmol/L 3.9 4.0 4.1   CHLORIDE mmol/L 109* 108* 108*   CO2 mmol/L 23 22 24   BUN mg/dL 20 23 24*   CREATININE mg/dL 1.44* 1.47* 1.53*   GLUCOSE mg/dL 103* 104* 96   CALCIUM mg/dL 8.9 8.8 8.9   ANION GAP mmol/L 12 14 13   EGFR mL/min/1.73m*2 40* 39* 37*     Results from last 72 hours   Lab Units 05/16/25  1216   ALK PHOS U/L 145*   BILIRUBIN TOTAL mg/dL 1.0   PROTEIN TOTAL g/dL 7.2   ALT U/L 12   AST U/L 17   ALBUMIN g/dL 3.8     Estimated Creatinine Clearance: 41.6 mL/min (A) (by C-G formula based on SCr of 1.44 mg/dL (H)).  No results found for: \"CRP\", \"SEDRATE\"  No results found for: \"HIV1X2\", \"HIVCONF\", \"[id number]\"  No results found for: \"HEPCABINIT\", \"HEPCAB\", \"HCVPCRQUANT\"  Microbiology  Susceptibility data from last 90 days.  Collected Specimen Info Organism Ampicillin Cefazolin Cefazolin (uncomplicated UTIs only) Ciprofloxacin Gentamicin Nitrofurantoin Piperacillin/Tazobactam Trimethoprim/Sulfamethoxazole   04/12/25 Urine from Clean Catch/Voided Escherichia coli  S  S  S  S  S  S  S  S     Imaging  Electrocardiogram, 12-lead PRN ACS symptoms  Result Date: 5/16/2025  Normal sinus rhythm Left anterior fascicular block Left ventricular hypertrophy with repolarization abnormality ( R in aVL , [place] product ) Abnormal ECG When compared with ECG of 31-MAR-2025 10:55, Minimal criteria for Septal infarct are no longer Present    CT head wo IV contrast  Result Date: 5/16/2025  Interpreted By:  Ketan Perez, STUDY: CT HEAD WO IV CONTRAST;  5/16/2025 12:33 pm   INDICATION: Signs/Symptoms:weakness.   COMPARISON: 03/03/2025   ACCESSION " NUMBER(S): [id number]   ORDERING CLINICIAN: ARTIS WATTS   TECHNIQUE: Sequential trans axial images were obtained  .   FINDINGS: INTRACRANIAL:   There is moderate prominence of the cortical sulci indicating atrophy.   There is moderate ventriculomegaly, again consistent with atrophy. Additionally there is slightly greater dilatation of the right lateral ventricle may be ex vacuo from remote infarction at the corona radiata.   Moderate to fairly extensive decreased attenuation of the periventricular and long tracks of the white matter most consistent with gliosis from arterial disease.There is more discrete focal hypodensity in the corona radiata probably from remote infarctions. There is also remote lacunar infarction of the left thalamus that may have been subacute on the previous exam. Otherwise there is no evidence of definite subacute infarction, intracranial hemorrhage or mass.     EXTRACRANIAL: Visualized paranasal sinuses and mastoids are clear. The calvarium is intact.       Moderate age related degenerative change as described without acute findings or significant change from the prior exam.   Signed by: Ketan Perez 5/16/2025 1:00 PM Dictation workstation:   [id number]    XR chest 1 view  Result Date: 5/16/2025  Interpreted By:  Efe Laird, STUDY: XR CHEST 1 VIEW;  5/16/2025 12:05 pm   INDICATION: Signs/Symptoms:weakness.     COMPARISON: 03/31/2025   ACCESSION NUMBER(S): [id number]   ORDERING CLINICIAN: ARTIS WATTS   FINDINGS:         CARDIOMEDIASTINAL SILHOUETTE: Cardiomediastinal silhouette is normal in size and configuration.   LUNGS: Lungs are clear.   ABDOMEN: No remarkable upper abdominal findings.   BONES: No acute osseous changes.       1.  No evidence of acute cardiopulmonary process.       MACRO: None   Signed by: Efe Laird 5/16/2025 12:11 PM Dictation workstation:   [id number]     Assessment/Plan   Pyuria, negative urine culture  Acute kidney injury on chronic kidney disease,  resolved  Positive blood culture-Staphylococcus epidermidis per interval review, contaminant    Discontinue daptomycin  Supportive care  Monitor temperature and WBC  Monitor renal function    This is a complex infectious disease issue and the following was performed today (for more details please see the above note): Management decisions reflecting the added complexity (e.g., changes in antimicrobial therapy, infection control strategies).         Pedrito Grande MD         [1]   Family History  Problem Relation Name Age of Onset    Hypertension Mother      Diabetes Mother      Hypertension Father      Diabetes Father     [2]   Medications Prior to Admission   Medication Sig Dispense Refill Last Dose/Taking    allopurinol (Zyloprim) 100 mg tablet TAKE 1 TABLET BY MOUTH TWICE A  tablet 3 5/16/2025    amLODIPine (Norvasc) 10 mg tablet Take 1 tablet (10 mg) by mouth once daily.   5/16/2025    aspirin 81 mg EC tablet Take 1 tablet (81 mg) by mouth once daily.   5/16/2025    atenolol (Tenormin) 50 mg tablet Take 1 tablet (50 mg) by mouth 2 times a day.   5/16/2025    atorvastatin (Lipitor) 40 mg tablet Take 1 tablet (40 mg) by mouth once daily at bedtime. 90 tablet 3 5/15/2025    cholecalciferol (Vitamin D3) 10 MCG (400 UNIT) tablet Take 1 tablet (10 mcg) by mouth once daily. 30 tablet 11 Past Month    furosemide (Lasix) 40 mg tablet Take 1 tablet (40 mg) by mouth once daily as needed (edema).   Past Week    hydrALAZINE (Apresoline) 25 mg tablet Take 1 tablet (25 mg) by mouth 4 times a day.   5/16/2025    nystatin (Mycostatin) 100,000 unit/gram powder Apply 1 Application topically 2 times a day. groin   Past Week    pantoprazole (ProtoNix) 40 mg EC tablet Take 1 tablet (40 mg) by mouth once daily in the morning. Take before meals. Do not crush, chew, or split.   5/16/2025    potassium chloride CR 10 mEq ER tablet TAKE 1 TABLET BY MOUTH TWICE A DAY (DO NOT CRUSH, CHEW, OR SPLIT) 60 tablet 5 5/16/2025     valsartan (Diovan) 80 mg tablet TAKE 1 TABLET (80 MG) BY MOUTH 2 TIMES A DAY. 100 tablet 3 5/16/2025   [3] allopurinol, 100 mg, oral, BID  amLODIPine, 10 mg, oral, Daily  aspirin, 81 mg, oral, Daily  atenolol, 50 mg, oral, BID  atorvastatin, 40 mg, oral, Nightly  cholecalciferol, 10 mcg, oral, Daily  heparin, 5,000 Units, subcutaneous, q12h  hydrALAZINE, 25 mg, oral, 4x daily  lactobacillus acidophilus, 1 tablet, oral, BID  nystatin, 1 Application, Topical, BID  pantoprazole, 40 mg, oral, Daily before breakfast    [4]    [5] PRN medications: acetaminophen, docusate sodium, loperamide, melatonin

## 2025-05-19 NOTE — CARE PLAN
Problem: Skin  Goal: Decreased wound size/increased tissue granulation at next dressing change  Outcome: Progressing  Flowsheets (Taken 5/18/2025 2308)  Decreased wound size/increased tissue granulation at next dressing change:   Promote sleep for wound healing   Protective dressings over bony prominences   Utilize specialty bed per algorithm  Goal: Participates in plan/prevention/treatment measures  Outcome: Progressing  Flowsheets (Taken 5/18/2025 2308)  Participates in plan/prevention/treatment measures:   Discuss with provider PT/OT consult   Elevate heels   Increase activity/out of bed for meals  Goal: Prevent/manage excess moisture  Outcome: Progressing  Flowsheets (Taken 5/18/2025 2308)  Prevent/manage excess moisture:   Cleanse incontinence/protect with barrier cream   Follow provider orders for dressing changes   Moisturize dry skin   Monitor for/manage infection if present   Use wicking fabric (obtain order)  Goal: Prevent/minimize sheer/friction injuries  Outcome: Progressing  Flowsheets (Taken 5/18/2025 2308)  Prevent/minimize sheer/friction injuries:   Complete micro-shifts as needed if patient unable. Adjust patient position to relieve pressure points, not a full turn   HOB 30 degrees or less   Increase activity/out of bed for meals   Turn/reposition every 2 hours/use positioning/transfer devices   Use pull sheet   Utilize specialty bed per algorithm  Goal: Promote/optimize nutrition  Outcome: Progressing  Flowsheets (Taken 5/18/2025 2308)  Promote/optimize nutrition:   Assist with feeding   Consume > 50% meals/supplements   Discuss with provider if NPO > 2 days   Monitor/record intake including meals   Offer water/supplements/favorite foods   Reassess MST if dietician not consulted  Goal: Promote skin healing  Outcome: Progressing  Flowsheets (Taken 5/18/2025 2308)  Promote skin healing:   Assess skin/pad under line(s)/device(s)   Ensure correct size (line/device) and apply per   instructions   Protective dressings over bony prominences   Rotate device position/do not position patient on device   Turn/reposition every 2 hours/use positioning/transfer devices     Problem: Pain - Adult  Goal: Verbalizes/displays adequate comfort level or baseline comfort level  Outcome: Progressing     Problem: Safety - Adult  Goal: Free from fall injury  Outcome: Progressing     Problem: Discharge Planning  Goal: Discharge to home or other facility with appropriate resources  Outcome: Progressing     Problem: Chronic Conditions and Co-morbidities  Goal: Patient's chronic conditions and co-morbidity symptoms are monitored and maintained or improved  Outcome: Progressing     Problem: Nutrition  Goal: Nutrient intake appropriate for maintaining nutritional needs  Outcome: Progressing     Problem: Fall/Injury  Goal: Not fall by end of shift  Outcome: Progressing  Goal: Be free from injury by end of the shift  Outcome: Progressing  Goal: Verbalize understanding of personal risk factors for fall in the hospital  Outcome: Progressing  Goal: Verbalize understanding of risk factor reduction measures to prevent injury from fall in the home  Outcome: Progressing  Goal: Use assistive devices by end of the shift  Outcome: Progressing  Goal: Pace activities to prevent fatigue by end of the shift  Outcome: Progressing   The patient's goals for the shift include improve mobility    The clinical goals for the shift include maintain safety    Over the shift, the patient did not make progress toward the following goals. Barriers to progression include . Recommendations to address these barriers include .

## 2025-05-19 NOTE — CARE PLAN
Problem: Skin  Goal: Decreased wound size/increased tissue granulation at next dressing change  Outcome: Progressing     Problem: Pain - Adult  Goal: Verbalizes/displays adequate comfort level or baseline comfort level  Outcome: Progressing     Problem: Safety - Adult  Goal: Free from fall injury  Outcome: Progressing     Problem: Discharge Planning  Goal: Discharge to home or other facility with appropriate resources  Outcome: Progressing

## 2025-05-19 NOTE — ASSESSMENT & PLAN NOTE
Generalized Weakness  Acute on chronic  PT OT. Fall precautions     Pyuria  Urine culture reviewed: No growth.  Observe off ATB     Acute/Chronic Urinary Retention  Unable to void s/p indwelling nixon catheter removal on admit  Now with another nixon catheter after failing another voiding trial  Urology following     Diarrhea  Hx IBS  C diff negative  Probiotic.  PRN imodium     Leukocytosis  --Resolved    Chronic Diastolic HF  CXR reviewed: unremarkable.   EF 55-60% April 2025.  Doesn't appear acutely overloaded on exam   Hold home lasix for now.   Daily weights. Cardiac diet. Monitor I/O     HTN  Continue home amlodipine, hydralazine, atenolol  Monitor BP     Hx Remote TIA/CVA  No obvious residual deficits noted on exam  Continue home statin, ASA     CKD  Currently stable at baseline  Trend renal function, avoid nephrotoxins when possible     Elevated Troponin  Mildly elevated/flat: 15, 16.   EKG nonischemic  Monitor on telemetry      DVT/GI  Heparin subcutaneous/PPI      Disposition: SNF pending choices/pre-CERT

## 2025-05-19 NOTE — PROGRESS NOTES
Tammy Walker is a 69 y.o. female on day 3 of admission presenting with Weakness.      Subjective   Lying in bed.  Denies any pain or complaints.       Objective     Last Recorded Vitals  /77 (BP Location: Left arm, Patient Position: Lying)   Pulse 64   Temp 36.7 °C (98.1 °F) (Oral)   Resp 18   Wt 94.1 kg (207 lb 7.3 oz)   SpO2 97%   Intake/Output last 3 Shifts:    Intake/Output Summary (Last 24 hours) at 5/19/2025 1030  Last data filed at 5/19/2025 0900  Gross per 24 hour   Intake 540 ml   Output 1700 ml   Net -1160 ml       Admission Weight  Weight: 68.5 kg (151 lb) (05/16/25 1136)    Daily Weight  05/19/25 : 94.1 kg (207 lb 7.3 oz)    Image Results  Electrocardiogram, 12-lead PRN ACS symptoms  Normal sinus rhythm  Left anterior fascicular block  Left ventricular hypertrophy with repolarization abnormality ( R in aVL , [place] product )  Abnormal ECG  When compared with ECG of 31-MAR-2025 10:55,  Minimal criteria for Septal infarct are no longer Present  CT head wo IV contrast  Narrative: Interpreted By:  Ketan Perez,   STUDY:  CT HEAD WO IV CONTRAST;  5/16/2025 12:33 pm      INDICATION:  Signs/Symptoms:weakness.      COMPARISON:  03/03/2025      ACCESSION NUMBER(S):  [id number]      ORDERING CLINICIAN:  ARTIS WATTS      TECHNIQUE:  Sequential trans axial images were obtained  .      FINDINGS:  INTRACRANIAL:      There is moderate prominence of the cortical sulci indicating atrophy.      There is moderate ventriculomegaly, again consistent with atrophy.  Additionally there is slightly greater dilatation of the right  lateral ventricle may be ex vacuo from remote infarction at the  corona radiata.      Moderate to fairly extensive decreased attenuation of the  periventricular and long tracks of the white matter most consistent  with gliosis from arterial disease.There is more discrete focal  hypodensity in the corona radiata probably from remote infarctions.  There is also remote lacunar infarction  of the left thalamus that may  have been subacute on the previous exam. Otherwise there is no  evidence of definite subacute infarction, intracranial hemorrhage or  mass.          EXTRACRANIAL:  Visualized paranasal sinuses and mastoids are clear.  The calvarium is intact.      Impression: Moderate age related degenerative change as described without acute  findings or significant change from the prior exam.      Signed by: Ketan Perez 5/16/2025 1:00 PM  Dictation workstation:   [id number]  XR chest 1 view  Narrative: Interpreted By:  Efe Laird,   STUDY:  XR CHEST 1 VIEW;  5/16/2025 12:05 pm      INDICATION:  Signs/Symptoms:weakness.          COMPARISON:  03/31/2025      ACCESSION NUMBER(S):  [id number]      ORDERING CLINICIAN:  ARTIS WATTS      FINDINGS:                  CARDIOMEDIASTINAL SILHOUETTE:  Cardiomediastinal silhouette is normal in size and configuration.      LUNGS:  Lungs are clear.      ABDOMEN:  No remarkable upper abdominal findings.      BONES:  No acute osseous changes.      Impression: 1.  No evidence of acute cardiopulmonary process.              MACRO:  None      Signed by: Efe Laird 5/16/2025 12:11 PM  Dictation workstation:   [id number]      Physical Exam  Vitals and nursing note reviewed. Exam conducted with a chaperone present (Bedside ZOË Suggs).   Constitutional:       Appearance: Normal appearance. She is obese.   HENT:      Head: Normocephalic and atraumatic.      Right Ear: External ear normal.      Left Ear: External ear normal.      Nose: Nose normal.      Mouth/Throat:      Mouth: Mucous membranes are moist.   Eyes:      Extraocular Movements: Extraocular movements intact.      Conjunctiva/sclera: Conjunctivae normal.   Cardiovascular:      Rate and Rhythm: Normal rate.      Pulses: Normal pulses.      Heart sounds: Normal heart sounds.   Pulmonary:      Effort: Pulmonary effort is normal.      Breath sounds: Normal breath sounds.   Abdominal:      General: Bowel  sounds are normal.      Palpations: Abdomen is soft.   Genitourinary:     Comments: Indwelling Nixon catheter draining clear yellow urine  Musculoskeletal:         General: Normal range of motion.      Cervical back: Normal range of motion and neck supple.      Comments: Trace bilateral extremity edema   Skin:     General: Skin is warm and dry.      Capillary Refill: Capillary refill takes less than 2 seconds.   Neurological:      Mental Status: She is alert and oriented to person, place, and time. Mental status is at baseline.   Psychiatric:         Mood and Affect: Mood normal.         Behavior: Behavior normal.         Relevant Results                      This patient has a urinary catheter   Reason for the urinary catheter remaining today? urinary retention/bladder outlet obstruction, acute or chronic    Assessment & Plan  Weakness    Generalized Weakness  Acute on chronic  PT OT. Fall precautions     Pyuria  Urine culture reviewed: No growth.  Observe off ATB     Acute/Chronic Urinary Retention  Unable to void s/p indwelling nixon catheter removal on admit  Now with another nixon catheter after failing another voiding trial  Urology following     Diarrhea  Hx IBS  C diff negative  Probiotic.  PRN imodium     Leukocytosis  --Resolved    Chronic Diastolic HF  CXR reviewed: unremarkable.   EF 55-60% April 2025.  Doesn't appear acutely overloaded on exam   Hold home lasix for now.   Daily weights. Cardiac diet. Monitor I/O     HTN  Continue home amlodipine, hydralazine, atenolol  Monitor BP     Hx Remote TIA/CVA  No obvious residual deficits noted on exam  Continue home statin, ASA     CKD  Currently stable at baseline  Trend renal function, avoid nephrotoxins when possible     Elevated Troponin  Mildly elevated/flat: 15, 16.   EKG nonischemic  Monitor on telemetry      DVT/GI  Heparin subcutaneous/PPI      Disposition: SNF pending choices/pre-CERT       5/19/2025: Denies any pain or complaints.  Pending SNF  arrangements.           Lisa Adkins, APRN-CNP

## 2025-05-19 NOTE — TELEPHONE ENCOUNTER
Patient admitted to [facility] 5/16/25. Per today's IM NP progress note: Urine culture reviewed: No growth.  Observe off ATB. Failed voiding trail, Urology following, [facility] to remain in for now. Plan is to discharge to SNF, accepted at [facility], precert required.

## 2025-05-19 NOTE — PROGRESS NOTES
05/19/25 1010   Discharge Planning   Expected Discharge Disposition SNF     TCC spoke to patient, updated regarding therapy recommendations. Patient agreeable to SNF at this time. Received choices for [facility] and [facility]   Referral sent via careport at this time     1049: patient accepted by [facility]     Precert needed prior to discharge   ** do not discharge without speaking to care coordination**

## 2025-05-20 VITALS
TEMPERATURE: 98.1 F | RESPIRATION RATE: 17 BRPM | OXYGEN SATURATION: 95 % | HEART RATE: 64 BPM | WEIGHT: 201.5 LBS | BODY MASS INDEX: 34.4 KG/M2 | HEIGHT: 64 IN | SYSTOLIC BLOOD PRESSURE: 138 MMHG | DIASTOLIC BLOOD PRESSURE: 68 MMHG

## 2025-05-20 LAB
ANION GAP SERPL CALCULATED.3IONS-SCNC: 10 MMOL/L (ref 10–20)
BASOPHILS # BLD AUTO: 0.08 X10*3/UL (ref 0–0.1)
BASOPHILS NFR BLD AUTO: 0.9 %
BUN SERPL-MCNC: 23 MG/DL (ref 6–23)
CALCIUM SERPL-MCNC: 8.6 MG/DL (ref 8.6–10.3)
CHLORIDE SERPL-SCNC: 109 MMOL/L (ref 98–107)
CO2 SERPL-SCNC: 25 MMOL/L (ref 21–32)
CREAT SERPL-MCNC: 1.46 MG/DL (ref 0.5–1.05)
EGFRCR SERPLBLD CKD-EPI 2021: 39 ML/MIN/1.73M*2
EOSINOPHIL # BLD AUTO: 0.35 X10*3/UL (ref 0–0.7)
EOSINOPHIL NFR BLD AUTO: 4.1 %
ERYTHROCYTE [DISTWIDTH] IN BLOOD BY AUTOMATED COUNT: 14.7 % (ref 11.5–14.5)
GLUCOSE SERPL-MCNC: 91 MG/DL (ref 74–99)
HCT VFR BLD AUTO: 35.2 % (ref 36–46)
HGB BLD-MCNC: 11.7 G/DL (ref 12–16)
IMM GRANULOCYTES # BLD AUTO: 0.03 X10*3/UL (ref 0–0.7)
IMM GRANULOCYTES NFR BLD AUTO: 0.3 % (ref 0–0.9)
LYMPHOCYTES # BLD AUTO: 1.69 X10*3/UL (ref 1.2–4.8)
LYMPHOCYTES NFR BLD AUTO: 19.6 %
MCH RBC QN AUTO: 30.4 PG (ref 26–34)
MCHC RBC AUTO-ENTMCNC: 33.2 G/DL (ref 32–36)
MCV RBC AUTO: 91 FL (ref 80–100)
MONOCYTES # BLD AUTO: 0.85 X10*3/UL (ref 0.1–1)
MONOCYTES NFR BLD AUTO: 9.9 %
NEUTROPHILS # BLD AUTO: 5.61 X10*3/UL (ref 1.2–7.7)
NEUTROPHILS NFR BLD AUTO: 65.2 %
NRBC BLD-RTO: 0 /100 WBCS (ref 0–0)
PLATELET # BLD AUTO: 278 X10*3/UL (ref 150–450)
POTASSIUM SERPL-SCNC: 3.8 MMOL/L (ref 3.5–5.3)
RBC # BLD AUTO: 3.85 X10*6/UL (ref 4–5.2)
SODIUM SERPL-SCNC: 140 MMOL/L (ref 136–145)
TSH SERPL-ACNC: 3.95 MIU/L (ref 0.44–3.98)
WBC # BLD AUTO: 8.6 X10*3/UL (ref 4.4–11.3)

## 2025-05-20 PROCEDURE — 85025 COMPLETE CBC W/AUTO DIFF WBC: CPT

## 2025-05-20 PROCEDURE — 84443 ASSAY THYROID STIM HORMONE: CPT

## 2025-05-20 PROCEDURE — 2500000001 HC RX 250 WO HCPCS SELF ADMINISTERED DRUGS (ALT 637 FOR MEDICARE OP): Performed by: NURSE PRACTITIONER

## 2025-05-20 PROCEDURE — 2500000004 HC RX 250 GENERAL PHARMACY W/ HCPCS (ALT 636 FOR OP/ED)

## 2025-05-20 PROCEDURE — 97535 SELF CARE MNGMENT TRAINING: CPT | Mod: GO,CO

## 2025-05-20 PROCEDURE — 36415 COLL VENOUS BLD VENIPUNCTURE: CPT

## 2025-05-20 PROCEDURE — 97530 THERAPEUTIC ACTIVITIES: CPT | Mod: GP

## 2025-05-20 PROCEDURE — 97110 THERAPEUTIC EXERCISES: CPT | Mod: GP

## 2025-05-20 PROCEDURE — 80048 BASIC METABOLIC PNL TOTAL CA: CPT

## 2025-05-20 PROCEDURE — 2500000001 HC RX 250 WO HCPCS SELF ADMINISTERED DRUGS (ALT 637 FOR MEDICARE OP)

## 2025-05-20 PROCEDURE — 97530 THERAPEUTIC ACTIVITIES: CPT | Mod: GO,CO

## 2025-05-20 RX ORDER — L. ACIDOPHILUS/L.BULGARICUS 1MM CELL
1 TABLET ORAL 2 TIMES DAILY
Start: 2025-05-20

## 2025-05-20 RX ORDER — LOPERAMIDE HYDROCHLORIDE 2 MG/1
2 CAPSULE ORAL 4 TIMES DAILY PRN
Qty: 30 CAPSULE | Refills: 0 | Status: SHIPPED | OUTPATIENT
Start: 2025-05-20

## 2025-05-20 RX ORDER — ATENOLOL 50 MG/1
50 TABLET ORAL 2 TIMES DAILY
Start: 2025-05-20 | End: 2025-05-20 | Stop reason: HOSPADM

## 2025-05-20 RX ADMIN — AMLODIPINE BESYLATE 10 MG: 10 TABLET ORAL at 08:16

## 2025-05-20 RX ADMIN — Medication 1 TABLET: at 08:16

## 2025-05-20 RX ADMIN — ALLOPURINOL 100 MG: 100 TABLET ORAL at 08:16

## 2025-05-20 RX ADMIN — ASPIRIN 81 MG: 81 TABLET, COATED ORAL at 08:16

## 2025-05-20 RX ADMIN — HYDRALAZINE HYDROCHLORIDE 25 MG: 25 TABLET ORAL at 11:43

## 2025-05-20 RX ADMIN — NYSTATIN 1 APPLICATION: 100000 POWDER TOPICAL at 08:19

## 2025-05-20 RX ADMIN — HEPARIN SODIUM 5000 UNITS: 5000 INJECTION, SOLUTION INTRAVENOUS; SUBCUTANEOUS at 08:16

## 2025-05-20 RX ADMIN — CHOLECALCIFEROL (VITAMIN D3) 10 MCG (400 UNIT) TABLET 10 MCG: at 08:16

## 2025-05-20 RX ADMIN — PANTOPRAZOLE SODIUM 40 MG: 40 TABLET, DELAYED RELEASE ORAL at 06:13

## 2025-05-20 RX ADMIN — HYDRALAZINE HYDROCHLORIDE 25 MG: 25 TABLET ORAL at 06:13

## 2025-05-20 ASSESSMENT — PAIN SCALES - GENERAL
PAINLEVEL_OUTOF10: 0 - NO PAIN

## 2025-05-20 ASSESSMENT — COGNITIVE AND FUNCTIONAL STATUS - GENERAL
TURNING FROM BACK TO SIDE WHILE IN FLAT BAD: A LITTLE
TURNING FROM BACK TO SIDE WHILE IN FLAT BAD: A LITTLE
DAILY ACTIVITIY SCORE: 14
STANDING UP FROM CHAIR USING ARMS: A LOT
MOVING TO AND FROM BED TO CHAIR: A LITTLE
STANDING UP FROM CHAIR USING ARMS: TOTAL
DRESSING REGULAR UPPER BODY CLOTHING: A LOT
CLIMB 3 TO 5 STEPS WITH RAILING: TOTAL
MOVING FROM LYING ON BACK TO SITTING ON SIDE OF FLAT BED WITH BEDRAILS: A LITTLE
CLIMB 3 TO 5 STEPS WITH RAILING: TOTAL
HELP NEEDED FOR BATHING: A LOT
TOILETING: A LITTLE
DRESSING REGULAR UPPER BODY CLOTHING: A LITTLE
EATING MEALS: A LITTLE
PERSONAL GROOMING: A LITTLE
DAILY ACTIVITIY SCORE: 18
EATING MEALS: A LITTLE
DRESSING REGULAR LOWER BODY CLOTHING: A LOT
MOBILITY SCORE: 14
TOILETING: A LOT
DRESSING REGULAR LOWER BODY CLOTHING: A LITTLE
WALKING IN HOSPITAL ROOM: TOTAL
MOBILITY SCORE: 10
MOVING FROM LYING ON BACK TO SITTING ON SIDE OF FLAT BED WITH BEDRAILS: A LITTLE
PERSONAL GROOMING: A LITTLE
MOVING TO AND FROM BED TO CHAIR: TOTAL
WALKING IN HOSPITAL ROOM: A LOT
HELP NEEDED FOR BATHING: A LITTLE

## 2025-05-20 ASSESSMENT — ACTIVITIES OF DAILY LIVING (ADL): HOME_MANAGEMENT_TIME_ENTRY: 8

## 2025-05-20 ASSESSMENT — PAIN - FUNCTIONAL ASSESSMENT
PAIN_FUNCTIONAL_ASSESSMENT: 0-10

## 2025-05-20 NOTE — PROGRESS NOTES
05/20/25 1017   Discharge Planning   Expected Discharge Disposition SNF  ([facility])     Insurance offered p2p information given to Lisa CRUZ     ** do not discharge without speaking to care coordination**

## 2025-05-20 NOTE — PROGRESS NOTES
Occupational Therapy    OT Treatment    Patient Name: Tammy Walker  MRN: [id number]  Department: [address]  Room: [address] [place] [address]  Today's Date: 5/20/2025  Time Calculation  Start Time: 1037  Stop Time: 1104  Time Calculation (min): 27 min        Assessment:  OT Assessment: pt tolerated treatment fairly and is making gradual progression towards OT POC. pt displaying increased activity tolerance for 3 transfer tasks and LB dressing tasks on EOB which displays progress towards OT goals. pt would benefit from continued OT services to increase strentgh, balance, endurance, and activity tolerance for ADLs  Prognosis: Fair  Barriers to Discharge Home: Caregiver assistance, Physical needs  Caregiver Assistance: Caregiver assistance needed per identified barriers - however, level of patient's required assistance exceeds assistance available at home  Physical Needs: 24hr ADL assistance needed, 24hr mobility assistance needed, High falls risk due to function or environment  Evaluation/Treatment Tolerance: Patient limited by fatigue, Patient tolerated treatment well  Medical Staff Made Aware: Yes  End of Session Communication: Bedside nurse  End of Session Patient Position: Bed, 3 rail up, Alarm on (call light in reach and all needs met: PT working with pt upon CASEY exit)  OT Assessment Results: Decreased ADL status, Decreased upper extremity range of motion, Decreased upper extremity strength, Decreased cognition, Decreased endurance, Decreased functional mobility, Decreased trunk control for functional activities  Prognosis: Fair  Barriers to Discharge: Decreased caregiver support  Evaluation/Treatment Tolerance: Patient limited by fatigue, Patient tolerated treatment well  Medical Staff Made Aware: Yes  Strengths: Ability to acquire knowledge  Barriers to Participation: Comorbidities  Plan:  Treatment Interventions: ADL retraining, Functional transfer training, UE strengthening/ROM, Endurance training, Equipment  evaluation/education, Compensatory technique education  OT Frequency: 3 times per week  OT Discharge Recommendations: Moderate intensity level of continued care  Equipment Recommended upon Discharge: Wheeled walker, Bedside commode  OT Recommended Transfer Status: Assist of 1, Moderate assist  OT - OK to Discharge: Yes  Treatment Interventions: ADL retraining, Functional transfer training, UE strengthening/ROM, Endurance training, Equipment evaluation/education, Compensatory technique education    Subjective   OT Visit Info:  OT Received On: 05/20/25  General Visit Info:  General  Reason for Referral: ADL impairment; 69 yr old female presenting with generalized weakness and difficulty w/ taking care of self after returning home from rehab ~2 weeks ago.  Referred By: Dr. Elias  Past Medical History Relevant to Rehab: Arthritis, Covid, systolic heart failure, chronic kidney failure, arthritis, HTN, bladder dysfunction; urinary retention; hyperlipidemia; IBS; UTI  Family/Caregiver Present: No  Co-Treatment: PT  Co-Treatment Reason: partial co treatment with PT d/t pt requiring two skilled therapists to mobilize  Prior to Session Communication: Bedside nurse  Patient Position Received: Bed, 3 rail up, Alarm on  Preferred Learning Style: verbal, visual  General Comment: pt cleared for OT treatment by nursing; pt pleasant and agreeable to therapy  Precautions:  Hearing/Visual Limitations: vision WNL; mild [place]  Medical Precautions: Fall precautions  Precautions Comment: nixon      Vital Signs Comment: while seated on EOB: O2 97% and HR 65     Pain:  Pain Assessment  Pain Assessment: 0-10  0-10 (Numeric) Pain Score: 0 - No pain    Objective    Cognition:  Cognition  Overall Cognitive Status: Impaired  Orientation Level: Oriented X4  Cognition Comments: intermittment confusion throughout session; cues for redirection to task at times; fear of falling  Safety/Judgement: Exceptions to WFL  Insight: Mild  Impulsive:  Mildly  Task Initiation: Delayed initiation  Processing Speed: Delayed  Coordination:  Movements are Fluid and Coordinated: No  Upper Body Coordination: decreased rate and accuracy of movements L>R, intermittent tremors obeserved L side  Activities of Daily Living:      Grooming  Grooming Level of Assistance: Setup, Minimum assistance  Grooming Where Assessed: Edge of bed  Grooming Comments: pt engaged in combing hair and hair care while seated on EOB with set up of items and cues throughout for attention/completion of task; cues to maintain posture while seated on EOB due to heavy posterior lean d/t fear of falling    LE Dressing  LE Dressing: Yes  LE Dressing Where Assessed: Edge of bed  LE Dressing Comments: pt engaged in donning/doffing socks while seated on EOB and utilizing figure four; pt displaying heavy posterior lean/retropulsive due to fear of falling; CGA-Mod A for trunk alignement and stability while completing LB dressing task; pt able to don/doff socks with increased time, effort, and verbal/tactile cues throughout    Toileting  Toileting Comments: +nixon    Bed Mobility/Transfers:   Bed Mobility  Bed Mobility: Yes  Bed Mobility 1  Bed Mobility 1: Supine to sitting  Level of Assistance 1: Minimum assistance  Bed Mobility Comments 1: Min A to bring BLEs towards EOB: pt able to elevate trunk into sitting position; max verbal cues for sequencing; completed with significantly increased time d/t fear of falling  Bed Mobility 2  Bed Mobility  2: Scooting  Level of Assistance 2: Moderate assistance  Bed Mobility Comments 2: Mod A to scoot hips towards EOB by pt pulling on therapist hand and utilizing draw sheet; pt displaying posterior lean and required verbal/tactile cues to maintain midline and trunk alignement while seated on EOB  Bed Mobility 3  Bed Mobility 3: Sitting to supine  Level of Assistance 3: Maximum assistance, +2  Bed Mobility Comments 3: Max A x2 to bring BLEs and trunk into supine position;  cues throughout for sequencing with poor follow through  Bed Mobility 4  Bed Mobility 4: Scooting  Level of Assistance 4: Maximum assistance  Bed Mobility Comments 4: Max A to scoot up towards HOB wit use of draw sheet: pt able to partially assist with BLEs    Transfers  Transfer: Yes  Transfer 1  Transfer From 1: Bed to  Transfer to 1: Stand  Technique 1: Sit to stand  Transfer Device 1: Walker  Transfer Level of Assistance 1: Maximum assistance, Moderate assistance, +2  Trials/Comments 1: completed 3 trials: initial 2 stands requiring Max A x2 and progressed to Mod A x2 for 3rd transfer task. Max A x2/Mod A x2 for trunk elevation and to establish COG over MUSA: cues for body alignement and to maintain midline d/t pt displaying R lateral lean; pt retropulsive  Transfers 2  Transfer From 2: Stand to  Transfer to 2: Bed  Technique 2: Stand to sit  Transfer Device 2: Walker  Trials/Comments 2: completed 3 trials: initial 2 stands requiring Max A x2 and progressed to Mod A x2 for 3rd transfer task. Max A x2/Mod A x2 for controlled descent: cues for body alignement and to maintain midline d/t pt displaying R lateral lean; pt retropulsive and slightly impuslive    Functional Mobility:  Functional Mobility  Functional Mobility Performed: No    Sitting Balance:  Dynamic Sitting Balance  Dynamic Sitting-Balance Support: Right upper extremity supported, Left upper extremity supported, Feet supported (alternating)  Dynamic Sitting-Level of Assistance: Contact guard, Minimum assistance, Moderate assistance  Dynamic Sitting-Balance: Forward lean, Reaching for objects, Reaching across midline  Dynamic Sitting-Comments: grooming and LB dressing tasks while seated on EOB with intermittment Mod A - CGA for trunk alignment and maintain midline; pt retropulsive and required verbal and tactile cues to correct with poor follow through; pt displaying poor/fair- seated balance    Standing Balance:  Static Standing Balance  Static  Standing-Balance Support: Bilateral upper extremity supported  Static Standing-Level of Assistance: Moderate assistance, Maximum assistance (x2)  Static Standing-Comment/Number of Minutes: completed 3 trials during transfer task with poor/fair- standing balance; pt retropulsive and displaying R lateral lean; cues for optimal body alignment to maintain full standing position safely; Max A x2 progressing to Mod A x2 during static standing transfer/tasks    Therapy/Activity:      Therapeutic Activity  Therapeutic Activity Performed: Yes  Therapeutic Activity 1: seated on EOB for ~20 minutes completing ADL tasks to increase activity tolerance, endurance, and balance to increase participation in ADLs  Therapeutic Activity 2: completed 3 static standing tasks (first and second trials: 30-60 seconds and third trial: 2 mins and 15 seconds) with FWW to increase functional standing tolerance and balance for ADL participation; pt completed with multiple verbal/tactile cues to maintain opitmal body alignment and maintain standing position; pt displaying R lateral lean while in stance    Outcome Measures:  [facility] Daily Activity  Putting on and taking off regular lower body clothing: A lot  Bathing (including washing, rinsing, drying): A lot  Putting on and taking off regular upper body clothing: A lot  Toileting, which includes using toilet, bedpan or urinal: A lot  Taking care of personal grooming such as brushing teeth: A little  Eating Meals: A little  Daily Activity - Total Score: 14      Education Documentation  Body Mechanics, taught by CARMELA Vazquez at 5/20/2025 11:23 AM.  Learner: Patient  Readiness: Acceptance  Method: Explanation  Response: Needs Reinforcement  Comment: pt educated on OT POC, body mechanics, balance/safety technqiues, safety precautions, and call light use during ADL retraining    ADL Training, taught by CARMELA Vazquez at 5/20/2025 11:23 AM.  Learner: Patient  Readiness: Acceptance  Method:  Explanation  Response: Needs Reinforcement  Comment: pt educated on OT POC, body mechanics, balance/safety technqiues, safety precautions, and call light use during ADL retraining    Goals:  Encounter Problems       Encounter Problems (Active)       ADLs       Patient with complete lower body dressing with minimal assist  level of assistance donning and doffing all LE clothes  with PRN adaptive equipment while edge of bed  (Progressing)       Start:  05/16/25    Expected End:  06/16/25            Patient will complete daily grooming tasks with set-up level of assistance and PRN adaptive equipment while supported sitting. (Progressing)       Start:  05/16/25    Expected End:  06/16/25            Patient will complete toileting including hygiene clothing management/hygiene with minimal assist  level of assistance and bedside commode. (Progressing)       Start:  05/16/25    Expected End:  06/16/25               TRANSFERS       Patient will complete functional transfer to BSC/toilet with least restrictive device with contact guard assist level of assistance. (Progressing)       Start:  05/16/25    Expected End:  06/16/25               TRANSFERS       Patient will complete sit to stand transfer with contact guard assist level of assistance and least restrictive device in order to improve safety and prepare for out of bed mobility. (Progressing)       Start:  05/16/25    Expected End:  06/16/25

## 2025-05-20 NOTE — PROGRESS NOTES
Tammy Walker is a 69 y.o. female on day 4 of admission presenting with Weakness.      Subjective   Lying in bed.  Denies any pain or shortness of breath.  No complaints.       Objective     Last Recorded Vitals  /60 (BP Location: Left arm, Patient Position: Lying)   Pulse 54   Temp 36.5 °C (97.7 °F) (Oral)   Resp 16   Wt 91.4 kg (201 lb 8 oz)   SpO2 98%   Intake/Output last 3 Shifts:    Intake/Output Summary (Last 24 hours) at 5/20/2025 1024  Last data filed at 5/20/2025 0926  Gross per 24 hour   Intake 780 ml   Output 875 ml   Net -95 ml       Admission Weight  Weight: 68.5 kg (151 lb) (05/16/25 1136)    Daily Weight  05/20/25 : 91.4 kg (201 lb 8 oz)    Image Results  Electrocardiogram, 12-lead PRN ACS symptoms  Normal sinus rhythm  Left anterior fascicular block  Left ventricular hypertrophy with repolarization abnormality ( R in aVL , [place] product )  Abnormal ECG  When compared with ECG of 31-MAR-2025 10:55,  Minimal criteria for Septal infarct are no longer Present  CT head wo IV contrast  Narrative: Interpreted By:  Ketan Perez,   STUDY:  CT HEAD WO IV CONTRAST;  5/16/2025 12:33 pm      INDICATION:  Signs/Symptoms:weakness.      COMPARISON:  03/03/2025      ACCESSION NUMBER(S):  [id number]      ORDERING CLINICIAN:  ARTIS WATTS      TECHNIQUE:  Sequential trans axial images were obtained  .      FINDINGS:  INTRACRANIAL:      There is moderate prominence of the cortical sulci indicating atrophy.      There is moderate ventriculomegaly, again consistent with atrophy.  Additionally there is slightly greater dilatation of the right  lateral ventricle may be ex vacuo from remote infarction at the  corona radiata.      Moderate to fairly extensive decreased attenuation of the  periventricular and long tracks of the white matter most consistent  with gliosis from arterial disease.There is more discrete focal  hypodensity in the corona radiata probably from remote infarctions.  There is also remote  lacunar infarction of the left thalamus that may  have been subacute on the previous exam. Otherwise there is no  evidence of definite subacute infarction, intracranial hemorrhage or  mass.          EXTRACRANIAL:  Visualized paranasal sinuses and mastoids are clear.  The calvarium is intact.      Impression: Moderate age related degenerative change as described without acute  findings or significant change from the prior exam.      Signed by: Ketan Perez 5/16/2025 1:00 PM  Dictation workstation:   [id number]  XR chest 1 view  Narrative: Interpreted By:  Efe Laird,   STUDY:  XR CHEST 1 VIEW;  5/16/2025 12:05 pm      INDICATION:  Signs/Symptoms:weakness.          COMPARISON:  03/31/2025      ACCESSION NUMBER(S):  [id number]      ORDERING CLINICIAN:  ARTIS WATTS      FINDINGS:                  CARDIOMEDIASTINAL SILHOUETTE:  Cardiomediastinal silhouette is normal in size and configuration.      LUNGS:  Lungs are clear.      ABDOMEN:  No remarkable upper abdominal findings.      BONES:  No acute osseous changes.      Impression: 1.  No evidence of acute cardiopulmonary process.              MACRO:  None      Signed by: Efe Laird 5/16/2025 12:11 PM  Dictation workstation:   [id number]      Physical Exam  Vitals and nursing note reviewed.   Constitutional:       Appearance: Normal appearance. She is obese.   HENT:      Head: Normocephalic and atraumatic.      Right Ear: External ear normal.      Left Ear: External ear normal.      Nose: Nose normal.      Mouth/Throat:      Mouth: Mucous membranes are moist.   Eyes:      Extraocular Movements: Extraocular movements intact.      Conjunctiva/sclera: Conjunctivae normal.   Cardiovascular:      Rate and Rhythm: Normal rate.      Pulses: Normal pulses.      Heart sounds: Normal heart sounds.   Pulmonary:      Effort: Pulmonary effort is normal.      Breath sounds: Normal breath sounds.   Abdominal:      General: Bowel sounds are normal.      Palpations:  Abdomen is soft.   Genitourinary:     Comments: Indwelling Nixon catheter draining clear yellow urine  Musculoskeletal:         General: Normal range of motion.      Cervical back: Normal range of motion and neck supple.      Comments: Trace bilateral extremity edema   Skin:     General: Skin is warm and dry.      Capillary Refill: Capillary refill takes less than 2 seconds.   Neurological:      Mental Status: She is alert and oriented to person, place, and time. Mental status is at baseline.   Psychiatric:         Mood and Affect: Mood normal.         Behavior: Behavior normal.         Relevant Results                      This patient has a urinary catheter   Reason for the urinary catheter remaining today? urinary retention/bladder outlet obstruction, acute or chronic    Assessment & Plan  Weakness    Generalized Weakness  Acute on chronic  PT OT. Fall precautions     Pyuria  Urine culture reviewed: No growth.    Appreciate input from ID    1/2 positive blood cultures GPC  Per ID suspect contaminant  S/p one-time IV daptomycin     Acute/Chronic Urinary Retention  Unable to void s/p indwelling nixon catheter removal on admit  Now with another nixon catheter after failing another voiding trial  Urology following     Diarrhea  Hx IBS  C diff negative  Probiotic.  PRN imodium     Leukocytosis  --Resolved    Chronic Diastolic HF  CXR reviewed: unremarkable.   EF 55-60% April 2025.  Doesn't appear acutely overloaded on exam   Hold home lasix for now.   Daily weights. Cardiac diet. Monitor I/O     HTN  Continue home amlodipine, hydralazine, atenolol  Monitor BP     Hx Remote TIA/CVA  No obvious residual deficits noted on exam  Continue home statin, ASA     CKD  Currently stable at baseline  Trend renal function, avoid nephrotoxins when possible     Elevated Troponin  Mildly elevated/flat: 15, 16.   EKG nonischemic  Monitor on telemetry      DVT/GI  Heparin subcutaneous/PPI      Disposition: SNF pending peer to peer        5/19/25: Denies any pain or complaints.  Pending SNF arrangements.    5/20/25: Noted 1/2 blood cultures positive gram-positive cocci.  S/p ID evaluation believe contaminant.  Did receive one-time IV daptomycin per ID.  SNF placement pending peer to peer today.           Lisa Adkins, APRN-CNP

## 2025-05-20 NOTE — PROGRESS NOTES
Physical Therapy    Physical Therapy Treatment    Patient Name: Tammy Walker  MRN: [id number]  Department: [address]  Room: [address] [place] [address]  Today's Date: 5/20/2025  Time Calculation  Start Time: 1052  Stop Time: 1117  Time Calculation (min): 25 min         Assessment/Plan   PT Assessment  Barriers to Discharge Home: Physical needs  Physical Needs: 24hr mobility assistance needed  Evaluation/Treatment Tolerance: Patient tolerated treatment well  Medical Staff Made Aware: Yes  Strengths: Ability to acquire knowledge  Barriers to Participation: Comorbidities  End of Session Communication: Bedside nurse  Assessment Comment: pt demonstrated slightly improved transfer technique and awareness of C og G over B of S during sit/standing with FWW---still requires mod to max assist of 2  End of Session Patient Position: Bed, 3 rail up, Alarm on (call button in reach)  PT Plan  Inpatient/Swing Bed or Outpatient: Inpatient  PT Plan  Treatment/Interventions: Bed mobility, Transfer training, Gait training, Balance training, Neuromuscular re-education, Strengthening, Endurance training, Range of motion, Therapeutic exercise, Therapeutic activity, Home exercise program  PT Plan: Ongoing PT  PT Frequency: 4 times per week  PT Discharge Recommendations: Moderate intensity level of continued care  Equipment Recommended upon Discharge: Wheelchair, Wheeled walker  PT Recommended Transfer Status: Assist x2, Assistive device (w/c; FWW)  PT - OK to Discharge: Yes    PT Visit Info:  PT Received On: 05/20/25     General Visit Information:   General  Family/Caregiver Present: No  Co-Treatment: OT  Co-Treatment Reason: partial co treatment with PT d/t pt requiring two skilled therapists to mobilize  Prior to Session Communication: Bedside nurse  Patient Position Received: Alarm off, caregiver present (pt sitting edge of bed with CASEY present)  Preferred Learning Style: verbal, visual  General Comment: cleared by nurse for therapy; pt agreeable to  therapy; + Rush cath    Subjective   Precautions:  Precautions  Hearing/Visual Limitations: vision WNL; mild [place]  Medical Precautions: Fall precautions     Date/Time Vitals Session Patient Position Pulse Resp SpO2 BP MAP (mmHg)    05/20/25 1052 During PT  --  --  --  --  --  --           Vital Signs Comment: O2 sat 97% with HR 59 bpm during LE exercise in long sitting---room air     Objective   Pain:  Pain Assessment  Pain Assessment: 0-10  Cognition:  Cognition  Overall Cognitive Status: Impaired  Orientation Level: Oriented X4  Cognition Comments: intermittant confusion ; + fear of falling  Safety/Judgement:  (decreased safety insight and carryover during functional mobility)  Insight: Mild  Processing Speed: Delayed  Coordination:  Movements are Fluid and Coordinated: No  Lower Body Coordination: decreased timing/accuracy of samia LE active movements, especially left foot/ankle  Postural Control:  Postural Control  Posture Comment: obese with mild forward head, protracted shldrs; mild retro lean in sitting EOB; fluctuating right lateral lean in standing  Extremity/Trunk Assessments:    Activity Tolerance:  Activity Tolerance  Endurance: Decreased tolerance for upright activites  Activity Tolerance Comments: fair - due to LE/trunk weakness; + fatigue  Treatments:  Therapeutic Exercise  Therapeutic Exercise Performed: Yes  Therapeutic Exercise Activity 1: supine samia AP x 30 reps  Therapeutic Exercise Activity 2: supine samia heel slides x 20 reps  Therapeutic Exercise Activity 3: supine samia hip abd x 20 reps  Therapeutic Exercise Activity 4: hooklying bridges x 2 sets of 10 reps  Therapeutic Exercise Activity 5: seated samia LAQ's x 20 reps    Therapeutic Activity  Therapeutic Activity Performed: Yes (see bed mobility, transfers, standing with FWWW)    Bed Mobility  Bed Mobility: Yes  Bed Mobility 1  Bed Mobility 1: Sitting to supine  Level of Assistance 1: Maximum assistance, +2, Moderate verbal cues  Bed Mobility  Comments 1: head of bed flat; max assist of 2 for trunk down, samia LE's onto bed via modified logrolling, verbal/tactile cues for active us eof samia UE's, ease of transition and NO retro leaning    Ambulation/Gait Training  Ambulation/Gait Training Performed: Yes  Ambulation/Gait Training 1  Surface 1: Level tile  Device 1: Rolling walker  Assistance 1: Maximum assistance, Moderate verbal cues, Moderate tactile cues (x 2)  Comments/Distance (ft) 1: pt stood at 3 trials at bedside, FWW, max assist of 2; first 2 trials between 30 to 60 secs with mod right sided lean; third trial pt stood for 2 min, 15 secs, verbal cues for erect posture, increased samia UE's pushing down through FWW, tightening of glutes and quads. Overall balance poor.  Transfers  Transfer: Yes  Transfer 1  Transfer From 1: Bed to  Transfer to 1: Stand  Technique 1: Sit to stand  Transfer Device 1: Walker  Transfer Level of Assistance 1: Maximum assistance, Moderate assistance, Moderate verbal cues  Trials/Comments 1: initially max assist of 2 progressing to mod assist of 2, verbal cues for samia hand placement on bed, forward weight shifting----X 3 trials  Transfers 2  Transfer From 2: Stand to  Transfer to 2: Bed  Technique 2: Stand to sit  Transfer Device 2: Walker  Transfer Level of Assistance 2: Moderate assistance, +2, Minimal verbal cues  Trials/Comments 2: mod assist of 2 for trunk down, verbal cues for reaching back with both hands for bed    Stairs  Stairs: No    Outcome Measures:  [facility] Basic Mobility  Turning from your back to your side while in a flat bed without using bedrails: A little  Moving from lying on your back to sitting on the side of a flat bed without using bedrails: A little  Moving to and from bed to chair (including a wheelchair): Total  Standing up from a chair using your arms (e.g. wheelchair or bedside chair): Total  To walk in hospital room: Total  Climbing 3-5 steps with railing: Total  Basic Mobility - Total Score:  10    Education Documentation  Precautions, taught by Kelle Jeronimo PT at 5/20/2025 12:23 PM.  Learner: Patient  Readiness: Acceptance  Method: Explanation, Demonstration  Response: Needs Reinforcement  Comment: PT educated pt in safe transfer technique    Mobility Training, taught by Kelle Jeronimo PT at 5/20/2025 12:23 PM.  Learner: Patient  Readiness: Acceptance  Method: Explanation, Demonstration  Response: Needs Reinforcement  Comment: PT educated pt in safe transfer technique    Education Comments  No comments found.               Encounter Problems       Encounter Problems (Active)       PT Transfers       STG - Patient to transfer to and from sit to supine with MIN A (Progressing)       Start:  05/17/25    Expected End:  05/31/25            STG - Patient will transfer sit to and from stand with MIN A using rolling walker (Progressing)       Start:  05/17/25    Expected End:  05/31/25               PT Transfers       STG - Transfer from bed to chair via SPT with MIN A (Progressing)       Start:  05/17/25    Expected End:  05/31/25            STG - Patient will roll side to side with CGA (Progressing)       Start:  05/17/25    Expected End:  05/31/25               Pain - Adult

## 2025-05-20 NOTE — ASSESSMENT & PLAN NOTE
Generalized Weakness  Acute on chronic  PT OT. Fall precautions     Pyuria  Urine culture reviewed: No growth.    Appreciate input from ID    1/2 positive blood cultures [facility]  Per ID suspect contaminant  S/p one-time IV daptomycin     Acute/Chronic Urinary Retention  Unable to void s/p indwelling nixon catheter removal on admit  Now with another nixon catheter after failing another voiding trial  Urology following     Diarrhea  Hx IBS  C diff negative  Probiotic.  PRN imodium     Leukocytosis  --Resolved    Chronic Diastolic HF  CXR reviewed: unremarkable.   EF 55-60% April 2025.  Doesn't appear acutely overloaded on exam   Hold home lasix for now.   Daily weights. Cardiac diet. Monitor I/O     HTN  Continue home amlodipine, hydralazine, atenolol  Monitor BP     Hx Remote TIA/CVA  No obvious residual deficits noted on exam  Continue home statin, ASA     CKD  Currently stable at baseline  Trend renal function, avoid nephrotoxins when possible     Elevated Troponin  Mildly elevated/flat: 15, 16.   EKG nonischemic  Monitor on telemetry      DVT/GI  Heparin subcutaneous/PPI      Disposition: SNF pending peer to peer

## 2025-05-20 NOTE — PROGRESS NOTES
05/20/25 1523   Discharge Planning   Expected Discharge Disposition SNF  ([facility])   Has discharge transport been arranged? Yes   What day is the transport expected? 05/20/25   What time is the transport expected? [id number] Completed   AVS and goldenrod sent to facility   Patient updated, attempted to call pts sister no answer and  full    RN made aware and given report number

## 2025-05-20 NOTE — PROGRESS NOTES
Tammy Walker is a 69 y.o. female on day 4 of admission presenting with Weakness.    Subjective   Interval History:   Afebrile, no chills  No chest pain or shortness of breath  No nausea vomiting or diarrhea     Review of Systems   All other systems reviewed and are negative.      Objective   Range of Vitals (last 24 hours)  Heart Rate:  [54-64]   Temp:  [36.5 °C (97.7 °F)-36.9 °C (98.4 °F)]   Resp:  [15-18]   BP: (134-158)/(59-70)   Weight:  [91.4 kg (201 lb 8 oz)]   SpO2:  [96 %-98 %]   Daily Weight  05/20/25 : 91.4 kg (201 lb 8 oz)    Body mass index is 34.59 kg/m².    Physical Exam  Constitutional:       Appearance: Normal appearance.   HENT:      Head: Normocephalic and atraumatic.      Right Ear: External ear normal.      Left Ear: External ear normal.      Nose: Nose normal.   Eyes:      General: No scleral icterus.     Extraocular Movements: Extraocular movements intact.      Conjunctiva/sclera: Conjunctivae normal.   Cardiovascular:      Rate and Rhythm: Normal rate and regular rhythm.      Heart sounds: Normal heart sounds.   Pulmonary:      Effort: Pulmonary effort is normal.      Breath sounds: Normal breath sounds.   Abdominal:      General: Bowel sounds are normal.      Palpations: Abdomen is soft.      Tenderness: There is no abdominal tenderness.   Musculoskeletal:      Cervical back: Normal range of motion and neck supple.      Right lower leg: No edema.      Left lower leg: No edema.   Skin:     General: Skin is warm and dry.   Neurological:      Mental Status: She is alert and oriented to person, place, and time.   Psychiatric:         Behavior: Behavior normal.        Antibiotics  This patient does not have an active medication from one of the medication groupers.    Relevant Results  Labs  Results from last 72 hours   Lab Units 05/20/25  0547 05/19/25  0632 05/18/25  0659   WBC AUTO x10*3/uL 8.6 7.9 8.5   HEMOGLOBIN g/dL 11.7* 12.4 11.2*   HEMATOCRIT % 35.2* 36.1 33.4*   PLATELETS AUTO  "x10*3/uL 278 293 259   NEUTROS PCT AUTO % 65.2 69.4 69.3   LYMPHS PCT AUTO % 19.6 17.1 17.1   MONOS PCT AUTO % 9.9 8.1 8.4   EOS PCT AUTO % 4.1 4.1 4.0     Results from last 72 hours   Lab Units 05/20/25  0547 05/19/25  0632 05/18/25  0659   SODIUM mmol/L 140 140 140   POTASSIUM mmol/L 3.8 3.9 4.0   CHLORIDE mmol/L 109* 109* 108*   CO2 mmol/L 25 23 22   BUN mg/dL 23 20 23   CREATININE mg/dL 1.46* 1.44* 1.47*   GLUCOSE mg/dL 91 103* 104*   CALCIUM mg/dL 8.6 8.9 8.8   ANION GAP mmol/L 10 12 14   EGFR mL/min/1.73m*2 39* 40* 39*         Estimated Creatinine Clearance: 40.4 mL/min (A) (by C-G formula based on SCr of 1.46 mg/dL (H)).  No results found for: \"CRP\"  Microbiology  Susceptibility data from last 14 days.  Collected Specimen Info Organism   05/17/25 Blood culture from Peripheral Venipuncture Staphylococcus epidermidis     Imaging  Electrocardiogram, 12-lead PRN ACS symptoms  Result Date: 5/16/2025  Normal sinus rhythm Left anterior fascicular block Left ventricular hypertrophy with repolarization abnormality ( R in aVL , Jarret product ) Abnormal ECG When compared with ECG of 31-MAR-2025 10:55, Minimal criteria for Septal infarct are no longer Present    CT head wo IV contrast  Result Date: 5/16/2025  Interpreted By:  Ketan Perez, STUDY: CT HEAD WO IV CONTRAST;  5/16/2025 12:33 pm   INDICATION: Signs/Symptoms:weakness.   COMPARISON: 03/03/2025   ACCESSION NUMBER(S): [id number]   ORDERING CLINICIAN: ARTIS WATTS   TECHNIQUE: Sequential trans axial images were obtained  .   FINDINGS: INTRACRANIAL:   There is moderate prominence of the cortical sulci indicating atrophy.   There is moderate ventriculomegaly, again consistent with atrophy. Additionally there is slightly greater dilatation of the right lateral ventricle may be ex vacuo from remote infarction at the corona radiata.   Moderate to fairly extensive decreased attenuation of the periventricular and long tracks of the white matter most consistent with " gliosis from arterial disease.There is more discrete focal hypodensity in the corona radiata probably from remote infarctions. There is also remote lacunar infarction of the left thalamus that may have been subacute on the previous exam. Otherwise there is no evidence of definite subacute infarction, intracranial hemorrhage or mass.     EXTRACRANIAL: Visualized paranasal sinuses and mastoids are clear. The calvarium is intact.       Moderate age related degenerative change as described without acute findings or significant change from the prior exam.   Signed by: Ketan Perez 5/16/2025 1:00 PM Dictation workstation:   [id number]    XR chest 1 view  Result Date: 5/16/2025  Interpreted By:  Efe Laird, STUDY: XR CHEST 1 VIEW;  5/16/2025 12:05 pm   INDICATION: Signs/Symptoms:weakness.     COMPARISON: 03/31/2025   ACCESSION NUMBER(S): [id number]   ORDERING CLINICIAN: ARTIS WATTS   FINDINGS:         CARDIOMEDIASTINAL SILHOUETTE: Cardiomediastinal silhouette is normal in size and configuration.   LUNGS: Lungs are clear.   ABDOMEN: No remarkable upper abdominal findings.   BONES: No acute osseous changes.       1.  No evidence of acute cardiopulmonary process.       MACRO: None   Signed by: Efe Laird 5/16/2025 12:11 PM Dictation workstation:   [id number]     Assessment/Plan   Pyuria, negative urine culture  Acute kidney injury on chronic kidney disease, resolved  Positive blood culture-Staphylococcus epidermidis per interval review, contaminant     Monitor off antibiotics  Supportive care  Monitor temperature and WBC  Monitor renal function  Discharge planning-plan discussed with primary team     This is a complex infectious disease issue and the following was performed today (for more details please see the above note): Management decisions reflecting the added complexity (e.g., changes in antimicrobial therapy, infection control strategies).     Pedrito rGande MD

## 2025-05-20 NOTE — CARE PLAN
The patient's goals for the shift include improve mobility    The clinical goals for the shift include remain hds      Problem: Skin  Goal: Decreased wound size/increased tissue granulation at next dressing change  Outcome: Progressing  Goal: Participates in plan/prevention/treatment measures  Outcome: Progressing  Goal: Prevent/manage excess moisture  Outcome: Progressing  Goal: Prevent/minimize sheer/friction injuries  Outcome: Progressing  Goal: Promote/optimize nutrition  Outcome: Progressing  Goal: Promote skin healing  Outcome: Progressing     Problem: Pain - Adult  Goal: Verbalizes/displays adequate comfort level or baseline comfort level  Outcome: Progressing     Problem: Safety - Adult  Goal: Free from fall injury  Outcome: Progressing     Problem: Discharge Planning  Goal: Discharge to home or other facility with appropriate resources  Outcome: Progressing     Problem: Chronic Conditions and Co-morbidities  Goal: Patient's chronic conditions and co-morbidity symptoms are monitored and maintained or improved  Outcome: Progressing     Problem: Nutrition  Goal: Nutrient intake appropriate for maintaining nutritional needs  Outcome: Progressing     Problem: Fall/Injury  Goal: Not fall by end of shift  Outcome: Progressing  Goal: Be free from injury by end of the shift  Outcome: Progressing  Goal: Verbalize understanding of personal risk factors for fall in the hospital  Outcome: Progressing  Goal: Verbalize understanding of risk factor reduction measures to prevent injury from fall in the home  Outcome: Progressing  Goal: Use assistive devices by end of the shift  Outcome: Progressing  Goal: Pace activities to prevent fatigue by end of the shift  Outcome: Progressing

## 2025-05-20 NOTE — DISCHARGE SUMMARY
Discharge Diagnosis  Generalized weakness  Acute/chronic urinary retention  Pyuria           Issues Requiring Follow-Up  Follow-up PCP 1 to 2 weeks  Follow-up urology 1 week    Indwelling Rush catheter to remain until follow-up appointment with urology    Discharge Meds     Medication List      START taking these medications     lactobacillus acidophilus tablet tablet; Take 1 tablet by mouth 2 times   a day.   loperamide 2 mg capsule; Commonly known as: Imodium; Take 1 capsule (2   mg) by mouth 4 times a day as needed for diarrhea.     CONTINUE taking these medications     allopurinol 100 mg tablet; Commonly known as: Zyloprim; TAKE 1 TABLET BY   MOUTH TWICE A DAY   amLODIPine 10 mg tablet; Commonly known as: Norvasc; Take 1 tablet (10   mg) by mouth once daily.   aspirin 81 mg EC tablet   atenolol 50 mg tablet; Commonly known as: Tenormin   atorvastatin 40 mg tablet; Commonly known as: Lipitor; Take 1 tablet (40   mg) by mouth once daily at bedtime.   furosemide 40 mg tablet; Commonly known as: Lasix; Take 1 tablet (40 mg)   by mouth once daily as needed (edema).   hydrALAZINE 25 mg tablet; Commonly known as: Apresoline   nystatin 100,000 unit/gram powder; Commonly known as: Mycostatin; Apply   1 Application topically 2 times a day. groin   pantoprazole 40 mg EC tablet; Commonly known as: ProtoNix; Take 1 tablet   (40 mg) by mouth once daily in the morning. Take before meals. Do not   crush, chew, or split.   potassium chloride CR 10 mEq ER tablet; Commonly known as: Klor-Con;   TAKE 1 TABLET BY MOUTH TWICE A DAY (DO NOT CRUSH, CHEW, OR SPLIT)     STOP taking these medications     cholecalciferol 10 mcg (400 units) tablet; Commonly known as: Vitamin D3   valsartan 80 mg tablet; Commonly known as: Diovan       Test Results Pending At Discharge  Pending Labs       Order Current Status    Blood Culture Preliminary result    Blood Culture Preliminary result            Hospital Course  69-year-old female, admitted  5/16/2025 for pronounced, nonfocal generalized weakness.  With pyuria on admit treated with IV antibiotics empirically.  With acute urinary retention, Rush placed status post urology consult.  Urine culture negative ABX discontinued.  Hospital course C/B 1/2 positive blood cultures [facility] consulted ID.  Now s/p 1x IV daptomycin--per ID believe + BC contaminant.  Has been cleared to discharge by ID.    At this point patient is awake alert oriented x 3.  Denies pain or complaints.  Stable on room air.  Peer to peer completed for SNF approved today.  Will discharge to SNF today for further rehabilitation.    Patient to follow-up  PCP 1 to 2 weeks  Urology 1 week    SNF to keep Rush in place until follow up appointment with urology.    Pertinent Physical Exam At Time of Discharge  Physical Exam  Vitals and nursing note reviewed.   Constitutional:       Appearance: Normal appearance. She is obese.   HENT:      Head: Normocephalic and atraumatic.      Right Ear: External ear normal.      Left Ear: External ear normal.      Nose: Nose normal.      Mouth/Throat:      Mouth: Mucous membranes are moist.   Eyes:      Extraocular Movements: Extraocular movements intact.      Conjunctiva/sclera: Conjunctivae normal.      Pupils: Pupils are equal, round, and reactive to light.   Cardiovascular:      Rate and Rhythm: Normal rate.      Pulses: Normal pulses.      Heart sounds: Normal heart sounds.   Pulmonary:      Effort: Pulmonary effort is normal.      Breath sounds: Normal breath sounds.   Abdominal:      General: Bowel sounds are normal.      Palpations: Abdomen is soft.   Genitourinary:     Comments: Indwelling Rush catheter draining clear yellow urine  Musculoskeletal:         General: Normal range of motion.      Cervical back: Normal range of motion and neck supple.      Right lower leg: Edema present.      Left lower leg: Edema present.      Comments: Trace bilateral lower extremity edema   Skin:     General: Skin is warm  and dry.      Capillary Refill: Capillary refill takes less than 2 seconds.   Neurological:      Mental Status: She is alert and oriented to person, place, and time.   Psychiatric:         Mood and Affect: Mood normal.         Behavior: Behavior normal.         Outpatient Follow-Up  Future Appointments   Date Time Provider Department Center   6/6/2025  2:30 PM DAV Benitez [id number]     Time spent on discharge including arranging coordinating completing peer to peer --60 minutes    ANGELI Fernando-CNP

## 2025-05-21 LAB — BACTERIA BLD CULT: NORMAL

## 2025-05-22 LAB
BACTERIA BLD AEROBE CULT: ABNORMAL
BACTERIA BLD CULT: ABNORMAL
GRAM STN SPEC: ABNORMAL

## 2025-05-23 LAB
ATRIAL RATE: 63 BPM
P AXIS: 22 DEGREES
P OFFSET: 174 MS
P ONSET: 116 MS
PR INTERVAL: 196 MS
Q ONSET: 214 MS
QRS COUNT: 10 BEATS
QRS DURATION: 112 MS
QT INTERVAL: 456 MS
QTC CALCULATION(BAZETT): 466 MS
QTC FREDERICIA: 463 MS
R AXIS: -71 DEGREES
T AXIS: 93 DEGREES
T OFFSET: 442 MS
VENTRICULAR RATE: 63 BPM

## 2025-05-30 ENCOUNTER — TELEPHONE (OUTPATIENT)
Dept: PRIMARY CARE | Facility: CLINIC | Age: 70
End: 2025-05-30
Payer: MEDICARE

## 2025-05-30 NOTE — TELEPHONE ENCOUNTER
Patient discharged from 5/23/25 from [facility]. Phoned Idalmis/ sister to schedule a House Calls visit, no answer at this time and unable to leave a message.

## 2025-06-03 NOTE — TELEPHONE ENCOUNTER
"JALEEL Erazo - Phoned Idalmis/ sister, she reported patient was at rehab for 3 days and \"signed herself out\". She stated, \"No one was doing anything there\". Offered to schedule a House Calls visit 6/4/25 at 12:00 pm and she declined r/t the time. House Calls visit scheduled 6/13/25 at 2:30 pm. Patient currently does not have C.   "

## 2025-06-06 ENCOUNTER — APPOINTMENT (OUTPATIENT)
Dept: PRIMARY CARE | Facility: CLINIC | Age: 70
End: 2025-06-06
Payer: MEDICARE

## 2025-06-11 ENCOUNTER — APPOINTMENT (OUTPATIENT)
Dept: PRIMARY CARE | Facility: CLINIC | Age: 70
End: 2025-06-11
Payer: MEDICARE

## 2025-06-12 ENCOUNTER — TELEPHONE (OUTPATIENT)
Dept: PRIMARY CARE | Facility: CLINIC | Age: 70
End: 2025-06-12
Payer: MEDICARE

## 2025-06-13 ENCOUNTER — OFFICE VISIT (OUTPATIENT)
Dept: PRIMARY CARE | Facility: CLINIC | Age: 70
End: 2025-06-13
Payer: MEDICARE

## 2025-06-13 DIAGNOSIS — R19.7 DIARRHEA, UNSPECIFIED TYPE: ICD-10-CM

## 2025-06-13 DIAGNOSIS — M1A.09X0 IDIOPATHIC CHRONIC GOUT OF MULTIPLE SITES WITHOUT TOPHUS: Chronic | ICD-10-CM

## 2025-06-13 DIAGNOSIS — I50.41 ACUTE COMBINED SYSTOLIC AND DIASTOLIC CONGESTIVE HEART FAILURE: Chronic | ICD-10-CM

## 2025-06-13 DIAGNOSIS — I10 PRIMARY HYPERTENSION: Primary | ICD-10-CM

## 2025-06-13 DIAGNOSIS — R33.9 URINE RETENTION: Chronic | ICD-10-CM

## 2025-06-13 DIAGNOSIS — I16.0 HYPERTENSIVE URGENCY: ICD-10-CM

## 2025-06-13 DIAGNOSIS — E55.9 VITAMIN D DEFICIENCY: Chronic | ICD-10-CM

## 2025-06-13 PROCEDURE — 1159F MED LIST DOCD IN RCRD: CPT | Performed by: NURSE PRACTITIONER

## 2025-06-13 PROCEDURE — 3077F SYST BP >= 140 MM HG: CPT | Performed by: NURSE PRACTITIONER

## 2025-06-13 PROCEDURE — 1111F DSCHRG MED/CURRENT MED MERGE: CPT | Performed by: NURSE PRACTITIONER

## 2025-06-13 PROCEDURE — 1160F RVW MEDS BY RX/DR IN RCRD: CPT | Performed by: NURSE PRACTITIONER

## 2025-06-13 PROCEDURE — 3078F DIAST BP <80 MM HG: CPT | Performed by: NURSE PRACTITIONER

## 2025-06-13 PROCEDURE — 99349 HOME/RES VST EST MOD MDM 40: CPT | Performed by: NURSE PRACTITIONER

## 2025-06-13 RX ORDER — HYDRALAZINE HYDROCHLORIDE 25 MG/1
25 TABLET, FILM COATED ORAL 4 TIMES DAILY
Qty: 120 TABLET | Refills: 11 | Status: SHIPPED | OUTPATIENT
Start: 2025-06-13 | End: 2026-06-13

## 2025-06-13 RX ORDER — AMLODIPINE BESYLATE 10 MG/1
10 TABLET ORAL DAILY
Qty: 90 TABLET | Refills: 3 | Status: SHIPPED | OUTPATIENT
Start: 2025-06-13 | End: 2026-06-13

## 2025-06-13 NOTE — PROGRESS NOTES
Subjective   Patient ID: Tammy Walker is a 69 y.o. female who presents for No chief complaint on file..    Patient is seen in her private home sitting up on the sofa in the living room. Resides at home where she lives with her sister, and she is her primary caregiver.  She is awake and alert with appropriate awareness and is her own decision maker.  Sister is present for home visit.  Currently is non-ambulatory, can bear weight only and propels herself in the wheelchair. PMH: HTN, Covid-19 appx 2021, Hyperlipidemia, TIA.   Medical Necessity - Homebound due to functional decline from Covid-19 Long Hauler.      Post Acute 5-16-25: admitted 5/16/2025 for pronounced, nonfocal generalized weakness.  With pyuria on admit treated with IV antibiotics empirically.  With acute urinary retention, Rush placed status post urology consult.  Urine culture negative ABX discontinued.  Hospital course C/B 1/2 positive blood cultures [facility] consulted ID.  Now s/p 1x IV daptomycin--per ID believe + BC contaminant.  Has been cleared to discharge by ID.  At this point patient is awake alert oriented x 3.  Denies pain or complaints.  Stable on room air.  Peer to peer completed for SNF approved today.  Will discharge to SNF today for further rehabilitation.    Today is a follow up for overall functional decline, HTN and post acute.  Tammy is asking for catheter to be removed. Has an appointment with Dr. Holt next week.  Reports that she has not had a void test without the catheter, knows risks and benefits if catheter is removed.   I've endorsed that she needs to follow up with urology since it is noted that she has a paralyzed bladder.  Sister is working on having a ramp build so that she can get her out of the house and to necessary appointments.  She is in a weakened state, agreeable to start PT/OT for strengthening.  Goal is to be able to stand and transfer on her own.  Remains stationary on the sofa all day with legs elevated  "watching TV.  Reports that she has resumed taking all medications as prescribed, including vitamind D supplement. Sister is managing all medications.  Reports blood pressures have been \"pretty good\".  Log reviewed via machine with 's - 190's and DBP 80's-90's.  Has not made appointment with cardiology Dr. Alonzo or with nephrology as recommended.  She remains stationary on the sofa throughout the day, and also where she sleeps.  Does transfer herself and propel via wheelchair to commode.    Reports that appetite is good, staying hydrated and urinating adequate amounts.  Denies chest pain, palpitations, tachycardia, and shortness of breath, wheezing, no PND/orthopnea, or respiratory complaints for the patient. There is no fever, or chills. No nausea, vomiting, diarrhea, headaches, or vision changes or recent falls. There is no hematuria, dysuria, flank pain, or increased urgency.     Home Visit: Medically necessary due to: dementia/cognitive impairment, unsteady gait/poor balance, shortness of breath with minimal exertion, Illness or condition that results in activity limitation or restriction that impacts the ability to leave home such as: equipment and /or human assistance needed to safely leave the home, patient has limited support systems to help the patient attend office visits, the office visit would require excessive physical effort or pain for the patient.         Current Medications[1]     Review of Systems    Objective   There were no vitals taken for this visit.    Physical Exam    Assessment/Plan   {Assess/PlanSmartLinks:02351}             Raisa Goff, APRN-[facility]         [1]    Current Outpatient Medications:   •  allopurinol (Zyloprim) 100 mg tablet, TAKE 1 TABLET BY MOUTH TWICE A DAY, Disp: 180 tablet, Rfl: 3  •  amLODIPine (Norvasc) 10 mg tablet, Take 1 tablet (10 mg) by mouth once daily., Disp: , Rfl:   •  aspirin 81 mg EC tablet, Take 1 tablet (81 mg) by mouth once daily., Disp: , Rfl:   •  " atenolol (Tenormin) 50 mg tablet, Take 1 tablet (50 mg) by mouth 2 times a day., Disp: , Rfl:   •  atorvastatin (Lipitor) 40 mg tablet, Take 1 tablet (40 mg) by mouth once daily at bedtime., Disp: 90 tablet, Rfl: 3  •  furosemide (Lasix) 40 mg tablet, Take 1 tablet (40 mg) by mouth once daily as needed (edema)., Disp: , Rfl:   •  hydrALAZINE (Apresoline) 25 mg tablet, Take 1 tablet (25 mg) by mouth 4 times a day., Disp: , Rfl:   •  lactobacillus acidophilus tablet tablet, Take 1 tablet by mouth 2 times a day., Disp: , Rfl:   •  loperamide (Imodium) 2 mg capsule, Take 1 capsule (2 mg) by mouth 4 times a day as needed for diarrhea., Disp: 30 capsule, Rfl: 0  •  nystatin (Mycostatin) 100,000 unit/gram powder, Apply 1 Application topically 2 times a day. groin, Disp: , Rfl:   •  pantoprazole (ProtoNix) 40 mg EC tablet, Take 1 tablet (40 mg) by mouth once daily in the morning. Take before meals. Do not crush, chew, or split., Disp: , Rfl:   •  potassium chloride CR 10 mEq ER tablet, TAKE 1 TABLET BY MOUTH TWICE A DAY (DO NOT CRUSH, CHEW, OR SPLIT), Disp: 60 tablet, Rfl: 5   equal, round, and reactive to light.   Cardiovascular:      Rate and Rhythm: Normal rate and regular rhythm.      Pulses: Normal pulses.      Heart sounds: Normal heart sounds.   Pulmonary:      Effort: Pulmonary effort is normal.      Breath sounds: Normal breath sounds. No wheezing or rhonchi.   Abdominal:      General: Bowel sounds are normal.      Palpations: Abdomen is soft.      Tenderness: There is no abdominal tenderness. There is no guarding.   Genitourinary:     Comments: Rush catheter present  Musculoskeletal:      Right lower leg: Edema present.      Left lower leg: Edema present.      Comments: Limited ROM - non ambulatory able to stand and pivot only   Skin:     Capillary Refill: Capillary refill takes 2 to 3 seconds.      Coloration: Skin is pale.   Neurological:      Mental Status: She is alert and oriented to person, place, and time.      Motor: Weakness present.      Gait: Gait abnormal.   Psychiatric:         Mood and Affect: Mood normal.       Assessment/Plan   Diagnoses and all orders for this visit:  Primary hypertension  -     hydrALAZINE (Apresoline) 25 mg tablet; Take 1 tablet (25 mg) by mouth 4 times a day.  -     amLODIPine (Norvasc) 10 mg tablet; Take 1 tablet (10 mg) by mouth once daily.  Acute combined systolic and diastolic congestive heart failure  Comments:  Continue furosemide 40mg daily  Vitamin D deficiency  Comments:  Continue Vit D3 supplement  Urine retention  Comments:  Follow up with urology  Idiopathic chronic gout of multiple sites without tophus  Comments:  managed - cont with [id number] bid    More than 50% of time spent face-to-face discussion, a total of 60 minutes with this patient on counseling, coordination of care, and review of medical records and diagnostics. Advised pt to contact the [facility] with any acute concerns or medication needs.     Will continue with [facility] home visits. Patient has limited support, limited mobility and  unable to navigate to traditional office appointments.  Follow up in 4-6 weeks and ANGELI Yost-NANCY         [1]   Current Outpatient Medications:     allopurinol (Zyloprim) 100 mg tablet, TAKE 1 TABLET BY MOUTH TWICE A DAY, Disp: 180 tablet, Rfl: 3    aspirin 81 mg EC tablet, Take 1 tablet (81 mg) by mouth once daily., Disp: , Rfl:     atenolol (Tenormin) 50 mg tablet, Take 1 tablet (50 mg) by mouth 2 times a day., Disp: , Rfl:     atorvastatin (Lipitor) 40 mg tablet, Take 1 tablet (40 mg) by mouth once daily at bedtime., Disp: 90 tablet, Rfl: 3    furosemide (Lasix) 40 mg tablet, Take 1 tablet (40 mg) by mouth once daily as needed (edema)., Disp: , Rfl:     hydrALAZINE (Apresoline) 25 mg tablet, Take 1 tablet (25 mg) by mouth 4 times a day., Disp: 120 tablet, Rfl: 11    lactobacillus acidophilus tablet tablet, Take 1 tablet by mouth 2 times a day., Disp: , Rfl:     nystatin (Mycostatin) 100,000 unit/gram powder, Apply 1 Application topically 2 times a day. groin, Disp: , Rfl:     pantoprazole (ProtoNix) 40 mg EC tablet, Take 1 tablet (40 mg) by mouth once daily in the morning. Take before meals. Do not crush, chew, or split., Disp: , Rfl:     potassium chloride CR 10 mEq ER tablet, TAKE 1 TABLET BY MOUTH TWICE A DAY (DO NOT CRUSH, CHEW, OR SPLIT), Disp: 60 tablet, Rfl: 5    amLODIPine (Norvasc) 10 mg tablet, Take 1 tablet (10 mg) by mouth once daily., Disp: 90 tablet, Rfl: 3

## 2025-06-25 VITALS
TEMPERATURE: 96.8 F | OXYGEN SATURATION: 97 % | SYSTOLIC BLOOD PRESSURE: 140 MMHG | HEART RATE: 88 BPM | RESPIRATION RATE: 16 BRPM | DIASTOLIC BLOOD PRESSURE: 68 MMHG

## 2025-06-25 PROBLEM — R19.7 DIARRHEA: Status: ACTIVE | Noted: 2025-06-25

## 2025-06-25 ASSESSMENT — ENCOUNTER SYMPTOMS
PSYCHIATRIC NEGATIVE: 1
SHORTNESS OF BREATH: 0
WEAKNESS: 1
ACTIVITY CHANGE: 1
RESPIRATORY NEGATIVE: 1
GASTROINTESTINAL NEGATIVE: 1

## 2025-06-26 ENCOUNTER — HOSPITAL ENCOUNTER (EMERGENCY)
Facility: HOSPITAL | Age: 70
Discharge: HOME | End: 2025-06-26
Attending: EMERGENCY MEDICINE
Payer: MEDICARE

## 2025-06-26 VITALS
WEIGHT: 200 LBS | SYSTOLIC BLOOD PRESSURE: 149 MMHG | TEMPERATURE: 97.7 F | HEART RATE: 72 BPM | OXYGEN SATURATION: 95 % | RESPIRATION RATE: 16 BRPM | BODY MASS INDEX: 33.32 KG/M2 | HEIGHT: 65 IN | DIASTOLIC BLOOD PRESSURE: 102 MMHG

## 2025-06-26 DIAGNOSIS — T83.9XXA PROBLEM WITH FOLEY CATHETER, INITIAL ENCOUNTER: Primary | ICD-10-CM

## 2025-06-26 DIAGNOSIS — R82.90 ABNORMAL URINE: ICD-10-CM

## 2025-06-26 LAB
APPEARANCE UR: CLEAR
BILIRUB UR STRIP.AUTO-MCNC: NEGATIVE MG/DL
COLOR UR: COLORLESS
GLUCOSE UR STRIP.AUTO-MCNC: NORMAL MG/DL
KETONES UR STRIP.AUTO-MCNC: NEGATIVE MG/DL
LEUKOCYTE ESTERASE UR QL STRIP.AUTO: ABNORMAL
NITRITE UR QL STRIP.AUTO: NEGATIVE
PH UR STRIP.AUTO: 5 [PH]
PROT UR STRIP.AUTO-MCNC: NEGATIVE MG/DL
RBC # UR STRIP.AUTO: NEGATIVE MG/DL
RBC #/AREA URNS AUTO: NORMAL /HPF
SP GR UR STRIP.AUTO: 1
UROBILINOGEN UR STRIP.AUTO-MCNC: NORMAL MG/DL
WBC #/AREA URNS AUTO: NORMAL /HPF

## 2025-06-26 PROCEDURE — 99283 EMERGENCY DEPT VISIT LOW MDM: CPT | Performed by: EMERGENCY MEDICINE

## 2025-06-26 PROCEDURE — 81001 URINALYSIS AUTO W/SCOPE: CPT | Performed by: CLINICAL NURSE SPECIALIST

## 2025-06-26 PROCEDURE — 2500000002 HC RX 250 W HCPCS SELF ADMINISTERED DRUGS (ALT 637 FOR MEDICARE OP, ALT 636 FOR OP/ED): Performed by: EMERGENCY MEDICINE

## 2025-06-26 PROCEDURE — 87086 URINE CULTURE/COLONY COUNT: CPT | Mod: WESLAB | Performed by: CLINICAL NURSE SPECIALIST

## 2025-06-26 PROCEDURE — 51702 INSERT TEMP BLADDER CATH: CPT

## 2025-06-26 RX ORDER — OXYBUTYNIN CHLORIDE 5 MG/1
5 TABLET ORAL ONCE
Status: COMPLETED | OUTPATIENT
Start: 2025-06-26 | End: 2025-06-26

## 2025-06-26 RX ADMIN — OXYBUTYNIN CHLORIDE 5 MG: 5 TABLET ORAL at 19:56

## 2025-06-26 ASSESSMENT — PAIN - FUNCTIONAL ASSESSMENT: PAIN_FUNCTIONAL_ASSESSMENT: 0-10

## 2025-06-26 ASSESSMENT — PAIN DESCRIPTION - PAIN TYPE: TYPE: ACUTE PAIN

## 2025-06-26 ASSESSMENT — PAIN SCALES - GENERAL: PAINLEVEL_OUTOF10: 8

## 2025-06-26 NOTE — ED NOTES
Pt states she is having pain in her lower abdomen, states nixon has not been changed in over a month. Nixon balloon found to have 6ml of fluid. Old nixon removed and replaced with a new 16f.      Kathryn Littlejohn RN  06/26/25 [id number]

## 2025-06-26 NOTE — DISCHARGE INSTRUCTIONS
Make sure you are drinking plenty of fluids.  Follow-up with Dr. Holt as soon as possible  Drink plenty of fluids add cranberry juice to diet  Today it was know that your blood pressure was elevated follow-up with primary care physician for reevaluation.  Journal your blood pressure daily  Today your urine was abnormal.  Follow-up with primary care physician for reevaluation.  Culture was obtained  Please journal blood pressure daily for primary care physician.

## 2025-06-26 NOTE — CARE PLAN
Pt requested to speak to Care Coordination concerning HHC  Pt was discharged from [facility] on sent to [facility] on 5/20 with a nixon in place. She has never had the nixon changed  Pt and family are requesting HHC for nixon care  Sent a secure chat to the pts clinician Raisa Goff NP requesting HHC for nixon care.  Received a message back from Raisa; this pt has missed a couple of appointments, she will not follow up with the urologist (Dr Monique)  Went back to the ED and spoke to the pt and her sister Idalmis and explained the importance of following up with all appointments.  The sister, idalmis, said that she will schedule an appointment with Dr. Monique, then call dial a ride and schedule transportation for the appointment. I also told Idalmis to request HHC for nixon care for Tammy.   Idalmis verbalized understanding    DISCHARGE PLAN: HOME WITH SISTER IDALMIS AND FOLLOW UP WITH DR MONIQUE

## 2025-06-26 NOTE — ED TRIAGE NOTES
Pt from home via EMS with c/o catheter pain. States she feels like she is being stabbed with her nixon.

## 2025-06-26 NOTE — ED PROVIDER NOTES
Department of Emergency Medicine   ED  Provider Note  Admit Date/RoomTime: 6/26/2025  1:45 PM  [facility]        History of Present Illness:  Chief Complaint   Patient presents with    Catheter Pain         Tammy Walker is a 69 y.o. female history of congestive heart failure, hypertension hyperlipidemia recent admission to the hospital in May discharged on 5/20/2025 for acute on chronic urinary retention and pyuria with generalized weakness.  Patient has a chronic indwelling Rush presented with pain at the insertion site of the Rush.  Denies any fever or chills.  No nausea vomiting no abdominal pain no difficulty moving bowels.  No black tarry stools or blood in the stool.  No constipation.  Patient states she feels like somebody is stabbing her in the vagina area where the Rush is located.  She is supposed to follow-up with Dr. Holt urology presents now for painful catheter    Review of Systems:   Pertinent positives and negatives are stated within HPI, all other systems reviewed and are negative.        --------------------------------------------- PAST HISTORY ---------------------------------------------  Past Medical History:  has a past medical history of Arthritis, CHF (congestive heart failure), Chronic kidney disease, HLD (hyperlipidemia), HTN (hypertension), Long COVID, TIA (transient ischemic attack), and Urinary tract infection.  Past Surgical History:  has a past surgical history that includes MR angio head wo IV contrast (9/11/2023).  Social History:  reports that she quit smoking about 3 years ago. Her smoking use included cigarettes. She has never used smokeless tobacco. She reports that she does not currently use alcohol. She reports that she does not use drugs.  Family History: family history includes Diabetes in her father and mother; Hypertension in her father and mother.. Unless otherwise noted, family history is non contributory  The patient’s home medications have been  "reviewed.  Allergies: Patient has no known allergies.        ---------------------------------------------------PHYSICAL EXAM--------------------------------------    GENERAL APPEARANCE: Awake and alert.   VITAL SIGNS: As per the nurses' triage record.   HEENT: Normocephalic, atraumatic. Extraocular muscles are intact. Pupils equal round and reactive to light. Conjunctiva are pink. Negative scleral icterus. Mucous membranes are moist. Tongue in the midline. Pharynx was without erythema or exudates, uvula midline  CHEST: Nontender to palpation. Clear to auscultation bilaterally. No rales, rhonchi, or wheezing.   HEART: S1, S2. Regular rate and rhythm. No murmurs, gallops or rubs.  Strong and equal pulses in the extremities.   ABDOMEN: Soft, nontender, nondistended, positive bowel sounds, no palpable masses.   chaperone present.  Discoloration of the abdominal folds.  Does not appear to be cellulitic or yeast in nature.  No foul odor noted.  Rush intact draining a clear yellow urine changed by nursing.  MUSCULCSKELETAL: Full gross active range of motion.  Peripheral pulses intact.  NEUROLOGICAL: Awake, alert and oriented x 3. Power intact in the upper and lower extremities. Sensation is intact to light touch in the upper and lower extremities.   IMMUNOLOGICAL: No lymphatic streaking noted   DERM: No petechiae, rashes, or ecchymoses.          ------------------------- NURSING NOTES AND VITALS REVIEWED ---------------------------  The nursing notes within the ED encounter and vital signs as below have been reviewed by myself  BP (!) 165/92   Pulse 68   Temp 36.5 °C (97.7 °F)   Resp 16   Ht 1.651 m (5' 5\")   Wt 90.7 kg (200 lb)   SpO2 95%   BMI 33.28 kg/m²     Oxygen Saturation Interpretation: 95% room air        The patient’s available past medical records and past encounters were reviewed.          -----------------------DIAGNOSTIC RESULTS------------------------  LABS:    Labs Reviewed   URINALYSIS WITH " REFLEX CULTURE AND MICROSCOPIC - Abnormal       Result Value    Color, Urine Colorless (*)     Appearance, Urine Clear      Specific Gravity, Urine 1.005      pH, Urine 5.0      Protein, Urine NEGATIVE      Glucose, Urine Normal      Blood, Urine NEGATIVE      Ketones, Urine NEGATIVE      Bilirubin, Urine NEGATIVE      Urobilinogen, Urine Normal      Nitrite, Urine NEGATIVE      Leukocyte Esterase, Urine 75 Jeison/uL (*)    MICROSCOPIC ONLY, URINE - Normal    WBC, Urine 1-5      RBC, Urine NONE     URINE CULTURE   URINALYSIS WITH REFLEX CULTURE AND MICROSCOPIC    Narrative:     The following orders were created for panel order Urinalysis with Reflex Culture and Microscopic.  Procedure                               Abnormality         Status                     ---------                               -----------         ------                     Urinalysis with Reflex C...[[id number]]  Abnormal            Final result               Extra Urine Gray Tube[[id number]]                            In process                   Please view results for these tests on the individual orders.   EXTRA URINE GRAY TUBE       As interpreted by me, the above displayed labs are abnormal. All other labs obtained during this visit were within normal range or not returned as of this dictation.    ------------------------------ ED COURSE/MEDICAL DECISION MAKING----------------------  Medical Decision Making:   Exam: A medically appropriate exam performed, outlined above, given the known history and presentation.    History obtained from: Review medical record nursing notes patient patient's family      Social Determinants of Health considered during this visit: Takes care of her self at home with family help and home health      PAST MEDICAL HISTORY/Chronic Conditions Affecting Care     has a past medical history of Arthritis, CHF (congestive heart failure), Chronic kidney disease, HLD (hyperlipidemia), HTN (hypertension), Long COVID, TIA  (transient ischemic attack), and Urinary tract infection.       CC/HPI Summary, Social Determinants of health, Records Reviewed, DDx, testing done/not done, ED Course, Reassessment, disposition considerations/shared decision making with patient, consults, disposition:   Presents with painful Rush catheter.  Rush catheter changed by nursing draining a clear yellow with resolution of patient's pain and discomfort.  Urine showed leuk esterase otherwise unremarkable sent for culture.  Patient to follow-up with Dr. Holt.  Family requesting care coordinator.  This was discussed with the care coordinator have provided information advise close follow-up with her urology team.  No signs of sepsis or toxicity she is not hypotensive tachycardic or febrile.  She is denying any nausea or vomiting.  Abdominal exam is benign.  No blood noted.  Patient monitored in the emergency department no further problems with her catheter.  Patient seen and evaluated with attending physician Dr. Lejeune  CMT for discharge utilized to discharge planning discussed blood pressure with patient and family.  Close follow-up with primary care  Impression Rush catheter problem catheter change follow-up with urology  Abnormal urine culture pending low suspicion for UTI at this time signs of pyelonephritis or kidney stone      PROCEDURES  Unless otherwise noted below, none      CONSULTS:   None      ED Course as of 06/26/25 1754   Thu Jun 26, 2025   1501 Patient was improvement of pain after changing the catheter.  Urine sample pending [TB]   1554 Patient requesting care coordinator to help with Rush care at home.  Epic chat with care coordinator advised : I  spoke to the pt NP Raisa Goff. The pt has been non compliant with appointments. She is supposed to follow up with Dr. Holt. The pts sister Idalmis is going to make an appointment With Dr. Holt as soon as they get home, then schedule transportation with [employer] a ride. The pt needs a  "following physician for C. So I told Idalmis to request HHC from Dr. Holt.\"  [TB]      ED Course User Index  [TB] ANGELI Busch-CNP         Diagnoses as of 06/26/25 1754   Problem with Rush catheter, initial encounter   Abnormal urine         This patient has remained hemodynamically stable during their ED course.      Critical Care: None      Counseling:  The emergency provider has spoken with the patient and discussed today’s results, in addition to providing specific details for the plan of care and counseling regarding the diagnosis and prognosis.  Questions are answered at this time and they are agreeable with the plan.         --------------------------------- IMPRESSION AND DISPOSITION ---------------------------------    IMPRESSION  1. Problem with Rush catheter, initial encounter    2. Abnormal urine        DISPOSITION  Disposition: Discharge home  Patient condition is stable improved with ER interventions        NOTE: This report was transcribed using voice recognition software. Every effort was made to ensure accuracy; however, inadvertent computerized transcription errors may be present      DAV Busch  06/26/25 1754    "

## 2025-06-27 ENCOUNTER — TELEPHONE (OUTPATIENT)
Dept: PRIMARY CARE | Facility: CLINIC | Age: 70
End: 2025-06-27
Payer: MEDICARE

## 2025-06-27 NOTE — TELEPHONE ENCOUNTER
JALEEL Kline - Patient was treated and released 6/26/25 from [facility] ED. Per ED note: Presents with painful Rush catheter. Rush catheter changed by nursing draining a clear yellow with resolution of patient's pain and discomfort. Urine showed leuk esterase otherwise unremarkable sent for culture. Patient to follow-up with Dr. Holt. Family requesting care coordinator. This was discussed with the care coordinator have provided information advise close follow-up with her urology team.

## 2025-06-28 LAB — BACTERIA UR CULT: NO GROWTH

## 2025-07-07 DIAGNOSIS — M10.9 GOUT OF MULTIPLE SITES, UNSPECIFIED CAUSE, UNSPECIFIED CHRONICITY: ICD-10-CM

## 2025-07-08 RX ORDER — ALLOPURINOL 100 MG/1
100 TABLET ORAL 2 TIMES DAILY
Qty: 180 TABLET | Refills: 3 | Status: SHIPPED | OUTPATIENT
Start: 2025-07-08

## 2025-07-17 ENCOUNTER — HOSPITAL ENCOUNTER (EMERGENCY)
Facility: HOSPITAL | Age: 70
Discharge: HOME | End: 2025-07-17
Attending: STUDENT IN AN ORGANIZED HEALTH CARE EDUCATION/TRAINING PROGRAM
Payer: MEDICARE

## 2025-07-17 VITALS
BODY MASS INDEX: 33.32 KG/M2 | HEIGHT: 65 IN | OXYGEN SATURATION: 96 % | SYSTOLIC BLOOD PRESSURE: 165 MMHG | RESPIRATION RATE: 18 BRPM | DIASTOLIC BLOOD PRESSURE: 93 MMHG | TEMPERATURE: 97.7 F | WEIGHT: 200 LBS | HEART RATE: 57 BPM

## 2025-07-17 DIAGNOSIS — R39.89 BLADDER PAIN: Primary | ICD-10-CM

## 2025-07-17 DIAGNOSIS — Z87.898 H/O URINARY RETENTION: ICD-10-CM

## 2025-07-17 PROCEDURE — 51702 INSERT TEMP BLADDER CATH: CPT

## 2025-07-17 PROCEDURE — 99283 EMERGENCY DEPT VISIT LOW MDM: CPT | Performed by: STUDENT IN AN ORGANIZED HEALTH CARE EDUCATION/TRAINING PROGRAM

## 2025-07-17 ASSESSMENT — PAIN DESCRIPTION - LOCATION: LOCATION: GROIN

## 2025-07-17 ASSESSMENT — PAIN SCALES - GENERAL: PAINLEVEL_OUTOF10: 8

## 2025-07-17 ASSESSMENT — PAIN - FUNCTIONAL ASSESSMENT: PAIN_FUNCTIONAL_ASSESSMENT: 0-10

## 2025-07-17 ASSESSMENT — PAIN DESCRIPTION - PAIN TYPE: TYPE: ACUTE PAIN

## 2025-07-17 NOTE — ED TRIAGE NOTES
BIBA for bladder pain. Patient has a nixon in place and thinks it needs to be replaced. Last changed a month ago and this is what it usually feels like when it is due to be changed.

## 2025-07-17 NOTE — DISCHARGE INSTRUCTIONS
Follow-up Rush catheter care instructions as provided.  Leave the catheter in place until you follow-up with your urologist.  Return to the ED for any new or worsening symptoms including worsening swelling, pain, redness of the testicles or penis, bleeding or leaking around the catheter, severe pain, fevers or chills nausea vomiting or any new or worsening symptoms that you feel require emergent evaluation by physician.  Take all medication as prescribed including antibiotics, Flomax which can help to shrink the prostate and male patients to alleviate symptoms of urinary retention.

## 2025-07-18 NOTE — ED PROVIDER NOTES
HPI   Chief Complaint   Patient presents with    Rush Catheter Problem       This is a 69-year-old female with a past medical history of chronic urinary retention with chronic Rush catheter in place presenting to the ED for treatment of pain at the catheter site.  She states that she has had the current catheter in for about a month, she states that typically the catheter will eventually become painful around this time and will need to be replaced.  She has an appointment coming up next week, but came to the ED tonight because she feels the catheter needs to be replaced due to the discomfort she is feeling.  She states that this happened many times in the past and wants us to replace her catheter tonight.  She has no fevers or chills, nausea or vomiting, no abdominal pain associated with this.  No hematuria.  She still is draining urine into the catheter bag appropriately.      History provided by:  Patient   used: No            Patient History   Medical History[1]  Surgical History[2]  Family History[3]  Social History[4]    Physical Exam   ED Triage Vitals [07/17/25 0506]   Temperature Heart Rate Respirations BP   36.5 °C (97.7 °F) 57 18 (!) 165/93      Pulse Ox Temp Source Heart Rate Source Patient Position   96 % Oral -- --      BP Location FiO2 (%)     -- --       Physical Exam  General: well developed, obese no female who is awake and alert, in no apparent distress  GI: abdomen soft, mild suprapubic tenderness on exam without rigidity or guarding, no peritoneal signs, abdomen is nondistended, no masses palpated  Psych: appropriate mood and affect, cooperative with exam  Skin: warm, dry, without evidence of rash or abrasions    ED Course & MDM   Diagnoses as of 07/18/25 1703   Bladder pain   H/O urinary retention                 No data recorded     [place] Coma Scale Score: 15 (07/17/25 0508 : Jodi Valadez RN)                           Medical Decision Making  She is awake and alert,  in no acute distress on arrival to the ED.  Abdominal exam is benign without any focal tenderness or peritoneal signs.  Rush catheter is in place with urine in the bag showing that it is still draining.  Given her experience in the past, her catheter was removed and replaced.  After this was done she states the pain is resolved and she is ready to be discharged.  The urine itself is not cloudy or foul-smelling, given that the pain was resolved with replacement of her Rush catheter, and she has no infectious signs or symptoms, I do not feel that she likely has UTI or needs to be tested for this.  She requested to have her catheter replaced which has been done.  She was discharged in stable condition otherwise.    Procedure  Procedures       [1]   Past Medical History:  Diagnosis Date    Arthritis     CHF (congestive heart failure)     Chronic kidney disease     HLD (hyperlipidemia)     HTN (hypertension)     Long COVID     TIA (transient ischemic attack)     Urinary tract infection    [2]   Past Surgical History:  Procedure Laterality Date    MR HEAD ANGIO WO IV CONTRAST  9/11/2023    MR HEAD ANGIO WO IV CONTRAST [facility]   [3]   Family History  Problem Relation Name Age of Onset    Hypertension Mother      Diabetes Mother      Hypertension Father      Diabetes Father     [4]   Social History  Tobacco Use    Smoking status: Former     Current packs/day: 0.00     Types: Cigarettes     Quit date: 2022     Years since quitting: 3.5    Smokeless tobacco: Never   Substance Use Topics    Alcohol use: Not Currently    Drug use: Never        Preston Cheek DO  07/18/25 6852

## 2025-07-21 DIAGNOSIS — I10 PRIMARY HYPERTENSION: Primary | ICD-10-CM

## 2025-07-22 ENCOUNTER — APPOINTMENT (OUTPATIENT)
Dept: RADIOLOGY | Facility: HOSPITAL | Age: 70
End: 2025-07-22
Payer: MEDICARE

## 2025-07-22 ENCOUNTER — HOSPITAL ENCOUNTER (EMERGENCY)
Facility: HOSPITAL | Age: 70
Discharge: HOME | End: 2025-07-22
Attending: EMERGENCY MEDICINE
Payer: MEDICARE

## 2025-07-22 VITALS
HEART RATE: 62 BPM | RESPIRATION RATE: 18 BRPM | SYSTOLIC BLOOD PRESSURE: 106 MMHG | HEIGHT: 65 IN | TEMPERATURE: 97.7 F | OXYGEN SATURATION: 94 % | DIASTOLIC BLOOD PRESSURE: 89 MMHG | BODY MASS INDEX: 33.32 KG/M2 | WEIGHT: 200 LBS

## 2025-07-22 DIAGNOSIS — R10.30 LOWER ABDOMINAL PAIN: ICD-10-CM

## 2025-07-22 DIAGNOSIS — R33.9 ACUTE ON CHRONIC URINARY RETENTION: Primary | ICD-10-CM

## 2025-07-22 DIAGNOSIS — N39.0 ACUTE LOWER URINARY TRACT INFECTION: ICD-10-CM

## 2025-07-22 DIAGNOSIS — N28.9 RENAL INSUFFICIENCY: ICD-10-CM

## 2025-07-22 DIAGNOSIS — D72.829 LEUKOCYTOSIS, UNSPECIFIED TYPE: ICD-10-CM

## 2025-07-22 LAB
ALBUMIN SERPL BCP-MCNC: 4 G/DL (ref 3.4–5)
ALP SERPL-CCNC: 143 U/L (ref 33–136)
ALT SERPL W P-5'-P-CCNC: 11 U/L (ref 7–45)
ANION GAP SERPL CALCULATED.3IONS-SCNC: 13 MMOL/L (ref 10–20)
APPEARANCE UR: CLEAR
AST SERPL W P-5'-P-CCNC: 17 U/L (ref 9–39)
BACTERIA #/AREA URNS AUTO: ABNORMAL /HPF
BASOPHILS # BLD AUTO: 0.08 X10*3/UL (ref 0–0.1)
BASOPHILS NFR BLD AUTO: 0.7 %
BILIRUB SERPL-MCNC: 0.9 MG/DL (ref 0–1.2)
BILIRUB UR STRIP.AUTO-MCNC: NEGATIVE MG/DL
BUN SERPL-MCNC: 31 MG/DL (ref 6–23)
CALCIUM SERPL-MCNC: 9.2 MG/DL (ref 8.6–10.3)
CHLORIDE SERPL-SCNC: 105 MMOL/L (ref 98–107)
CO2 SERPL-SCNC: 26 MMOL/L (ref 21–32)
COLOR UR: ABNORMAL
CREAT SERPL-MCNC: 1.74 MG/DL (ref 0.5–1.05)
EGFRCR SERPLBLD CKD-EPI 2021: 32 ML/MIN/1.73M*2
EOSINOPHIL # BLD AUTO: 0.43 X10*3/UL (ref 0–0.7)
EOSINOPHIL NFR BLD AUTO: 3.6 %
ERYTHROCYTE [DISTWIDTH] IN BLOOD BY AUTOMATED COUNT: 14.1 % (ref 11.5–14.5)
GLUCOSE SERPL-MCNC: 96 MG/DL (ref 74–99)
GLUCOSE UR STRIP.AUTO-MCNC: NORMAL MG/DL
HCT VFR BLD AUTO: 34.9 % (ref 36–46)
HGB BLD-MCNC: 12.1 G/DL (ref 12–16)
IMM GRANULOCYTES # BLD AUTO: 0.03 X10*3/UL (ref 0–0.7)
IMM GRANULOCYTES NFR BLD AUTO: 0.3 % (ref 0–0.9)
KETONES UR STRIP.AUTO-MCNC: NEGATIVE MG/DL
LEUKOCYTE ESTERASE UR QL STRIP.AUTO: ABNORMAL
LYMPHOCYTES # BLD AUTO: 1.54 X10*3/UL (ref 1.2–4.8)
LYMPHOCYTES NFR BLD AUTO: 12.9 %
MCH RBC QN AUTO: 30.9 PG (ref 26–34)
MCHC RBC AUTO-ENTMCNC: 34.7 G/DL (ref 32–36)
MCV RBC AUTO: 89 FL (ref 80–100)
MONOCYTES # BLD AUTO: 1.21 X10*3/UL (ref 0.1–1)
MONOCYTES NFR BLD AUTO: 10.1 %
MUCOUS THREADS #/AREA URNS AUTO: ABNORMAL /LPF
NEUTROPHILS # BLD AUTO: 8.69 X10*3/UL (ref 1.2–7.7)
NEUTROPHILS NFR BLD AUTO: 72.4 %
NITRITE UR QL STRIP.AUTO: NEGATIVE
NRBC BLD-RTO: 0 /100 WBCS (ref 0–0)
PH UR STRIP.AUTO: 5 [PH]
PLATELET # BLD AUTO: 308 X10*3/UL (ref 150–450)
POTASSIUM SERPL-SCNC: 3.9 MMOL/L (ref 3.5–5.3)
PROT SERPL-MCNC: 7 G/DL (ref 6.4–8.2)
PROT UR STRIP.AUTO-MCNC: ABNORMAL MG/DL
RBC # BLD AUTO: 3.92 X10*6/UL (ref 4–5.2)
RBC # UR STRIP.AUTO: NEGATIVE MG/DL
RBC #/AREA URNS AUTO: ABNORMAL /HPF
SODIUM SERPL-SCNC: 140 MMOL/L (ref 136–145)
SP GR UR STRIP.AUTO: 1.02
SQUAMOUS #/AREA URNS AUTO: ABNORMAL /HPF
UROBILINOGEN UR STRIP.AUTO-MCNC: NORMAL MG/DL
WBC # BLD AUTO: 12 X10*3/UL (ref 4.4–11.3)
WBC #/AREA URNS AUTO: ABNORMAL /HPF
WBC CLUMPS #/AREA URNS AUTO: ABNORMAL /HPF

## 2025-07-22 PROCEDURE — 36415 COLL VENOUS BLD VENIPUNCTURE: CPT | Performed by: EMERGENCY MEDICINE

## 2025-07-22 PROCEDURE — 99284 EMERGENCY DEPT VISIT MOD MDM: CPT | Mod: 25 | Performed by: EMERGENCY MEDICINE

## 2025-07-22 PROCEDURE — 81003 URINALYSIS AUTO W/O SCOPE: CPT | Performed by: EMERGENCY MEDICINE

## 2025-07-22 PROCEDURE — 74176 CT ABD & PELVIS W/O CONTRAST: CPT | Performed by: RADIOLOGY

## 2025-07-22 PROCEDURE — 87086 URINE CULTURE/COLONY COUNT: CPT | Mod: WESLAB | Performed by: EMERGENCY MEDICINE

## 2025-07-22 PROCEDURE — 80053 COMPREHEN METABOLIC PANEL: CPT | Performed by: EMERGENCY MEDICINE

## 2025-07-22 PROCEDURE — 85025 COMPLETE CBC W/AUTO DIFF WBC: CPT | Performed by: EMERGENCY MEDICINE

## 2025-07-22 PROCEDURE — 74176 CT ABD & PELVIS W/O CONTRAST: CPT

## 2025-07-22 RX ORDER — ACETAMINOPHEN 325 MG/1
650 TABLET ORAL ONCE
Status: DISCONTINUED | OUTPATIENT
Start: 2025-07-22 | End: 2025-07-22 | Stop reason: HOSPADM

## 2025-07-22 RX ORDER — VALSARTAN 80 MG/1
80 TABLET ORAL
Qty: 100 TABLET | Refills: 1 | Status: SHIPPED | OUTPATIENT
Start: 2025-07-22

## 2025-07-22 RX ORDER — LEVOFLOXACIN 250 MG/1
250 TABLET, FILM COATED ORAL DAILY
Qty: 7 TABLET | Refills: 0 | Status: SHIPPED | OUTPATIENT
Start: 2025-07-22 | End: 2025-07-29

## 2025-07-22 ASSESSMENT — LIFESTYLE VARIABLES
EVER FELT BAD OR GUILTY ABOUT YOUR DRINKING: NO
HAVE YOU EVER FELT YOU SHOULD CUT DOWN ON YOUR DRINKING: NO
HAVE PEOPLE ANNOYED YOU BY CRITICIZING YOUR DRINKING: NO
TOTAL SCORE: 0
EVER HAD A DRINK FIRST THING IN THE MORNING TO STEADY YOUR NERVES TO GET RID OF A HANGOVER: NO

## 2025-07-22 ASSESSMENT — PAIN SCALES - GENERAL: PAINLEVEL_OUTOF10: 7

## 2025-07-22 ASSESSMENT — PAIN - FUNCTIONAL ASSESSMENT: PAIN_FUNCTIONAL_ASSESSMENT: 0-10

## 2025-07-22 ASSESSMENT — PAIN DESCRIPTION - LOCATION: LOCATION: PELVIS

## 2025-07-22 NOTE — DISCHARGE INSTRUCTIONS
Follow up with your primary care physician within 1 to 2 days for further management of your current symptoms, review of the urine culture results, and repeat check of your blood pressure.      Follow-up with urology as scheduled      Return to the emergency department sooner with worsening symptoms or onset of new symptoms

## 2025-07-22 NOTE — ED PROVIDER NOTES
Emergency Department Provider Note       History of Present Illness     History provided by: Patient  Limitations to History: None  External Records Reviewed with Brief Summary: None        Physical Exam   Triage vitals:  T 36.6 °C (97.9 °F)  HR 62  /80  RR 20  O2 96 % None (Room air)          Medical Decision Making & ED Course     The patient is a 69-year-old female presenting to the emergency department for evaluation of suprapubic discomfort with a Rush catheter in place.  The patient reportedly was seen in the emergency room last week and had a Rush catheter placed for urinary retention.  The patient reportedly has had a history of urinary retention in the past and has been seen by urology, Dr. Holt, in the past.  The patient did have a recurrence of the retention after removal of the Rush catheter in the past.  The patient states that the pain is in the suprapubic region.  No better or worse.  No radiation.  It is fairly constant.  She denies any headache or visual changes.  No chest pain or shortness of breath.  No fever or chills.  No focal weakness or numbness.  No vaginal discharge.  No diarrhea or constipation.  No recent injury or trauma.  All pertinent positives and negatives are recorded above.  All other systems reviewed and otherwise negative.  Vital signs within normal limits.  Physical exam with a well-nourished well-developed female in no acute distress.  HEENT exam within normal limits.  She has no evidence of airway compromise or respiratory distress.  Abdominal exam is benign.  She does not have any gross motor, neurologic or vascular deficits on exam.  She does have bilateral lower extremity pitting edema.  Pulses are equal bilaterally.      Oral acetaminophen ordered      Diagnostic labs mild renal insufficiency, mild leukocytosis and evidence of a possible urinary tract infection but otherwise unremarkable      CT abdomen pelvis wo IV contrast   Final Result   1. Bladder  decompressed about a Rush catheter. Mild stranding around   the bladder as can be seen with cystitis.        MACRO:   None.        Signed by: Sharad Bret 7/22/2025 2:31 PM   Dictation workstation:   [id number]           The patient does not have any evidence of airway compromise or respiratory distress on exam.  She is well-perfused on exam.  No evidence of hemodynamic instability.  She does have a benign abdominal exam.  CT shows a bladder that is decompressed around Rush catheter and some mild stranding around the bladder consistent with cystitis.  Diagnostic labs with mild renal insufficiency, mild leukocytosis and evidence of a possible urinary tract infection but otherwise unremarkable      The patient was released in good condition with a prescription for Levaquin.  She was instructed to follow-up with her primary care physician within 1 to 2 days for further management of her current symptoms and review of the urine culture results.  She will return to the emergency department sooner with worsening symptoms or onset of new symptoms.  She will follow-up with Dr. Holt as scheduled.      Impression/diagnosis  Suprapubic abdominal pain  Urinary retention, acute on chronic  Acute lower urinary tract infection  Renal insufficiency, chronic  Leukocytosis, unspecified      I reviewed the results of the diagnostic labs and diagnostic imaging.  Formal radiology reading was completed by the radiologist    Disposition   As a result of the work-up, the patient was discharged home.  she was informed of her diagnosis and instructed to come back with any concerns or worsening of condition.  she and was agreeable to the plan as discussed above.  she was given the opportunity to ask questions.  All of the patient's questions were answered.    Procedures   Procedures        Re Elaine MD  Emergency Medicine                                                       Re Elaine MD  07/22/25 1813       Re Elaine,  MD  07/22/25 8238

## 2025-07-22 NOTE — PROGRESS NOTES
Pharmacy Medication History Review    Tammy Walker is a 69 y.o. female. Pharmacy reviewed the patient's vbyon-on-teqpvneak medications and allergies for accuracy.    Medications ADDED:  Cranberry oral  Medications CHANGED:  Furosemide 40mg  Hydralazine 25mg  Lactobacillus acidophilus  Medications REMOVED:   None      The list below reflects the updated PTA list. Comments regarding how patient may be taking medications differently can be found in the Admit Orders Activity  Prior to Admission Medications   Prescriptions Last Dose Informant   CRANBERRY ORAL 7/22/2025 Family Member   Sig: Take 1 each by mouth once daily.   allopurinol (Zyloprim) 100 mg tablet 7/22/2025 Family Member   Sig: TAKE ONE TABLET BY MOUTH TWO TIMES A DAY   amLODIPine (Norvasc) 10 mg tablet 7/22/2025 Family Member   Sig: Take 1 tablet (10 mg) by mouth once daily.   aspirin 81 mg EC tablet 7/22/2025 Family Member   Sig: Take 1 tablet (81 mg) by mouth once daily.   atenolol (Tenormin) 50 mg tablet 7/22/2025 Family Member   Sig: Take 1 tablet (50 mg) by mouth 2 times a day.   atorvastatin (Lipitor) 40 mg tablet 7/21/2025 Evening Family Member   Sig: Take 1 tablet (40 mg) by mouth once daily at bedtime.   furosemide (Lasix) 40 mg tablet 7/21/2025 Family Member   Sig: Take 1 tablet (40 mg) by mouth once daily as needed (edema).   Patient taking differently: Take 0.5 tablets (20 mg) by mouth once daily as needed (edema).   hydrALAZINE (Apresoline) 25 mg tablet 7/22/2025 Family Member   Sig: Take 1 tablet (25 mg) by mouth 4 times a day.   Patient taking differently: Take 1 tablet (25 mg) by mouth 2 times a day.   lactobacillus acidophilus tablet tablet 7/22/2025 Family Member   Sig: Take 1 tablet by mouth 2 times a day.   Patient taking differently: Take 1 tablet by mouth once daily.   nystatin (Mycostatin) 100,000 unit/gram powder Past Week Family Member   Sig: Apply 1 Application topically 2 times a day. groin   pantoprazole (ProtoNix) 40 mg EC  tablet 7/22/2025 Family Member   Sig: Take 1 tablet (40 mg) by mouth once daily in the morning. Take before meals. Do not crush, chew, or split.   potassium chloride CR 10 mEq ER tablet 7/22/2025 Family Member   Sig: TAKE 1 TABLET BY MOUTH TWICE A DAY (DO NOT CRUSH, CHEW, OR SPLIT)   valsartan (Diovan) 80 mg tablet 7/22/2025 Family Member   Sig: TAKE 1 TABLET (80MG) BY MOUTH 2 TIMES A DAY      Facility-Administered Medications: None        The list below reflects the updated allergy list. Please review each documented allergy for additional clarification and justification.  Allergies  Reviewed by Genevieve Wylie CPhT on 7/22/2025   No Known Allergies         Pharmacy has been updated to [facility].    Sources used to complete the med history include dispense history, PTA medication list, family interview. Family is a good informant.    Below are additional concerns with the patient's PTA list.  None     Genevieve Wylie CPhT-Adv  Please reach out via [employer] Secure Chat for questions

## 2025-07-23 NOTE — ED NOTES
Psychiatric onsite. Report given. D.c. papers given. Scrips x 1 sent to pharmacy. Follow up with PCP. Advised on oral water hydration. Return should symptoms change/worsen.      Jeremiah Bustamante RN  07/22/25 2012

## 2025-07-25 ENCOUNTER — TELEPHONE (OUTPATIENT)
Dept: PRIMARY CARE | Facility: CLINIC | Age: 70
End: 2025-07-25
Payer: MEDICARE

## 2025-07-25 NOTE — TELEPHONE ENCOUNTER
"JALEEL Osorio - Patient is active with [facility], followed by Raisa Goff NP. Patient was treated and released from [facility] ED 6/26/25, 7/17/25 and 7/22/25. Phoned Idalmis/ sister and scheduled a [facility] visit with Raisa Goff NP 8/7/25 at 3:00 pm. Patient's sister requested a visit later in the day d/t her work schedule. She reported patient has a visit scheduled with Dr. Holt 8/8/25. She reported the ED stated she \"might have an infection\". Sister reported Levaquin was ordered. She is asking what the urine  culture noted. She also reported her family has a history of \"long QT\" so this medication concerns her. She reported patient feels \"sick to her stomach with a mushy BM today\". Patient did not eat this morning because she was not feeling good. Patient reported her \"bowels hurt\". No c/o ABD pian. No temperature. Rush draining clear yellow urine.   Jo, after review please forward message to Tiki for follow up with patient's sister  as I will be out of the office. Thank you.   "

## 2025-07-26 LAB — BACTERIA UR CULT: ABNORMAL

## 2025-07-28 ENCOUNTER — RESULTS FOLLOW-UP (OUTPATIENT)
Dept: PHARMACY | Facility: HOSPITAL | Age: 70
End: 2025-07-28
Payer: MEDICARE

## 2025-07-28 NOTE — PROGRESS NOTES
EDPD Note: Antibiotics Reviewed and Warranted    Contacted Mr./Mrs./Ms. Tammy Walker regarding a positive urine culture/result that was taken during their recent emergency room visit. I completed education with sister, Idalmis . The patient is being treated appropriately with Levofloxacin 250mg daily x7.     Patient presented to the ED for suprapubic discomfort with nixon catheter. History of urinary retention. Cystitis found on CT. At time of call, sister reports patient is doing well, feeling better, slowly improving. She has  a follow up appointment at the beginning of the month. Advised patient to finish full course of antibiotic and follow up with PCP or urgent care if symptoms do not completely resolve.      Susceptibility data from last 90 days.  Collected Specimen Info Organism Ampicillin Ciprofloxacin Levofloxacin Nitrofurantoin Penicillin Trimethoprim/Sulfamethoxazole Vancomycin   07/22/25 Urine from Indwelling (Nixon) Catheter Enterococcus faecalis  S  I  S  S  S  R  S   05/17/25 Blood culture from Peripheral Venipuncture Staphylococcus epidermidis               No further follow up needed from EDPD Team.     Nelida Poe, PharmD

## 2025-08-06 ENCOUNTER — TELEPHONE (OUTPATIENT)
Dept: PRIMARY CARE | Facility: CLINIC | Age: 70
End: 2025-08-06
Payer: MEDICARE

## 2025-08-06 NOTE — TELEPHONE ENCOUNTER
Idalmis returning call to office.  Address and insurance confirmed.  Verbal consent for provider visit to patient home obtained.  Travel screening negative.  One hour before and/or after scheduled visit time for provider to arrive discussed.  Patient appointment confirmed.

## 2025-08-07 ENCOUNTER — OFFICE VISIT (OUTPATIENT)
Dept: PRIMARY CARE | Facility: CLINIC | Age: 70
End: 2025-08-07
Payer: MEDICARE

## 2025-08-07 VITALS
OXYGEN SATURATION: 98 % | SYSTOLIC BLOOD PRESSURE: 140 MMHG | TEMPERATURE: 97.1 F | DIASTOLIC BLOOD PRESSURE: 90 MMHG | RESPIRATION RATE: 16 BRPM | HEART RATE: 61 BPM

## 2025-08-07 DIAGNOSIS — R06.09 DYSPNEA ON EXERTION: ICD-10-CM

## 2025-08-07 DIAGNOSIS — I50.43 ACUTE ON CHRONIC COMBINED SYSTOLIC AND DIASTOLIC CONGESTIVE HEART FAILURE: Primary | Chronic | ICD-10-CM

## 2025-08-07 DIAGNOSIS — Z74.09 DECLINING MOBILITY: ICD-10-CM

## 2025-08-07 DIAGNOSIS — N31.9 NEUROGENIC BLADDER: ICD-10-CM

## 2025-08-07 DIAGNOSIS — F43.23 ADJUSTMENT REACTION WITH ANXIETY AND DEPRESSION: Chronic | ICD-10-CM

## 2025-08-07 RX ORDER — SERTRALINE HYDROCHLORIDE 25 MG/1
25 TABLET, FILM COATED ORAL DAILY
Qty: 30 TABLET | Refills: 1 | Status: SHIPPED | OUTPATIENT
Start: 2025-08-07 | End: 2025-10-06

## 2025-08-07 RX ORDER — HYDROXYZINE HYDROCHLORIDE 25 MG/1
25 TABLET, FILM COATED ORAL EVERY 8 HOURS PRN
Qty: 90 TABLET | Refills: 0 | Status: SHIPPED | OUTPATIENT
Start: 2025-08-07 | End: 2025-09-06

## 2025-08-15 ENCOUNTER — HOME HEALTH ADMISSION (OUTPATIENT)
Dept: HOME HEALTH SERVICES | Facility: HOME HEALTH | Age: 70
End: 2025-08-15
Payer: MEDICARE

## 2025-08-15 PROBLEM — F43.23 ADJUSTMENT REACTION WITH ANXIETY AND DEPRESSION: Status: ACTIVE | Noted: 2025-08-15

## 2025-08-15 PROBLEM — N31.9 NEUROGENIC BLADDER: Status: ACTIVE | Noted: 2025-08-15

## 2025-08-17 ENCOUNTER — DOCUMENTATION (OUTPATIENT)
Dept: HOME HEALTH SERVICES | Facility: HOME HEALTH | Age: 70
End: 2025-08-17
Payer: MEDICARE

## 2025-08-20 ENCOUNTER — HOME CARE VISIT (OUTPATIENT)
Dept: HOME HEALTH SERVICES | Facility: HOME HEALTH | Age: 70
End: 2025-08-20
Payer: MEDICARE

## 2025-08-20 PROCEDURE — 169592 NO-PAY CLAIM PROCEDURE

## 2025-08-20 PROCEDURE — 0023 HH SOC

## 2025-08-20 PROCEDURE — G0299 HHS/HOSPICE OF RN EA 15 MIN: HCPCS | Mod: HHH

## 2025-08-20 PROCEDURE — 1090000001 HH PPS REVENUE CREDIT

## 2025-08-20 PROCEDURE — 1090000002 HH PPS REVENUE DEBIT

## 2025-08-21 PROCEDURE — 1090000002 HH PPS REVENUE DEBIT

## 2025-08-21 PROCEDURE — 1090000001 HH PPS REVENUE CREDIT

## 2025-08-22 PROCEDURE — 1090000001 HH PPS REVENUE CREDIT

## 2025-08-22 PROCEDURE — 1090000002 HH PPS REVENUE DEBIT

## 2025-08-23 PROCEDURE — 1090000001 HH PPS REVENUE CREDIT

## 2025-08-23 PROCEDURE — 1090000002 HH PPS REVENUE DEBIT

## 2025-08-24 VITALS
HEIGHT: 65 IN | HEART RATE: 60 BPM | DIASTOLIC BLOOD PRESSURE: 84 MMHG | WEIGHT: 200 LBS | OXYGEN SATURATION: 95 % | BODY MASS INDEX: 33.32 KG/M2 | SYSTOLIC BLOOD PRESSURE: 152 MMHG | RESPIRATION RATE: 18 BRPM | TEMPERATURE: 97.4 F

## 2025-08-24 PROCEDURE — 1090000001 HH PPS REVENUE CREDIT

## 2025-08-24 PROCEDURE — 1090000002 HH PPS REVENUE DEBIT

## 2025-08-24 ASSESSMENT — ENCOUNTER SYMPTOMS
LOWEST PAIN SEVERITY IN PAST 24 HOURS: 1/10
PAIN LOCATION - PAIN SEVERITY: 4/10
PAIN LOCATION: GENERALIZED
APPETITE LEVEL: FAIR
PAIN LOCATION - EXACERBATING FACTORS: MOVEMENT
AGGRESSION WITHIN DEFINED LIMITS: 1
PAIN LOCATION - PAIN DURATION: DAILY
ANGER WITHIN DEFINED LIMITS: 1
PAIN: 1
SLEEP QUALITY: FAIR
PAIN LOCATION - RELIEVING FACTORS: REST
PAIN SEVERITY GOAL: 0/10
HIGHEST PAIN SEVERITY IN PAST 24 HOURS: 6/10
SUBJECTIVE PAIN PROGRESSION: WAXING AND WANING
HYPERTENSION: 1
MUSCLE WEAKNESS: 1
PAIN LOCATION - PAIN FREQUENCY: CONSTANT
CHANGE IN APPETITE: UNCHANGED
FATIGUES EASILY: 1
BOWEL INCONTINENCE: 1
PERSON REPORTING PAIN: PATIENT
PAIN LOCATION - PAIN QUALITY: ACHE

## 2025-08-24 ASSESSMENT — ACTIVITIES OF DAILY LIVING (ADL)
ENTERING_EXITING_HOME: TWO PERSON
OASIS_M1830: 06

## 2025-08-25 PROCEDURE — 1090000001 HH PPS REVENUE CREDIT

## 2025-08-25 PROCEDURE — 1090000002 HH PPS REVENUE DEBIT

## 2025-08-26 ENCOUNTER — HOME CARE VISIT (OUTPATIENT)
Dept: HOME HEALTH SERVICES | Facility: HOME HEALTH | Age: 70
End: 2025-08-26
Payer: MEDICARE

## 2025-08-26 ENCOUNTER — HOSPITAL ENCOUNTER (EMERGENCY)
Facility: HOSPITAL | Age: 70
Discharge: HOME | End: 2025-08-26
Attending: STUDENT IN AN ORGANIZED HEALTH CARE EDUCATION/TRAINING PROGRAM
Payer: MEDICARE

## 2025-08-26 VITALS
TEMPERATURE: 97.3 F | RESPIRATION RATE: 20 BRPM | HEART RATE: 63 BPM | SYSTOLIC BLOOD PRESSURE: 138 MMHG | DIASTOLIC BLOOD PRESSURE: 73 MMHG | OXYGEN SATURATION: 97 %

## 2025-08-26 VITALS
HEIGHT: 65 IN | DIASTOLIC BLOOD PRESSURE: 72 MMHG | SYSTOLIC BLOOD PRESSURE: 133 MMHG | TEMPERATURE: 98.2 F | RESPIRATION RATE: 18 BRPM | WEIGHT: 200 LBS | BODY MASS INDEX: 33.32 KG/M2 | OXYGEN SATURATION: 94 % | HEART RATE: 62 BPM

## 2025-08-26 DIAGNOSIS — T83.9XXA COMPLICATION OF FOLEY CATHETER, INITIAL ENCOUNTER: ICD-10-CM

## 2025-08-26 DIAGNOSIS — Z91.89 AT RISK OF CATHETER-ASSOCIATED URINARY TRACT INFECTION: ICD-10-CM

## 2025-08-26 DIAGNOSIS — R10.9 ACUTE ABDOMINAL PAIN: Primary | ICD-10-CM

## 2025-08-26 LAB
ALBUMIN SERPL BCP-MCNC: 3.8 G/DL (ref 3.4–5)
ALP SERPL-CCNC: 142 U/L (ref 33–136)
ALT SERPL W P-5'-P-CCNC: 11 U/L (ref 7–45)
ANION GAP SERPL CALCULATED.3IONS-SCNC: 12 MMOL/L (ref 10–20)
APPEARANCE UR: ABNORMAL
AST SERPL W P-5'-P-CCNC: 16 U/L (ref 9–39)
BACTERIA #/AREA URNS AUTO: ABNORMAL /HPF
BASOPHILS # BLD AUTO: 0.08 X10*3/UL (ref 0–0.1)
BASOPHILS NFR BLD AUTO: 0.7 %
BILIRUB SERPL-MCNC: 0.9 MG/DL (ref 0–1.2)
BILIRUB UR STRIP.AUTO-MCNC: NEGATIVE MG/DL
BUN SERPL-MCNC: 22 MG/DL (ref 6–23)
CALCIUM SERPL-MCNC: 9.1 MG/DL (ref 8.6–10.3)
CHLORIDE SERPL-SCNC: 106 MMOL/L (ref 98–107)
CO2 SERPL-SCNC: 25 MMOL/L (ref 21–32)
COLOR UR: ABNORMAL
CREAT SERPL-MCNC: 1.6 MG/DL (ref 0.5–1.05)
EGFRCR SERPLBLD CKD-EPI 2021: 35 ML/MIN/1.73M*2
EOSINOPHIL # BLD AUTO: 0.58 X10*3/UL (ref 0–0.7)
EOSINOPHIL NFR BLD AUTO: 4.9 %
ERYTHROCYTE [DISTWIDTH] IN BLOOD BY AUTOMATED COUNT: 13.5 % (ref 11.5–14.5)
GLUCOSE SERPL-MCNC: 143 MG/DL (ref 74–99)
GLUCOSE UR STRIP.AUTO-MCNC: NORMAL MG/DL
HCT VFR BLD AUTO: 33.2 % (ref 36–46)
HGB BLD-MCNC: 11.4 G/DL (ref 12–16)
IMM GRANULOCYTES # BLD AUTO: 0.03 X10*3/UL (ref 0–0.7)
IMM GRANULOCYTES NFR BLD AUTO: 0.3 % (ref 0–0.9)
KETONES UR STRIP.AUTO-MCNC: NEGATIVE MG/DL
LEUKOCYTE ESTERASE UR QL STRIP.AUTO: ABNORMAL
LYMPHOCYTES # BLD AUTO: 2.17 X10*3/UL (ref 1.2–4.8)
LYMPHOCYTES NFR BLD AUTO: 18.4 %
MAGNESIUM SERPL-MCNC: 1.52 MG/DL (ref 1.6–2.4)
MCH RBC QN AUTO: 30.7 PG (ref 26–34)
MCHC RBC AUTO-ENTMCNC: 34.3 G/DL (ref 32–36)
MCV RBC AUTO: 90 FL (ref 80–100)
MONOCYTES # BLD AUTO: 0.86 X10*3/UL (ref 0.1–1)
MONOCYTES NFR BLD AUTO: 7.3 %
MUCOUS THREADS #/AREA URNS AUTO: ABNORMAL /LPF
NEUTROPHILS # BLD AUTO: 8.06 X10*3/UL (ref 1.2–7.7)
NEUTROPHILS NFR BLD AUTO: 68.4 %
NITRITE UR QL STRIP.AUTO: NEGATIVE
NRBC BLD-RTO: 0 /100 WBCS (ref 0–0)
PH UR STRIP.AUTO: 5.5 [PH]
PLATELET # BLD AUTO: 286 X10*3/UL (ref 150–450)
POTASSIUM SERPL-SCNC: 3.3 MMOL/L (ref 3.5–5.3)
PROT SERPL-MCNC: 7 G/DL (ref 6.4–8.2)
PROT UR STRIP.AUTO-MCNC: ABNORMAL MG/DL
RBC # BLD AUTO: 3.71 X10*6/UL (ref 4–5.2)
RBC # UR STRIP.AUTO: ABNORMAL MG/DL
RBC #/AREA URNS AUTO: >20 /HPF
SODIUM SERPL-SCNC: 140 MMOL/L (ref 136–145)
SP GR UR STRIP.AUTO: 1.02
SQUAMOUS #/AREA URNS AUTO: ABNORMAL /HPF
UROBILINOGEN UR STRIP.AUTO-MCNC: NORMAL MG/DL
WBC # BLD AUTO: 11.8 X10*3/UL (ref 4.4–11.3)
WBC #/AREA URNS AUTO: >50 /HPF
WBC CLUMPS #/AREA URNS AUTO: ABNORMAL /HPF

## 2025-08-26 PROCEDURE — G0300 HHS/HOSPICE OF LPN EA 15 MIN: HCPCS | Mod: HHH

## 2025-08-26 PROCEDURE — 83735 ASSAY OF MAGNESIUM: CPT | Performed by: STUDENT IN AN ORGANIZED HEALTH CARE EDUCATION/TRAINING PROGRAM

## 2025-08-26 PROCEDURE — 36415 COLL VENOUS BLD VENIPUNCTURE: CPT | Performed by: STUDENT IN AN ORGANIZED HEALTH CARE EDUCATION/TRAINING PROGRAM

## 2025-08-26 PROCEDURE — 87086 URINE CULTURE/COLONY COUNT: CPT | Mod: WESLAB | Performed by: STUDENT IN AN ORGANIZED HEALTH CARE EDUCATION/TRAINING PROGRAM

## 2025-08-26 PROCEDURE — 85025 COMPLETE CBC W/AUTO DIFF WBC: CPT | Performed by: STUDENT IN AN ORGANIZED HEALTH CARE EDUCATION/TRAINING PROGRAM

## 2025-08-26 PROCEDURE — 1090000001 HH PPS REVENUE CREDIT

## 2025-08-26 PROCEDURE — 81003 URINALYSIS AUTO W/O SCOPE: CPT | Performed by: STUDENT IN AN ORGANIZED HEALTH CARE EDUCATION/TRAINING PROGRAM

## 2025-08-26 PROCEDURE — 99283 EMERGENCY DEPT VISIT LOW MDM: CPT | Performed by: STUDENT IN AN ORGANIZED HEALTH CARE EDUCATION/TRAINING PROGRAM

## 2025-08-26 PROCEDURE — 1090000002 HH PPS REVENUE DEBIT

## 2025-08-26 PROCEDURE — 2500000001 HC RX 250 WO HCPCS SELF ADMINISTERED DRUGS (ALT 637 FOR MEDICARE OP): Performed by: STUDENT IN AN ORGANIZED HEALTH CARE EDUCATION/TRAINING PROGRAM

## 2025-08-26 PROCEDURE — 84075 ASSAY ALKALINE PHOSPHATASE: CPT | Performed by: STUDENT IN AN ORGANIZED HEALTH CARE EDUCATION/TRAINING PROGRAM

## 2025-08-26 RX ORDER — OXYBUTYNIN CHLORIDE 5 MG/1
5 TABLET ORAL ONCE
Status: DISCONTINUED | OUTPATIENT
Start: 2025-08-26 | End: 2025-08-27 | Stop reason: HOSPADM

## 2025-08-26 RX ORDER — ACETAMINOPHEN 500 MG
1000 TABLET ORAL ONCE
Status: DISCONTINUED | OUTPATIENT
Start: 2025-08-26 | End: 2025-08-27 | Stop reason: HOSPADM

## 2025-08-26 RX ORDER — CEPHALEXIN 500 MG/1
500 CAPSULE ORAL ONCE
Status: COMPLETED | OUTPATIENT
Start: 2025-08-26 | End: 2025-08-26

## 2025-08-26 RX ORDER — CEPHALEXIN 500 MG/1
500 CAPSULE ORAL 3 TIMES DAILY
Qty: 21 CAPSULE | Refills: 0 | Status: SHIPPED | OUTPATIENT
Start: 2025-08-26 | End: 2025-09-02

## 2025-08-26 RX ADMIN — CEPHALEXIN 500 MG: 500 CAPSULE ORAL at 23:15

## 2025-08-26 ASSESSMENT — ENCOUNTER SYMPTOMS
PAIN LOCATION - PAIN FREQUENCY: CONSTANT
PAIN LOCATION - PAIN QUALITY: ACHES
PAIN: 1
PAIN LOCATION - PAIN SEVERITY: 5/10
PAIN LOCATION - EXACERBATING FACTORS: FOLEY CATHETER
PAIN LOCATION: ABDOMEN
HIGHEST PAIN SEVERITY IN PAST 24 HOURS: 5/10

## 2025-08-26 ASSESSMENT — PAIN DESCRIPTION - PROGRESSION: CLINICAL_PROGRESSION: NOT CHANGED

## 2025-08-26 ASSESSMENT — PAIN DESCRIPTION - LOCATION: LOCATION: ABDOMEN

## 2025-08-26 ASSESSMENT — PAIN - FUNCTIONAL ASSESSMENT: PAIN_FUNCTIONAL_ASSESSMENT: 0-10

## 2025-08-26 ASSESSMENT — PAIN SCALES - GENERAL: PAINLEVEL_OUTOF10: 5 - MODERATE PAIN

## 2025-08-27 PROCEDURE — 1090000001 HH PPS REVENUE CREDIT

## 2025-08-27 PROCEDURE — 1090000002 HH PPS REVENUE DEBIT

## 2025-08-28 LAB — BACTERIA UR CULT: NORMAL

## 2025-08-28 PROCEDURE — 1090000002 HH PPS REVENUE DEBIT

## 2025-08-28 PROCEDURE — 1090000001 HH PPS REVENUE CREDIT

## 2025-08-28 PROCEDURE — G0180 MD CERTIFICATION HHA PATIENT: HCPCS | Performed by: NURSE PRACTITIONER

## 2025-08-29 ENCOUNTER — APPOINTMENT (OUTPATIENT)
Dept: PRIMARY CARE | Facility: CLINIC | Age: 70
End: 2025-08-29
Payer: MEDICARE

## 2025-08-29 PROCEDURE — 1090000001 HH PPS REVENUE CREDIT

## 2025-08-29 PROCEDURE — 1090000002 HH PPS REVENUE DEBIT

## 2025-08-30 PROCEDURE — 1090000002 HH PPS REVENUE DEBIT

## 2025-08-30 PROCEDURE — 1090000001 HH PPS REVENUE CREDIT
